# Patient Record
Sex: MALE | Race: WHITE | NOT HISPANIC OR LATINO | Employment: OTHER | URBAN - NONMETROPOLITAN AREA
[De-identification: names, ages, dates, MRNs, and addresses within clinical notes are randomized per-mention and may not be internally consistent; named-entity substitution may affect disease eponyms.]

---

## 2017-03-29 ENCOUNTER — CONSULT (OUTPATIENT)
Dept: ONCOLOGY | Facility: CLINIC | Age: 82
End: 2017-03-29

## 2017-03-29 VITALS
DIASTOLIC BLOOD PRESSURE: 54 MMHG | WEIGHT: 155 LBS | HEART RATE: 61 BPM | SYSTOLIC BLOOD PRESSURE: 144 MMHG | RESPIRATION RATE: 16 BRPM | HEIGHT: 72 IN | TEMPERATURE: 98.5 F | BODY MASS INDEX: 20.99 KG/M2

## 2017-03-29 DIAGNOSIS — D64.9 NORMOCYTIC ANEMIA: Primary | ICD-10-CM

## 2017-03-29 PROCEDURE — 99203 OFFICE O/P NEW LOW 30 MIN: CPT | Performed by: INTERNAL MEDICINE

## 2017-03-29 RX ORDER — AMLODIPINE BESYLATE 10 MG/1
10 TABLET ORAL
COMMUNITY
Start: 2014-10-27 | End: 2017-10-11

## 2017-03-29 RX ORDER — ASPIRIN 81 MG/1
TABLET, CHEWABLE ORAL
COMMUNITY
End: 2018-02-06 | Stop reason: SDUPTHER

## 2017-03-29 RX ORDER — SIMVASTATIN 40 MG
40 TABLET ORAL
COMMUNITY
Start: 2014-10-22 | End: 2020-06-29 | Stop reason: SDUPTHER

## 2017-03-29 RX ORDER — PANTOPRAZOLE SODIUM 20 MG/1
TABLET, DELAYED RELEASE ORAL
COMMUNITY
End: 2017-10-11

## 2017-03-29 RX ORDER — FLUTICASONE PROPIONATE 50 MCG
2 SPRAY, SUSPENSION (ML) NASAL
COMMUNITY
Start: 2016-10-13

## 2017-03-29 RX ORDER — MONTELUKAST SODIUM 10 MG/1
10 TABLET ORAL
COMMUNITY
Start: 2016-10-13 | End: 2022-05-10

## 2017-03-29 RX ORDER — NEBIVOLOL 10 MG/1
10 TABLET ORAL
COMMUNITY
Start: 2014-10-27 | End: 2017-10-11

## 2017-03-30 NOTE — PROGRESS NOTES
ID: 81 y.o. year old male from [place] [address]    PCP: LINDA Nuñez    REFERRING PHYSICIAN: Ms. Hanley    Reason for Consultation: Worsening Anemia    Dear Ms. Hanley    It is a pleasure to meet Mr. Trotter today.  As you know he is a very pleasant 81-year-old gentleman who I'm seeing in consultation for worsening normocytic anemia.  He reports that his hemoglobin was around 13 a few months ago and now it's down to 11.  In that.  He's had bleeding AVM and this colonoscopy.  He also has been having trouble swallowing for which she is undergoing EGD and possible dilation of the stricture.  He is also on chronic PPI use.  He does take an iron supplementation.  Clinically he does have some fatigue but overall he seems to be in reasonable health.            Past Medical History:   Diagnosis Date   • Basal cell carcinoma 2015   • COPD (chronic obstructive pulmonary disease)    • Diabetes mellitus    • Hypertension    • Melanoma 2012       History reviewed. No pertinent surgical history.    Social History     Social History   • Marital status:      Spouse name: N/A   • Number of children: N/A   • Years of education: N/A     Social History Main Topics   • Smoking status: Former Smoker     Quit date: 1961   • Smokeless tobacco: None   • Alcohol use None   • Drug use: None   • Sexual activity: Not Asked     Other Topics Concern   • None     Social History Narrative   • None       Family History   Problem Relation Age of Onset   • Diabetes Mother    • Stroke Mother    • Breast cancer Sister        Review of Systems:    16 point review of systems was performed and reviewed and scanned into the EMR      Current Outpatient Prescriptions:   •  albuterol (PROVENTIL) (5 MG/ML) 0.5% nebulizer solution, Inhale 2.5 mg Every 4 (Four) Hours., Disp: , Rfl:   •  amLODIPine (NORVASC) 10 MG tablet, Take 10 mg by mouth., Disp: , Rfl:   •  fluticasone (FLONASE) 50 MCG/ACT nasal spray, 2 sprays into each nostril., Disp:  , Rfl:   •  montelukast (SINGULAIR) 10 MG tablet, Take 10 mg by mouth., Disp: , Rfl:   •  nebivolol (BYSTOLIC) 10 MG tablet, Take 10 mg by mouth., Disp: , Rfl:   •  simvastatin (ZOCOR) 40 MG tablet, Take 40 mg by mouth., Disp: , Rfl:   •  sitaGLIPtin-metFORMIN (JANUMET)  MG per tablet, Take  by mouth., Disp: , Rfl:   •  aspirin 81 MG chewable tablet, Chew., Disp: , Rfl:   •  Multiple Vitamin (MULTIVITAMINS PO), Take  by mouth., Disp: , Rfl:   •  pantoprazole (PROTONIX) 20 MG EC tablet, Take  by mouth., Disp: , Rfl:     Allergies   Allergen Reactions   • Penicillins Swelling   • Sulfa Antibiotics Swelling       ECOG SCORE: 0    Objective     Vitals:    03/29/17 1403   BP: 144/54   Pulse: 61   Resp: 16   Temp: 98.5 °F (36.9 °C)       Physical Exam   Constitutional: He is oriented to person, place, and time. He appears well-developed and well-nourished.   HENT:   Head: Normocephalic.   Right Ear: External ear normal.   Left Ear: External ear normal.   Mouth/Throat: Oropharynx is clear and moist.   Eyes: EOM are normal. Pupils are equal, round, and reactive to light.   Neck: Normal range of motion.   Cardiovascular: Normal rate and regular rhythm.    Pulmonary/Chest: Effort normal.   Abdominal: Soft.   Musculoskeletal: Normal range of motion.   Neurological: He is alert and oriented to person, place, and time.   Skin: Skin is warm and dry.   Psychiatric: He has a normal mood and affect. His behavior is normal. Judgment and thought content normal.     Hemoglobin of 11.3 with an MCV of 93    Ferritin of 40    ASSESSMENT:  81-year-old gentleman with worsening anemia.  This appears to be an anemia of chronic disease.  I suspect there is a component related to his current difficulty eating.  I want to see how he does after his esophageal dilation.  At his age the possibility of myelodysplasia is also possible.  Anemia of age is also in the differential.  I think it's reasonable to watch and see if he improves in the  next month.  I've also recommended vitamin C with his iron to help with absorption.  Although I am less convinced that this is a deficiency. I will see him in 4 weeks after labs.      Thank you for allowing me to participate in the care of this patient.    Yours sincerely,    Delgado Larios MD  [facility]  Hematology and Oncology        Please note that portions of this note may have been completed with a voice recognition program. Efforts were made to edit the dictations, but occasionally words are mistranscribed.

## 2017-05-01 ENCOUNTER — RESULTS ENCOUNTER (OUTPATIENT)
Dept: ONCOLOGY | Facility: CLINIC | Age: 82
End: 2017-05-01

## 2017-05-01 DIAGNOSIS — D64.9 NORMOCYTIC ANEMIA: ICD-10-CM

## 2017-05-10 ENCOUNTER — OFFICE VISIT (OUTPATIENT)
Dept: ONCOLOGY | Facility: CLINIC | Age: 82
End: 2017-05-10

## 2017-05-10 VITALS
DIASTOLIC BLOOD PRESSURE: 51 MMHG | WEIGHT: 154 LBS | TEMPERATURE: 98 F | HEIGHT: 72 IN | SYSTOLIC BLOOD PRESSURE: 140 MMHG | BODY MASS INDEX: 20.86 KG/M2 | HEART RATE: 64 BPM | RESPIRATION RATE: 18 BRPM

## 2017-05-10 DIAGNOSIS — D64.9 NORMOCYTIC ANEMIA: Primary | ICD-10-CM

## 2017-05-10 PROCEDURE — 99213 OFFICE O/P EST LOW 20 MIN: CPT | Performed by: INTERNAL MEDICINE

## 2017-10-11 ENCOUNTER — RESULTS ENCOUNTER (OUTPATIENT)
Dept: ONCOLOGY | Facility: CLINIC | Age: 82
End: 2017-10-11

## 2017-10-11 ENCOUNTER — OFFICE VISIT (OUTPATIENT)
Dept: ONCOLOGY | Facility: CLINIC | Age: 82
End: 2017-10-11

## 2017-10-11 VITALS
WEIGHT: 152 LBS | RESPIRATION RATE: 16 BRPM | TEMPERATURE: 99.4 F | BODY MASS INDEX: 20.59 KG/M2 | DIASTOLIC BLOOD PRESSURE: 70 MMHG | HEART RATE: 70 BPM | HEIGHT: 72 IN | SYSTOLIC BLOOD PRESSURE: 142 MMHG

## 2017-10-11 DIAGNOSIS — D64.9 NORMOCYTIC ANEMIA: ICD-10-CM

## 2017-10-11 DIAGNOSIS — D64.9 NORMOCYTIC ANEMIA: Primary | ICD-10-CM

## 2017-10-11 PROCEDURE — 99213 OFFICE O/P EST LOW 20 MIN: CPT | Performed by: INTERNAL MEDICINE

## 2017-10-11 RX ORDER — TAMSULOSIN HYDROCHLORIDE 0.4 MG/1
1 CAPSULE ORAL DAILY
COMMUNITY
Start: 2017-08-02 | End: 2022-05-10

## 2017-10-11 RX ORDER — PANTOPRAZOLE SODIUM 40 MG/1
40 TABLET, DELAYED RELEASE ORAL DAILY
COMMUNITY
Start: 2017-07-16 | End: 2020-06-29

## 2017-10-11 RX ORDER — NEBIVOLOL HYDROCHLORIDE 20 MG/1
TABLET ORAL
COMMUNITY
Start: 2017-07-06 | End: 2018-02-06

## 2017-10-16 LAB
BASOPHILS # BLD AUTO: 0 X10E3/UL (ref 0–0.2)
BASOPHILS NFR BLD AUTO: 0 %
EOSINOPHIL # BLD AUTO: 0.2 X10E3/UL (ref 0–0.4)
EOSINOPHIL NFR BLD AUTO: 2 %
ERYTHROCYTE [DISTWIDTH] IN BLOOD BY AUTOMATED COUNT: 14.4 % (ref 12.3–15.4)
FERRITIN SERPL-MCNC: 87 NG/ML (ref 30–400)
HCT VFR BLD AUTO: 34 % (ref 37.5–51)
HGB BLD-MCNC: 11.4 G/DL (ref 12.6–17.7)
IMM GRANULOCYTES # BLD: 0 X10E3/UL (ref 0–0.1)
IMM GRANULOCYTES NFR BLD: 0 %
LYMPHOCYTES # BLD AUTO: 1.9 X10E3/UL (ref 0.7–3.1)
LYMPHOCYTES NFR BLD AUTO: 21 %
MCH RBC QN AUTO: 29.8 PG (ref 26.6–33)
MCHC RBC AUTO-ENTMCNC: 33.5 G/DL (ref 31.5–35.7)
MCV RBC AUTO: 89 FL (ref 79–97)
METHYLMALONATE SERPL-SCNC: 176 NMOL/L (ref 0–378)
MONOCYTES # BLD AUTO: 0.3 X10E3/UL (ref 0.1–0.9)
MONOCYTES NFR BLD AUTO: 4 %
NEUTROPHILS # BLD AUTO: 6.9 X10E3/UL (ref 1.4–7)
NEUTROPHILS NFR BLD AUTO: 73 %
PLATELET # BLD AUTO: 286 X10E3/UL (ref 150–379)
RBC # BLD AUTO: 3.83 X10E6/UL (ref 4.14–5.8)
TESTOST FREE SERPL-MCNC: 2.5 PG/ML (ref 6.6–18.1)
TESTOST SERPL-MCNC: 214 NG/DL (ref 264–916)
WBC # BLD AUTO: 9.5 X10E3/UL (ref 3.4–10.8)

## 2018-02-06 ENCOUNTER — OFFICE VISIT (OUTPATIENT)
Dept: PULMONOLOGY | Facility: CLINIC | Age: 83
End: 2018-02-06

## 2018-02-06 ENCOUNTER — DOCUMENTATION (OUTPATIENT)
Dept: OTHER | Facility: HOSPITAL | Age: 83
End: 2018-02-06

## 2018-02-06 VITALS
HEIGHT: 71 IN | HEART RATE: 76 BPM | DIASTOLIC BLOOD PRESSURE: 60 MMHG | WEIGHT: 152 LBS | TEMPERATURE: 98.1 F | OXYGEN SATURATION: 98 % | BODY MASS INDEX: 21.28 KG/M2 | RESPIRATION RATE: 18 BRPM | SYSTOLIC BLOOD PRESSURE: 130 MMHG

## 2018-02-06 DIAGNOSIS — R06.02 SHORTNESS OF BREATH: Primary | ICD-10-CM

## 2018-02-06 DIAGNOSIS — J44.9 CHRONIC OBSTRUCTIVE PULMONARY DISEASE, UNSPECIFIED COPD TYPE (HCC): ICD-10-CM

## 2018-02-06 DIAGNOSIS — J18.9 RECURRENT PNEUMONIA: ICD-10-CM

## 2018-02-06 DIAGNOSIS — J47.9 BRONCHIECTASIS WITHOUT COMPLICATION (HCC): ICD-10-CM

## 2018-02-06 DIAGNOSIS — R91.8 MASS OF LOWER LOBE OF LEFT LUNG: ICD-10-CM

## 2018-02-06 PROBLEM — K21.9 GERD (GASTROESOPHAGEAL REFLUX DISEASE): Status: ACTIVE | Noted: 2018-02-06

## 2018-02-06 PROCEDURE — 94726 PLETHYSMOGRAPHY LUNG VOLUMES: CPT | Performed by: INTERNAL MEDICINE

## 2018-02-06 PROCEDURE — 94060 EVALUATION OF WHEEZING: CPT | Performed by: INTERNAL MEDICINE

## 2018-02-06 PROCEDURE — 94729 DIFFUSING CAPACITY: CPT | Performed by: INTERNAL MEDICINE

## 2018-02-06 PROCEDURE — 99204 OFFICE O/P NEW MOD 45 MIN: CPT | Performed by: INTERNAL MEDICINE

## 2018-02-06 RX ORDER — SIMVASTATIN 40 MG
TABLET ORAL
COMMUNITY
Start: 2017-05-18 | End: 2022-05-02

## 2018-02-06 RX ORDER — FERROUS SULFATE 325(65) MG
325 TABLET ORAL
COMMUNITY

## 2018-02-06 RX ORDER — LEVOCETIRIZINE DIHYDROCHLORIDE 5 MG/1
5 TABLET, FILM COATED ORAL EVERY EVENING
COMMUNITY
Start: 2018-02-01 | End: 2020-06-12 | Stop reason: SDUPTHER

## 2018-02-06 RX ORDER — NEBIVOLOL 10 MG/1
TABLET ORAL
COMMUNITY
Start: 2016-11-15 | End: 2022-09-01 | Stop reason: SDUPTHER

## 2018-02-06 RX ORDER — FLUTICASONE PROPIONATE 50 MCG
2 SPRAY, SUSPENSION (ML) NASAL
COMMUNITY
Start: 2016-09-28 | End: 2018-04-18 | Stop reason: SDUPTHER

## 2018-02-06 RX ORDER — LEVALBUTEROL TARTRATE 45 UG/1
3 AEROSOL, METERED ORAL ONCE
Status: COMPLETED | OUTPATIENT
Start: 2018-02-06 | End: 2018-02-06

## 2018-02-06 RX ORDER — AMLODIPINE BESYLATE 10 MG/1
TABLET ORAL
COMMUNITY
Start: 2017-11-07 | End: 2022-05-02 | Stop reason: SDUPTHER

## 2018-02-06 RX ORDER — METOPROLOL SUCCINATE 50 MG/1
50 TABLET, EXTENDED RELEASE ORAL DAILY
COMMUNITY
Start: 2018-01-25 | End: 2020-06-29 | Stop reason: ALTCHOICE

## 2018-02-06 RX ADMIN — LEVALBUTEROL TARTRATE 3 PUFF: 45 AEROSOL, METERED ORAL at 12:25

## 2018-02-06 NOTE — PROGRESS NOTES
Complete PFT with Bronchodilator. Three puffs of Xopenex was given. Patient tolerated procedure well.

## 2018-02-06 NOTE — PROGRESS NOTES
Met patient, wife and daughter in lung nodule clinic with Dr. Herbert. Patient reports smoking cessation [id number] ago. He worked for [employer]. Indicates he has had pneumonia annually the last x3-4years. Last treatment for pneumonia was in November 2017. At that time he c/o fatigue and dry cough. Continues to have cough but denies production, SOA or pain. Scans and PFT's reviewed. Per Dr. Herbert repeat CT in April with f/u OV. Introduced lung navigator role and provided contact information. He v/u and agreeable to plan. He knows to call with questions or concerns.

## 2018-02-06 NOTE — PROGRESS NOTES
"Sorts Subjective:     Chief Complaint:   Chief Complaint   Patient presents with   • Lung Nodule       HPI:    Ricki Trotter is a 82 y.o. male seen in consultation at the request of Stacey MCKEON for evaluation of a lung nodule.    He has a history of recurrent \"pneumonia\".  He describes yearly episodes of increased cough and fatigue.  He typically does not have fever, sputum production, chest pain, or hemoptysis with these episodes.  He had an episode starting in November which has persisted longer than usual and has had more cough than usual.  He underwent a chest x-ray which revealed possible hilar prominence and he underwent a follow-up CAT scan of the chest last month which revealed a 2.6 x 3.6 cm masslike density in the left lower lobe associated with bronchiectasis and pleural scarring.  He is referred for further evaluation.  Also noted was granulomatous disease with partially calcified adenopathy in the hilar regions and some reticulonodular densities in the upper lobes most consistent with scarring    He has a history of COPD.  He does use albuterol on an as-needed basis.  He quit smoking in 1961.  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    He has a history of negative TB skin tests and has never been told he had histoplasmosis.    He has a history of a bronchoscopy in 1989 for unclear reasons.  He has not had any recent CAT scans though I think he had an evaluation several years ago in [place] for some type of lung issue.  He does follow with Dr. Gonzalez for normocytic anemia which has been attributed to age versus low-grade MDS and has esophageal stricture and recently underwent dilation.        Current medications are:   Current Outpatient Prescriptions:   •  amLODIPine (NORVASC) 10 MG tablet, TAKE 1 TABLET DAILY, Disp: , Rfl:   •  aspirin 81 MG tablet, Take  by mouth., Disp: , Rfl:   •  ferrous sulfate 325 (65 FE) MG " tablet, Take 325 mg by mouth Daily With Breakfast., Disp: , Rfl:   •  fluticasone (FLONASE) 50 MCG/ACT nasal spray, 2 sprays into each nostril., Disp: , Rfl:   •  fluticasone (FLONASE) 50 MCG/ACT nasal spray, 2 sprays into each nostril., Disp: , Rfl:   •  levocetirizine (XYZAL) 5 MG tablet, Take 5 mg by mouth Every Evening., Disp: , Rfl:   •  metoprolol succinate XL (TOPROL-XL) 50 MG 24 hr tablet, Take 50 mg by mouth Daily., Disp: , Rfl:   •  montelukast (SINGULAIR) 10 MG tablet, Take 10 mg by mouth., Disp: , Rfl:   •  Multiple Vitamin (MULTIVITAMINS PO), Take  by mouth., Disp: , Rfl:   •  Multiple Vitamins-Minerals (MULTIVITAMIN ADULT PO), Take 1 tablet by mouth., Disp: , Rfl:   •  nebivolol (BYSTOLIC) 10 MG tablet, TAKE 1 TABLET DAILY, Disp: , Rfl:   •  pantoprazole (PROTONIX) 40 MG EC tablet, Take 40 mg by mouth Daily., Disp: , Rfl:   •  simvastatin (ZOCOR) 40 MG tablet, Take 40 mg by mouth., Disp: , Rfl:   •  simvastatin (ZOCOR) 40 MG tablet, TAKE 1 TABLET NIGHTLY, Disp: , Rfl:   •  sitaGLIPtin-metFORMIN (JANUMET)  MG per tablet, Take  by mouth., Disp: , Rfl:   •  tamsulosin (FLOMAX) 0.4 MG capsule 24 hr capsule, 1 capsule Daily., Disp: , Rfl:   •  albuterol (PROVENTIL) (5 MG/ML) 0.5% nebulizer solution, Inhale 2.5 mg., Disp: , Rfl:   No current facility-administered medications for this visit. .      The patient's relevant past medical, surgical, family and social history were reviewed and updated in Epic as appropriate.     ROS:    Review of Systems  ROS documented in patient questionnaire ×12 systems.  Reviewed with patient.  Otherwise negative except as noted in HPI.    Objective:    Physical Exam   Constitutional: He is oriented to person, place, and time. He appears well-developed and well-nourished.   HENT:   Head: Normocephalic and atraumatic.   Nose: Nose normal.   Mouth/Throat: Oropharynx is clear and moist. No oropharyngeal exudate.   Eyes: Conjunctivae are normal. No scleral icterus.   Neck:  "No tracheal deviation present. No thyromegaly present.   Cardiovascular: Normal rate and regular rhythm.  Exam reveals no gallop and no friction rub.    No murmur heard.  Pulmonary/Chest: Effort normal. No respiratory distress. He has no wheezes. He has no rales.   LLL rub   Musculoskeletal: He exhibits no edema or deformity.   Neurological: He is alert and oriented to person, place, and time.   Skin: Skin is warm and dry. No rash noted.   Psychiatric: He has a normal mood and affect. His behavior is normal.   Nursing note and vitals reviewed.      Diagnostics:     Reviewed CT images and report from [facility].  They are as described below.    PFTs: Mild obstruction.  Mild restriction.  Normal diffusion.    Assessment:    Problem List Items Addressed This Visit        Pulmonary Problems    Mass of lower lobe of left lung    Overview     Probable inflammation associated with focal bronchiectasis         Relevant Orders    CT Chest Without Contrast    COPD (chronic obstructive pulmonary disease)    Relevant Medications    fluticasone (FLONASE) 50 MCG/ACT nasal spray    levocetirizine (XYZAL) 5 MG tablet      Other Visit Diagnoses     Shortness of breath    -  Primary    Relevant Medications    levalbuterol (XOPENEX HFA) inhaler 3 puff (Completed)    Other Relevant Orders    Pulmonary Function Test (Completed)          He has a density in the left lower lobe which measures 2.6 x 3.6 cm.  It is associated with bronchiectasis and pleural scarring as well as possibly a very small amount of pleural fluid.  He does have a rub on exam in the same area.  He has old granulomatous disease involving the roger and upper lobes.  The left lower lobe density is likely inflammatory and may be the source of his recurrent \"pneumonia\" however the symptoms are primarily those of a yearly episode of increased cough and fatigue.  He has never required hospitalization.    I would proceed in a conservative manner as I think malignancy is " unlikely.  Furthermore, he is symptomatically improving.  I would simply repeat the imaging in 3 months.  If there is improvement then would probably discontinue follow-up.  If there is stability then would continue follow-up for 2 years.  If there is worsening in the appearance of the imaging then would proceed with bronchoscopy and further workup.    Plan:     1. As above, follow-up CT scan in 3 months  2. Discussed all the above in detail with the patient and his family and all questions answered.  If the left lower lobe bronchiectasis is the source of the recurrent infection, given his age and the non-severity of the symptoms, I would not consider surgical resection for prevention of infection.  3. Gave him some recommendations on dust mitigation in the home as he clearly has indoor allergen type symptoms.  4. Follow-up with me after his follow-up CAT scan      Signed by  Gregorio Herbert MD

## 2018-04-03 ENCOUNTER — TRANSCRIBE ORDERS (OUTPATIENT)
Dept: PULMONOLOGY | Facility: CLINIC | Age: 83
End: 2018-04-03

## 2018-04-09 ENCOUNTER — RESULTS ENCOUNTER (OUTPATIENT)
Dept: ONCOLOGY | Facility: CLINIC | Age: 83
End: 2018-04-09

## 2018-04-09 DIAGNOSIS — D64.9 NORMOCYTIC ANEMIA: ICD-10-CM

## 2018-04-10 ENCOUNTER — TELEPHONE (OUTPATIENT)
Dept: OTHER | Facility: HOSPITAL | Age: 83
End: 2018-04-10

## 2018-04-10 DIAGNOSIS — R91.8 MASS OF LOWER LOBE OF LEFT LUNG: ICD-10-CM

## 2018-04-10 NOTE — TELEPHONE ENCOUNTER
Daughter returned VM left 04/09/2018. Lung navigator inquired if patient had CT chest done at outside facility prior to OV with Dr. Herbert 04/18/2018. Daughter confirmed he had CT chest at [facility] 04/09/2018. Requested they bring disc to appointment, she v/u and will obtain prior to OV. She knows to call with questions or concerns.

## 2018-04-18 ENCOUNTER — OFFICE VISIT (OUTPATIENT)
Dept: PULMONOLOGY | Facility: CLINIC | Age: 83
End: 2018-04-18

## 2018-04-18 VITALS
HEART RATE: 67 BPM | OXYGEN SATURATION: 96 % | DIASTOLIC BLOOD PRESSURE: 66 MMHG | RESPIRATION RATE: 16 BRPM | BODY MASS INDEX: 21.47 KG/M2 | TEMPERATURE: 98.6 F | SYSTOLIC BLOOD PRESSURE: 154 MMHG | WEIGHT: 153.38 LBS | HEIGHT: 71 IN

## 2018-04-18 DIAGNOSIS — J18.9 RECURRENT PNEUMONIA: ICD-10-CM

## 2018-04-18 DIAGNOSIS — R91.8 MASS OF LOWER LOBE OF LEFT LUNG: ICD-10-CM

## 2018-04-18 DIAGNOSIS — J44.9 CHRONIC OBSTRUCTIVE PULMONARY DISEASE, UNSPECIFIED COPD TYPE (HCC): ICD-10-CM

## 2018-04-18 DIAGNOSIS — J47.9 BRONCHIECTASIS WITHOUT COMPLICATION (HCC): Primary | ICD-10-CM

## 2018-04-18 PROCEDURE — 99214 OFFICE O/P EST MOD 30 MIN: CPT | Performed by: INTERNAL MEDICINE

## 2018-04-18 RX ORDER — CIPROFLOXACIN HYDROCHLORIDE 3.5 MG/ML
2 SOLUTION/ DROPS TOPICAL
COMMUNITY
Start: 2018-04-06 | End: 2018-12-12

## 2018-04-18 RX ORDER — PREDNISOLONE ACETATE 10 MG/ML
2 SUSPENSION/ DROPS OPHTHALMIC 4 TIMES DAILY
COMMUNITY
Start: 2018-04-13 | End: 2018-12-12

## 2018-04-18 NOTE — PROGRESS NOTES
"Subjective:     Chief Complaint:   Chief Complaint   Patient presents with   • Shortness of Breath     f/u       HPI:    Ricki Trotter is a 82 y.o. male here for follow-up of an abnormal CAT scan.    He has a history of recurrent \"pneumonia\".  He describes yearly episodes of increased cough and fatigue.  He typically does not have fever, sputum production, chest pain, or hemoptysis with these episodes.  He had an episode starting in November 2017 which persisted longer than usual and had more cough than usual.  He underwent a chest x-ray which revealed possible hilar prominence and he underwent a follow-up CAT scan of the chest which revealed a 2.6 x 3.6 cm masslike density in the left lower lobe associated with bronchiectasis and pleural scarring.  He was referred for further evaluation.  Also noted was granulomatous disease with partially calcified adenopathy in the hilar regions and some reticulonodular densities in the upper lobes most consistent with scarring     He has a history of COPD.  He quit smoking in 1961.  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     He has a history of negative TB skin tests and has never been told he had histoplasmosis.     He has a history of a bronchoscopy in 1989 for unclear reasons.  He had an evaluation several years ago in [place] for some type of lung issue.  He does follow with Dr. Gonzalez for normocytic anemia which has been attributed to age versus low-grade MDS and has esophageal stricture and recently underwent dilation.    Since I saw him in February he has improved symptomatically.  He denies any chest pain, cough, fever, or sputum production.  He is not that short of breath with exertion.  He had a follow-up CAT scan this month which revealed no change over February.  There is a 2.9 cm density in the left lower lobe associated with bronchiectasis and associated pleural thickening as well as a " small spiculated right upper lobe nodule which is stable.    Current medications are:   Current Outpatient Prescriptions:   •  albuterol (PROVENTIL) (5 MG/ML) 0.5% nebulizer solution, Inhale 2.5 mg., Disp: , Rfl:   •  amLODIPine (NORVASC) 10 MG tablet, TAKE 1 TABLET DAILY, Disp: , Rfl:   •  aspirin 81 MG tablet, Take  by mouth., Disp: , Rfl:   •  ciprofloxacin (CILOXAN) 0.3 % ophthalmic solution, Administer 2 drops to both eyes Every 2 (Two) Hours., Disp: , Rfl:   •  ferrous sulfate 325 (65 FE) MG tablet, Take 325 mg by mouth Daily With Breakfast., Disp: , Rfl:   •  fluticasone (FLONASE) 50 MCG/ACT nasal spray, 2 sprays into each nostril., Disp: , Rfl:   •  levocetirizine (XYZAL) 5 MG tablet, Take 5 mg by mouth Every Evening., Disp: , Rfl:   •  metoprolol succinate XL (TOPROL-XL) 50 MG 24 hr tablet, Take 50 mg by mouth Daily., Disp: , Rfl:   •  montelukast (SINGULAIR) 10 MG tablet, Take 10 mg by mouth., Disp: , Rfl:   •  Multiple Vitamin (MULTIVITAMINS PO), Take  by mouth., Disp: , Rfl:   •  Multiple Vitamins-Minerals (MULTIVITAMIN ADULT PO), Take 1 tablet by mouth., Disp: , Rfl:   •  nebivolol (BYSTOLIC) 10 MG tablet, TAKE 1 TABLET DAILY, Disp: , Rfl:   •  pantoprazole (PROTONIX) 40 MG EC tablet, Take 40 mg by mouth Daily., Disp: , Rfl:   •  prednisoLONE acetate (PRED FORTE) 1 % ophthalmic suspension, Administer 2 drops to both eyes 4 (Four) Times a Day., Disp: , Rfl:   •  simvastatin (ZOCOR) 40 MG tablet, Take 40 mg by mouth., Disp: , Rfl:   •  simvastatin (ZOCOR) 40 MG tablet, TAKE 1 TABLET NIGHTLY, Disp: , Rfl:   •  sitaGLIPtin-metFORMIN (JANUMET)  MG per tablet, Take  by mouth., Disp: , Rfl:   •  tamsulosin (FLOMAX) 0.4 MG capsule 24 hr capsule, 1 capsule Daily., Disp: , Rfl: .      The patient's relevant past medical, surgical, family and social history were reviewed and updated in Epic as appropriate.     ROS:    Review of Systems   Constitutional: Positive for fatigue. Negative for fever.    Respiratory: Negative for cough and shortness of breath.          Objective:    Physical Exam   Constitutional: He is oriented to person, place, and time. He appears well-developed and well-nourished.   HENT:   Head: Normocephalic and atraumatic.   Mouth/Throat: Oropharynx is clear and moist.   Neck: Neck supple. No thyromegaly present.   Cardiovascular: Normal rate and regular rhythm.  Exam reveals no gallop and no friction rub.    No murmur heard.  Pulmonary/Chest: Effort normal. No respiratory distress. He has no wheezes. He has no rales.   Left lower lobe pleural rub   Musculoskeletal: He exhibits no edema.   Neurological: He is alert and oriented to person, place, and time.   Skin: Skin is warm and dry.   Psychiatric: He has a normal mood and affect. His behavior is normal.   Vitals reviewed.      Diagnostics:     CT images were reviewed with the patient and his wife.  They're unchanged compared to February and are as described above.    Assessment:    Problem List Items Addressed This Visit        Pulmonary Problems    Mass of lower lobe of left lung    Overview     1. Probable inflammation associated with focal bronchiectasis  2. Stable CT scan         COPD (chronic obstructive pulmonary disease)    Recurrent pneumonia    Bronchiectasis without complication - Primary      Other Visit Diagnoses    None.         I continue to think the density in the left lower lobe is postinflammatory.  It has a very oblong shape and is associated with bronchiectasis and scarring which extends at the pleural surface.  I don't think there is any free flowing pleural fluid but the density in the pleural space appears to be some pleural thickening.  I think I may even detect some small calcifications in the pleural space.  I suspect this is a postinflammatory or postinfectious process.  He said he was told he had something in his left lung in the late 80s.  He also has a stable right upper lobe nodule which also appears to be  attached to linear densities consistent with scarring.  He does have a pleural rub which persists in the left lower lobe.  He does have a history of asbestos exposure.    Plan:     1. As he is asymptomatic I think we need to continue follow-up of the CT scan which is abnormal but stable.  I think would be prudent to perform a follow-up scan in the fall which will be 6 months from now.  He wanted to delay that further but at least early on I want to closely follow this area until I get better assurance that it is absolutely stable.  2. Continue Singulair and Flonase for his sinus congestion  3. Continue albuterol on an as-needed basis.  4. Follow-up with me after the above CAT scan or earlier for any new chest symptoms.    Discussed in detail with the patient.  He will call prior to his follow up visit for any new problems.    Signed by  Gregorio Herbert MD

## 2018-04-19 DIAGNOSIS — R91.1 NODULE OF LEFT LUNG: Primary | ICD-10-CM

## 2018-10-08 ENCOUNTER — TRANSCRIBE ORDERS (OUTPATIENT)
Dept: PULMONOLOGY | Facility: CLINIC | Age: 83
End: 2018-10-08

## 2018-10-15 DIAGNOSIS — R91.1 NODULE OF LEFT LUNG: ICD-10-CM

## 2018-12-12 ENCOUNTER — OFFICE VISIT (OUTPATIENT)
Dept: PULMONOLOGY | Facility: CLINIC | Age: 83
End: 2018-12-12

## 2018-12-12 VITALS
OXYGEN SATURATION: 98 % | SYSTOLIC BLOOD PRESSURE: 132 MMHG | WEIGHT: 158.13 LBS | BODY MASS INDEX: 21.42 KG/M2 | HEART RATE: 56 BPM | RESPIRATION RATE: 16 BRPM | TEMPERATURE: 97.8 F | DIASTOLIC BLOOD PRESSURE: 60 MMHG | HEIGHT: 72 IN

## 2018-12-12 DIAGNOSIS — J44.9 CHRONIC OBSTRUCTIVE PULMONARY DISEASE, UNSPECIFIED COPD TYPE (HCC): ICD-10-CM

## 2018-12-12 DIAGNOSIS — J18.9 RECURRENT PNEUMONIA: ICD-10-CM

## 2018-12-12 DIAGNOSIS — R91.8 MASS OF LOWER LOBE OF LEFT LUNG: Primary | ICD-10-CM

## 2018-12-12 PROCEDURE — 99214 OFFICE O/P EST MOD 30 MIN: CPT | Performed by: INTERNAL MEDICINE

## 2018-12-12 RX ORDER — SUCRALFATE 1 G/1
1 TABLET ORAL 2 TIMES DAILY
COMMUNITY
Start: 2018-10-29 | End: 2022-09-21

## 2018-12-12 RX ORDER — SITAGLIPTIN 100 MG/1
100 TABLET, FILM COATED ORAL DAILY
COMMUNITY
Start: 2018-11-01 | End: 2022-06-16

## 2018-12-12 NOTE — PROGRESS NOTES
"Subjective:     Chief Complaint:   Chief Complaint   Patient presents with   • Post CT     f/u       HPI:    Ricki Trotter is a 83 y.o. male here for follow-up of abnormal CAT scan    He has a history of recurrent \"pneumonia\".  He describes yearly episodes of increased cough and fatigue.  He typically does not have fever, sputum production, chest pain, or hemoptysis with these episodes.  He had an episode starting in November 2017 which persisted longer than usual and had more cough than usual.  He underwent a chest x-ray which revealed possible hilar prominence and he underwent a follow-up CAT scan of the chest which revealed a 2.6 x 3.6 cm masslike density in the left lower lobe associated with bronchiectasis and pleural scarring.  He was referred for further evaluation.  Also noted was granulomatous disease with partially calcified adenopathy in the hilar regions and some reticulonodular densities in the upper lobes most consistent with scarring     He has a history of COPD.  He quit smoking in 1961.  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     He has a history of negative TB skin tests and has never been told he had histoplasmosis.     He has a history of a bronchoscopy in 1989 for unclear reasons.  He had an evaluation several years ago in [place] for some type of lung issue.  He does follow with Dr. Gonzalez for normocytic anemia which has been attributed to age versus low-grade MDS and has esophageal stricture and recently underwent dilation.    He had a follow-up CAT scan in October.  This revealed complete stability of the masslike density in the left lower lobe associated with bronchiectasis as well as a smaller right upper lobe nodule associated with scarring.  Radiologist felt, as I did, that this is postinflammatory but recommended possible follow-up in a year    From the patient's standpoint he notes no acute pulmonary " symptoms.    Further details:    General symptoms:  - no fever  - weight stable  - no edema    Chest symptoms:  - no chest pain  - normal/appropriate cough    Sputum:  - normal  - denies hemoptysis    Upper airway:  - no sinus congestion or drainage    Reflux symptoms:  - reflux symptoms controlled with PPI    Exercise tolerance:  - dyspnea with moderate exertion    Recent imaging:  - CT of chest:  Stable findings compared to 6 months ago    Current medications are:   Current Outpatient Medications:   •  albuterol (PROVENTIL) (5 MG/ML) 0.5% nebulizer solution, Inhale 2.5 mg., Disp: , Rfl:   •  amLODIPine (NORVASC) 10 MG tablet, TAKE 1 TABLET DAILY, Disp: , Rfl:   •  aspirin 81 MG tablet, Take  by mouth., Disp: , Rfl:   •  ferrous sulfate 325 (65 FE) MG tablet, Take 325 mg by mouth Daily With Breakfast., Disp: , Rfl:   •  fluticasone (FLONASE) 50 MCG/ACT nasal spray, 2 sprays into each nostril., Disp: , Rfl:   •  levocetirizine (XYZAL) 5 MG tablet, Take 5 mg by mouth Every Evening., Disp: , Rfl:   •  metoprolol succinate XL (TOPROL-XL) 50 MG 24 hr tablet, Take 50 mg by mouth Daily., Disp: , Rfl:   •  montelukast (SINGULAIR) 10 MG tablet, Take 10 mg by mouth., Disp: , Rfl:   •  Multiple Vitamin (MULTIVITAMINS PO), Take  by mouth., Disp: , Rfl:   •  Multiple Vitamins-Minerals (MULTIVITAMIN ADULT PO), Take 1 tablet by mouth., Disp: , Rfl:   •  nebivolol (BYSTOLIC) 10 MG tablet, TAKE 1 TABLET DAILY, Disp: , Rfl:   •  pantoprazole (PROTONIX) 40 MG EC tablet, Take 40 mg by mouth Daily., Disp: , Rfl:   •  simvastatin (ZOCOR) 40 MG tablet, Take 40 mg by mouth., Disp: , Rfl:   •  simvastatin (ZOCOR) 40 MG tablet, TAKE 1 TABLET NIGHTLY, Disp: , Rfl:   •  tamsulosin (FLOMAX) 0.4 MG capsule 24 hr capsule, 1 capsule Daily., Disp: , Rfl:   •  JANUVIA 100 MG tablet, , Disp: , Rfl:   •  sucralfate (CARAFATE) 1 g tablet, , Disp: , Rfl: .      The patient's relevant past medical, surgical, family and social history were reviewed and  updated in Epic as appropriate.     ROS:    Review of Systems  ROS as documented in patient questionnaire unless as noted otherwise    Objective:    Physical Exam   Constitutional: He is oriented to person, place, and time. He appears well-developed and well-nourished.   HENT:   Head: Normocephalic and atraumatic.   Mouth/Throat: Oropharynx is clear and moist.   Neck: Neck supple. No thyromegaly present.   Cardiovascular: Normal rate and regular rhythm. Exam reveals no gallop and no friction rub.   No murmur heard.  Pulmonary/Chest: Effort normal. No respiratory distress. He has no wheezes. He has no rales.   Musculoskeletal: He exhibits no edema.   Neurological: He is alert and oriented to person, place, and time.   Skin: Skin is warm and dry.   Psychiatric: He has a normal mood and affect. His behavior is normal.   Vitals reviewed.      Diagnostics:     PFT:  - no additional    CXR:  - no additional    Assessment:    Problem List Items Addressed This Visit        Pulmonary Problems    Mass of lower lobe of left lung - Primary    Overview     1. Probable inflammation associated with focal bronchiectasis  2. Stable CT scan         COPD (chronic obstructive pulmonary disease) (CMS/HCC)    Recurrent pneumonia          Stable CT scan abnormalities.  Suspect these are postinflammatory associated with bronchiectasis.  Symptomatically he is stable.    Plan:     CT scan in one year  I will see him after the above unless he has new pulmonary symptoms before then.  Spirometry on return to clinic  Clinic follow-up  1 year    Discussed in detail with the patient.  He will call prior to his follow up visit for any new problems.    Signed by  Gregorio Herbert MD

## 2019-11-21 ENCOUNTER — TRANSCRIBE ORDERS (OUTPATIENT)
Dept: PULMONOLOGY | Facility: CLINIC | Age: 84
End: 2019-11-21

## 2019-11-27 DIAGNOSIS — R91.8 MASS OF LOWER LOBE OF LEFT LUNG: ICD-10-CM

## 2019-12-20 ENCOUNTER — OFFICE VISIT (OUTPATIENT)
Dept: PULMONOLOGY | Facility: CLINIC | Age: 84
End: 2019-12-20

## 2019-12-20 VITALS
SYSTOLIC BLOOD PRESSURE: 98 MMHG | TEMPERATURE: 98.7 F | OXYGEN SATURATION: 96 % | WEIGHT: 163 LBS | BODY MASS INDEX: 22.08 KG/M2 | HEART RATE: 72 BPM | HEIGHT: 72 IN | DIASTOLIC BLOOD PRESSURE: 50 MMHG

## 2019-12-20 DIAGNOSIS — J20.9 ACUTE BRONCHITIS, UNSPECIFIED ORGANISM: ICD-10-CM

## 2019-12-20 DIAGNOSIS — J47.9 BRONCHIECTASIS WITHOUT COMPLICATION (HCC): ICD-10-CM

## 2019-12-20 DIAGNOSIS — J44.9 CHRONIC OBSTRUCTIVE PULMONARY DISEASE, UNSPECIFIED COPD TYPE (HCC): ICD-10-CM

## 2019-12-20 DIAGNOSIS — R91.8 MASS OF LOWER LOBE OF LEFT LUNG: Primary | ICD-10-CM

## 2019-12-20 DIAGNOSIS — J18.9 RECURRENT PNEUMONIA: ICD-10-CM

## 2019-12-20 PROCEDURE — 94375 RESPIRATORY FLOW VOLUME LOOP: CPT | Performed by: INTERNAL MEDICINE

## 2019-12-20 PROCEDURE — 99214 OFFICE O/P EST MOD 30 MIN: CPT | Performed by: INTERNAL MEDICINE

## 2019-12-20 RX ORDER — PREDNISONE 10 MG/1
TABLET ORAL
Qty: 31 TABLET | Refills: 0 | Status: SHIPPED | OUTPATIENT
Start: 2019-12-20 | End: 2020-06-12

## 2019-12-20 RX ORDER — DOXYCYCLINE HYCLATE 100 MG/1
100 CAPSULE ORAL 2 TIMES DAILY
Qty: 20 CAPSULE | Refills: 0 | Status: SHIPPED | OUTPATIENT
Start: 2019-12-20 | End: 2020-06-12

## 2019-12-20 NOTE — PROGRESS NOTES
"Subjective:     Chief Complaint:   Chief Complaint   Patient presents with   • Shortness of Breath       HPI:    Ricki Trotter is a 84 y.o. male here for follow-up of abnormal CAT scan    He has a history of recurrent \"pneumonia\".  He describes yearly episodes of increased cough and fatigue.  He typically does not have fever, sputum production, chest pain, or hemoptysis with these episodes.  He had an episode starting in November 2017 which persisted longer than usual and had more cough than usual.  He underwent a chest x-ray which revealed possible hilar prominence and he underwent a follow-up CAT scan of the chest which revealed a 2.6 x 3.6 cm masslike density in the left lower lobe associated with bronchiectasis and pleural scarring.  He was referred for further evaluation.  Also noted was granulomatous disease with partially calcified adenopathy in the hilar regions and some reticulonodular densities in the upper lobes most consistent with scarring     He has a history of COPD.  He quit smoking in 1961.  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     He has a history of negative TB skin tests and has never been told he had histoplasmosis.     He has a history of a bronchoscopy in 1989 for unclear reasons.  He had an evaluation several years ago in [place] for some type of lung issue.  He does follow with Dr. Gonzalez for normocytic anemia which has been attributed to age versus low-grade MDS and has esophageal stricture and has a history of dilation.     He had a follow-up CAT scan in October 2018.  This revealed complete stability of the masslike density in the left lower lobe associated with bronchiectasis as well as a smaller right upper lobe nodule associated with scarring.  Radiologist felt, as I did, that this is postinflammatory but recommended possible follow-up in a year.    I last saw him in December of last year so this is a " one-year follow-up.  He has done symptomatically well until last week when he developed some increased dyspnea and wheezing.  This was really not associated with cough, production, or fever.  He denies any chest pain.    He had a follow-up CAT scan in [place] on 11/25.  There were multiple stable nodules but there was some increase in an area of left upper lobe atelectasis as well as some increase in the masslike density in the left lower lobe that was felt to represent rounded atelectasis.    Further details:    General symptoms:  - no fever  - weight stable  - no edema    Exercise tolerance:  - no unusual exercise limitation    Chest symptoms:  - mild cough    Sputum:  - normal  - denies hemoptysis    Upper airway:  - no sinus congestion or drainage    Recent imaging:  - CT of chest: Slight increase in some linear left upper lobe atelectasis and some change in morphology and possible slight increase in size of the left lower lobe density.  Images seen.    Current medications are:   Current Outpatient Medications:   •  albuterol (PROVENTIL) (5 MG/ML) 0.5% nebulizer solution, Inhale 2.5 mg., Disp: , Rfl:   •  amLODIPine (NORVASC) 10 MG tablet, TAKE 1 TABLET DAILY, Disp: , Rfl:   •  aspirin 81 MG tablet, Take  by mouth., Disp: , Rfl:   •  ferrous sulfate 325 (65 FE) MG tablet, Take 325 mg by mouth Daily With Breakfast., Disp: , Rfl:   •  fluticasone (FLONASE) 50 MCG/ACT nasal spray, 2 sprays into each nostril., Disp: , Rfl:   •  JANUVIA 100 MG tablet, , Disp: , Rfl:   •  levocetirizine (XYZAL) 5 MG tablet, Take 5 mg by mouth Every Evening., Disp: , Rfl:   •  metoprolol succinate XL (TOPROL-XL) 50 MG 24 hr tablet, Take 50 mg by mouth Daily., Disp: , Rfl:   •  montelukast (SINGULAIR) 10 MG tablet, Take 10 mg by mouth., Disp: , Rfl:   •  Multiple Vitamin (MULTIVITAMINS PO), Take  by mouth., Disp: , Rfl:   •  Multiple Vitamins-Minerals (MULTIVITAMIN ADULT PO), Take 1 tablet by mouth., Disp: , Rfl:   •  nebivolol  (BYSTOLIC) 10 MG tablet, TAKE 1 TABLET DAILY, Disp: , Rfl:   •  pantoprazole (PROTONIX) 40 MG EC tablet, Take 40 mg by mouth Daily., Disp: , Rfl:   •  simvastatin (ZOCOR) 40 MG tablet, Take 40 mg by mouth., Disp: , Rfl:   •  simvastatin (ZOCOR) 40 MG tablet, TAKE 1 TABLET NIGHTLY, Disp: , Rfl:   •  sucralfate (CARAFATE) 1 g tablet, , Disp: , Rfl:   •  tamsulosin (FLOMAX) 0.4 MG capsule 24 hr capsule, 1 capsule Daily., Disp: , Rfl: .      The patient's relevant past medical, surgical, family and social history were reviewed and updated in Epic as appropriate.     ROS:    Review of Systems  ROS as documented in patient questionnaire unless as noted otherwise    Objective:    Physical Exam   Constitutional: He is oriented to person, place, and time. He appears well-developed and well-nourished.   HENT:   Head: Normocephalic and atraumatic.   Mouth/Throat: Oropharynx is clear and moist.   Neck: Neck supple. No thyromegaly present.   Cardiovascular: Normal rate and regular rhythm. Exam reveals no gallop and no friction rub.   No murmur heard.  Pulmonary/Chest: Effort normal. No respiratory distress. He has no wheezes. He has no rales.   Musculoskeletal: He exhibits no edema.   Neurological: He is alert and oriented to person, place, and time.   Skin: Skin is warm and dry.   Psychiatric: He has a normal mood and affect. His behavior is normal.   Vitals reviewed.      Diagnostics:     PFT:  - moderate airway obstruction    CXR:  - no additional    Assessment/Plan:    Problem List Items Addressed This Visit        Pulmonary Problems    Mass of lower lobe of left lung - Primary    Overview     1. Probable inflammation associated with focal bronchiectasis  2. Stable CT scan         COPD (chronic obstructive pulmonary disease) (CMS/HCC)    Recurrent pneumonia    Bronchiectasis without complication (CMS/HCC)    Acute bronchitis            1. COPD: Some exacerbation due to what sounds like an acute bronchitis without systemic  symptoms.  Will give a 10-day course of doxycycline and a prednisone pulse and taper  2. Abnormal CAT scan: The longstanding density in the left lower lobe associate with bronchiectasis may be slightly bigger with slightly different morphology but I think this is an area of atelectasis/mucous plugging/chronic scarring and would expect some dynamic changes over time.  The changes are quite subtle and the size is within several millimeters of the size 2 years ago  3. Recurrent pneumonia: No further documented cases since his prior visit    Will plan on the antibiotics and steroids as above and will schedule a follow-up chest CT in 1 years time.  If he does not improve or has any new symptoms he will call prior to his follow-up.    Level of Risk Moderate due to: mild exacerbation of one chronic illness and prescription drug management    Discussed in detail with the patient.  He will call prior to his follow up visit for any new problems.    Signed by  Gregorio Herbert MD

## 2020-06-12 ENCOUNTER — OFFICE VISIT (OUTPATIENT)
Dept: PULMONOLOGY | Facility: CLINIC | Age: 85
End: 2020-06-12

## 2020-06-12 VITALS
OXYGEN SATURATION: 93 % | HEIGHT: 72 IN | DIASTOLIC BLOOD PRESSURE: 58 MMHG | SYSTOLIC BLOOD PRESSURE: 118 MMHG | BODY MASS INDEX: 22.19 KG/M2 | WEIGHT: 163.8 LBS | TEMPERATURE: 98.4 F | HEART RATE: 66 BPM

## 2020-06-12 DIAGNOSIS — K21.9 GASTROESOPHAGEAL REFLUX DISEASE, ESOPHAGITIS PRESENCE NOT SPECIFIED: ICD-10-CM

## 2020-06-12 DIAGNOSIS — J44.9 CHRONIC OBSTRUCTIVE PULMONARY DISEASE, UNSPECIFIED COPD TYPE (HCC): ICD-10-CM

## 2020-06-12 DIAGNOSIS — J47.9 BRONCHIECTASIS WITHOUT COMPLICATION (HCC): ICD-10-CM

## 2020-06-12 DIAGNOSIS — J18.9 RECURRENT PNEUMONIA: ICD-10-CM

## 2020-06-12 DIAGNOSIS — R09.82 POST-NASAL DRAINAGE: Primary | ICD-10-CM

## 2020-06-12 PROCEDURE — 99214 OFFICE O/P EST MOD 30 MIN: CPT | Performed by: NURSE PRACTITIONER

## 2020-06-12 RX ORDER — ESOMEPRAZOLE MAGNESIUM 40 MG/1
40 CAPSULE, DELAYED RELEASE ORAL
COMMUNITY
End: 2022-05-16

## 2020-06-12 RX ORDER — LEVOCETIRIZINE DIHYDROCHLORIDE 5 MG/1
5 TABLET, FILM COATED ORAL EVERY EVENING
Qty: 90 TABLET | Refills: 3 | Status: SHIPPED | OUTPATIENT
Start: 2020-06-12 | End: 2021-06-04

## 2020-06-12 NOTE — PROGRESS NOTES
[facility] Pulmonary Follow up    CHIEF COMPLAINT    Dyspnea/recurrent pneumonia    HISTORY OF PRESENT ILLNESS    Ricki Trotter is a 84 y.o.male here today for a follow-up.  He was last seen by Dr. Herbert in December.  He was scheduled for a follow-up in December 2020 with Dr. Herbert.  He is also scheduled for a repeat CT scan at that time as well.    He states he has had 2-3 episodes of pneumonia since his last appointment and has been treated with rounds of antibiotic and steroids.  He states his last antibiotic was 2 months ago.  He was treated with doxycycline and prednisone.  He states he had some uncontrolled reflux at the beginning of these episodes and is currently being followed by gastroenterologist.  He states that his GERD is now more controlled with medication.    He does state that he seems not back to baseline since his last treatment for pneumonia.  He continues to have some shortness of breath with activity but does not do much activity currently.  He states that he does have wheezing and a cough at nighttime.  He also feels that he has a lot of mucus at nighttime as well.    He denies fever, chills, sputum production, hemoptysis, night sweats, weight loss, chest pain or palpitations.  He states he has more of a dry cough.  He denies any lower extremity edema or calf tenderness.  He does have chronic postnasal drip and rhinorrhea.  He states he has been using Flonase on a twice a day basis.  He also is been taking Singulair at night.  He has noticed a slight improvement in his symptoms since taking both of these.  He continues to have reflux symptoms.  He does take Nexium on a daily basis.    He is up-to-date on his current vaccinations.  He is accompanied by his daughter today.    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   • COPD (chronic obstructive pulmonary disease) (CMS/HCC)   • Recurrent pneumonia   • Post-nasal drainage   • GERD  (gastroesophageal reflux disease)   • DM (diabetes mellitus) (CMS/HCC)   • HTN (hypertension)   • Bronchiectasis without complication (CMS/HCC)   • Acute bronchitis       Allergies   Allergen Reactions   • Penicillins Swelling   • Sulfa Antibiotics Swelling       Current Outpatient Medications:   •  albuterol (PROVENTIL) (5 MG/ML) 0.5% nebulizer solution, Inhale 2.5 mg., Disp: , Rfl:   •  amLODIPine (NORVASC) 10 MG tablet, TAKE 1 TABLET DAILY, Disp: , Rfl:   •  aspirin 81 MG tablet, Take  by mouth., Disp: , Rfl:   •  doxycycline (VIBRAMYCIN) 100 MG capsule, Take 1 capsule by mouth 2 (Two) Times a Day., Disp: 20 capsule, Rfl: 0  •  esomeprazole (nexIUM) 40 MG capsule, Take 40 mg by mouth Every Morning Before Breakfast., Disp: , Rfl:   •  ferrous sulfate 325 (65 FE) MG tablet, Take 325 mg by mouth Daily With Breakfast., Disp: , Rfl:   •  fluticasone (FLONASE) 50 MCG/ACT nasal spray, 2 sprays into each nostril., Disp: , Rfl:   •  JANUVIA 100 MG tablet, , Disp: , Rfl:   •  levocetirizine (XYZAL) 5 MG tablet, Take 1 tablet by mouth Every Evening., Disp: 90 tablet, Rfl: 3  •  metoprolol succinate XL (TOPROL-XL) 50 MG 24 hr tablet, Take 50 mg by mouth Daily., Disp: , Rfl:   •  montelukast (SINGULAIR) 10 MG tablet, Take 10 mg by mouth., Disp: , Rfl:   •  Multiple Vitamin (MULTIVITAMINS PO), Take  by mouth., Disp: , Rfl:   •  Multiple Vitamins-Minerals (MULTIVITAMIN ADULT PO), Take 1 tablet by mouth., Disp: , Rfl:   •  nebivolol (BYSTOLIC) 10 MG tablet, TAKE 1 TABLET DAILY, Disp: , Rfl:   •  predniSONE (DELTASONE) 10 MG tablet, Take 4 tabs daily x 3 days, then take 3 tabs daily x 3 days, then take 2 tabs daily x 3 days, then take 1 tab daily x 3 days, Disp: 31 tablet, Rfl: 0  •  simvastatin (ZOCOR) 40 MG tablet, Take 40 mg by mouth., Disp: , Rfl:   •  simvastatin (ZOCOR) 40 MG tablet, TAKE 1 TABLET NIGHTLY, Disp: , Rfl:   •  sucralfate (CARAFATE) 1 g tablet, , Disp: , Rfl:   •  tamsulosin (FLOMAX) 0.4 MG capsule 24 hr  "capsule, 1 capsule Daily., Disp: , Rfl:   •  pantoprazole (PROTONIX) 40 MG EC tablet, Take 40 mg by mouth Daily., Disp: , Rfl:   MEDICATION LIST AND ALLERGIES REVIEWED.    Social History     Tobacco Use   • Smoking status: Former Smoker     Last attempt to quit: 1961     Years since quittin.4   • Smokeless tobacco: Never Used   Substance Use Topics   • Alcohol use: Yes     Alcohol/week: 2.0 standard drinks     Types: 2 Cans of beer per week   • Drug use: No       FAMILY AND SOCIAL HISTORY REVIEWED.    Review of Systems   Constitutional: Positive for activity change. Negative for appetite change, fatigue, fever and unexpected weight change.   HENT: Positive for congestion, postnasal drip and rhinorrhea. Negative for sinus pressure, sore throat and voice change.    Eyes: Negative for visual disturbance.   Respiratory: Positive for cough and shortness of breath. Negative for chest tightness and wheezing.    Cardiovascular: Negative for chest pain, palpitations and leg swelling.   Gastrointestinal: Negative for abdominal distention, abdominal pain, nausea and vomiting.   Endocrine: Negative for cold intolerance and heat intolerance.   Genitourinary: Negative for difficulty urinating and urgency.   Musculoskeletal: Negative for arthralgias, back pain and neck pain.   Skin: Negative for color change and pallor.   Allergic/Immunologic: Negative for environmental allergies and food allergies.   Neurological: Negative for dizziness, syncope, weakness and light-headedness.   Hematological: Negative for adenopathy. Does not bruise/bleed easily.   Psychiatric/Behavioral: Negative for agitation and behavioral problems.   .    /58   Pulse 66   Temp 98.4 °F (36.9 °C)   Ht 181.6 cm (71.5\")   Wt 74.3 kg (163 lb 12.8 oz)   SpO2 93% Comment: resting at room air  BMI 22.53 kg/m²     Immunization History   Administered Date(s) Administered   • FLUAD TRI 65YR+ 10/18/2019   • Flu Vaccine Quad PF >18YRS 10/28/2014   • Flu " Vaccine Quad PF >36MO 10/18/2017, 10/22/2018   • Fluzone High Dose =>65 Years ([employer] ONLY) 10/29/2013   • Hepatitis A 04/30/2018, 11/01/2018   • Influenza TIV (IM) 10/15/2015, 11/16/2015   • Influenza, Unspecified 11/01/2014   • Tdap 06/13/2017       Physical Exam   Constitutional: He is oriented to person, place, and time. He appears well-developed and well-nourished.   HENT:   Head: Normocephalic and atraumatic.   Eyes: Pupils are equal, round, and reactive to light.   Neck: Normal range of motion. Neck supple. No thyromegaly present.   Cardiovascular: Normal rate, regular rhythm, normal heart sounds and intact distal pulses. Exam reveals no gallop and no friction rub.   No murmur heard.  Pulmonary/Chest: Effort normal and breath sounds normal. No respiratory distress. He has no wheezes. He has no rales. He exhibits no tenderness.   Abdominal: Soft. Bowel sounds are normal. There is no tenderness.   Musculoskeletal: Normal range of motion. He exhibits no edema.   Lymphadenopathy:     He has no cervical adenopathy.   Neurological: He is alert and oriented to person, place, and time.   Skin: Skin is warm and dry. Capillary refill takes less than 2 seconds. He is not diaphoretic.   Psychiatric: He has a normal mood and affect. His behavior is normal.   Nursing note and vitals reviewed.        RESULTS      PROBLEM LIST    Problem List Items Addressed This Visit        Respiratory    COPD (chronic obstructive pulmonary disease) (CMS/[facility])    Relevant Medications    levocetirizine (XYZAL) 5 MG tablet    Recurrent pneumonia    Relevant Medications    levocetirizine (XYZAL) 5 MG tablet    Bronchiectasis without complication (CMS/[facility])    Relevant Medications    levocetirizine (XYZAL) 5 MG tablet       Digestive    GERD (gastroesophageal reflux disease)    Relevant Medications    esomeprazole (nexIUM) 40 MG capsule       Other    Post-nasal drainage - Primary    Relevant Medications    levocetirizine (XYZAL) 5 MG tablet             DISCUSSION    Mr. Trotter was here for a follow-up of his recurrent pneumonias.  He has had 3 episodes of pneumonias since his last appointment in December.  I do suspect that reflux was playing a role in these recurrent pneumonias.  He does not appear to be infected in the office today.  I did advise him that if he were to have another episode of pneumonia in the near future he would probably need to have an EGD that was recommended by his gastroenterologist.    He will continue Flonase, Singulair and I am going to add Xyzal for his postnasal drainage.  I do suspect that postnasal drainage is contributing to his wheezing at nighttime.    He will continue Nexium daily for GERD.  We also discussed reflux precaution such as elevating the head of the bed at night with a wedge pillow, not eating 2 to 3 hours before bed and avoiding foods that trigger reflux symptoms.  He is going to buy a bed that will elevate the head of the bed.  He follows the other precautions closely.    I also encouraged him to use his albuterol nebulizers at least twice a day to see if this helps with opening his airways especially at nighttime.  I did give him a sample of Breo 100 to try for 2 weeks to see if this helped his shortness of breath during the day.  I did show him how to use the inhaler in the office today.  I did encourage him to rinse his mouth out thoroughly after each use.    He will follow-up in December with Dr. Herbert after his CT scan has been performed.  He would like a CT [facility] performed at [facility].  I will have this arranged in December.    I did advise him to call if he were to have worsening symptoms or need to be seen sooner.  I spent 25 minutes with the patient and family. I spent > 50% percent of this time counseling and discussing diagnosis, prognosis, diagnostic testing, evaluation, current status, treatment options and management.    Demetria Dasilva, APRN  06/12/202011:00  AM  Electronically signed     Please note that portions of this note were completed with a voice recognition program. Efforts were made to edit the dictations, but occasionally words are mistranscribed.      CC: Stacey Hanley APRN

## 2020-06-29 ENCOUNTER — OFFICE VISIT (OUTPATIENT)
Dept: CARDIOLOGY | Facility: HOSPITAL | Age: 85
End: 2020-06-29

## 2020-06-29 ENCOUNTER — HOSPITAL ENCOUNTER (OUTPATIENT)
Dept: CARDIOLOGY | Facility: HOSPITAL | Age: 85
Discharge: HOME | End: 2020-06-29
Admitting: NURSE PRACTITIONER

## 2020-06-29 ENCOUNTER — HOSPITAL ENCOUNTER (OUTPATIENT)
Dept: CARDIOLOGY | Facility: HOSPITAL | Age: 85
Discharge: HOME | End: 2020-06-29

## 2020-06-29 DIAGNOSIS — R00.2 PALPITATIONS: ICD-10-CM

## 2020-06-29 DIAGNOSIS — I49.3 PVC'S (PREMATURE VENTRICULAR CONTRACTIONS): ICD-10-CM

## 2020-06-29 DIAGNOSIS — R06.09 DOE (DYSPNEA ON EXERTION): ICD-10-CM

## 2020-06-29 DIAGNOSIS — R00.2 PALPITATIONS: Primary | ICD-10-CM

## 2020-06-29 PROCEDURE — 93010 ELECTROCARDIOGRAM REPORT: CPT | Performed by: INTERNAL MEDICINE

## 2020-06-29 PROCEDURE — 93005 ELECTROCARDIOGRAM TRACING: CPT | Performed by: NURSE PRACTITIONER

## 2020-06-29 PROCEDURE — 0296T HC EXT ECG > 48HR TO 21 DAY RCRD W/CONECT INTL RCRD: CPT

## 2020-06-29 PROCEDURE — 99214 OFFICE O/P EST MOD 30 MIN: CPT | Performed by: NURSE PRACTITIONER

## 2020-06-29 RX ORDER — BACLOFEN 5 MG/1
5 TABLET ORAL 3 TIMES DAILY
COMMUNITY
Start: 2020-06-19

## 2020-07-01 VITALS
RESPIRATION RATE: 18 BRPM | HEIGHT: 72 IN | SYSTOLIC BLOOD PRESSURE: 124 MMHG | BODY MASS INDEX: 22.22 KG/M2 | DIASTOLIC BLOOD PRESSURE: 64 MMHG | WEIGHT: 164.06 LBS | HEART RATE: 63 BPM | TEMPERATURE: 98 F | OXYGEN SATURATION: 96 %

## 2020-07-09 ENCOUNTER — TELEPHONE (OUTPATIENT)
Dept: CARDIOLOGY | Facility: HOSPITAL | Age: 85
End: 2020-07-09

## 2020-07-13 ENCOUNTER — HOSPITAL ENCOUNTER (OUTPATIENT)
Dept: CARDIOLOGY | Facility: HOSPITAL | Age: 85
End: 2020-07-13

## 2020-07-15 ENCOUNTER — TELEPHONE (OUTPATIENT)
Dept: CARDIOLOGY | Facility: HOSPITAL | Age: 85
End: 2020-07-15

## 2020-07-15 DIAGNOSIS — I49.3 PVC'S (PREMATURE VENTRICULAR CONTRACTIONS): ICD-10-CM

## 2020-07-15 DIAGNOSIS — I10 ESSENTIAL HYPERTENSION: Primary | ICD-10-CM

## 2020-07-15 DIAGNOSIS — I47.29 NSVT (NONSUSTAINED VENTRICULAR TACHYCARDIA) (HCC): ICD-10-CM

## 2020-07-15 DIAGNOSIS — R94.31 ABNORMAL ELECTROCARDIOGRAM (ECG) (EKG): ICD-10-CM

## 2020-07-15 NOTE — TELEPHONE ENCOUNTER
Spoke with patient's daughter and reviewed preliminary results of extended holter which showed nonsustained ventricular tachycardia, PVC burden of 8.2%, PAC burden of 4.6%.   Dr. Conde has reviewed the monitor and is requesting a stress test and an echo within the next week.  Patient's daughter verbalized understanding and will be called with that appointment.

## 2020-07-20 ENCOUNTER — HOSPITAL ENCOUNTER (OUTPATIENT)
Dept: NUCLEAR MEDICINE | Facility: HOSPITAL | Age: 85
Discharge: HOME | End: 2020-07-20

## 2020-07-20 ENCOUNTER — APPOINTMENT (OUTPATIENT)
Dept: NUCLEAR MEDICINE | Facility: HOSPITAL | Age: 85
End: 2020-07-20

## 2020-07-20 ENCOUNTER — HOSPITAL ENCOUNTER (OUTPATIENT)
Dept: CARDIOLOGY | Facility: HOSPITAL | Age: 85
Discharge: HOME | End: 2020-07-20
Admitting: NURSE PRACTITIONER

## 2020-07-20 ENCOUNTER — TELEPHONE (OUTPATIENT)
Dept: CARDIOLOGY | Facility: HOSPITAL | Age: 85
End: 2020-07-20

## 2020-07-20 ENCOUNTER — HOSPITAL ENCOUNTER (OUTPATIENT)
Dept: NUCLEAR MEDICINE | Facility: HOSPITAL | Age: 85
End: 2020-07-20

## 2020-07-20 DIAGNOSIS — R00.2 PALPITATIONS: ICD-10-CM

## 2020-07-20 DIAGNOSIS — R06.09 DOE (DYSPNEA ON EXERTION): ICD-10-CM

## 2020-07-20 LAB
MAXIMAL PREDICTED HEART RATE: 135 BPM
STRESS TARGET HR: 115 BPM

## 2020-07-20 PROCEDURE — 93306 TTE W/DOPPLER COMPLETE: CPT

## 2020-07-20 PROCEDURE — 93306 TTE W/DOPPLER COMPLETE: CPT | Performed by: INTERNAL MEDICINE

## 2020-07-22 ENCOUNTER — HOSPITAL ENCOUNTER (OUTPATIENT)
Dept: CARDIOLOGY | Facility: HOSPITAL | Age: 85
Discharge: HOME | End: 2020-07-22
Admitting: NURSE PRACTITIONER

## 2020-07-22 VITALS
BODY MASS INDEX: 22.96 KG/M2 | WEIGHT: 164 LBS | DIASTOLIC BLOOD PRESSURE: 78 MMHG | HEART RATE: 65 BPM | SYSTOLIC BLOOD PRESSURE: 138 MMHG | HEIGHT: 71 IN

## 2020-07-22 DIAGNOSIS — R94.31 ABNORMAL ELECTROCARDIOGRAM (ECG) (EKG): ICD-10-CM

## 2020-07-22 DIAGNOSIS — I49.3 PVC'S (PREMATURE VENTRICULAR CONTRACTIONS): ICD-10-CM

## 2020-07-22 DIAGNOSIS — I47.29 NSVT (NONSUSTAINED VENTRICULAR TACHYCARDIA) (HCC): ICD-10-CM

## 2020-07-22 DIAGNOSIS — I10 ESSENTIAL HYPERTENSION: ICD-10-CM

## 2020-07-22 LAB
BH CV STRESS BP STAGE 1: NORMAL
BH CV STRESS BP STAGE 3: NORMAL
BH CV STRESS COMMENTS STAGE 1: NORMAL
BH CV STRESS DOSE REGADENOSON STAGE 1: 0.4
BH CV STRESS DURATION MIN STAGE 1: 1
BH CV STRESS DURATION MIN STAGE 2: 1
BH CV STRESS DURATION MIN STAGE 3: 1
BH CV STRESS DURATION MIN STAGE 4: 1
BH CV STRESS DURATION SEC STAGE 2: 0
BH CV STRESS HR STAGE 1: 86
BH CV STRESS HR STAGE 2: 87
BH CV STRESS HR STAGE 3: 89
BH CV STRESS HR STAGE 4: 86
BH CV STRESS PROTOCOL 1: NORMAL
BH CV STRESS RECOVERY BP: NORMAL MMHG
BH CV STRESS RECOVERY HR: 86 BPM
BH CV STRESS STAGE 1: 1
BH CV STRESS STAGE 2: 2
BH CV STRESS STAGE 3: 3
BH CV STRESS STAGE 4: 4
LV EF NUC BP: 58 %
MAXIMAL PREDICTED HEART RATE: 135 BPM
PERCENT MAX PREDICTED HR: 65.93 %
STRESS BASELINE BP: NORMAL MMHG
STRESS BASELINE HR: 65 BPM
STRESS PERCENT HR: 78 %
STRESS POST PEAK BP: NORMAL MMHG
STRESS POST PEAK HR: 89 BPM
STRESS TARGET HR: 115 BPM

## 2020-07-22 PROCEDURE — 93018 CV STRESS TEST I&R ONLY: CPT | Performed by: INTERNAL MEDICINE

## 2020-07-22 PROCEDURE — 25010000002 REGADENOSON 0.4 MG/5ML SOLUTION: Performed by: NURSE PRACTITIONER

## 2020-07-22 PROCEDURE — 0 TECHNETIUM SESTAMIBI: Performed by: NURSE PRACTITIONER

## 2020-07-22 PROCEDURE — 93017 CV STRESS TEST TRACING ONLY: CPT

## 2020-07-22 PROCEDURE — A9500 TC99M SESTAMIBI: HCPCS | Performed by: NURSE PRACTITIONER

## 2020-07-22 PROCEDURE — 78452 HT MUSCLE IMAGE SPECT MULT: CPT | Performed by: INTERNAL MEDICINE

## 2020-07-22 PROCEDURE — 78452 HT MUSCLE IMAGE SPECT MULT: CPT

## 2020-07-22 RX ADMIN — TECHNETIUM TC 99M SESTAMIBI 1 DOSE: 1 INJECTION INTRAVENOUS at 07:35

## 2020-07-22 RX ADMIN — TECHNETIUM TC 99M SESTAMIBI 1 DOSE: 1 INJECTION INTRAVENOUS at 09:20

## 2020-07-22 RX ADMIN — REGADENOSON 0.4 MG: 0.08 INJECTION, SOLUTION INTRAVENOUS at 09:16

## 2020-07-23 ENCOUNTER — TELEPHONE (OUTPATIENT)
Dept: CARDIOLOGY | Facility: HOSPITAL | Age: 85
End: 2020-07-23

## 2020-07-23 NOTE — TELEPHONE ENCOUNTER
Reviewed stress and echo with patient's daughter. No ischemia noted. Patient to keep follow up appt next week in office to discuss medication changes. Patient's daughter verbalized understanding

## 2020-07-29 ENCOUNTER — APPOINTMENT (OUTPATIENT)
Dept: CARDIOLOGY | Facility: HOSPITAL | Age: 85
End: 2020-07-29

## 2020-07-30 ENCOUNTER — OFFICE VISIT (OUTPATIENT)
Dept: CARDIOLOGY | Facility: HOSPITAL | Age: 85
End: 2020-07-30

## 2020-07-30 VITALS
HEART RATE: 63 BPM | DIASTOLIC BLOOD PRESSURE: 59 MMHG | OXYGEN SATURATION: 96 % | TEMPERATURE: 98 F | HEIGHT: 72 IN | WEIGHT: 160 LBS | BODY MASS INDEX: 21.67 KG/M2 | SYSTOLIC BLOOD PRESSURE: 152 MMHG

## 2020-07-30 DIAGNOSIS — I10 ESSENTIAL HYPERTENSION: ICD-10-CM

## 2020-07-30 DIAGNOSIS — R06.09 DOE (DYSPNEA ON EXERTION): ICD-10-CM

## 2020-07-30 DIAGNOSIS — I49.3 PVC'S (PREMATURE VENTRICULAR CONTRACTIONS): Primary | ICD-10-CM

## 2020-07-30 PROCEDURE — 99214 OFFICE O/P EST MOD 30 MIN: CPT | Performed by: NURSE PRACTITIONER

## 2020-08-03 NOTE — PROGRESS NOTES
[facility]    [facility]  07/30/2020         Ricki Trotter  [address] [facility] [address] [facility] [place] [address]  [unfilled]    1935    Stacey Hanley APRN    Rikci Trotter is a 85 y.o. male.      Subjective:     Chief Complaint:  Palpitations and Follow-up           HPI 85 year old male who presents to the office today for ongoing evaluation of his pvcs. He notes that pvcs have improved and he notes that he has not experienced them in a few weeks. Notes dyspnea on exertion has resolved. Notes energy level has improved and notes that he is feeling better overall. Recently had stress and echo and both were acceptable studies.   Patient denies chest pain, chest pressure, palpitations, tachycardia, dyspnea, presyncope, syncope, orthopnea, PND, abdominal fullness, early satiety, claudication, cough or edema.      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   • COPD (chronic obstructive pulmonary disease) (CMS/HCC)   • Recurrent pneumonia   • Post-nasal drainage   • GERD (gastroesophageal reflux disease)   • DM (diabetes mellitus) (CMS/HCC)   • HTN (hypertension)   • Bronchiectasis without complication (CMS/HCC)   • Acute bronchitis       Past Medical History:   Diagnosis Date   • Basal cell carcinoma 2015   • COPD (chronic obstructive pulmonary disease) (CMS/HCC)    • Diabetes mellitus (CMS/HCC)    • Dyslipidemia    • Hypertension    • Melanoma (CMS/HCC) 2012       Past Surgical History:   Procedure Laterality Date   • COLONOSCOPY     • PROSTHODONTIC PROCEDURE         Family History   Problem Relation Age of Onset   • Diabetes Mother    • Stroke Mother    • Breast cancer Sister        Social History     Socioeconomic History   • Marital status:      Spouse name: Not on file   • Number of children: Not on file   • Years of education: Not on file   • Highest education level: Not on file   Tobacco Use   • Smoking status: Former  Smoker     Last attempt to quit:      Years since quittin.6   • Smokeless tobacco: Never Used   Substance and Sexual Activity   • Alcohol use: Yes     Alcohol/week: 2.0 standard drinks     Types: 2 Cans of beer per week   • Drug use: No   • Sexual activity: Defer   Social History Narrative    caffeine use: 2-3 serving of coffee daily       Allergies   Allergen Reactions   • Penicillins Swelling   • Sulfa Antibiotics Swelling         Current Outpatient Medications:   •  albuterol (PROVENTIL) (5 MG/ML) 0.5% nebulizer solution, Take 2.5 mg by nebulization Every 4 (Four) Hours As Needed., Disp: , Rfl:   •  amLODIPine (NORVASC) 10 MG tablet, TAKE 1 TABLET DAILY, Disp: , Rfl:   •  aspirin 81 MG tablet, Take 81 mg by mouth Daily., Disp: , Rfl:   •  Baclofen 5 MG tablet, Take 5 mg by mouth 3 (Three) Times a Day., Disp: , Rfl:   •  esomeprazole (nexIUM) 40 MG capsule, Take 40 mg by mouth Every Morning Before Breakfast., Disp: , Rfl:   •  ferrous sulfate 325 (65 FE) MG tablet, Take 325 mg by mouth Daily With Breakfast., Disp: , Rfl:   •  fluticasone (FLONASE) 50 MCG/ACT nasal spray, 2 sprays into each nostril., Disp: , Rfl:   •  JANUVIA 100 MG tablet, Take 100 mg by mouth Daily., Disp: , Rfl:   •  levocetirizine (XYZAL) 5 MG tablet, Take 1 tablet by mouth Every Evening., Disp: 90 tablet, Rfl: 3  •  montelukast (SINGULAIR) 10 MG tablet, Take 10 mg by mouth., Disp: , Rfl:   •  Multiple Vitamins-Minerals (MULTIVITAMIN ADULT PO), Take 1 tablet by mouth., Disp: , Rfl:   •  nebivolol (BYSTOLIC) 10 MG tablet, TAKE 1 TABLET DAILY, Disp: , Rfl:   •  simvastatin (ZOCOR) 40 MG tablet, TAKE 1 TABLET NIGHTLY, Disp: , Rfl:   •  sucralfate (CARAFATE) 1 g tablet, Take 1 g by mouth 2 (two) times a day., Disp: , Rfl:   •  tamsulosin (FLOMAX) 0.4 MG capsule 24 hr capsule, 1 capsule Daily., Disp: , Rfl:     The following portions of the patient's history were reviewed today and updated as appropriate: allergies, current medications,  "past family history, past medical history, past social history, past surgical history and problem list     Review of Systems   Constitution: Negative for chills, decreased appetite, diaphoresis, fever, malaise/fatigue, night sweats, weight gain and weight loss.   HENT: Negative for congestion, hearing loss, hoarse voice and nosebleeds.    Eyes: Negative for blurred vision, visual disturbance and visual halos.   Cardiovascular: Positive for palpitations. Negative for chest pain, claudication, cyanosis, dyspnea on exertion, irregular heartbeat, leg swelling, near-syncope, orthopnea, paroxysmal nocturnal dyspnea and syncope.   Respiratory: Negative for cough, hemoptysis, shortness of breath, sleep disturbances due to breathing, snoring, sputum production and wheezing.    Hematologic/Lymphatic: Negative for bleeding problem. Does not bruise/bleed easily.   Skin: Negative for dry skin, itching and rash.   Musculoskeletal: Negative for arthritis, falls, joint pain, joint swelling and myalgias.   Gastrointestinal: Negative for bloating, abdominal pain, constipation, diarrhea, flatus, heartburn, hematemesis, hematochezia, melena, nausea and vomiting.   Genitourinary: Negative for dysuria, frequency, hematuria, nocturia and urgency.   Neurological: Negative for excessive daytime sleepiness, dizziness, headaches, light-headedness, loss of balance and weakness.   Psychiatric/Behavioral: Negative for depression. The patient does not have insomnia and is not nervous/anxious.        Objective:     Vitals:    07/30/20 1534   BP: 152/59   BP Location: Left arm   Patient Position: Sitting   Cuff Size: Adult   Pulse: 63   Temp: 98 °F (36.7 °C)   TempSrc: Temporal   SpO2: 96%   Weight: 72.6 kg (160 lb)   Height: 181.6 cm (71.5\")       Body mass index is 22 kg/m².    Physical Exam   Constitutional: He is oriented to person, place, and time. He appears well-developed and well-nourished. He is active and cooperative. No distress.   HENT: "   Head: Normocephalic and atraumatic.   Mouth/Throat: Oropharynx is clear and moist.   Eyes: Pupils are equal, round, and reactive to light. Conjunctivae and EOM are normal.   Neck: Normal range of motion. Neck supple. No JVD present. No tracheal deviation present. No thyromegaly present.   Cardiovascular: Normal rate, regular rhythm, normal heart sounds and intact distal pulses.   Pulmonary/Chest: Effort normal and breath sounds normal.   Abdominal: Soft. Bowel sounds are normal. He exhibits no distension. There is no tenderness.   Musculoskeletal: Normal range of motion.   Neurological: He is alert and oriented to person, place, and time.   Skin: Skin is warm, dry and intact.   Psychiatric: He has a normal mood and affect. His behavior is normal.   Nursing note and vitals reviewed.      Lab and Diagnostic Review:  Results for orders placed or performed during the hospital encounter of 07/22/20   Stress Test With Myocardial Perfusion (1 Day)   Result Value Ref Range    BH CV STRESS PROTOCOL 1 Pharmacologic     Stage 1 1     Duration Min Stage 1 1     Stress Dose Regadenoson Stage 1 0.4     Stress Comments Stage 1 10 sec bolus injection     Stage 2 2     Duration Min Stage 2 1     Duration Sec Stage 2 0     Stage 3 3     Duration Min Stage 3 1     Stage 4 4     Duration Min Stage 4 1     Baseline HR 65 bpm    Baseline /78 mmHg    Target HR (85%) 115 bpm    Max. Pred. HR (100%) 135 bpm    HR Stage 1 86     BP Stage 1 140/44     HR Stage 2 87     HR Stage 3 89     BP Stage 3 138/58     HR Stage 4 86     Peak HR 89 bpm    Percent Max Pred HR 65.93 %    Percent Target HR 78 %    Peak /44 mmHg    Recovery HR 86 bpm    Recovery /68 mmHg    Nuc Stress EF 58 %     Extended holter showed pvc burden on 8.2%  EchoNormal left ventricular size with low normal LV systolic function, LVEF 50-55%.  2.  Mild concentric LVH.  3.  Grade 2 diastolic dysfunction.  4.  Normal right ventricular size and systolic  function.  5.  Severely increased left atrial volume index.  6.  Mild right atrial dilation.  7.  Aortic sclerosis without significant stenosis (max velocity 220 cm/s, max gradient 19 mmHg)  8.  Mild to moderate mitral regurgitation.  9.  Mild to moderate tricuspid regurgitation, RVSP 52 mmHg.    Assessment and Plan:   1. PVC's (premature ventricular contractions)  High burden on recent extended holter but has resolved. Continue BB    2. Essential hypertension  Well controlled  HTN Education provided today including signs and symptoms, medication management, daily blood pressure monitoring. Patient encouraged to call the Heart and Valve center with any abnormal readings.   3. RAI (dyspnea on exertion)  Resolved           It has been a pleasure to participate in the care of this patient.  Patient was instructed to call the [facility] with any questions, concerns, or worsening symptoms.    *Please note that portions of this note were completed with a voice recognition program. Efforts were made to edit the dictations, but occasionally words are mistranscribed.

## 2020-11-24 ENCOUNTER — TRANSCRIBE ORDERS (OUTPATIENT)
Dept: PULMONOLOGY | Facility: CLINIC | Age: 85
End: 2020-11-24

## 2020-12-14 ENCOUNTER — TRANSCRIBE ORDERS (OUTPATIENT)
Dept: PULMONOLOGY | Facility: CLINIC | Age: 85
End: 2020-12-14

## 2021-03-01 DIAGNOSIS — Z00.6 EXAMINATION FOR NORMAL COMPARISON FOR CLINICAL RESEARCH: Primary | ICD-10-CM

## 2021-03-10 ENCOUNTER — OFFICE VISIT (OUTPATIENT)
Dept: PULMONOLOGY | Facility: CLINIC | Age: 86
End: 2021-03-10

## 2021-03-10 VITALS
WEIGHT: 168.2 LBS | HEIGHT: 71 IN | OXYGEN SATURATION: 95 % | BODY MASS INDEX: 23.55 KG/M2 | RESPIRATION RATE: 18 BRPM | HEART RATE: 73 BPM | DIASTOLIC BLOOD PRESSURE: 70 MMHG | SYSTOLIC BLOOD PRESSURE: 140 MMHG | TEMPERATURE: 97.5 F

## 2021-03-10 DIAGNOSIS — J47.9 BRONCHIECTASIS WITHOUT COMPLICATION (HCC): ICD-10-CM

## 2021-03-10 DIAGNOSIS — J44.9 CHRONIC OBSTRUCTIVE PULMONARY DISEASE, UNSPECIFIED COPD TYPE (HCC): ICD-10-CM

## 2021-03-10 DIAGNOSIS — K21.9 GASTROESOPHAGEAL REFLUX DISEASE, UNSPECIFIED WHETHER ESOPHAGITIS PRESENT: Primary | ICD-10-CM

## 2021-03-10 PROCEDURE — 99214 OFFICE O/P EST MOD 30 MIN: CPT | Performed by: NURSE PRACTITIONER

## 2021-03-10 RX ORDER — INSULIN GLARGINE 300 U/ML
16 INJECTION, SOLUTION SUBCUTANEOUS DAILY
COMMUNITY
Start: 2021-02-01 | End: 2022-04-15

## 2021-03-11 NOTE — PROGRESS NOTES
[facility] Pulmonary Follow up    CHIEF COMPLAINT    Recurrent pneumonias    HISTORY OF PRESENT ILLNESS    Ricki Trotter is a 85 y.o.male here today for follow-up of his recurrent pneumonias.  He was last seen in the office by me in June.  He states that he did have one episode of pneumonia in December and was treated with antibiotics and steroids.    He has been followed in this office for an abnormal CAT scan since 2017.  He had an abnormal CT scan in 2017 that showed a 2.6 x 3.6 cm masslike density in the left lower lobe which was associated with bronchiectasis and pleural scarring and was referred to our office.  We have followed this area with serial CT scans since this time.  His last CT scan was in December 2020 and he is here to discuss the results.    He states that he is doing fairly well from a pulmonary standpoint.  He has minimal shortness of breath with exertion.  He does recover fairly quickly at rest.  He uses no maintenance inhalers.    He denies fever, chills, sputum production, hemoptysis, night sweats, weight loss, chest pain or palpitations.  He denies any lower extremity edema or calf tenderness.  He has occasional sinus and allergy symptoms.  He does take Flonase, Xyzal and Singulair on a regular basis.  He states that his reflux has been improved with the change of his medication.  He feels that reflux was causing majority of his difficulties with pneumonias.  He has noticed an improvement in his reflux symptoms since his medication changes.    He is up-to-date on his current vaccinations.    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   • COPD (chronic obstructive pulmonary disease) (CMS/HCC)   • Recurrent pneumonia   • Post-nasal drainage   • GERD (gastroesophageal reflux disease)   • DM (diabetes mellitus) (CMS/HCC)   • HTN (hypertension)   • Bronchiectasis without complication (CMS/HCC)   • Acute bronchitis       Allergies   Allergen  Reactions   • Penicillins Swelling   • Sulfa Antibiotics Swelling       Current Outpatient Medications:   •  albuterol (PROVENTIL) (5 MG/ML) 0.5% nebulizer solution, Take 2.5 mg by nebulization Every 4 (Four) Hours As Needed., Disp: , Rfl:   •  amLODIPine (NORVASC) 10 MG tablet, TAKE 1 TABLET DAILY, Disp: , Rfl:   •  aspirin 81 MG tablet, Take 81 mg by mouth Daily., Disp: , Rfl:   •  Baclofen 5 MG tablet, Take 5 mg by mouth 3 (Three) Times a Day., Disp: , Rfl:   •  esomeprazole (nexIUM) 40 MG capsule, Take 40 mg by mouth Every Morning Before Breakfast., Disp: , Rfl:   •  ferrous sulfate 325 (65 FE) MG tablet, Take 325 mg by mouth Daily With Breakfast., Disp: , Rfl:   •  fluticasone (FLONASE) 50 MCG/ACT nasal spray, 2 sprays into each nostril., Disp: , Rfl:   •  JANUVIA 100 MG tablet, Take 100 mg by mouth Daily., Disp: , Rfl:   •  levocetirizine (XYZAL) 5 MG tablet, Take 1 tablet by mouth Every Evening., Disp: 90 tablet, Rfl: 3  •  montelukast (SINGULAIR) 10 MG tablet, Take 10 mg by mouth., Disp: , Rfl:   •  Multiple Vitamins-Minerals (MULTIVITAMIN ADULT PO), Take 1 tablet by mouth., Disp: , Rfl:   •  nebivolol (BYSTOLIC) 10 MG tablet, TAKE 1 TABLET DAILY, Disp: , Rfl:   •  simvastatin (ZOCOR) 40 MG tablet, TAKE 1 TABLET NIGHTLY, Disp: , Rfl:   •  sucralfate (CARAFATE) 1 g tablet, Take 1 g by mouth 2 (two) times a day., Disp: , Rfl:   •  tamsulosin (FLOMAX) 0.4 MG capsule 24 hr capsule, 1 capsule Daily., Disp: , Rfl:   •  Toujeo Max SoloStar 300 UNIT/ML solution pen-injector injection, Inject 16 Units under the skin into the appropriate area as directed Daily., Disp: , Rfl:   MEDICATION LIST AND ALLERGIES REVIEWED.    Social History     Tobacco Use   • Smoking status: Former Smoker     Quit date:      Years since quittin.2   • Smokeless tobacco: Never Used   Substance Use Topics   • Alcohol use: Yes     Alcohol/week: 2.0 standard drinks     Types: 2 Cans of beer per week   • Drug use: No       FAMILY AND  "SOCIAL HISTORY REVIEWED.    Review of Systems   Constitutional: Positive for fatigue. Negative for activity change, appetite change, fever and unexpected weight change.   HENT: Negative for congestion, postnasal drip, rhinorrhea, sinus pressure, sore throat and voice change.    Eyes: Negative for visual disturbance.   Respiratory: Positive for shortness of breath. Negative for cough, chest tightness and wheezing.    Cardiovascular: Negative for chest pain, palpitations and leg swelling.   Gastrointestinal: Negative for abdominal distention, abdominal pain, nausea and vomiting.   Endocrine: Negative for cold intolerance and heat intolerance.   Genitourinary: Negative for difficulty urinating and urgency.   Musculoskeletal: Negative for arthralgias, back pain and neck pain.   Skin: Negative for color change and pallor.   Allergic/Immunologic: Negative for environmental allergies and food allergies.   Neurological: Negative for dizziness, syncope, weakness and light-headedness.   Hematological: Negative for adenopathy. Does not bruise/bleed easily.   Psychiatric/Behavioral: Negative for agitation and behavioral problems.   .    /70   Pulse 73   Temp 97.5 °F (36.4 °C)   Resp 18   Ht 181.6 cm (71.5\")   Wt 76.3 kg (168 lb 3.2 oz)   SpO2 95% Comment: RA  BMI 23.13 kg/m²     Immunization History   Administered Date(s) Administered   • FLUAD TRI 65YR+ 10/18/2019   • Flu Vaccine Quad PF >18YRS 10/28/2014   • Flu Vaccine Quad PF >36MO 10/18/2017, 10/22/2018   • Fluzone High Dose =>65 Years ([employer] ONLY) 10/29/2013   • Hepatitis A 04/30/2018, 11/01/2018   • Influenza TIV (IM) 10/15/2015, 11/16/2015   • Influenza, Unspecified 11/01/2014   • Tdap 06/13/2017       Physical Exam  Vitals and nursing note reviewed.   Constitutional:       Appearance: He is well-developed. He is not diaphoretic.   HENT:      Head: Normocephalic and atraumatic.   Eyes:      Pupils: Pupils are equal, round, and reactive to light.   Neck: "      Thyroid: No thyromegaly.   Cardiovascular:      Rate and Rhythm: Normal rate and regular rhythm.      Heart sounds: Murmur present. No friction rub. No gallop.    Pulmonary:      Effort: Pulmonary effort is normal. No respiratory distress.      Breath sounds: Normal breath sounds. No wheezing or rales.   Chest:      Chest wall: No tenderness.   Abdominal:      General: Bowel sounds are normal.      Palpations: Abdomen is soft.      Tenderness: There is no abdominal tenderness.   Musculoskeletal:         General: No swelling. Normal range of motion.      Cervical back: Normal range of motion and neck supple.   Lymphadenopathy:      Cervical: No cervical adenopathy.   Skin:     General: Skin is warm and dry.      Capillary Refill: Capillary refill takes less than 2 seconds.   Neurological:      Mental Status: He is alert and oriented to person, place, and time.   Psychiatric:         Behavior: Behavior normal.           RESULTS    CT chest without contrast on 12/14/2020: Small likely chronic and exudative left pleural effusion with rounded atelectasis at left lung base measuring only 3 x 3 cm.  In addition, biapical upper lobe scarring and clustered nodules with mediastinal granulomas, suspect chronic granulomatous process such as either sarcoidosis or silicosis.  Recommend short-term follow-up with CT chest in 6 months to confirm stability.    PROBLEM LIST    Problem List Items Addressed This Visit        Gastrointestinal Abdominal     GERD (gastroesophageal reflux disease) - Primary       Pulmonary and Pneumonias    COPD (chronic obstructive pulmonary disease) (CMS/HCC)    Bronchiectasis without complication (CMS/HCC)            DISCUSSION    Mr. Trotter was here for follow-up of a CT scan that had back in December.  I had Dr. Herbert review the CT scan and compared to his previous scans the left lung base area has been unchanged in 1 year when compared to his previous scans.  Dr. Herbert recommended no  further imaging at this time.    He will continue to take his medication for GERD.  We also discussed reflux precautions in the office today such as elevating the head of the bed at night, not eating to 3 hours before bed and avoiding foods that trigger reflux symptoms.    I did encourage him to use Mucinex twice a day if his secretions were to become thickened to help with his bronchiectasis.    He will follow-up in 1 year or sooner if his symptoms worsen.  I did advise him to call with any additional concerns or questions.    Level of service justified based on 30 minutes spent in patient care on this date of service including, but not limited to: preparing to see the patient, obtaining and/or reviewing history, performing medically appropriate examination, ordering tests/medicine/procedures, independently interpreting results, documenting clinical information in EHR, and counseling/education of patient/family/caregiver. (Level 4 30-39 minutes; Level 5 40-54 minutes)      LINDA Desouza  03/10/956628:30 EST  Electronically signed     Please note that portions of this note were completed with a voice recognition program. Efforts were made to edit the dictations, but occasionally words are mistranscribed.      CC: Stacey Hanley, APRN

## 2021-06-03 DIAGNOSIS — R09.82 POST-NASAL DRAINAGE: ICD-10-CM

## 2021-06-04 RX ORDER — LEVOCETIRIZINE DIHYDROCHLORIDE 5 MG/1
TABLET, FILM COATED ORAL
Qty: 90 TABLET | Refills: 3 | Status: SHIPPED | OUTPATIENT
Start: 2021-06-04 | End: 2023-04-06 | Stop reason: SDUPTHER

## 2022-04-15 RX ORDER — INSULIN GLARGINE 300 U/ML
INJECTION, SOLUTION SUBCUTANEOUS
Qty: 6 ML | Refills: 3 | Status: SHIPPED | OUTPATIENT
Start: 2022-04-15 | End: 2023-03-31 | Stop reason: SDUPTHER

## 2022-05-02 RX ORDER — SIMVASTATIN 40 MG
TABLET ORAL
Qty: 90 TABLET | Refills: 0 | Status: SHIPPED | OUTPATIENT
Start: 2022-05-02 | End: 2022-08-04

## 2022-05-10 RX ORDER — TAMSULOSIN HYDROCHLORIDE 0.4 MG/1
CAPSULE ORAL
Qty: 90 CAPSULE | Refills: 3 | Status: SHIPPED | OUTPATIENT
Start: 2022-05-10 | End: 2023-03-13

## 2022-05-10 RX ORDER — MONTELUKAST SODIUM 10 MG/1
TABLET ORAL
Qty: 90 TABLET | Refills: 3 | Status: SHIPPED | OUTPATIENT
Start: 2022-05-10 | End: 2023-03-13

## 2022-05-16 RX ORDER — ESOMEPRAZOLE MAGNESIUM 40 MG/1
CAPSULE, DELAYED RELEASE ORAL
Qty: 180 CAPSULE | Refills: 3 | Status: SHIPPED | OUTPATIENT
Start: 2022-05-16 | End: 2022-06-01 | Stop reason: SDUPTHER

## 2022-05-31 ENCOUNTER — TELEPHONE (OUTPATIENT)
Dept: FAMILY MEDICINE CLINIC | Facility: CLINIC | Age: 87
End: 2022-05-31

## 2022-06-01 RX ORDER — ESOMEPRAZOLE MAGNESIUM 40 MG/1
40 CAPSULE, DELAYED RELEASE ORAL 2 TIMES DAILY
Qty: 180 CAPSULE | Refills: 1 | Status: SHIPPED | OUTPATIENT
Start: 2022-06-01 | End: 2022-09-02 | Stop reason: SDUPTHER

## 2022-06-01 NOTE — TELEPHONE ENCOUNTER
I'll send it but it's a timely process for a PA. The pharm has to try to run it into insurance, insurance will kick it out, pharm will start PA. We receive PA then we start the process and it can take several days to hear back from the insurance. We are also very behind on PA's so unlikely to be done by Monday. He would probably be better off buying OTC.

## 2022-06-02 ENCOUNTER — TELEPHONE (OUTPATIENT)
Dept: FAMILY MEDICINE CLINIC | Facility: CLINIC | Age: 87
End: 2022-06-02

## 2022-06-15 ENCOUNTER — TELEPHONE (OUTPATIENT)
Dept: FAMILY MEDICINE CLINIC | Facility: CLINIC | Age: 87
End: 2022-06-15

## 2022-06-15 NOTE — TELEPHONE ENCOUNTER
DAUGHTER HAS CALLED TO REPORT THAT [employer] HAS CONTACTED THEM REGARDING PA NEXIUM. [employer] REPORTS THEY HAVE NOT REC'D THE INFO THAT THEY HAD SENT TO OUR OFFICE FOR FURTHER INFO REQUEST. DAUGHTER REPORTS PT IS ALMOST OUT OF THIS RX AT THIS TIME.

## 2022-06-16 RX ORDER — SITAGLIPTIN 100 MG/1
TABLET, FILM COATED ORAL
Qty: 90 TABLET | Refills: 0 | Status: SHIPPED | OUTPATIENT
Start: 2022-06-16 | End: 2022-09-14

## 2022-06-17 NOTE — TELEPHONE ENCOUNTER
We've submitted it but never heard anything back. I just renewed the request and submitted again. If he runs out they can get OTC.

## 2022-07-07 ENCOUNTER — TELEPHONE (OUTPATIENT)
Dept: FAMILY MEDICINE CLINIC | Facility: CLINIC | Age: 87
End: 2022-07-07

## 2022-07-07 RX ORDER — AMLODIPINE BESYLATE 10 MG/1
TABLET ORAL
Qty: 90 TABLET | Refills: 3 | OUTPATIENT
Start: 2022-07-07

## 2022-07-14 ENCOUNTER — LAB (OUTPATIENT)
Dept: FAMILY MEDICINE CLINIC | Facility: CLINIC | Age: 87
End: 2022-07-14

## 2022-07-14 DIAGNOSIS — E78.2 MIXED HYPERLIPIDEMIA: ICD-10-CM

## 2022-07-14 DIAGNOSIS — E55.9 VITAMIN D DEFICIENCY: ICD-10-CM

## 2022-07-14 DIAGNOSIS — E56.9 VITAMIN DEFICIENCY: ICD-10-CM

## 2022-07-14 DIAGNOSIS — R73.9 HYPERGLYCEMIA: ICD-10-CM

## 2022-07-14 DIAGNOSIS — E78.2 MIXED HYPERLIPIDEMIA: Primary | ICD-10-CM

## 2022-07-15 PROCEDURE — 36415 COLL VENOUS BLD VENIPUNCTURE: CPT | Performed by: NURSE PRACTITIONER

## 2022-07-16 LAB
25(OH)D3+25(OH)D2 SERPL-MCNC: 35.6 NG/ML (ref 30–100)
ALBUMIN SERPL-MCNC: 4.5 G/DL (ref 3.6–4.6)
ALBUMIN/GLOB SERPL: 2.1 {RATIO} (ref 1.2–2.2)
ALP SERPL-CCNC: 59 IU/L (ref 44–121)
ALT SERPL-CCNC: 13 IU/L (ref 0–44)
AST SERPL-CCNC: 23 IU/L (ref 0–40)
BASOPHILS # BLD AUTO: 0 X10E3/UL (ref 0–0.2)
BASOPHILS NFR BLD AUTO: 0 %
BILIRUB SERPL-MCNC: 0.2 MG/DL (ref 0–1.2)
BUN SERPL-MCNC: 18 MG/DL (ref 8–27)
BUN/CREAT SERPL: 12 (ref 10–24)
CALCIUM SERPL-MCNC: 9.1 MG/DL (ref 8.6–10.2)
CHLORIDE SERPL-SCNC: 100 MMOL/L (ref 96–106)
CO2 SERPL-SCNC: 22 MMOL/L (ref 20–29)
CREAT SERPL-MCNC: 1.51 MG/DL (ref 0.76–1.27)
EGFRCR SERPLBLD CKD-EPI 2021: 44 ML/MIN/1.73
EOSINOPHIL # BLD AUTO: 0.2 X10E3/UL (ref 0–0.4)
EOSINOPHIL NFR BLD AUTO: 2 %
ERYTHROCYTE [DISTWIDTH] IN BLOOD BY AUTOMATED COUNT: 14.2 % (ref 11.6–15.4)
FOLATE SERPL-MCNC: >20 NG/ML
GLOBULIN SER CALC-MCNC: 2.1 G/DL (ref 1.5–4.5)
GLUCOSE SERPL-MCNC: 82 MG/DL (ref 65–99)
HBA1C MFR BLD: 5.9 % (ref 4.8–5.6)
HCT VFR BLD AUTO: 38.4 % (ref 37.5–51)
HGB BLD-MCNC: 12.1 G/DL (ref 13–17.7)
IMM GRANULOCYTES # BLD AUTO: 0 X10E3/UL (ref 0–0.1)
IMM GRANULOCYTES NFR BLD AUTO: 0 %
LYMPHOCYTES # BLD AUTO: 3.7 X10E3/UL (ref 0.7–3.1)
LYMPHOCYTES NFR BLD AUTO: 33 %
MCH RBC QN AUTO: 29.1 PG (ref 26.6–33)
MCHC RBC AUTO-ENTMCNC: 31.5 G/DL (ref 31.5–35.7)
MCV RBC AUTO: 92 FL (ref 79–97)
MONOCYTES # BLD AUTO: 0.7 X10E3/UL (ref 0.1–0.9)
MONOCYTES NFR BLD AUTO: 6 %
NEUTROPHILS # BLD AUTO: 6.6 X10E3/UL (ref 1.4–7)
NEUTROPHILS NFR BLD AUTO: 59 %
PLATELET # BLD AUTO: 167 X10E3/UL (ref 150–450)
POTASSIUM SERPL-SCNC: 7 MMOL/L (ref 3.5–5.2)
PROT SERPL-MCNC: 6.6 G/DL (ref 6–8.5)
RBC # BLD AUTO: 4.16 X10E6/UL (ref 4.14–5.8)
SODIUM SERPL-SCNC: 139 MMOL/L (ref 134–144)
VIT B12 SERPL-MCNC: 834 PG/ML (ref 232–1245)
WBC # BLD AUTO: 11.2 X10E3/UL (ref 3.4–10.8)

## 2022-07-18 ENCOUNTER — TELEPHONE (OUTPATIENT)
Dept: FAMILY MEDICINE CLINIC | Facility: CLINIC | Age: 87
End: 2022-07-18

## 2022-07-18 DIAGNOSIS — E87.5 HYPERKALEMIA: Primary | ICD-10-CM

## 2022-07-18 NOTE — TELEPHONE ENCOUNTER
We let him know that his potassium was critically high on his lab work.  I would like him to go to the hospital today and have this redrawn.  Hopefully this was just an error in the processing of the blood.  We need to get him on the schedule for an appointment to discuss the rest of his labs and refill his medications as well.  Thanks!

## 2022-07-18 NOTE — TELEPHONE ENCOUNTER
Called other number in chart. Mobile is Daughter's and remember her telling me she is going on vacation. Ricki answered. Stated he went to [facility] on Saturday and potassium is much better. Pulling records from [facility].

## 2022-08-04 RX ORDER — SIMVASTATIN 40 MG
TABLET ORAL
Qty: 90 TABLET | Refills: 3 | Status: SHIPPED | OUTPATIENT
Start: 2022-08-04

## 2022-08-26 ENCOUNTER — OFFICE VISIT (OUTPATIENT)
Dept: PULMONOLOGY | Facility: CLINIC | Age: 87
End: 2022-08-26

## 2022-08-26 VITALS
OXYGEN SATURATION: 96 % | BODY MASS INDEX: 22.42 KG/M2 | TEMPERATURE: 97.3 F | WEIGHT: 163 LBS | DIASTOLIC BLOOD PRESSURE: 70 MMHG | SYSTOLIC BLOOD PRESSURE: 130 MMHG | HEART RATE: 76 BPM

## 2022-08-26 DIAGNOSIS — J47.9 BRONCHIECTASIS WITHOUT COMPLICATION: Primary | ICD-10-CM

## 2022-08-26 DIAGNOSIS — R91.8 MASS OF LOWER LOBE OF LEFT LUNG: ICD-10-CM

## 2022-08-26 PROCEDURE — 99213 OFFICE O/P EST LOW 20 MIN: CPT | Performed by: NURSE PRACTITIONER

## 2022-08-26 NOTE — PROGRESS NOTES
[facility] up    CHIEF COMPLAINT    No cpomplaints    HISTORY OF PRESENT ILLNESS    Ricki Trotter is a 87 y.o.male here today for follow-up.  He was last seen in March 2021 by me.    He has been followed in the past for an abnormal CAT scan since 2017.  It showed a 2.6 x 3.6 cm masslike density in the left lower lobe which was associated with bronchiectasis and pleural scarring was referred to our office.  He has had serial CT scans since that time and the decision was made to stop monitoring this as it has not changed in many years.    He denies any breathing difficulties.  He has mild dyspnea with exertion but does recover fairly quickly at rest.    He denies any sputum production, hemoptysis or chest pain or palpitations.  He denies any lower extremity edema or calf tenderness.    He is up-to-date on his current vaccinations.    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   • COPD (chronic obstructive pulmonary disease) ([facility])   • Recurrent pneumonia   • Post-nasal drainage   • GERD (gastroesophageal reflux disease)   • DM (diabetes mellitus) ([facility])   • HTN (hypertension)   • Bronchiectasis without complication ([facility])   • Acute bronchitis       Allergies   Allergen Reactions   • Penicillins Swelling   • Sulfa Antibiotics Swelling       Current Outpatient Medications:   •  albuterol (PROVENTIL) (5 MG/ML) 0.5% nebulizer solution, Take 2.5 mg by nebulization Every 4 (Four) Hours As Needed., Disp: , Rfl:   •  aspirin 81 MG tablet, Take 81 mg by mouth Daily., Disp: , Rfl:   •  Baclofen 5 MG tablet, Take 5 mg by mouth 3 (Three) Times a Day., Disp: , Rfl:   •  esomeprazole (nexIUM) 40 MG capsule, Take 1 capsule by mouth 2 (Two) Times a Day., Disp: 180 capsule, Rfl: 1  •  ferrous sulfate 325 (65 FE) MG tablet, Take 325 mg by mouth Daily With Breakfast., Disp: , Rfl:   •  fluticasone (FLONASE) 50 MCG/ACT nasal spray, 2 sprays into each nostril., Disp: ,  Rfl:   •  Insulin Pen Needle 32G X 4 MM misc, 90 each Daily., Disp: 90 each, Rfl: 1  •  Januvia 100 MG tablet, TAKE 1 TABLET DAILY, Disp: 90 tablet, Rfl: 0  •  levocetirizine (XYZAL) 5 MG tablet, TAKE 1 TABLET BY MOUTH ONCE DAILY IN THE EVENING, Disp: 90 tablet, Rfl: 3  •  montelukast (SINGULAIR) 10 MG tablet, TAKE 1 TABLET DAILY IN THE EVENING, Disp: 90 tablet, Rfl: 3  •  Multiple Vitamins-Minerals (MULTIVITAMIN ADULT PO), Take 1 tablet by mouth., Disp: , Rfl:   •  nebivolol (BYSTOLIC) 10 MG tablet, TAKE 1 TABLET DAILY, Disp: , Rfl:   •  simvastatin (ZOCOR) 40 MG tablet, TAKE 1 TABLET DAILY IN THE EVENING (DUE FOR 6 MONTH APPOINTMENT), Disp: 90 tablet, Rfl: 3  •  sucralfate (CARAFATE) 1 g tablet, Take 1 g by mouth 2 (two) times a day., Disp: , Rfl:   •  tamsulosin (FLOMAX) 0.4 MG capsule 24 hr capsule, TAKE 1 CAPSULE DAILY 1/2 HOUR FOLLOWING THE SAME MEAL EACH DAY, Disp: 90 capsule, Rfl: 3  •  Toujeo Max SoloStar 300 UNIT/ML solution pen-injector injection, INJECT 16 UNITS UNDER THE SKIN ONCE DAILY, Disp: 6 mL, Rfl: 3  MEDICATION LIST AND ALLERGIES REVIEWED.    Social History     Tobacco Use   • Smoking status: Former Smoker     Packs/day: 0.50     Years: 10.00     Pack years: 5.00     Quit date:      Years since quittin.6   • Smokeless tobacco: Never Used   Substance Use Topics   • Alcohol use: Yes     Alcohol/week: 2.0 standard drinks     Types: 2 Cans of beer per week   • Drug use: No       FAMILY AND SOCIAL HISTORY REVIEWED.    Review of Systems   Constitutional: Negative for activity change, appetite change, fatigue, fever and unexpected weight change.   HENT: Negative for congestion, postnasal drip, rhinorrhea, sinus pressure, sore throat and voice change.    Eyes: Negative for visual disturbance.   Respiratory: Positive for shortness of breath. Negative for cough, chest tightness and wheezing.    Cardiovascular: Negative for chest pain, palpitations and leg swelling.   Gastrointestinal: Negative for  abdominal distention, abdominal pain, nausea and vomiting.   Endocrine: Negative for cold intolerance and heat intolerance.   Genitourinary: Negative for difficulty urinating and urgency.   Musculoskeletal: Negative for arthralgias, back pain and neck pain.   Skin: Negative for color change and pallor.   Allergic/Immunologic: Negative for environmental allergies and food allergies.   Neurological: Negative for dizziness, syncope, weakness and light-headedness.   Hematological: Negative for adenopathy. Does not bruise/bleed easily.   Psychiatric/Behavioral: Negative for agitation and behavioral problems.   .    /70   Pulse 76   Temp 97.3 °F (36.3 °C)   Wt 73.9 kg (163 lb)   SpO2 96% Comment: resting ,room air  BMI 22.42 kg/m²     Immunization History   Administered Date(s) Administered   • COVID-19 (PFIZER) PURPLE CAP 03/22/2021, 04/19/2021, 11/03/2021   • FLUAD TRI 65YR+ 10/18/2019   • Flu Vaccine Quad PF >36MO 10/18/2017, 10/22/2018   • Fluzone High Dose =>65 Years ([facility] ONLY) 10/29/2013   • Fluzone High-Dose 65+yrs 11/03/2021   • Fluzone Quad >6mos (Multi-dose) 10/28/2014   • Hepatitis A 04/30/2018, 11/01/2018   • Influenza TIV (IM) 10/15/2015, 11/16/2015   • Influenza, Unspecified 11/01/2014, 10/15/2015, 11/16/2015   • Tdap 06/13/2017       Physical Exam  Vitals and nursing note reviewed.   Constitutional:       Appearance: He is well-developed. He is not diaphoretic.   HENT:      Head: Normocephalic and atraumatic.   Eyes:      Pupils: Pupils are equal, round, and reactive to light.   Neck:      Thyroid: No thyromegaly.   Cardiovascular:      Rate and Rhythm: Normal rate and regular rhythm.      Heart sounds: Normal heart sounds. No murmur heard.    No friction rub. No gallop.   Pulmonary:      Effort: Pulmonary effort is normal. No respiratory distress.      Breath sounds: Normal breath sounds. No wheezing or rales.   Chest:      Chest wall: No tenderness.   Abdominal:      General: Bowel sounds  are normal.      Palpations: Abdomen is soft.      Tenderness: There is no abdominal tenderness.   Musculoskeletal:         General: No swelling. Normal range of motion.      Cervical back: Normal range of motion and neck supple.   Lymphadenopathy:      Cervical: No cervical adenopathy.   Skin:     General: Skin is warm and dry.      Capillary Refill: Capillary refill takes less than 2 seconds.   Neurological:      Mental Status: He is alert and oriented to person, place, and time.   Psychiatric:         Mood and Affect: Mood normal.         Behavior: Behavior normal.           RESULTS    Chest PA/lateral: Official report pending    PROBLEM LIST    Problem List Items Addressed This Visit        Pulmonary and Pneumonias    Mass of lower lobe of left lung    Overview     1. Probable inflammation associated with focal bronchiectasis  2. Stable CT scan         Bronchiectasis without complication (HCC) - Primary    Relevant Orders    XR Chest PA & Lateral            DISCUSSION    Mr. Trotter was here for follow-up.  He seems to be doing well from a pulmonary standpoint.  He has no complaints today in the office.    We did review his chest x-ray in the office today and it does appear to be similar to his previous chest x-ray.  We will await the official interpretation before pursuing any further imaging.  I will call him if there are any abnormalities found.    He will follow-up in 1 year with a chest x-ray or sooner if his symptoms worsen.  Advised him to call with any additional concerns or questions.    Level of service justified based on 25 minutes spent in patient care on this date of service including, but not limited to: preparing to see the patient, obtaining and/or reviewing history, performing medically appropriate examination, ordering tests/medicine/procedures, independently interpreting results, documenting clinical information in EHR, and counseling/education of patient/family/caregiver. (Level 4 30-39  minutes; Level 5 40-54 minutes)      LINDA Desouza  08/26/202214:27 EDT  Electronically signed     Please note that portions of this note were completed with a voice recognition program.        CC: Stacey Hanley, APRN

## 2022-09-01 ENCOUNTER — TELEPHONE (OUTPATIENT)
Dept: FAMILY MEDICINE CLINIC | Facility: CLINIC | Age: 87
End: 2022-09-01

## 2022-09-01 RX ORDER — ESOMEPRAZOLE MAGNESIUM 40 MG/1
40 CAPSULE, DELAYED RELEASE ORAL 2 TIMES DAILY
Qty: 180 CAPSULE | Refills: 1 | Status: CANCELLED | OUTPATIENT
Start: 2022-09-01

## 2022-09-01 NOTE — TELEPHONE ENCOUNTER
Caller: Paty Tobar    Relationship: Emergency Contact    Best call back number: [phone]    Requested Prescriptions:   Requested Prescriptions     Pending Prescriptions Disp Refills   • esomeprazole (nexIUM) 40 MG capsule 180 capsule 1     Sig: Take 1 capsule by mouth 2 (Two) Times a Day.   • nebivolol (BYSTOLIC) 10 MG tablet       Sig: Take 1 tablet by mouth Daily.        Pharmacy where request should be sent: [facility] [address] [phone] [facility] [phone] [facility]     Additional details provided by patient: PATIENT HAS A FEW LEFT OF THESE MEDICATIONS    Does the patient have less than a 3 day supply:  [x] Yes  [] No    Alva [place] Rep   09/01/22 12:03 EDT

## 2022-09-02 RX ORDER — ESOMEPRAZOLE MAGNESIUM 40 MG/1
40 CAPSULE, DELAYED RELEASE ORAL 2 TIMES DAILY
Qty: 180 CAPSULE | Refills: 0 | Status: SHIPPED | OUTPATIENT
Start: 2022-09-02 | End: 2022-12-01

## 2022-09-02 RX ORDER — NEBIVOLOL 10 MG/1
10 TABLET ORAL DAILY
Qty: 90 TABLET | Refills: 0 | Status: SHIPPED | OUTPATIENT
Start: 2022-09-02 | End: 2023-03-31 | Stop reason: SDUPTHER

## 2022-09-14 RX ORDER — SITAGLIPTIN 100 MG/1
TABLET, FILM COATED ORAL
Qty: 90 TABLET | Refills: 0 | Status: SHIPPED | OUTPATIENT
Start: 2022-09-14 | End: 2022-12-13

## 2022-09-21 ENCOUNTER — OFFICE VISIT (OUTPATIENT)
Dept: FAMILY MEDICINE CLINIC | Facility: CLINIC | Age: 87
End: 2022-09-21

## 2022-09-21 VITALS
SYSTOLIC BLOOD PRESSURE: 136 MMHG | WEIGHT: 165 LBS | DIASTOLIC BLOOD PRESSURE: 80 MMHG | HEART RATE: 73 BPM | HEIGHT: 71 IN | OXYGEN SATURATION: 93 % | BODY MASS INDEX: 23.1 KG/M2

## 2022-09-21 DIAGNOSIS — I10 PRIMARY HYPERTENSION: ICD-10-CM

## 2022-09-21 DIAGNOSIS — Z79.4 TYPE 2 DIABETES MELLITUS WITHOUT COMPLICATION, WITH LONG-TERM CURRENT USE OF INSULIN: ICD-10-CM

## 2022-09-21 DIAGNOSIS — E11.9 TYPE 2 DIABETES MELLITUS WITHOUT COMPLICATION, WITH LONG-TERM CURRENT USE OF INSULIN: ICD-10-CM

## 2022-09-21 DIAGNOSIS — Z00.00 ENCOUNTER FOR ANNUAL WELLNESS EXAM IN MEDICARE PATIENT: Primary | ICD-10-CM

## 2022-09-21 DIAGNOSIS — N40.0 BENIGN PROSTATIC HYPERPLASIA WITHOUT LOWER URINARY TRACT SYMPTOMS: ICD-10-CM

## 2022-09-21 DIAGNOSIS — J44.9 CHRONIC OBSTRUCTIVE PULMONARY DISEASE, UNSPECIFIED COPD TYPE: ICD-10-CM

## 2022-09-21 DIAGNOSIS — K21.9 GASTROESOPHAGEAL REFLUX DISEASE WITHOUT ESOPHAGITIS: ICD-10-CM

## 2022-09-21 DIAGNOSIS — E78.2 MIXED HYPERLIPIDEMIA: ICD-10-CM

## 2022-09-21 LAB
POC CREATININE URINE: ABNORMAL
POC MICROALBUMIN URINE: 50

## 2022-09-21 PROCEDURE — 99397 PER PM REEVAL EST PAT 65+ YR: CPT | Performed by: NURSE PRACTITIONER

## 2022-09-21 PROCEDURE — G0009 ADMIN PNEUMOCOCCAL VACCINE: HCPCS | Performed by: NURSE PRACTITIONER

## 2022-09-21 PROCEDURE — 1170F FXNL STATUS ASSESSED: CPT | Performed by: NURSE PRACTITIONER

## 2022-09-21 PROCEDURE — 90677 PCV20 VACCINE IM: CPT | Performed by: NURSE PRACTITIONER

## 2022-09-21 PROCEDURE — 1159F MED LIST DOCD IN RCRD: CPT | Performed by: NURSE PRACTITIONER

## 2022-09-21 PROCEDURE — 82044 UR ALBUMIN SEMIQUANTITATIVE: CPT | Performed by: NURSE PRACTITIONER

## 2022-09-21 PROCEDURE — G0439 PPPS, SUBSEQ VISIT: HCPCS | Performed by: NURSE PRACTITIONER

## 2022-09-21 RX ORDER — VITAMIN E 268 MG
400 CAPSULE ORAL 2 TIMES DAILY
COMMUNITY

## 2022-09-21 RX ORDER — PEN NEEDLE, DIABETIC 32GX 5/32"
NEEDLE, DISPOSABLE MISCELLANEOUS
Qty: 90 EACH | Refills: 10 | Status: SHIPPED | OUTPATIENT
Start: 2022-09-21

## 2022-09-21 NOTE — PROGRESS NOTES
The ABCs of the Annual Wellness Visit  Subsequent Medicare Wellness Visit    Chief Complaint   Patient presents with   • check up      Subjective    History of Present Illness:  Ricki Trotter is a 87 y.o. male who presents for a Subsequent Medicare Wellness Visit.    The following portions of the patient's history were reviewed and   updated as appropriate: past family history, past social history and past surgical history.    Compared to one year ago, the patient feels his physical   health is better.    Compared to one year ago, the patient feels his mental   health is the same.    Recent Hospitalizations:  He was not admitted to the hospital during the last year.       Current Medical Providers:  Patient Care Team:  Stacey Hanley APRN as PCP - General (Nurse Practitioner)  Delgado Larios MD as Consulting Physician (Medical Oncology)  Demetria Dasilva APRN as Nurse Practitioner (Pulmonary Disease)    Outpatient Medications Prior to Visit   Medication Sig Dispense Refill   • albuterol (PROVENTIL) (5 MG/ML) 0.5% nebulizer solution Take 2.5 mg by nebulization Every 4 (Four) Hours As Needed.     • aspirin 81 MG tablet Take 81 mg by mouth Daily.     • Baclofen 5 MG tablet Take 5 mg by mouth 3 (Three) Times a Day.     • BD Pen Needle Rasheeda U/F 32G X 4 MM misc USE DAILY 90 each 10   • esomeprazole (nexIUM) 40 MG capsule Take 1 capsule by mouth 2 (Two) Times a Day. 180 capsule 0   • ferrous sulfate 325 (65 FE) MG tablet Take 325 mg by mouth Daily With Breakfast.     • fluticasone (FLONASE) 50 MCG/ACT nasal spray 2 sprays into each nostril.     • Januvia 100 MG tablet TAKE 1 TABLET DAILY 90 tablet 0   • levocetirizine (XYZAL) 5 MG tablet TAKE 1 TABLET BY MOUTH ONCE DAILY IN THE EVENING 90 tablet 3   • montelukast (SINGULAIR) 10 MG tablet TAKE 1 TABLET DAILY IN THE EVENING 90 tablet 3   • nebivolol (BYSTOLIC) 10 MG tablet Take 1 tablet by mouth Daily. 90 tablet 0   • simvastatin (ZOCOR) 40 MG tablet TAKE  1 TABLET DAILY IN THE EVENING (DUE FOR 6 MONTH APPOINTMENT) 90 tablet 3   • tamsulosin (FLOMAX) 0.4 MG capsule 24 hr capsule TAKE 1 CAPSULE DAILY 1/2 HOUR FOLLOWING THE SAME MEAL EACH DAY 90 capsule 3   • Toleann Max SoloStar 300 UNIT/ML solution pen-injector injection INJECT 16 UNITS UNDER THE SKIN ONCE DAILY 6 mL 3   • vitamin E 400 UNIT capsule Take 400 Units by mouth 2 (Two) Times a Day.     • Multiple Vitamins-Minerals (MULTIVITAMIN ADULT PO) Take 1 tablet by mouth.     • sucralfate (CARAFATE) 1 g tablet Take 1 g by mouth 2 (two) times a day.       No facility-administered medications prior to visit.       No opioid medication identified on active medication list. I have reviewed chart for other potential  high risk medication/s and harmful drug interactions in the elderly.          Aspirin is on active medication list. Aspirin use is indicated based on review of current medical condition/s. Pros and cons of this therapy have been discussed today. Benefits of this medication outweigh potential harm.  Patient has been encouraged to continue taking this medication.  .      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   • COPD (chronic obstructive pulmonary disease) (HCC)   • Recurrent pneumonia   • Post-nasal drainage   • GERD (gastroesophageal reflux disease)   • DM (diabetes mellitus) (HCC)   • HTN (hypertension)   • Bronchiectasis without complication (HCC)   • Acute bronchitis     Advance Care Planning  Advance Directive is not on file.  ACP discussion was held with the patient during this visit. Patient has an advance directive (not in EMR), copy requested.    Review of Systems   Constitutional: Negative for fatigue and fever.   Respiratory: Negative for shortness of breath.    Cardiovascular: Negative for chest pain, palpitations and leg swelling.   Neurological: Negative for syncope.   Psychiatric/Behavioral: The patient is not nervous/anxious.        "  Objective    Vitals:    22 1316   BP: 136/80   Pulse: 73   SpO2: 93%   Weight: 74.8 kg (165 lb)   Height: 180.3 cm (71\")     Estimated body mass index is 23.01 kg/m² as calculated from the following:    Height as of this encounter: 180.3 cm (71\").    Weight as of this encounter: 74.8 kg (165 lb).    BMI is within normal parameters. No other follow-up for BMI required.      Does the patient have evidence of cognitive impairment? No    Physical Exam  Constitutional:       Appearance: Normal appearance.   HENT:      Head: Normocephalic.   Eyes:      Conjunctiva/sclera: Conjunctivae normal.      Pupils: Pupils are equal, round, and reactive to light.   Cardiovascular:      Rate and Rhythm: Normal rate and regular rhythm.      Heart sounds: Normal heart sounds.   Pulmonary:      Effort: Pulmonary effort is normal.      Breath sounds: Normal breath sounds.   Abdominal:      Tenderness: There is no abdominal tenderness.   Musculoskeletal:         General: Normal range of motion.   Skin:     General: Skin is warm and dry.      Capillary Refill: Capillary refill takes less than 2 seconds.   Neurological:      General: No focal deficit present.      Mental Status: He is alert and oriented to person, place, and time.   Psychiatric:         Mood and Affect: Mood normal.         Behavior: Behavior normal.         Thought Content: Thought content normal.         Judgment: Judgment normal.       Lab Results   Component Value Date    HGBA1C 5.9 (H) 07/15/2022            HEALTH RISK ASSESSMENT    Smoking Status:  Social History     Tobacco Use   Smoking Status Former Smoker   • Packs/day: 0.50   • Years: 9.00   • Pack years: 4.50   • Start date:    • Quit date:    • Years since quittin.7   Smokeless Tobacco Never Used     Alcohol Consumption:  Social History     Substance and Sexual Activity   Alcohol Use Yes   • Alcohol/week: 2.0 standard drinks   • Types: 2 Cans of beer per week     Fall Risk Screen:    CAROLYN " Fall Risk Assessment has not been completed.    Depression Screening:  No flowsheet data found.    Health Habits and Functional and Cognitive Screening:  Functional & Cognitive Status 9/21/2022   Do you have difficulty preparing food and eating? No   Do you have difficulty bathing yourself, getting dressed or grooming yourself? No   Do you have difficulty using the toilet? No   Do you have difficulty moving around from place to place? No   Do you have trouble with steps or getting out of a bed or a chair? No   Current Diet Well Balanced Diet   Dental Exam Up to date   Eye Exam Up to date   Exercise (times per week) 0 times per week   Current Exercises Include No Regular Exercise   Do you need help using the phone?  No   Are you deaf or do you have serious difficulty hearing?  No   Do you need help with transportation? No   Do you need help shopping? No   Do you need help preparing meals?  No   Do you need help with housework?  No   Do you need help with laundry? No   Do you need help taking your medications? No   Do you need help managing money? No   Do you ever drive or ride in a car without wearing a seat belt? No   Have you felt unusual stress, anger or loneliness in the last month? No   Who do you live with? Spouse   If you need help, do you have trouble finding someone available to you? No   Have you been bothered in the last four weeks by sexual problems? No   Do you have difficulty concentrating, remembering or making decisions? No       Age-appropriate Screening Schedule:  Refer to the list below for future screening recommendations based on patient's age, sex and/or medical conditions. Orders for these recommended tests are listed in the plan section. The patient has been provided with a written plan.    Health Maintenance   Topic Date Due   • URINE MICROALBUMIN  Never done   • LIPID PANEL  Never done   • ZOSTER VACCINE (1 of 2) 09/21/2023 (Originally 6/13/1985)   • INFLUENZA VACCINE  10/01/2022   •  DIABETIC EYE EXAM  11/09/2022   • HEMOGLOBIN A1C  01/15/2023   • TDAP/TD VACCINES (2 - Td or Tdap) 06/13/2027              Assessment & Plan   CMS Preventative Services Quick Reference  Risk Factors Identified During Encounter  None Identified  The above risks/problems have been discussed with the patient.  Follow up actions/plans if indicated are seen below in the Assessment/Plan Section.  Pertinent information has been shared with the patient in the After Visit Summary.    Diagnoses and all orders for this visit:    1. Encounter for annual wellness exam in Medicare patient (Primary)  Comments:  We discussed diet, activity, safety, vaccines, and prev counseling.   Orders:  -     Pneumococcal Conjugate Vaccine 20-Valent (PCV20)    2. Type 2 diabetes mellitus without complication, with long-term current use of insulin (HCC)  Comments:  Continue current meds. We discussed diet and activity. I reviewed recent labs.   Orders:  -     POC Microalbumin    3. Chronic obstructive pulmonary disease, unspecified COPD type (HCC)  Comments:  Continue current meds.     4. Gastroesophageal reflux disease without esophagitis  Comments:  Continue current meds.     5. Primary hypertension  Comments:  Continue current meds. Monitor BP.     6. Mixed hyperlipidemia  Comments:  Continue current meds.     7. Benign prostatic hyperplasia without lower urinary tract symptoms  Comments:  Continue current meds.         Follow Up:   Return in about 6 months (around 3/21/2023) for Recheck.     An After Visit Summary and PPPS were made available to the patient.

## 2022-11-01 RX ORDER — AMLODIPINE BESYLATE 10 MG/1
10 TABLET ORAL DAILY
COMMUNITY
End: 2022-11-01 | Stop reason: SDUPTHER

## 2022-11-01 RX ORDER — AMLODIPINE BESYLATE 10 MG/1
10 TABLET ORAL DAILY
Qty: 90 TABLET | Refills: 3 | Status: SHIPPED | OUTPATIENT
Start: 2022-11-01

## 2022-11-01 RX ORDER — AMLODIPINE BESYLATE 10 MG/1
TABLET ORAL
Qty: 90 TABLET | Refills: 3 | OUTPATIENT
Start: 2022-11-01

## 2022-11-01 NOTE — TELEPHONE ENCOUNTER
I had to reject refill because [facility] So I refused this request and sent medication in another encounter. Thanks.

## 2022-11-10 ENCOUNTER — TELEPHONE (OUTPATIENT)
Dept: FAMILY MEDICINE CLINIC | Facility: CLINIC | Age: 87
End: 2022-11-10

## 2022-11-10 RX ORDER — TRAZODONE HYDROCHLORIDE 50 MG/1
TABLET ORAL
Qty: 60 TABLET | Refills: 1 | Status: SHIPPED | OUTPATIENT
Start: 2022-11-10 | End: 2023-01-24 | Stop reason: SDUPTHER

## 2022-11-16 ENCOUNTER — OFFICE VISIT (OUTPATIENT)
Dept: FAMILY MEDICINE CLINIC | Facility: CLINIC | Age: 87
End: 2022-11-16

## 2022-11-16 VITALS
DIASTOLIC BLOOD PRESSURE: 82 MMHG | HEIGHT: 71 IN | BODY MASS INDEX: 22.45 KG/M2 | HEART RATE: 71 BPM | OXYGEN SATURATION: 97 % | WEIGHT: 160.38 LBS | SYSTOLIC BLOOD PRESSURE: 124 MMHG

## 2022-11-16 DIAGNOSIS — G47.00 INSOMNIA, UNSPECIFIED TYPE: ICD-10-CM

## 2022-11-16 DIAGNOSIS — M79.644 PAIN OF RIGHT THUMB: ICD-10-CM

## 2022-11-16 DIAGNOSIS — J44.9 CHRONIC OBSTRUCTIVE PULMONARY DISEASE, UNSPECIFIED COPD TYPE: Primary | ICD-10-CM

## 2022-11-16 DIAGNOSIS — R05.1 ACUTE COUGH: ICD-10-CM

## 2022-11-16 PROBLEM — R05.9 COUGH: Status: ACTIVE | Noted: 2022-11-16

## 2022-11-16 PROCEDURE — 99214 OFFICE O/P EST MOD 30 MIN: CPT | Performed by: NURSE PRACTITIONER

## 2022-11-16 RX ORDER — UMECLIDINIUM BROMIDE AND VILANTEROL TRIFENATATE 62.5; 25 UG/1; UG/1
1 POWDER RESPIRATORY (INHALATION)
Qty: 1 EACH | Refills: 2 | Status: SHIPPED | OUTPATIENT
Start: 2022-11-16 | End: 2022-12-09

## 2022-11-16 RX ORDER — ALBUTEROL SULFATE 90 UG/1
2 AEROSOL, METERED RESPIRATORY (INHALATION) EVERY 4 HOURS PRN
Qty: 18 G | Refills: 2 | Status: SHIPPED | OUTPATIENT
Start: 2022-11-16

## 2022-11-16 RX ORDER — DOXYCYCLINE HYCLATE 100 MG/1
100 CAPSULE ORAL 2 TIMES DAILY
COMMUNITY
Start: 2022-11-14 | End: 2022-12-09

## 2022-11-16 NOTE — ASSESSMENT & PLAN NOTE
States he continues to follow-up with pulmonary in [place] so encouraged to continue to do so.  Due to cough we went ahead and completed a chest x-ray.  We will let know if radiologist sees anything.  Encouraged him to finish doxycycline as prescribed by urgent care.  Will prescribe him albuterol rescue inhaler to use as needed.  Due to his history of COPD and wheezing we will also start him on Anoro.  Education provided on how to use and he is aware that he is to rinse his mouth after each use.  Education provided.  Return in 2 days if no improvement, sooner if worsens.  Risk of meds discussed and understood.  Encouraged him to follow-up with his PCP in 1 month for reevaluation since starting the Anoro.  Return to clinic or ED with any issues or concerns.

## 2022-11-16 NOTE — ASSESSMENT & PLAN NOTE
He states he wants to continue with the trazodone.  Proper sleep hygiene discussed and encouraged.  Denies other medications to help with sleep.  Informed him that if it is not noticeably helping in a week or so to contact his PCP for further evaluation.  Risk discussed and understood.  Return to clinic or ED with any issues or concerns.

## 2022-11-16 NOTE — ASSESSMENT & PLAN NOTE
X-ray completed.  Will let know if radiologist sees anything.  Urged.  Informed him that since he got a steroid shot 2 days ago hopefully that we will work and help improve the pain.  Informed him that if the pain does not go away in a week or so to contact me and let me know and at that point we may need to refer him to orthopedist, sooner if worsens.  He understands.  Return to clinic or ED with any issues or concerns.

## 2022-11-16 NOTE — PROGRESS NOTES
Chief Complaint  Right Thumb Pain and Wheezing    Subjective          Ricki Jamie Trotter presents to [facility]  History of Present Illness     Here today with daughter.  States a month ago he got his right thumb stuck in some type of loop on a pain in and has had right thumb pain since then.  No redness no warmth no swelling.  Has full range of motion.  States he used to work at [employer] and did a lot of work with his hands so is wondering if it could just be some inflammation from arthritis.  Would like an x-ray today to make sure no fracture.    Also states for the past week he has had a cough and wheezing.  History of COPD.  No smoking.  No fever no chills no body aches.  Went to urgent care 2 days ago and states they gave him a steroid injection and started him on doxycycline of which she has taken 2 days and is going to continue taking it.  States cough has slightly improved.  Still has some wheezing.  Daughter states she can hear him wheezing in the evening time.  States he has been using his wife's rescue inhaler and it does help and is asking for his own prescription of this.     They state wheezing is almost a daily thing for him.  He is not on any long-term inhaled steroid so he may try adding 1 today to see if that helps.    No chest pain no chest pressure.    They also state his wife passed away ago.  States he is doing as expected and doing okay.  States he has been having some issues with sleeping and states his PCP Stacey Hanley called him and trazodone about a week ago.  States he was taking 50 mg daily and yesterday increased to 50 mg twice daily.  States it has minimally helped.  We discussed other options but they state since this is a new medication for him they will continue taking it and giving it a little more time.  No anxiety.  No thoughts of suicide or hurting himself or anyone else.  Daughter is staying with him at the time.    Objective   Vital  "Signs:   /82   Pulse 71   Ht 180.3 cm (71\")   Wt 72.7 kg (160 lb 6 oz)   SpO2 97%   BMI 22.37 kg/m²     Body mass index is 22.37 kg/m².    Review of Systems   Constitutional: Negative for chills, fatigue and fever.   HENT: Negative for congestion, sinus pressure and sneezing.    Eyes: Negative for visual disturbance.   Respiratory: Positive for cough and wheezing. Negative for shortness of breath.    Cardiovascular: Negative for chest pain, palpitations and leg swelling.   Gastrointestinal: Negative for abdominal pain.   Musculoskeletal: Positive for arthralgias.   Skin: Negative for rash and wound.   Neurological: Negative for dizziness, weakness, numbness and headache.   Psychiatric/Behavioral: Positive for sleep disturbance. Negative for agitation, behavioral problems, decreased concentration, dysphoric mood, hallucinations, self-injury, suicidal ideas, negative for hyperactivity, depressed mood and stress. The patient is not nervous/anxious.        Past History:  Medical History: has a past medical history of Allergic rhinitis, Allergies, Amnesia, Basal cell carcinoma (2015), Benign prostatic hyperplasia, Chronic kidney disease, stage 3b ([facility]), CKD (chronic kidney disease), COPD (chronic obstructive pulmonary disease) ([facility]), Diabetes mellitus (HCC), Drug dependency (HCC), Dyslipidemia, Essential hypertension, Gastroesophageal reflux, Hyperlipidemia, Hypertension, Insulin-treated type 2 diabetes mellitus (HCC), Iron deficiency, Long term (current) use of insulin ([facility]), Melanoma ([facility]) (2012), Mixed hyperlipidemia, and Nephrosclerosis.   Surgical History: has a past surgical history that includes prosthodontic procedure and Colonoscopy.   Family History: family history includes Breast cancer in his sister; Diabetes in his mother; Stroke in his mother.   Social History: reports that he quit smoking about 60 years ago. His smoking use included cigarettes. He started smoking about 69 years ago. He has a " 4.50 pack-year smoking history. He has never used smokeless tobacco. He reports current alcohol use of about 2.0 standard drinks per week. He reports that he does not use drugs.    PHQ-2 Depression Screening  Little interest or pleasure in doing things? 0-->not at all   Feeling down, depressed, or hopeless? 1-->several days   PHQ-2 Total Score 1        PHQ-9 Depression Screening  Little interest or pleasure in doing things? 0-->not at all   Feeling down, depressed, or hopeless? 1-->several days   Trouble falling or staying asleep, or sleeping too much?     Feeling tired or having little energy?     Poor appetite or overeating?     Feeling bad about yourself - or that you are a failure or have let yourself or your family down?     Trouble concentrating on things, such as reading the newspaper or watching television?     Moving or speaking so slowly that other people could have noticed? Or the opposite - being so fidgety or restless that you have been moving around a lot more than usual?     Thoughts that you would be better off dead, or of hurting yourself in some way?     PHQ-9 Total Score 1   If you checked off any problems, how difficult have these problems made it for you to do your work, take care of things at home, or get along with other people?       PHQ-9 Total Score: 1               Current Outpatient Medications:   •  albuterol (PROVENTIL) (5 MG/ML) 0.5% nebulizer solution, Take 2.5 mg by nebulization Every 4 (Four) Hours As Needed., Disp: , Rfl:   •  albuterol sulfate  (90 Base) MCG/ACT inhaler, Inhale 2 puffs Every 4 (Four) Hours As Needed for Wheezing., Disp: 18 g, Rfl: 2  •  amLODIPine (NORVASC) 10 MG tablet, Take 1 tablet by mouth Daily., Disp: 90 tablet, Rfl: 3  •  aspirin 81 MG tablet, Take 81 mg by mouth Daily., Disp: , Rfl:   •  Baclofen 5 MG tablet, Take 5 mg by mouth 3 (Three) Times a Day., Disp: , Rfl:   •  BD Pen Needle Rasheeda U/F 32G X 4 MM misc, USE DAILY, Disp: 90 each, Rfl: 10  •   doxycycline (VIBRAMYCIN) 100 MG capsule, Take 100 mg by mouth 2 (Two) Times a Day., Disp: , Rfl:   •  esomeprazole (nexIUM) 40 MG capsule, Take 1 capsule by mouth 2 (Two) Times a Day., Disp: 180 capsule, Rfl: 0  •  ferrous sulfate 325 (65 FE) MG tablet, Take 325 mg by mouth Daily With Breakfast., Disp: , Rfl:   •  fluticasone (FLONASE) 50 MCG/ACT nasal spray, 2 sprays into each nostril., Disp: , Rfl:   •  Januvia 100 MG tablet, TAKE 1 TABLET DAILY, Disp: 90 tablet, Rfl: 0  •  levocetirizine (XYZAL) 5 MG tablet, TAKE 1 TABLET BY MOUTH ONCE DAILY IN THE EVENING, Disp: 90 tablet, Rfl: 3  •  montelukast (SINGULAIR) 10 MG tablet, TAKE 1 TABLET DAILY IN THE EVENING, Disp: 90 tablet, Rfl: 3  •  nebivolol (BYSTOLIC) 10 MG tablet, Take 1 tablet by mouth Daily., Disp: 90 tablet, Rfl: 0  •  simvastatin (ZOCOR) 40 MG tablet, TAKE 1 TABLET DAILY IN THE EVENING (DUE FOR 6 MONTH APPOINTMENT), Disp: 90 tablet, Rfl: 3  •  tamsulosin (FLOMAX) 0.4 MG capsule 24 hr capsule, TAKE 1 CAPSULE DAILY 1/2 HOUR FOLLOWING THE SAME MEAL EACH DAY, Disp: 90 capsule, Rfl: 3  •  Toujeo Max SoloStar 300 UNIT/ML solution pen-injector injection, INJECT 16 UNITS UNDER THE SKIN ONCE DAILY, Disp: 6 mL, Rfl: 3  •  traZODone (DESYREL) 50 MG tablet, 1-2 tablets HS PRN insomnia, Disp: 60 tablet, Rfl: 1  •  Umeclidinium-Vilanterol (Anoro Ellipta) 62.5-25 MCG/ACT aerosol powder  inhaler, Inhale 1 puff Daily., Disp: 1 each, Rfl: 2  •  vitamin E 400 UNIT capsule, Take 400 Units by mouth 2 (Two) Times a Day., Disp: , Rfl:    (Not in a hospital admission)     Allergies: Penicillins and Sulfa antibiotics    Physical Exam  Constitutional:       Appearance: Normal appearance.   HENT:      Right Ear: Tympanic membrane, ear canal and external ear normal.      Left Ear: Tympanic membrane, ear canal and external ear normal.      Nose: Nose normal.      Mouth/Throat:      Mouth: Mucous membranes are moist.      Pharynx: Oropharynx is clear.   Eyes:      Conjunctiva/sclera:  Conjunctivae normal.      Pupils: Pupils are equal, round, and reactive to light.   Cardiovascular:      Rate and Rhythm: Normal rate and regular rhythm.      Heart sounds: Normal heart sounds.   Pulmonary:      Effort: Pulmonary effort is normal. No respiratory distress.      Breath sounds: Wheezing present. No rhonchi or rales.   Musculoskeletal:      Right hand: Tenderness present. No swelling, deformity or lacerations. Normal range of motion. Normal pulse.      Comments: Right DIP joint on thumb slightly tender to palpation. Full ROM of right thumb. No redness, no warmth, no swelling. No nail involvement.    Neurological:      General: No focal deficit present.      Mental Status: He is alert and oriented to person, place, and time. Mental status is at baseline.   Psychiatric:         Attention and Perception: Attention normal.         Mood and Affect: Mood and affect normal.         Speech: Speech normal.         Behavior: Behavior normal. Behavior is cooperative.         Thought Content: Thought content normal. Thought content does not include homicidal or suicidal ideation. Thought content does not include homicidal or suicidal plan.         Cognition and Memory: Cognition and memory normal.         Judgment: Judgment normal.          Result Review :                   Assessment and Plan    Diagnoses and all orders for this visit:    1. Chronic obstructive pulmonary disease, unspecified COPD type (HCC) (Primary)  Assessment & Plan:  States he continues to follow-up with pulmonary in [place] so encouraged to continue to do so.  Due to cough we went ahead and completed a chest x-ray.  We will let know if radiologist sees anything.  Encouraged him to finish doxycycline as prescribed by urgent care.  Will prescribe him albuterol rescue inhaler to use as needed.  Due to his history of COPD and wheezing we will also start him on Anoro.  Education provided on how to use and he is aware that he is to rinse his  mouth after each use.  Education provided.  Return in 2 days if no improvement, sooner if worsens.  Risk of meds discussed and understood.  Encouraged him to follow-up with his PCP in 1 month for reevaluation since starting the Anoro.  Return to clinic or ED with any issues or concerns.      Orders:  -     XR Chest PA & Lateral (In Office)  -     Umeclidinium-Vilanterol (Anoro Ellipta) 62.5-25 MCG/ACT aerosol powder  inhaler; Inhale 1 puff Daily.  Dispense: 1 each; Refill: 2  -     albuterol sulfate  (90 Base) MCG/ACT inhaler; Inhale 2 puffs Every 4 (Four) Hours As Needed for Wheezing.  Dispense: 18 g; Refill: 2    2. Pain of right thumb  Assessment & Plan:  X-ray completed.  Will let know if radiologist sees anything.  Urged.  Informed him that since he got a steroid shot 2 days ago hopefully that we will work and help improve the pain.  Informed him that if the pain does not go away in a week or so to contact me and let me know and at that point we may need to refer him to orthopedist, sooner if worsens.  He understands.  Return to clinic or ED with any issues or concerns.    Orders:  -     XR Hand 3+ View Right (In Office)    3. Acute cough  Assessment & Plan:  States he continues to follow-up with pulmonary in [place] so encouraged to continue to do so.  Due to cough we went ahead and completed a chest x-ray.  We will let know if radiologist sees anything.  Encouraged him to finish doxycycline as prescribed by urgent care.  Will prescribe him albuterol rescue inhaler to use as needed.  Due to his history of COPD and wheezing we will also start him on Anoro.  Education provided on how to use and he is aware that he is to rinse his mouth after each use.  Education provided.  Return in 2 days if no improvement, sooner if worsens.  Risk of meds discussed and understood.  Encouraged him to follow-up with his PCP in 1 month for reevaluation since starting the Anoro.  Return to clinic or ED with any issues or  concerns.      4. Insomnia, unspecified type  Assessment & Plan:  He states he wants to continue with the trazodone.  Proper sleep hygiene discussed and encouraged.  Denies other medications to help with sleep.  Informed him that if it is not noticeably helping in a week or so to contact his PCP for further evaluation.  Risk discussed and understood.  Return to clinic or ED with any issues or concerns.              BMI is within normal parameters. No other follow-up for BMI required.       Follow Up   Return if symptoms worsen or fail to improve.  Patient was given instructions and counseling regarding his condition or for health maintenance advice. Please see specific information pulled into the AVS if appropriate.     LINDA Machuca

## 2022-11-16 NOTE — ASSESSMENT & PLAN NOTE
States he continues to follow-up with pulmonary in [place] so encouraged to continue to do so.  Due to cough we went ahead and completed a chest x-ray.  We will let know if radiologist sees anything.  Encouraged him to finish doxycycline as prescribed by urgent care.  Will prescribe him albuterol rescue inhaler to use as needed.  Due to his history of COPD and wheezing we will also start him on Anoro.  Education provided on how to use and he is aware that he is to rinse his mouth after each use.  Education provided.  Return in 2 days if no improvement, sooner if worsens.  Risk of meds discussed and understood.  Encouraged him to follow-up with his PCP in 1 month for reevaluation since starting the Anoro.  Return to clinic or ED with any issues or concerns.

## 2022-11-17 ENCOUNTER — TELEPHONE (OUTPATIENT)
Dept: FAMILY MEDICINE CLINIC | Facility: CLINIC | Age: 87
End: 2022-11-17

## 2022-12-01 RX ORDER — ESOMEPRAZOLE MAGNESIUM 40 MG/1
CAPSULE, DELAYED RELEASE ORAL
Qty: 180 CAPSULE | Refills: 3 | Status: SHIPPED | OUTPATIENT
Start: 2022-12-01

## 2022-12-09 ENCOUNTER — OFFICE VISIT (OUTPATIENT)
Dept: PULMONOLOGY | Facility: CLINIC | Age: 87
End: 2022-12-09

## 2022-12-09 VITALS
OXYGEN SATURATION: 98 % | WEIGHT: 155 LBS | HEART RATE: 70 BPM | HEIGHT: 71 IN | BODY MASS INDEX: 21.7 KG/M2 | DIASTOLIC BLOOD PRESSURE: 46 MMHG | TEMPERATURE: 98.9 F | SYSTOLIC BLOOD PRESSURE: 132 MMHG

## 2022-12-09 DIAGNOSIS — K21.9 GASTROESOPHAGEAL REFLUX DISEASE WITHOUT ESOPHAGITIS: ICD-10-CM

## 2022-12-09 DIAGNOSIS — J47.9 BRONCHIECTASIS WITHOUT COMPLICATION: ICD-10-CM

## 2022-12-09 DIAGNOSIS — J18.9 RECURRENT PNEUMONIA: Primary | ICD-10-CM

## 2022-12-09 PROCEDURE — 99214 OFFICE O/P EST MOD 30 MIN: CPT | Performed by: NURSE PRACTITIONER

## 2022-12-09 NOTE — PROGRESS NOTES
[facility] Follow up    CHIEF COMPLAINT    dyspnea    HISTORY OF PRESENT ILLNESS    Ricki Trotter is a 87 y.o.male here today for follow-up.  He was last seen in the office by me in August.  He states that he lost his wife recently and was sick in October and was treated with antibiotics and a steroid taper.  He states after losing his wife he had 3 weeks where he was having difficulty sleeping.  He is now sleeping regularly and is feeling better.    He states his breathing has been better he did get a sample of Anoro but had difficulty urinating with it.  He is wondering if he needs an alternative.  He does have an albuterol rescue inhaler to use but has not used this recently.    He denies sputum production or hemoptysis.  He denies any chest pain or palpitations.  He did denies any lower extremity edema or calf tenderness.    He states that his appetite is better as well.    He denies any reflux symptoms or swallowing difficulties.  He did have some worsening reflux about 3 weeks ago but states it is a bit better for the last week.    We have followed him in the past since 2017 for a masslike density in the left lower lobe we have decided to stop monitoring as it has been stable for many years.    He is up-to-date on his current vaccinations.  He is completed by his daughter.    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   • COPD (chronic obstructive pulmonary disease) ([facility])   • Recurrent pneumonia   • Post-nasal drainage   • GERD (gastroesophageal reflux disease)   • DM (diabetes mellitus) ([facility])   • HTN (hypertension)   • Bronchiectasis without complication ([facility])   • Acute bronchitis   • Pain of right thumb   • Cough   • Insomnia       Allergies   Allergen Reactions   • Penicillins Swelling   • Sulfa Antibiotics Swelling       Current Outpatient Medications:   •  albuterol (PROVENTIL) (5 MG/ML) 0.5% nebulizer solution, Take 2.5 mg by nebulization  Every 4 (Four) Hours As Needed., Disp: , Rfl:   •  albuterol sulfate  (90 Base) MCG/ACT inhaler, Inhale 2 puffs Every 4 (Four) Hours As Needed for Wheezing., Disp: 18 g, Rfl: 2  •  amLODIPine (NORVASC) 10 MG tablet, Take 1 tablet by mouth Daily., Disp: 90 tablet, Rfl: 3  •  aspirin 81 MG tablet, Take 81 mg by mouth Daily., Disp: , Rfl:   •  Baclofen 5 MG tablet, Take 5 mg by mouth 3 (Three) Times a Day., Disp: , Rfl:   •  BD Pen Needle Rasheeda U/F 32G X 4 MM misc, USE DAILY, Disp: 90 each, Rfl: 10  •  esomeprazole (nexIUM) 40 MG capsule, TAKE 1 CAPSULE TWICE A DAY, Disp: 180 capsule, Rfl: 3  •  ferrous sulfate 325 (65 FE) MG tablet, Take 325 mg by mouth Daily With Breakfast., Disp: , Rfl:   •  fluticasone (FLONASE) 50 MCG/ACT nasal spray, 2 sprays into each nostril., Disp: , Rfl:   •  Januvia 100 MG tablet, TAKE 1 TABLET DAILY, Disp: 90 tablet, Rfl: 0  •  levocetirizine (XYZAL) 5 MG tablet, TAKE 1 TABLET BY MOUTH ONCE DAILY IN THE EVENING, Disp: 90 tablet, Rfl: 3  •  montelukast (SINGULAIR) 10 MG tablet, TAKE 1 TABLET DAILY IN THE EVENING, Disp: 90 tablet, Rfl: 3  •  nebivolol (BYSTOLIC) 10 MG tablet, Take 1 tablet by mouth Daily., Disp: 90 tablet, Rfl: 0  •  simvastatin (ZOCOR) 40 MG tablet, TAKE 1 TABLET DAILY IN THE EVENING (DUE FOR 6 MONTH APPOINTMENT), Disp: 90 tablet, Rfl: 3  •  tamsulosin (FLOMAX) 0.4 MG capsule 24 hr capsule, TAKE 1 CAPSULE DAILY 1/2 HOUR FOLLOWING THE SAME MEAL EACH DAY, Disp: 90 capsule, Rfl: 3  •  Toujeo Max SoloStar 300 UNIT/ML solution pen-injector injection, INJECT 16 UNITS UNDER THE SKIN ONCE DAILY, Disp: 6 mL, Rfl: 3  •  traZODone (DESYREL) 50 MG tablet, 1-2 tablets HS PRN insomnia, Disp: 60 tablet, Rfl: 1  •  vitamin E 400 UNIT capsule, Take 400 Units by mouth 2 (Two) Times a Day., Disp: , Rfl:   MEDICATION LIST AND ALLERGIES REVIEWED.    Social History     Tobacco Use   • Smoking status: Former     Packs/day: 0.50     Years: 9.00     Pack years: 4.50     Types: Cigarettes      "Start date:      Quit date:      Years since quittin.9   • Smokeless tobacco: Never   Vaping Use   • Vaping Use: Never used   Substance Use Topics   • Alcohol use: Yes     Alcohol/week: 2.0 standard drinks     Types: 2 Cans of beer per week   • Drug use: No       FAMILY AND SOCIAL HISTORY REVIEWED.    Review of Systems   Constitutional: Negative for activity change, appetite change, fatigue, fever and unexpected weight change.   HENT: Negative for congestion, postnasal drip, rhinorrhea, sinus pressure, sore throat and voice change.    Eyes: Negative for visual disturbance.   Respiratory: Positive for shortness of breath. Negative for cough, chest tightness and wheezing.    Cardiovascular: Negative for chest pain, palpitations and leg swelling.   Gastrointestinal: Negative for abdominal distention, abdominal pain, nausea and vomiting.   Endocrine: Negative for cold intolerance and heat intolerance.   Genitourinary: Negative for difficulty urinating and urgency.   Musculoskeletal: Negative for arthralgias, back pain and neck pain.   Skin: Negative for color change and pallor.   Allergic/Immunologic: Negative for environmental allergies and food allergies.   Neurological: Negative for dizziness, syncope, weakness and light-headedness.   Hematological: Negative for adenopathy. Does not bruise/bleed easily.   Psychiatric/Behavioral: Negative for agitation and behavioral problems.   .    /46 (BP Location: Right arm, Patient Position: Sitting, Cuff Size: Adult)   Pulse 70   Temp 98.9 °F (37.2 °C) (Infrared)   Ht 180.3 cm (71\")   Wt 70.3 kg (155 lb)   SpO2 98% Comment: resting, room air  BMI 21.62 kg/m²     Immunization History   Administered Date(s) Administered   • COVID-19 (MODERNA) BIVALENT BOOSTER 6+YRS 10/06/2022   • COVID-19 (PFIZER) PURPLE CAP 2021, 2021, 2021   • FLUAD TRI 65YR+ 10/18/2019   • Flu Vaccine Quad PF >36MO 10/18/2017, 10/22/2018   • Fluad Quad 65+ 10/06/2022 "   • Fluzone High Dose =>65 Years ([facility] ONLY) 10/29/2013   • Fluzone High-Dose 65+yrs 11/03/2021   • Fluzone Quad >6mos (Multi-dose) 10/28/2014   • Hepatitis A 04/30/2018, 11/01/2018   • Influenza TIV (IM) 10/15/2015, 11/16/2015   • Influenza, Unspecified 11/01/2014, 10/15/2015, 11/16/2015, 10/22/2018   • Pneumococcal Conjugate 20-Valent (PCV20) 09/21/2022   • Tdap 06/13/2017       Physical Exam  Vitals and nursing note reviewed.   Constitutional:       Appearance: He is well-developed. He is not diaphoretic.   HENT:      Head: Normocephalic and atraumatic.   Eyes:      Pupils: Pupils are equal, round, and reactive to light.   Neck:      Thyroid: No thyromegaly.   Cardiovascular:      Rate and Rhythm: Normal rate and regular rhythm.      Heart sounds: Normal heart sounds. No murmur heard.    No friction rub. No gallop.   Pulmonary:      Effort: Pulmonary effort is normal. No respiratory distress.      Breath sounds: Normal breath sounds. No wheezing or rales.   Chest:      Chest wall: No tenderness.   Abdominal:      General: Bowel sounds are normal.      Palpations: Abdomen is soft.      Tenderness: There is no abdominal tenderness.   Musculoskeletal:         General: No swelling. Normal range of motion.      Cervical back: Normal range of motion and neck supple.   Lymphadenopathy:      Cervical: No cervical adenopathy.   Skin:     General: Skin is warm and dry.      Capillary Refill: Capillary refill takes less than 2 seconds.   Neurological:      Mental Status: He is alert and oriented to person, place, and time.   Psychiatric:         Mood and Affect: Mood normal.         Behavior: Behavior normal.           RESULTS    XR Chest PA & Lateral    Result Date: 12/9/2022  Persistent small left pleural effusion, with otherwise improved aeration of the adjacent left lower lobe.  This report was finalized on 12/9/2022 11:44 AM by Maldonado Lanza.      PROBLEM LIST    Problem List Items Addressed This Visit         Gastrointestinal Abdominal     GERD (gastroesophageal reflux disease)       Pulmonary and Pneumonias    Recurrent pneumonia - Primary    Bronchiectasis without complication (HCC)         DISCUSSION    Mr. Trotter was here for follow-up.  He seems to have recovered from his respiratory infection from last month.  He does not appear to be infected in the office today.    We did review his chest x-ray that showed no acute pulmonary process.    He will continue to take Nexium twice a day for his GERD.  We also discussed reflux precautions in the office today.    I did give him a sample of Breo 100 to use for 2 weeks.  I did advise him that he may be able to stop the inhaler completely not needed at all.  If he feels that this is helping his breathing I will send this into his pharmacy at the beginning of the year.    He will follow-up in 3 to 4 months with full PFTs.  I advised him to call with any additional concerns or questions.    I personally spent a total of 31 minutes on patient visit today including chart review, face to face with the patient obtaining the history and physical exam, review of pertinent images and tests, counseling and discussion and/or coordination of care as described above, and documentation.  Total time excludes time spent on other separate services such as performing procedures or test interpretation, if applicable.        LINDA Desouza  12/09/202212:44 EST  Electronically signed     Please note that portions of this note were completed with a voice recognition program.        CC: Stacey Hanley, LINDA

## 2023-01-24 RX ORDER — TRAZODONE HYDROCHLORIDE 50 MG/1
TABLET ORAL
Qty: 180 TABLET | Refills: 1 | Status: SHIPPED | OUTPATIENT
Start: 2023-01-24 | End: 2023-03-31 | Stop reason: SDUPTHER

## 2023-01-24 NOTE — TELEPHONE ENCOUNTER
Caller: Paty Tobar    Relationship: Emergency Contact    Best call back number:    [phone]          Requested Prescriptions:   Requested Prescriptions     Pending Prescriptions Disp Refills   • traZODone (DESYREL) 50 MG tablet 60 tablet 1     Si-2 tablets HS PRN insomnia        Pharmacy where request should be sent: [facility] - [facility] [address] [phone] [facility] [phone] FX     Does the patient have less than a 3 day supply:  [x] Yes  [] No    Would you like a call back once the refill request has been completed: [x] Yes [] No    If the office needs to give you a call back, can they leave a voicemail: [x] Yes [] No    Alva Delatorre Rep   23 12:48 EST

## 2023-03-10 ENCOUNTER — OFFICE VISIT (OUTPATIENT)
Dept: PULMONOLOGY | Facility: CLINIC | Age: 88
End: 2023-03-10
Payer: MEDICARE

## 2023-03-10 VITALS
OXYGEN SATURATION: 94 % | SYSTOLIC BLOOD PRESSURE: 158 MMHG | TEMPERATURE: 94.4 F | BODY MASS INDEX: 22.06 KG/M2 | WEIGHT: 157.6 LBS | DIASTOLIC BLOOD PRESSURE: 60 MMHG | HEIGHT: 71 IN | HEART RATE: 67 BPM

## 2023-03-10 DIAGNOSIS — J47.9 BRONCHIECTASIS WITHOUT COMPLICATION: ICD-10-CM

## 2023-03-10 DIAGNOSIS — J44.9 CHRONIC OBSTRUCTIVE PULMONARY DISEASE, UNSPECIFIED COPD TYPE: Primary | ICD-10-CM

## 2023-03-10 DIAGNOSIS — J18.9 RECURRENT PNEUMONIA: ICD-10-CM

## 2023-03-10 DIAGNOSIS — R91.8 MASS OF LOWER LOBE OF LEFT LUNG: ICD-10-CM

## 2023-03-10 PROCEDURE — 94729 DIFFUSING CAPACITY: CPT | Performed by: INTERNAL MEDICINE

## 2023-03-10 PROCEDURE — 94375 RESPIRATORY FLOW VOLUME LOOP: CPT | Performed by: INTERNAL MEDICINE

## 2023-03-10 PROCEDURE — 1159F MED LIST DOCD IN RCRD: CPT | Performed by: INTERNAL MEDICINE

## 2023-03-10 PROCEDURE — 94726 PLETHYSMOGRAPHY LUNG VOLUMES: CPT | Performed by: INTERNAL MEDICINE

## 2023-03-10 PROCEDURE — 1160F RVW MEDS BY RX/DR IN RCRD: CPT | Performed by: INTERNAL MEDICINE

## 2023-03-10 PROCEDURE — 99214 OFFICE O/P EST MOD 30 MIN: CPT | Performed by: INTERNAL MEDICINE

## 2023-03-10 RX ORDER — UMECLIDINIUM BROMIDE AND VILANTEROL TRIFENATATE 62.5; 25 UG/1; UG/1
POWDER RESPIRATORY (INHALATION)
COMMUNITY
Start: 2022-12-11 | End: 2023-03-10

## 2023-03-10 RX ORDER — FLUOROURACIL 50 MG/G
CREAM TOPICAL
COMMUNITY
Start: 2023-02-24

## 2023-03-10 NOTE — PROGRESS NOTES
Subjective:     Chief Complaint:   Chief Complaint   Patient presents with   • mass of lower lobe of left lung      F/u        HPI:    Ricki Trotter is a 87 y.o. male here for follow-up of COPD and abnormal CAT scan    I last saw him in December 2019.  He has been seen by Demetria MCKEON several times since then.    He has a history of recurrent pneumonia.  He has a chronic masslike density in the left lower lobe associated with bronchiectasis and pleural thickening.  He also has a history of COPD.  He was smoking at 61.  He was exposed to asbestos and other nonspecific chemicals per his report when he worked for [employer].    I thought at the time the mass was related to rounded atelectasis versus postinflammatory scarring or some combination.  Radiographic follow-up was recommended.    He had a recent episode of pneumonia last fall.  He is currently at a good baseline.  He lost his wife of 65 years late in the year and has been grieving about that.    He is on no regular inhaled therapy.  He does not seem to need it.  He has no acute attacks of shortness of breath.    Current medications are:   Current Outpatient Medications:   •  albuterol sulfate  (90 Base) MCG/ACT inhaler, Inhale 2 puffs Every 4 (Four) Hours As Needed for Wheezing., Disp: 18 g, Rfl: 2  •  amLODIPine (NORVASC) 10 MG tablet, Take 1 tablet by mouth Daily., Disp: 90 tablet, Rfl: 3  •  aspirin 81 MG tablet, Take 1 tablet by mouth Daily., Disp: , Rfl:   •  Baclofen 5 MG tablet, Take 5 mg by mouth 3 (Three) Times a Day., Disp: , Rfl:   •  BD Pen Needle Rasheeda U/F 32G X 4 MM misc, USE DAILY, Disp: 90 each, Rfl: 10  •  esomeprazole (nexIUM) 40 MG capsule, TAKE 1 CAPSULE TWICE A DAY, Disp: 180 capsule, Rfl: 3  •  ferrous sulfate 325 (65 FE) MG tablet, Take 1 tablet by mouth Daily With Breakfast., Disp: , Rfl:   •  fluorouracil (EFUDEX) 5 % cream, APPLY TO PREVIOUS BIOPSY SITES ON THE LEFT FOREARM AND RIGHT WRIST TWICE DAILY FOR 6  WEEKS. AVOID EYE AREA, Disp: , Rfl:   •  fluticasone (FLONASE) 50 MCG/ACT nasal spray, 2 sprays into the nostril(s) as directed by provider., Disp: , Rfl:   •  levocetirizine (XYZAL) 5 MG tablet, TAKE 1 TABLET BY MOUTH ONCE DAILY IN THE EVENING, Disp: 90 tablet, Rfl: 3  •  montelukast (SINGULAIR) 10 MG tablet, TAKE 1 TABLET DAILY IN THE EVENING, Disp: 90 tablet, Rfl: 3  •  nebivolol (BYSTOLIC) 10 MG tablet, Take 1 tablet by mouth Daily., Disp: 90 tablet, Rfl: 0  •  simvastatin (ZOCOR) 40 MG tablet, TAKE 1 TABLET DAILY IN THE EVENING (DUE FOR 6 MONTH APPOINTMENT), Disp: 90 tablet, Rfl: 3  •  SITagliptin (Januvia) 100 MG tablet, Take 1 tablet by mouth Daily., Disp: 90 tablet, Rfl: 1  •  tamsulosin (FLOMAX) 0.4 MG capsule 24 hr capsule, TAKE 1 CAPSULE DAILY 1/2 HOUR FOLLOWING THE SAME MEAL EACH DAY, Disp: 90 capsule, Rfl: 3  •  Toujeo Max SoloStar 300 UNIT/ML solution pen-injector injection, INJECT 16 UNITS UNDER THE SKIN ONCE DAILY, Disp: 6 mL, Rfl: 3  •  traZODone (DESYREL) 50 MG tablet, 1-2 tablets HS PRN insomnia, Disp: 180 tablet, Rfl: 1  •  vitamin E 400 UNIT capsule, Take 1 capsule by mouth 2 (Two) Times a Day., Disp: , Rfl:   •  albuterol (PROVENTIL) (5 MG/ML) 0.5% nebulizer solution, Take 2.5 mg by nebulization Every 4 (Four) Hours As Needed., Disp: , Rfl: .      The patient's relevant past medical, surgical, family and social history were reviewed and updated in Epic as appropriate.     ROS:    Review of Systems  ROS as documented in patient questionnaire unless as noted otherwise    Objective:    Physical Exam  Vitals reviewed.   Constitutional:       Appearance: He is well-developed.   HENT:      Head: Normocephalic and atraumatic.      Mouth/Throat:      Mouth: Mucous membranes are moist.      Pharynx: Oropharynx is clear.   Neck:      Thyroid: No thyromegaly.   Cardiovascular:      Rate and Rhythm: Normal rate and regular rhythm.      Heart sounds: No murmur heard.    No friction rub. No gallop.    Pulmonary:      Effort: Pulmonary effort is normal. No respiratory distress.      Breath sounds: No wheezing or rales.      Comments: Decreased BS in left base  Musculoskeletal:      Cervical back: Neck supple.   Skin:     General: Skin is warm and dry.   Neurological:      Mental Status: He is alert and oriented to person, place, and time.   Psychiatric:         Behavior: Behavior normal.         Thought Content: Thought content normal.         Data:    CXR: No additional today.  Recent chest x-ray 12/9/2022 stable chronic left pleural thickening    PFT: Mild mixed respiratory defects.  Normal diffusion.  Better than 2019.    Assessment:    Problem List Items Addressed This Visit        Pulmonary Problems    Mass of lower lobe of left lung    Overview     1. Probable inflammation associated with focal bronchiectasis  2. Stable CT scan         COPD (chronic obstructive pulmonary disease) ([facility]) - Primary    Relevant Orders    Pulmonary Function Test (Completed)    Recurrent pneumonia    Bronchiectasis without complication ([facility])         1. COPD: Mild mixed defect by PFTs.  No regular inhaled therapy at this point and uses albuterol infrequently  2. Chronic rounded density of left lower lobe: Associated with pleural thickening and likely represents some entrapped lung with scarring and bronchiectasis.  This has been present since 2015.  He has not had a CAT scan since 2020 but chest x-ray has been stable.  3. Recurrent pneumonia: Likely related to his entrapped lung and decreased secretion clearance on the left side    Plan:    1. See no need to start scheduled inhaled therapy at this point.  He has albuterol for as needed use.  Refill as needed.  2. I think as long as we monitor his chest x-ray, a CAT scan will not add much since this process in his left lower lobe has been present in some form for at least 8 years and any treatment will be based upon his symptoms.  3. Continued routine follow-up    Level of Risk  Moderate due to: prescription drug management    Discussed in detail with the patient.  He will call prior to his follow up visit for any new problems.    Signed by  Gregorio Herbert MD

## 2023-03-13 RX ORDER — MONTELUKAST SODIUM 10 MG/1
TABLET ORAL
Qty: 30 TABLET | Refills: 0 | Status: SHIPPED | OUTPATIENT
Start: 2023-03-13 | End: 2023-03-31 | Stop reason: SDUPTHER

## 2023-03-13 RX ORDER — TAMSULOSIN HYDROCHLORIDE 0.4 MG/1
CAPSULE ORAL
Qty: 30 CAPSULE | Refills: 0 | Status: SHIPPED | OUTPATIENT
Start: 2023-03-13 | End: 2023-03-31 | Stop reason: SDUPTHER

## 2023-03-31 ENCOUNTER — OFFICE VISIT (OUTPATIENT)
Dept: FAMILY MEDICINE CLINIC | Facility: CLINIC | Age: 88
End: 2023-03-31
Payer: MEDICARE

## 2023-03-31 VITALS
SYSTOLIC BLOOD PRESSURE: 140 MMHG | OXYGEN SATURATION: 95 % | WEIGHT: 156 LBS | BODY MASS INDEX: 21.84 KG/M2 | HEIGHT: 71 IN | HEART RATE: 59 BPM | DIASTOLIC BLOOD PRESSURE: 80 MMHG

## 2023-03-31 DIAGNOSIS — E55.9 VITAMIN D DEFICIENCY: ICD-10-CM

## 2023-03-31 DIAGNOSIS — E11.9 TYPE 2 DIABETES MELLITUS WITHOUT COMPLICATION, WITH LONG-TERM CURRENT USE OF INSULIN: ICD-10-CM

## 2023-03-31 DIAGNOSIS — E78.2 MIXED HYPERLIPIDEMIA: Primary | ICD-10-CM

## 2023-03-31 DIAGNOSIS — R73.9 HYPERGLYCEMIA: ICD-10-CM

## 2023-03-31 DIAGNOSIS — E56.9 VITAMIN DEFICIENCY: ICD-10-CM

## 2023-03-31 DIAGNOSIS — J30.1 SEASONAL ALLERGIC RHINITIS DUE TO POLLEN: ICD-10-CM

## 2023-03-31 DIAGNOSIS — Z79.4 TYPE 2 DIABETES MELLITUS WITHOUT COMPLICATION, WITH LONG-TERM CURRENT USE OF INSULIN: ICD-10-CM

## 2023-03-31 DIAGNOSIS — G47.00 INSOMNIA, UNSPECIFIED TYPE: ICD-10-CM

## 2023-03-31 DIAGNOSIS — I10 PRIMARY HYPERTENSION: ICD-10-CM

## 2023-03-31 DIAGNOSIS — N40.0 BENIGN PROSTATIC HYPERPLASIA WITHOUT LOWER URINARY TRACT SYMPTOMS: ICD-10-CM

## 2023-03-31 DIAGNOSIS — K21.9 GASTROESOPHAGEAL REFLUX DISEASE WITHOUT ESOPHAGITIS: ICD-10-CM

## 2023-03-31 RX ORDER — MONTELUKAST SODIUM 10 MG/1
10 TABLET ORAL EVERY EVENING
Qty: 90 TABLET | Refills: 3 | Status: SHIPPED | OUTPATIENT
Start: 2023-03-31

## 2023-03-31 RX ORDER — NEBIVOLOL 10 MG/1
10 TABLET ORAL DAILY
Qty: 90 TABLET | Refills: 3 | Status: SHIPPED | OUTPATIENT
Start: 2023-03-31

## 2023-03-31 RX ORDER — INSULIN GLARGINE 300 U/ML
16 INJECTION, SOLUTION SUBCUTANEOUS DAILY
Qty: 6 ML | Refills: 3 | Status: SHIPPED | OUTPATIENT
Start: 2023-03-31

## 2023-03-31 RX ORDER — TRAZODONE HYDROCHLORIDE 50 MG/1
TABLET ORAL
Qty: 180 TABLET | Refills: 1 | Status: SHIPPED | OUTPATIENT
Start: 2023-03-31

## 2023-03-31 RX ORDER — TAMSULOSIN HYDROCHLORIDE 0.4 MG/1
1 CAPSULE ORAL DAILY
Qty: 90 CAPSULE | Refills: 3 | Status: SHIPPED | OUTPATIENT
Start: 2023-03-31

## 2023-03-31 RX ORDER — FLUTICASONE PROPIONATE 50 MCG
2 SPRAY, SUSPENSION (ML) NASAL DAILY
Qty: 16 G | Refills: 11 | Status: SHIPPED | OUTPATIENT
Start: 2023-03-31

## 2023-03-31 NOTE — PROGRESS NOTES
"Chief Complaint  medication refills    Subjective          Ricki Trotter presents to [facility]  History of Present Illness  Pt is here to follow up on HTN, allergies, BPH, insomnia, and GERD. He is doing well today with no complaints. He states the Trazodone has helped his sleeping difficulties. He continues to have sadness since his wife passed away but he states this is improving.       Objective   Vital Signs:   /80   Pulse 59   Ht 180.3 cm (71\")   Wt 70.8 kg (156 lb)   SpO2 95%   BMI 21.76 kg/m²     Body mass index is 21.76 kg/m².    Review of Systems   Constitutional: Negative for fatigue and fever.   Respiratory: Negative for shortness of breath.    Cardiovascular: Negative for chest pain, palpitations and leg swelling.   Neurological: Negative for syncope.   Psychiatric/Behavioral: The patient is not nervous/anxious.           Current Outpatient Medications:   •  albuterol (PROVENTIL) (5 MG/ML) 0.5% nebulizer solution, Take 2.5 mg by nebulization Every 4 (Four) Hours As Needed., Disp: , Rfl:   •  albuterol sulfate  (90 Base) MCG/ACT inhaler, Inhale 2 puffs Every 4 (Four) Hours As Needed for Wheezing., Disp: 18 g, Rfl: 2  •  aspirin 81 MG tablet, Take 1 tablet by mouth Daily., Disp: , Rfl:   •  Baclofen 5 MG tablet, Take 1 tablet by mouth 3 (Three) Times a Day., Disp: , Rfl:   •  BD Pen Needle Rasheeda U/F 32G X 4 MM misc, USE DAILY, Disp: 90 each, Rfl: 10  •  Cholecalciferol (Vitamin D3) 125 MCG (5000 UT) chewable tablet, Chew 125 mcg 2 (Two) Times a Day., Disp: , Rfl:   •  esomeprazole (nexIUM) 40 MG capsule, TAKE 1 CAPSULE TWICE A DAY, Disp: 180 capsule, Rfl: 3  •  ferrous sulfate 325 (65 FE) MG tablet, Take 1 tablet by mouth Daily With Breakfast., Disp: , Rfl:   •  fluorouracil (EFUDEX) 5 % cream, APPLY TO PREVIOUS BIOPSY SITES ON THE LEFT FOREARM AND RIGHT WRIST TWICE DAILY FOR 6 WEEKS. AVOID EYE AREA, Disp: , Rfl:   •  fluticasone (FLONASE) 50 MCG/ACT " nasal spray, 2 sprays into the nostril(s) as directed by provider Daily., Disp: 16 g, Rfl: 11  •  Insulin Glargine, 2 Unit Dial, (Toujeo Max SoloStar) 300 UNIT/ML solution pen-injector injection, Inject 16 Units under the skin into the appropriate area as directed Daily., Disp: 6 mL, Rfl: 3  •  montelukast (SINGULAIR) 10 MG tablet, Take 1 tablet by mouth Every Evening., Disp: 90 tablet, Rfl: 3  •  Multiple Vitamins-Minerals (VITEYES AREDS ADVANCED PO), Take  by mouth 2 (Two) Times a Day., Disp: , Rfl:   •  nebivolol (BYSTOLIC) 10 MG tablet, Take 1 tablet by mouth Daily., Disp: 90 tablet, Rfl: 3  •  simvastatin (ZOCOR) 40 MG tablet, TAKE 1 TABLET DAILY IN THE EVENING (DUE FOR 6 MONTH APPOINTMENT), Disp: 90 tablet, Rfl: 3  •  SITagliptin (Januvia) 100 MG tablet, Take 1 tablet by mouth Daily., Disp: 90 tablet, Rfl: 1  •  tamsulosin (FLOMAX) 0.4 MG capsule 24 hr capsule, Take 1 capsule by mouth Daily., Disp: 90 capsule, Rfl: 3  •  traZODone (DESYREL) 50 MG tablet, 1-2 tablets HS PRN insomnia, Disp: 180 tablet, Rfl: 1  •  amLODIPine (NORVASC) 10 MG tablet, Take 1 tablet by mouth Daily. (Patient not taking: Reported on 3/31/2023), Disp: 90 tablet, Rfl: 3  •  levocetirizine (XYZAL) 5 MG tablet, TAKE 1 TABLET BY MOUTH ONCE DAILY IN THE EVENING (Patient not taking: Reported on 3/31/2023), Disp: 90 tablet, Rfl: 3  •  vitamin E 400 UNIT capsule, Take 1 capsule by mouth 2 (Two) Times a Day. (Patient not taking: Reported on 3/31/2023), Disp: , Rfl:       Allergies: Penicillins and Sulfa antibiotics    Physical Exam  Constitutional:       Appearance: Normal appearance.   HENT:      Head: Normocephalic.   Eyes:      Conjunctiva/sclera: Conjunctivae normal.      Pupils: Pupils are equal, round, and reactive to light.   Cardiovascular:      Rate and Rhythm: Normal rate and regular rhythm.      Heart sounds: Normal heart sounds.   Pulmonary:      Effort: Pulmonary effort is normal.      Breath sounds: Normal breath sounds.    Abdominal:      Tenderness: There is no abdominal tenderness.   Musculoskeletal:         General: Normal range of motion.   Skin:     General: Skin is warm and dry.      Capillary Refill: Capillary refill takes less than 2 seconds.   Neurological:      General: No focal deficit present.      Mental Status: He is alert and oriented to person, place, and time.   Psychiatric:         Mood and Affect: Mood normal.         Behavior: Behavior normal.         Thought Content: Thought content normal.         Judgment: Judgment normal.          Result Review :                   Assessment and Plan    Diagnoses and all orders for this visit:    1. Mixed hyperlipidemia (Primary)  Comments:  Continue Zocor. We discussed diet and exercise. Labs drawn.   Orders:  -     CBC & Differential; Future  -     Comprehensive Metabolic Panel; Future  -     Lipid Panel; Future  -     CBC & Differential  -     Comprehensive Metabolic Panel  -     Lipid Panel    2. Hyperglycemia  -     Hemoglobin A1c; Future  -     Hemoglobin A1c    3. Vitamin deficiency  -     Vitamin B12; Future  -     Vitamin D,25-Hydroxy; Future  -     Folate; Future  -     Vitamin B12  -     Vitamin D,25-Hydroxy  -     Folate    4. Vitamin D deficiency  -     Vitamin D,25-Hydroxy; Future  -     Vitamin D,25-Hydroxy    5. Primary hypertension  Comments:  Continue Bystolic and Amlodipine. Monitor BP.  Orders:  -     nebivolol (BYSTOLIC) 10 MG tablet; Take 1 tablet by mouth Daily.  Dispense: 90 tablet; Refill: 3    6. Seasonal allergic rhinitis due to pollen  Comments:  Continue Singulair and restart Flonase.   Orders:  -     montelukast (SINGULAIR) 10 MG tablet; Take 1 tablet by mouth Every Evening.  Dispense: 90 tablet; Refill: 3  -     fluticasone (FLONASE) 50 MCG/ACT nasal spray; 2 sprays into the nostril(s) as directed by provider Daily.  Dispense: 16 g; Refill: 11    7. Gastroesophageal reflux disease without esophagitis  Comments:  Continue Nexium.    8. Benign  prostatic hyperplasia without lower urinary tract symptoms  Comments:  Continue Flomax.  Orders:  -     tamsulosin (FLOMAX) 0.4 MG capsule 24 hr capsule; Take 1 capsule by mouth Daily.  Dispense: 90 capsule; Refill: 3    9. Insomnia, unspecified type  Comments:  Continue Trazodone.   Orders:  -     traZODone (DESYREL) 50 MG tablet; 1-2 tablets HS PRN insomnia  Dispense: 180 tablet; Refill: 1    10. Type 2 diabetes mellitus without complication, with long-term current use of insulin ([facility])  Comments:  Continue Januvia and insulin. Labs drawn.   Orders:  -     SITagliptin (Januvia) 100 MG tablet; Take 1 tablet by mouth Daily.  Dispense: 90 tablet; Refill: 1  -     Insulin Glargine, 2 Unit Dial, (Toujeo Max SoloStar) 300 UNIT/ML solution pen-injector injection; Inject 16 Units under the skin into the appropriate area as directed Daily.  Dispense: 6 mL; Refill: 3                Follow Up   Return in about 6 months (around 9/30/2023) for Recheck.  Patient was given instructions and counseling regarding his condition or for health maintenance advice. Please see specific information pulled into the AVS if appropriate.     LINDA Nuñez

## 2023-04-01 LAB
25(OH)D3+25(OH)D2 SERPL-MCNC: 122 NG/ML (ref 30–100)
ALBUMIN SERPL-MCNC: 4.1 G/DL (ref 3.6–4.6)
ALBUMIN/GLOB SERPL: 1.9 {RATIO} (ref 1.2–2.2)
ALP SERPL-CCNC: 62 IU/L (ref 44–121)
ALT SERPL-CCNC: 7 IU/L (ref 0–44)
AST SERPL-CCNC: 13 IU/L (ref 0–40)
BASOPHILS # BLD AUTO: 0 X10E3/UL (ref 0–0.2)
BASOPHILS NFR BLD AUTO: 0 %
BILIRUB SERPL-MCNC: 0.3 MG/DL (ref 0–1.2)
BUN SERPL-MCNC: 17 MG/DL (ref 8–27)
BUN/CREAT SERPL: 15 (ref 10–24)
CALCIUM SERPL-MCNC: 9.2 MG/DL (ref 8.6–10.2)
CHLORIDE SERPL-SCNC: 102 MMOL/L (ref 96–106)
CHOLEST SERPL-MCNC: 137 MG/DL (ref 100–199)
CO2 SERPL-SCNC: 24 MMOL/L (ref 20–29)
CREAT SERPL-MCNC: 1.17 MG/DL (ref 0.76–1.27)
EGFRCR SERPLBLD CKD-EPI 2021: 60 ML/MIN/1.73
EOSINOPHIL # BLD AUTO: 0.2 X10E3/UL (ref 0–0.4)
EOSINOPHIL NFR BLD AUTO: 2 %
ERYTHROCYTE [DISTWIDTH] IN BLOOD BY AUTOMATED COUNT: 13.5 % (ref 11.6–15.4)
FOLATE SERPL-MCNC: 16.2 NG/ML
GLOBULIN SER CALC-MCNC: 2.2 G/DL (ref 1.5–4.5)
GLUCOSE SERPL-MCNC: 85 MG/DL (ref 70–99)
HBA1C MFR BLD: 6 % (ref 4.8–5.6)
HCT VFR BLD AUTO: 32.3 % (ref 37.5–51)
HDLC SERPL-MCNC: 43 MG/DL
HGB BLD-MCNC: 10.8 G/DL (ref 13–17.7)
IMM GRANULOCYTES # BLD AUTO: 0.1 X10E3/UL (ref 0–0.1)
IMM GRANULOCYTES NFR BLD AUTO: 1 %
LDLC SERPL CALC-MCNC: 79 MG/DL (ref 0–99)
LYMPHOCYTES # BLD AUTO: 3 X10E3/UL (ref 0.7–3.1)
LYMPHOCYTES NFR BLD AUTO: 32 %
MCH RBC QN AUTO: 29.3 PG (ref 26.6–33)
MCHC RBC AUTO-ENTMCNC: 33.4 G/DL (ref 31.5–35.7)
MCV RBC AUTO: 88 FL (ref 79–97)
MONOCYTES # BLD AUTO: 0.6 X10E3/UL (ref 0.1–0.9)
MONOCYTES NFR BLD AUTO: 6 %
NEUTROPHILS # BLD AUTO: 5.4 X10E3/UL (ref 1.4–7)
NEUTROPHILS NFR BLD AUTO: 59 %
PLATELET # BLD AUTO: 214 X10E3/UL (ref 150–450)
POTASSIUM SERPL-SCNC: 4.9 MMOL/L (ref 3.5–5.2)
PROT SERPL-MCNC: 6.3 G/DL (ref 6–8.5)
RBC # BLD AUTO: 3.69 X10E6/UL (ref 4.14–5.8)
SODIUM SERPL-SCNC: 141 MMOL/L (ref 134–144)
TRIGL SERPL-MCNC: 78 MG/DL (ref 0–149)
VIT B12 SERPL-MCNC: 797 PG/ML (ref 232–1245)
VLDLC SERPL CALC-MCNC: 15 MG/DL (ref 5–40)
WBC # BLD AUTO: 9.2 X10E3/UL (ref 3.4–10.8)

## 2023-04-06 ENCOUNTER — OFFICE VISIT (OUTPATIENT)
Dept: FAMILY MEDICINE CLINIC | Facility: CLINIC | Age: 88
End: 2023-04-06
Payer: MEDICARE

## 2023-04-06 VITALS
HEIGHT: 71 IN | HEART RATE: 70 BPM | SYSTOLIC BLOOD PRESSURE: 144 MMHG | BODY MASS INDEX: 21.74 KG/M2 | DIASTOLIC BLOOD PRESSURE: 78 MMHG | WEIGHT: 155.31 LBS | OXYGEN SATURATION: 95 % | RESPIRATION RATE: 17 BRPM

## 2023-04-06 DIAGNOSIS — R05.9 COUGH, UNSPECIFIED TYPE: ICD-10-CM

## 2023-04-06 DIAGNOSIS — J30.89 SEASONAL ALLERGIC RHINITIS DUE TO OTHER ALLERGIC TRIGGER: ICD-10-CM

## 2023-04-06 DIAGNOSIS — R09.82 POST-NASAL DRAINAGE: ICD-10-CM

## 2023-04-06 DIAGNOSIS — J44.1 COPD WITH EXACERBATION: Primary | ICD-10-CM

## 2023-04-06 LAB
EXPIRATION DATE: NORMAL
FLUAV AG UPPER RESP QL IA.RAPID: NOT DETECTED
FLUBV AG UPPER RESP QL IA.RAPID: NOT DETECTED
INTERNAL CONTROL: NORMAL
Lab: NORMAL
SARS-COV-2 AG UPPER RESP QL IA.RAPID: NOT DETECTED

## 2023-04-06 RX ORDER — LEVOCETIRIZINE DIHYDROCHLORIDE 5 MG/1
5 TABLET, FILM COATED ORAL EVERY EVENING
Qty: 90 TABLET | Refills: 3 | Status: SHIPPED | OUTPATIENT
Start: 2023-04-06

## 2023-04-06 RX ORDER — DEXTROMETHORPHAN HYDROBROMIDE AND PROMETHAZINE HYDROCHLORIDE 15; 6.25 MG/5ML; MG/5ML
5 SYRUP ORAL 4 TIMES DAILY PRN
Qty: 120 ML | Refills: 0 | Status: SHIPPED | OUTPATIENT
Start: 2023-04-06 | End: 2023-04-12

## 2023-04-06 RX ORDER — PREDNISONE 20 MG/1
TABLET ORAL
Qty: 9 TABLET | Refills: 0 | Status: SHIPPED | OUTPATIENT
Start: 2023-04-06

## 2023-04-06 RX ORDER — IPRATROPIUM BROMIDE 21 UG/1
2 SPRAY, METERED NASAL EVERY 12 HOURS
Qty: 30 ML | Refills: 2 | Status: SHIPPED | OUTPATIENT
Start: 2023-04-06

## 2023-04-06 RX ORDER — TRIAMCINOLONE ACETONIDE 40 MG/ML
40 INJECTION, SUSPENSION INTRA-ARTICULAR; INTRAMUSCULAR ONCE
Status: COMPLETED | OUTPATIENT
Start: 2023-04-06 | End: 2023-04-06

## 2023-04-06 RX ADMIN — TRIAMCINOLONE ACETONIDE 40 MG: 40 INJECTION, SUSPENSION INTRA-ARTICULAR; INTRAMUSCULAR at 09:32

## 2023-04-06 NOTE — PROGRESS NOTES
"Chief Complaint  Cough (Symptoms started over the weekend. His side is sore from coughing. States he couldn't sleep last night. ) and Fatigue    Subjective        Ricki Jamie Trotter presents to [facility]  History of Present Illness  Patient states that he noticed he had some allergy symptoms started about a week and a half ago.  He states that over the past few days especially this weekend he has had a lot of congestion and coughing.  He states that his stomach is sore from coughing.  He states that he has not had any fever or discolored drainage.  He states that his cough is keeping him awake.  He took albuterol for the first time last night.  He is taking Singulair and Xyzal and to start taking Flonase.  He states that he has had some postnasal drainage.  His daughter accompanies him to the visit and helps to fill in the history.      Objective   Vital Signs:  /78 (BP Location: Left arm, Patient Position: Sitting, Cuff Size: Adult)   Pulse 70   Resp 17   Ht 180.3 cm (71\")   Wt 70.4 kg (155 lb 5 oz)   SpO2 95%   BMI 21.66 kg/m²   Estimated body mass index is 21.66 kg/m² as calculated from the following:    Height as of this encounter: 180.3 cm (71\").    Weight as of this encounter: 70.4 kg (155 lb 5 oz).       BMI is within normal parameters. No other follow-up for BMI required.      Physical Exam  Vitals and nursing note reviewed.   Constitutional:       Appearance: Normal appearance. He is normal weight.   HENT:      Head: Normocephalic and atraumatic.      Right Ear: Ear canal and external ear normal.      Left Ear: Ear canal and external ear normal.      Ears:      Comments: Bilateral tympanic membranes with clear air-fluid levels.     Nose: Congestion present.      Mouth/Throat:      Mouth: Mucous membranes are moist.      Comments: Clear postnasal drainage  Eyes:      Pupils: Pupils are equal, round, and reactive to light.   Cardiovascular:      Rate and Rhythm: " Normal rate and regular rhythm.      Heart sounds: Normal heart sounds.   Pulmonary:      Effort: Pulmonary effort is normal.      Breath sounds: Normal breath sounds.   Neurological:      General: No focal deficit present.      Mental Status: He is alert.   Psychiatric:         Mood and Affect: Mood normal.        Result Review :     POCT SARS-CoV-2 Antigen DARCI + Flu (04/06/2023 09:00)                 Assessment and Plan   Diagnoses and all orders for this visit:    1. COPD with exacerbation (Primary)  -     triamcinolone acetonide (KENALOG-40) injection 40 mg  -     predniSONE (DELTASONE) 20 MG tablet; 2 tab po qd x 3 days then 1 tab po qd x 3 days  Dispense: 9 tablet; Refill: 0  Patient was given Kenalog shot in office and will start his prednisone tomorrow.  2. Cough, unspecified type  -     POCT SARS-CoV-2 Antigen DARCI + Flu  -     promethazine-dextromethorphan (PROMETHAZINE-DM) 6.25-15 MG/5ML syrup; Take 5 mL by mouth 4 (Four) Times a Day As Needed for Cough for up to 6 days.  Dispense: 120 mL; Refill: 0  Discussed with patient that his flu and COVID test today are negative and also given him some Promethazine DM to use as needed for cough  3. Seasonal allergic rhinitis due to other allergic trigger  -     ipratropium (ATROVENT) 0.03 % nasal spray; 2 sprays into the nostril(s) as directed by provider Every 12 (Twelve) Hours.  Dispense: 30 mL; Refill: 2  We will also add Atrovent nasal spray to help decrease his postnasal drainage cough.  We will have him continue his Xyzal daily.  4. Post-nasal drainage  -     levocetirizine (XYZAL) 5 MG tablet; Take 1 tablet by mouth Every Evening.  Dispense: 90 tablet; Refill: 3             Follow Up   No follow-ups on file.  Patient was given instructions and counseling regarding his condition or for health maintenance advice. Please see specific information pulled into the AVS if appropriate.

## 2023-04-12 ENCOUNTER — TELEPHONE (OUTPATIENT)
Dept: FAMILY MEDICINE CLINIC | Facility: CLINIC | Age: 88
End: 2023-04-12
Payer: MEDICARE

## 2023-04-12 NOTE — TELEPHONE ENCOUNTER
Caller: Sunil Kern    Relationship: Emergency Contact    Best call back number: [phone]    What medication are you requesting:     ANTIBIOTIC    What are your current symptoms:     COUGHING, NASAL DRAINAGE, NO ENERGY    How long have you been experiencing symptoms:     OVER 1 WEEK     Have you had these symptoms before:    [x] Yes  [] No    Have you been treated for these symptoms before:   [x] Yes  [] No    If a prescription is needed, what is your preferred pharmacy and phone number: [facility] [address] [place] [address] [phone] [facility] [phone] FX     Additional notes:    SUNIL KERN ADVISED PATIENT IS STILL NOT FEELING WELL AFTER SEEING MIRTA CASTRO A COUPLE OF WEEKS AGO    SUNIL IS ASKING IF THERE IS SOMETHING ELSE MIRTA CAN CALL IN TO HELP PATIENT WITH SYMPTOMS

## 2023-04-12 NOTE — TELEPHONE ENCOUNTER
Paty called back rush.    She states that he is having discolored drainage but unsure about fever.    She also wants to remind us that he is allergic to sulfa and penicillins.

## 2023-04-13 RX ORDER — DOXYCYCLINE HYCLATE 100 MG/1
100 CAPSULE ORAL 2 TIMES DAILY
Qty: 14 CAPSULE | Refills: 0 | Status: SHIPPED | OUTPATIENT
Start: 2023-04-13 | End: 2023-04-20

## 2023-05-04 ENCOUNTER — TELEPHONE (OUTPATIENT)
Dept: FAMILY MEDICINE CLINIC | Facility: CLINIC | Age: 88
End: 2023-05-04
Payer: MEDICARE

## 2023-05-04 DIAGNOSIS — I49.9 IRREGULAR HEART BEAT: Primary | ICD-10-CM

## 2023-05-04 NOTE — TELEPHONE ENCOUNTER
PHONE CALL FROM DAUGHTER.  PATIENT HAD UPPER GI BUT COULD NOT DO THE PROCEDURE DUE TO IRREGULAR HEARTBEATS.  THEY NEED A REFERRAL TO DR BREA FRANK, CARDIOLOGIST.  APPOINTMENT MAY 26TH.  PLEASE FAX REFERRAL [phone].  THEY WANT COPIES OF OFFICE NOTE, XRAYS AND BLOODWORK.      CALL IF NEEDED

## 2023-05-22 NOTE — PROGRESS NOTES
[facility] Cardiology  Consultation H&P  Ricki Trotter  1935  [address] [facility] [address]     VISIT DATE:  05/26/23    PCP: Stacey Hanley, APRN  [address] [facility] [address]    IDENTIFICATION: A 87 y.o. male former  resident of [place], [place]  Previously worked up by Tanja Felipe    PROBLEM LIST:  1. Abnormal EKG  2. PVCs  a. 9d Holter 7/2020: avg 66,  bpm, VE 8.3%, SVE 4.6%, 5 runs of NSVT, 20 runs of SVT, no A-fib  b. Nuclear stress 7/2020: Normal MPS with no evidence of ischemia, frequent PVCs noted at rest actually decreased with increased heart rate, EF 58%, lower study  c. PVCs adequately controlled following initiation of beta-blocker  3. Shortness of Breath (2020)  a. Echo 7/2020: EF 50-55%, mild concentric LVH, grade 2 diastolic dysfunction, severely increased LA volume, aortic sclerosis without significant stenosis, mild to moderate MR, mild to moderate TR, RVSP 52 mmHg  b. Normal stress 7/2020 as noted above  4. Hypertension  5. Dyslipidemia  a. 3/2023 , trig 78, HDL 43, LDL 79 on Zocor  6. Type 2 diabetes, controlled  a. 3/23 A1c 6.0  7. COPD with recurrent pneumonia  a. Former smoker, asbestos exposure and other chemicals at [employer]  b. Some entrapped lung d/t scarring and bronchiectasis  8. Normocytic Anemia  9. BPH      CC:  Chief Complaint   Patient presents with   • Establish Care   • Abnormal ECG       Allergies  Allergies   Allergen Reactions   • Penicillins Swelling   • Sulfa Antibiotics Swelling       Current Medications    Current Outpatient Medications:   •  albuterol (PROVENTIL) (5 MG/ML) 0.5% nebulizer solution, Take 2.5 mg by nebulization Every 4 (Four) Hours As Needed., Disp: , Rfl:   •  albuterol sulfate  (90 Base) MCG/ACT inhaler, Inhale 2 puffs Every 4 (Four) Hours As Needed for Wheezing., Disp: 18 g, Rfl: 2  •  amLODIPine (NORVASC) 10 MG tablet, Take 1 tablet by mouth Daily., Disp: 90 tablet, Rfl:  3  •  aspirin 81 MG tablet, Take 1 tablet by mouth Daily., Disp: , Rfl:   •  Baclofen 5 MG tablet, Take 1 tablet by mouth 3 (Three) Times a Day., Disp: , Rfl:   •  BD Pen Needle Rasheeda U/F 32G X 4 MM misc, USE DAILY, Disp: 90 each, Rfl: 10  •  Cholecalciferol (Vitamin D3) 125 MCG (5000 UT) chewable tablet, Chew 125 mcg 2 (Two) Times a Day., Disp: , Rfl:   •  esomeprazole (nexIUM) 40 MG capsule, TAKE 1 CAPSULE TWICE A DAY, Disp: 180 capsule, Rfl: 3  •  fluorouracil (EFUDEX) 5 % cream, APPLY TO PREVIOUS BIOPSY SITES ON THE LEFT FOREARM AND RIGHT WRIST TWICE DAILY FOR 6 WEEKS. AVOID EYE AREA, Disp: , Rfl:   •  fluticasone (FLONASE) 50 MCG/ACT nasal spray, 2 sprays into the nostril(s) as directed by provider Daily., Disp: 16 g, Rfl: 11  •  Insulin Glargine, 2 Unit Dial, (Toujeo Max SoloStar) 300 UNIT/ML solution pen-injector injection, Inject 16 Units under the skin into the appropriate area as directed Daily., Disp: 6 mL, Rfl: 3  •  ipratropium (ATROVENT) 0.03 % nasal spray, 2 sprays into the nostril(s) as directed by provider Every 12 (Twelve) Hours., Disp: 30 mL, Rfl: 2  •  levocetirizine (XYZAL) 5 MG tablet, Take 1 tablet by mouth Every Evening., Disp: 90 tablet, Rfl: 3  •  montelukast (SINGULAIR) 10 MG tablet, Take 1 tablet by mouth Every Evening., Disp: 90 tablet, Rfl: 3  •  Multiple Vitamins-Minerals (VITEYES AREDS ADVANCED PO), Take  by mouth 2 (Two) Times a Day., Disp: , Rfl:   •  nebivolol (BYSTOLIC) 10 MG tablet, Take 1 tablet by mouth Daily., Disp: 90 tablet, Rfl: 3  •  simvastatin (ZOCOR) 40 MG tablet, TAKE 1 TABLET DAILY IN THE EVENING (DUE FOR 6 MONTH APPOINTMENT), Disp: 90 tablet, Rfl: 3  •  SITagliptin (Januvia) 100 MG tablet, Take 1 tablet by mouth Daily., Disp: 90 tablet, Rfl: 1  •  tamsulosin (FLOMAX) 0.4 MG capsule 24 hr capsule, Take 1 capsule by mouth Daily., Disp: 90 capsule, Rfl: 3  •  traZODone (DESYREL) 50 MG tablet, 1-2 tablets HS PRN insomnia, Disp: 180 tablet, Rfl: 1  •  vitamin E 400 UNIT  capsule, Take 1 capsule by mouth 2 (Two) Times a Day., Disp: , Rfl:      History of Present Illness   HPI  Ricki Trotter is a 87 y.o. year old male with the above mentioned PMH who presents for consult from Self Referring for evaluation of abnormal EKG. he presented to have a EGD performed at [facility] [place]. Taveras and the procedure was aborted due to some abnormality on telemetry.  Daughter forgot to bring the telemetry strips.  He has a history of shortness of breath that was worked up in 2020 and a Holter that showed PVCs and several short runs of nonsustained V. Tach.  Patient lost his wife last October and his functional capacity has decreased somewhat but he still lives independently at home and performs all his daily activities by himself.  His activity is not limited by chest discomfort, palpitations or shortness of breath.  He does report 1 episode of near syncope within the last several weeks where he had showered walked downstairs got ready for the morning was about to drink his coffee and had a sudden sensation like he was going to pass out.  He became lightheaded, dizzy and had to crawl to the phone.  He had no associated chest pain, shortness of breath or palpitations during the episode.  He has had no recurrence of this episode.    Pt denies any chest pain, dyspnea at rest, dyspnea on exertion, orthopnea, PND, palpitations, lower extremity edema, or claudication. Pt denies history of CHF, DVT, PE, MI, CVA, TIA, or rheumatic fever.       ROS  Review of Systems   Constitutional: Positive for decreased appetite and malaise/fatigue.   Musculoskeletal: Positive for joint pain.   Gastrointestinal: Positive for heartburn.   Neurological: Positive for dizziness.   Psychiatric/Behavioral: Positive for depression and memory loss.       SOCIAL HX  Social History     Socioeconomic History   • Marital status:    Tobacco Use   • Smoking status: Former     Packs/day: 0.50     Years: 9.00     Pack years: 4.50  "    Types: Cigarettes     Start date:      Quit date:      Years since quittin.4   • Smokeless tobacco: Never   Vaping Use   • Vaping Use: Never used   Substance and Sexual Activity   • Alcohol use: Yes     Alcohol/week: 2.0 standard drinks     Types: 2 Cans of beer per week   • Drug use: No   • Sexual activity: Defer       FAMILY HX  Family History   Problem Relation Age of Onset   • Diabetes Mother    • Stroke Mother    • Breast cancer Sister        Vitals:    23 1440   BP: 140/40   BP Location: Right arm   Patient Position: Sitting   Pulse: 61   SpO2: 96%   Weight: 71.8 kg (158 lb 3.2 oz)   Height: 180.3 cm (71\")     Body mass index is 22.06 kg/m².     PHYSICAL EXAMINATION:  Constitutional:       Appearance: Healthy appearance. Not in distress.   Pulmonary:      Effort: Pulmonary effort is normal.      Breath sounds: Normal breath sounds. No wheezing. No rhonchi. No rales.   Cardiovascular:      PMI at left midclavicular line. Normal rate. Regular rhythm. Normal S1. Normal S2.      Murmurs: There is a grade 2/6 early systolic murmur at the URSB.      No gallop. No click. No rub.   Pulses:     Intact distal pulses.   Edema:     Peripheral edema absent.   Skin:     General: Skin is warm and dry.   Neurological:      General: No focal deficit present.      Mental Status: Alert and oriented to person, place and time.         Diagnostic Data:    ECG 12 Lead    Date/Time: 2023 3:20 PM  Performed by: Ike Potts PA-C  Authorized by: Ike Potts PA-C   Comparison: compared with previous ECG from 2020  Comparison to previous ECG: PVCs are no longer present  Rhythm: sinus rhythm  Rate: normal  BPM: 61  Conduction: conduction normal  ST Segments: ST segments normal  T Waves: T waves normal  QRS axis: normal  Other: no other findings    Clinical impression: normal ECG          Lab Results   Component Value Date    CHLPL 137 2023    TRIG 78 2023    HDL 43 2023     Lab " Results   Component Value Date    GLUCOSE 85 03/31/2023    BUN 17 03/31/2023    CREATININE 1.17 03/31/2023     03/31/2023    K 4.9 03/31/2023     03/31/2023    CO2 24 03/31/2023     Lab Results   Component Value Date    HGBA1C 6.0 (H) 03/31/2023     Lab Results   Component Value Date    WBC 9.2 03/31/2023    HGB 10.8 (L) 03/31/2023    HCT 32.3 (L) 03/31/2023     03/31/2023         Advance Care Planning   ACP discussion was declined by the patient. Patient has an advance directive (not in EMR), copy requested.         ASSESSMENT:   Diagnosis Plan   1. Near syncope  Holter Monitor - 72 Hour Up To 15 Days      2. Primary hypertension        3. PVC's (premature ventricular contractions)  Holter Monitor - 72 Hour Up To 15 Days          PLAN:  Near Syncope/History of PVCs/Abnormal telemetry ahead of Procedure-patient has had relatively healthy life and taking good care of himself.  He presented for an EGD and was noted to have some abnormality on telemetry was told that he needed to see a cardiologist by Dr. Taveras.  He has a history of PVCs on prior Holter monitor as well as several short, asymptomatic episodes of nonsustained ventricular tachycardia.  He also had 1 episode of near syncope past several weeks with no recurrence and no associated chest pain, shortness of breath or palpitations.  He was worked up for palpitations and shortness of breath in 2020.  Palpitations were controlled after initiation of beta-blocker.  -7-day Holter monitor    Hypertension-borderline in office today at 140/40.  We will allow permissive hypertension in setting of near syncope.  Blood pressure at time of near syncope was reportedly with systolic in the 130s.         Self Referring, thank you for referring Mr. Trotter for evaluation.  I have forwarded my electronically generated recommendations to you for review.  Please do not hesitate to call with any questions.    Scribed by Ike Potts PA-C for Lukasz Singh  MD, FACC

## 2023-05-26 ENCOUNTER — OFFICE VISIT (OUTPATIENT)
Dept: CARDIOLOGY | Facility: CLINIC | Age: 88
End: 2023-05-26
Payer: MEDICARE

## 2023-05-26 VITALS
OXYGEN SATURATION: 96 % | SYSTOLIC BLOOD PRESSURE: 140 MMHG | BODY MASS INDEX: 22.15 KG/M2 | HEART RATE: 61 BPM | HEIGHT: 71 IN | WEIGHT: 158.2 LBS | DIASTOLIC BLOOD PRESSURE: 40 MMHG

## 2023-05-26 DIAGNOSIS — I49.3 PVC'S (PREMATURE VENTRICULAR CONTRACTIONS): ICD-10-CM

## 2023-05-26 DIAGNOSIS — I10 PRIMARY HYPERTENSION: ICD-10-CM

## 2023-05-26 DIAGNOSIS — R55 NEAR SYNCOPE: Primary | ICD-10-CM

## 2023-06-05 ENCOUNTER — TELEPHONE (OUTPATIENT)
Dept: FAMILY MEDICINE CLINIC | Facility: CLINIC | Age: 88
End: 2023-06-05
Payer: MEDICARE

## 2023-06-05 DIAGNOSIS — I10 PRIMARY HYPERTENSION: ICD-10-CM

## 2023-06-05 RX ORDER — NEBIVOLOL 10 MG/1
10 TABLET ORAL DAILY
Qty: 90 TABLET | Refills: 3 | Status: SHIPPED | OUTPATIENT
Start: 2023-06-05

## 2023-06-05 NOTE — TELEPHONE ENCOUNTER
Caller: Paty Tobar    Relationship: Emergency Contact    Best call back number: [phone]    What medications are you currently taking:   Current Outpatient Medications on File Prior to Visit   Medication Sig Dispense Refill    albuterol (PROVENTIL) (5 MG/ML) 0.5% nebulizer solution Take 2.5 mg by nebulization Every 4 (Four) Hours As Needed.      albuterol sulfate  (90 Base) MCG/ACT inhaler Inhale 2 puffs Every 4 (Four) Hours As Needed for Wheezing. 18 g 2    amLODIPine (NORVASC) 10 MG tablet Take 1 tablet by mouth Daily. 90 tablet 3    aspirin 81 MG tablet Take 1 tablet by mouth Daily.      Baclofen 5 MG tablet Take 1 tablet by mouth 3 (Three) Times a Day.      BD Pen Needle Rasheeda U/F 32G X 4 MM misc USE DAILY 90 each 10    Cholecalciferol (Vitamin D3) 125 MCG (5000 UT) chewable tablet Chew 125 mcg 2 (Two) Times a Day.      esomeprazole (nexIUM) 40 MG capsule TAKE 1 CAPSULE TWICE A  capsule 3    fluorouracil (EFUDEX) 5 % cream APPLY TO PREVIOUS BIOPSY SITES ON THE LEFT FOREARM AND RIGHT WRIST TWICE DAILY FOR 6 WEEKS. AVOID EYE AREA      fluticasone (FLONASE) 50 MCG/ACT nasal spray 2 sprays into the nostril(s) as directed by provider Daily. 16 g 11    Insulin Glargine, 2 Unit Dial, (Toujeo Max SoloStar) 300 UNIT/ML solution pen-injector injection Inject 16 Units under the skin into the appropriate area as directed Daily. 6 mL 3    ipratropium (ATROVENT) 0.03 % nasal spray 2 sprays into the nostril(s) as directed by provider Every 12 (Twelve) Hours. 30 mL 2    levocetirizine (XYZAL) 5 MG tablet Take 1 tablet by mouth Every Evening. 90 tablet 3    montelukast (SINGULAIR) 10 MG tablet Take 1 tablet by mouth Every Evening. 90 tablet 3    Multiple Vitamins-Minerals (VITEYES AREDS ADVANCED PO) Take  by mouth 2 (Two) Times a Day.      nebivolol (BYSTOLIC) 10 MG tablet Take 1 tablet by mouth Daily. 90 tablet 3    simvastatin (ZOCOR) 40 MG tablet TAKE 1 TABLET DAILY IN THE EVENING (DUE FOR 6 MONTH  APPOINTMENT) 90 tablet 3    SITagliptin (Januvia) 100 MG tablet Take 1 tablet by mouth Daily. 90 tablet 1    tamsulosin (FLOMAX) 0.4 MG capsule 24 hr capsule Take 1 capsule by mouth Daily. 90 capsule 3    traZODone (DESYREL) 50 MG tablet 1-2 tablets HS PRN insomnia 180 tablet 1    vitamin E 400 UNIT capsule Take 1 capsule by mouth 2 (Two) Times a Day.       No current facility-administered medications on file prior to visit.        Which medication are you concerned about: nebivolol (BYSTOLIC) 10 MG tablet    What are your concerns:    GENERIC IS NOT WORKING    SEND BRAND NAME TO [facility]    HE IS DIZZY HEADED AND BLOOD PRESSURE HAS BEEN HIGHER WHILE ON THE GENERIC

## 2023-06-07 RX ORDER — SIMVASTATIN 40 MG
TABLET ORAL
Qty: 90 TABLET | Refills: 3 | Status: SHIPPED | OUTPATIENT
Start: 2023-06-07

## 2023-06-12 RX ORDER — SIMVASTATIN 40 MG
TABLET ORAL
Qty: 90 TABLET | Refills: 3 | OUTPATIENT
Start: 2023-06-12

## 2023-06-13 ENCOUNTER — TELEPHONE (OUTPATIENT)
Dept: CARDIOLOGY | Facility: CLINIC | Age: 88
End: 2023-06-13
Payer: MEDICARE

## 2023-06-13 DIAGNOSIS — I49.3 PVC'S (PREMATURE VENTRICULAR CONTRACTIONS): Primary | ICD-10-CM

## 2023-06-13 NOTE — TELEPHONE ENCOUNTER
Daughter called for monitor results. Reviewed results with Will MAURICIO Woodson. No arrhythmias that should be causing near syncope.   Daughter verified pt taking nebivolol but has noticed BP elevated since Bystolic was changed to generic. She has requested name brand from the pharmacy.     Per Will - check echo. Daughter aware and agreeable.

## 2023-07-31 ENCOUNTER — TELEPHONE (OUTPATIENT)
Dept: FAMILY MEDICINE CLINIC | Facility: CLINIC | Age: 88
End: 2023-07-31
Payer: MEDICARE

## 2023-08-03 DIAGNOSIS — G47.00 INSOMNIA, UNSPECIFIED TYPE: ICD-10-CM

## 2023-08-04 RX ORDER — TRAZODONE HYDROCHLORIDE 50 MG/1
TABLET ORAL
Qty: 180 TABLET | Refills: 3 | Status: SHIPPED | OUTPATIENT
Start: 2023-08-04

## 2023-08-29 ENCOUNTER — TELEPHONE (OUTPATIENT)
Dept: FAMILY MEDICINE CLINIC | Facility: CLINIC | Age: 88
End: 2023-08-29
Payer: MEDICARE

## 2023-08-29 NOTE — TELEPHONE ENCOUNTER
Caller: Paty Tobar    Relationship: Emergency Contact    Best call back number: [phone]     Requested Prescriptions:   Requested Prescriptions      No prescriptions requested or ordered in this encounter    ONE TOUCH ULTRA BLUE TEST STRIPS QUANTITY 100     Pharmacy where request should be sent: [address] [phone] [facility] [phone] [facility]     Last office visit with prescribing clinician: 3/31/2023   Last telemedicine visit with prescribing clinician: Visit date not found   Next office visit with prescribing clinician: Visit date not found     Additional details provided by patient: PATIENTS DAUGHTER STATES HE IS OUT OF HIS TEST STRIPS.     Alva Evnas Rep   08/29/23 14:22 EDT

## 2023-08-31 ENCOUNTER — TELEPHONE (OUTPATIENT)
Dept: FAMILY MEDICINE CLINIC | Facility: CLINIC | Age: 88
End: 2023-08-31
Payer: MEDICARE

## 2023-08-31 RX ORDER — BLOOD SUGAR DIAGNOSTIC
STRIP MISCELLANEOUS
Qty: 100 EACH | Refills: 0 | OUTPATIENT
Start: 2023-08-31

## 2023-08-31 NOTE — TELEPHONE ENCOUNTER
[facility] CALLED - THEY  NEED SCRIPT RESENT FOR GLUCOSE TEST STRIPS THAT INCLUDED DIAGNOSIS CODE PER MEDICARE REQUIREMENTS. PLEASE CORRECT AND RESEND

## 2023-09-21 RX ORDER — ESOMEPRAZOLE MAGNESIUM 40 MG/1
CAPSULE, DELAYED RELEASE ORAL
Qty: 180 CAPSULE | Refills: 3 | OUTPATIENT
Start: 2023-09-21

## 2023-10-08 DIAGNOSIS — Z79.4 TYPE 2 DIABETES MELLITUS WITHOUT COMPLICATION, WITH LONG-TERM CURRENT USE OF INSULIN: ICD-10-CM

## 2023-10-08 DIAGNOSIS — E11.9 TYPE 2 DIABETES MELLITUS WITHOUT COMPLICATION, WITH LONG-TERM CURRENT USE OF INSULIN: ICD-10-CM

## 2023-10-09 RX ORDER — SITAGLIPTIN 100 MG/1
100 TABLET, FILM COATED ORAL DAILY
Qty: 90 TABLET | Refills: 3 | Status: SHIPPED | OUTPATIENT
Start: 2023-10-09

## 2023-11-10 ENCOUNTER — OFFICE VISIT (OUTPATIENT)
Dept: FAMILY MEDICINE CLINIC | Facility: CLINIC | Age: 88
End: 2023-11-10
Payer: MEDICARE

## 2023-11-10 VITALS
SYSTOLIC BLOOD PRESSURE: 132 MMHG | DIASTOLIC BLOOD PRESSURE: 60 MMHG | WEIGHT: 159.31 LBS | OXYGEN SATURATION: 97 % | HEART RATE: 74 BPM | HEIGHT: 71 IN | BODY MASS INDEX: 22.3 KG/M2

## 2023-11-10 DIAGNOSIS — I10 PRIMARY HYPERTENSION: ICD-10-CM

## 2023-11-10 DIAGNOSIS — G47.00 INSOMNIA, UNSPECIFIED TYPE: ICD-10-CM

## 2023-11-10 DIAGNOSIS — J30.1 SEASONAL ALLERGIC RHINITIS DUE TO POLLEN: ICD-10-CM

## 2023-11-10 DIAGNOSIS — Z00.00 ENCOUNTER FOR ANNUAL WELLNESS EXAM IN MEDICARE PATIENT: Primary | ICD-10-CM

## 2023-11-10 DIAGNOSIS — Z79.4 TYPE 2 DIABETES MELLITUS WITHOUT COMPLICATION, WITH LONG-TERM CURRENT USE OF INSULIN: ICD-10-CM

## 2023-11-10 DIAGNOSIS — K21.9 GASTROESOPHAGEAL REFLUX DISEASE WITHOUT ESOPHAGITIS: ICD-10-CM

## 2023-11-10 DIAGNOSIS — R09.82 POST-NASAL DRAINAGE: ICD-10-CM

## 2023-11-10 DIAGNOSIS — E11.9 TYPE 2 DIABETES MELLITUS WITHOUT COMPLICATION, WITH LONG-TERM CURRENT USE OF INSULIN: ICD-10-CM

## 2023-11-10 DIAGNOSIS — E78.2 MIXED HYPERLIPIDEMIA: ICD-10-CM

## 2023-11-10 DIAGNOSIS — N40.0 BENIGN PROSTATIC HYPERPLASIA WITHOUT LOWER URINARY TRACT SYMPTOMS: ICD-10-CM

## 2023-11-10 DIAGNOSIS — Z23 IMMUNIZATION DUE: ICD-10-CM

## 2023-11-10 PROBLEM — J20.9 ACUTE BRONCHITIS: Status: RESOLVED | Noted: 2019-12-20 | Resolved: 2023-11-10

## 2023-11-10 PROBLEM — R05.9 COUGH: Status: RESOLVED | Noted: 2022-11-16 | Resolved: 2023-11-10

## 2023-11-10 PROBLEM — M79.644 PAIN OF RIGHT THUMB: Status: RESOLVED | Noted: 2022-11-16 | Resolved: 2023-11-10

## 2023-11-10 RX ORDER — ESOMEPRAZOLE MAGNESIUM 40 MG/1
40 CAPSULE, DELAYED RELEASE ORAL 2 TIMES DAILY
Qty: 180 CAPSULE | Refills: 3 | Status: SHIPPED | OUTPATIENT
Start: 2023-11-10

## 2023-11-10 RX ORDER — FLUTICASONE PROPIONATE 50 MCG
2 SPRAY, SUSPENSION (ML) NASAL DAILY
Qty: 16 G | Refills: 11 | Status: SHIPPED | OUTPATIENT
Start: 2023-11-10

## 2023-11-10 RX ORDER — MONTELUKAST SODIUM 10 MG/1
10 TABLET ORAL EVERY EVENING
Qty: 90 TABLET | Refills: 3 | Status: SHIPPED | OUTPATIENT
Start: 2023-11-10

## 2023-11-10 RX ORDER — TAMSULOSIN HYDROCHLORIDE 0.4 MG/1
1 CAPSULE ORAL DAILY
Qty: 90 CAPSULE | Refills: 3 | Status: SHIPPED | OUTPATIENT
Start: 2023-11-10

## 2023-11-10 RX ORDER — INSULIN GLARGINE 300 U/ML
16 INJECTION, SOLUTION SUBCUTANEOUS DAILY
Qty: 6 ML | Refills: 3 | Status: SHIPPED | OUTPATIENT
Start: 2023-11-10

## 2023-11-10 RX ORDER — NEBIVOLOL HYDROCHLORIDE 10 MG/1
10 TABLET ORAL DAILY
Qty: 90 TABLET | Refills: 3 | Status: SHIPPED | OUTPATIENT
Start: 2023-11-10

## 2023-11-10 RX ORDER — TRAZODONE HYDROCHLORIDE 50 MG/1
TABLET ORAL
Qty: 180 TABLET | Refills: 3 | Status: SHIPPED | OUTPATIENT
Start: 2023-11-10

## 2023-11-10 RX ORDER — POTASSIUM CHLORIDE 750 MG/1
10 TABLET, FILM COATED, EXTENDED RELEASE ORAL DAILY
Qty: 90 TABLET | Refills: 1 | Status: SHIPPED | OUTPATIENT
Start: 2023-11-10

## 2023-11-10 RX ORDER — LEVOCETIRIZINE DIHYDROCHLORIDE 5 MG/1
5 TABLET, FILM COATED ORAL EVERY EVENING
Qty: 90 TABLET | Refills: 3 | Status: SHIPPED | OUTPATIENT
Start: 2023-11-10

## 2023-11-10 RX ORDER — SIMVASTATIN 40 MG
40 TABLET ORAL EVERY EVENING
Qty: 90 TABLET | Refills: 3 | Status: SHIPPED | OUTPATIENT
Start: 2023-11-10

## 2023-11-10 RX ORDER — AMLODIPINE BESYLATE 10 MG/1
10 TABLET ORAL DAILY
Qty: 90 TABLET | Refills: 3 | Status: SHIPPED | OUTPATIENT
Start: 2023-11-10

## 2023-11-10 NOTE — PROGRESS NOTES
The ABCs of the Annual Wellness Visit  Subsequent Medicare Wellness Visit    Subjective      Ricki Trotter is a 88 y.o. male who presents for a Subsequent Medicare Wellness Visit.    The following portions of the patient's history were reviewed and   updated as appropriate: past family history, past social history, and past surgical history.    Compared to one year ago, the patient feels his physical   health is the same.    Compared to one year ago, the patient feels his mental   health is worse.    Recent Hospitalizations:  He was not admitted to the hospital during the last year.       Current Medical Providers:  Patient Care Team:  Stacey Hanley APRN as PCP - General (Nurse Practitioner)  Delgado Larios MD as Consulting Physician (Medical Oncology)  Demetria Dasilva APRN as Nurse Practitioner (Pulmonary Disease)  Gregorio Herbert MD as Consulting Physician (Pulmonary Disease)  Lukasz Singh MD as Consulting Physician (Cardiology)    Outpatient Medications Prior to Visit   Medication Sig Dispense Refill    albuterol (PROVENTIL) (5 MG/ML) 0.5% nebulizer solution Take 0.5 mL by nebulization Every 4 (Four) Hours As Needed.      albuterol sulfate  (90 Base) MCG/ACT inhaler Inhale 2 puffs Every 4 (Four) Hours As Needed for Wheezing. 18 g 2    aspirin 81 MG tablet Take 1 tablet by mouth Daily.      Baclofen 5 MG tablet Take 1 tablet by mouth 3 (Three) Times a Day.      BD Pen Needle Rasheeda U/F 32G X 4 MM misc USE DAILY 90 each 10    Cholecalciferol (Vitamin D3) 125 MCG (5000 UT) chewable tablet Chew 125 mcg 2 (Two) Times a Day.      glucose blood test strip Check blood sugar daily, E11.9 100 each 12    ipratropium (ATROVENT) 0.03 % nasal spray 2 sprays into the nostril(s) as directed by provider Every 12 (Twelve) Hours. 30 mL 2    Multiple Vitamins-Minerals (VITEYES AREDS ADVANCED PO) Take  by mouth 2 (Two) Times a Day.      torsemide (DEMADEX) 5 MG tablet Take 1 tablet by mouth  Daily. 90 tablet 1    vitamin E 400 UNIT capsule Take 1 capsule by mouth 2 (Two) Times a Day.      amLODIPine (NORVASC) 10 MG tablet Take 1 tablet by mouth Daily. 90 tablet 3    Bystolic 10 MG tablet Take 1 tablet by mouth Daily. 90 tablet 1    esomeprazole (nexIUM) 40 MG capsule TAKE 1 CAPSULE TWICE A  capsule 3    fluticasone (FLONASE) 50 MCG/ACT nasal spray 2 sprays into the nostril(s) as directed by provider Daily. 16 g 11    Insulin Glargine, 2 Unit Dial, (Toujeo Max SoloStar) 300 UNIT/ML solution pen-injector injection Inject 16 Units under the skin into the appropriate area as directed Daily. 6 mL 3    Januvia 100 MG tablet TAKE 1 TABLET BY MOUTH DAILY 90 tablet 3    levocetirizine (XYZAL) 5 MG tablet Take 1 tablet by mouth Every Evening. 90 tablet 3    montelukast (SINGULAIR) 10 MG tablet Take 1 tablet by mouth Every Evening. 90 tablet 3    potassium chloride 10 MEQ CR tablet Take 1 tablet by mouth Daily. 90 tablet 1    simvastatin (ZOCOR) 40 MG tablet TAKE 1 TABLET BY MOUTH DAILY IN  THE EVENING 90 tablet 3    tamsulosin (FLOMAX) 0.4 MG capsule 24 hr capsule Take 1 capsule by mouth Daily. 90 capsule 3    traZODone (DESYREL) 50 MG tablet TAKE 1 TO 2 TABLETS BY MOUTH AT  BEDTIME AS NEEDED FOR INSOMNIA 180 tablet 3    fluorouracil (EFUDEX) 5 % cream APPLY TO PREVIOUS BIOPSY SITES ON THE LEFT FOREARM AND RIGHT WRIST TWICE DAILY FOR 6 WEEKS. AVOID EYE AREA       No facility-administered medications prior to visit.       No opioid medication identified on active medication list. I have reviewed chart for other potential  high risk medication/s and harmful drug interactions in the elderly.        Aspirin is on active medication list. Aspirin use is indicated based on review of current medical condition/s. Pros and cons of this therapy have been discussed today. Benefits of this medication outweigh potential harm.  Patient has been encouraged to continue taking this medication.  .      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  ACP discussion was held with the patient during this visit. Patient has an advance directive (not in EMR), copy requested.     Objective    Vitals:    11/10/23 0933   BP: 132/60   Pulse: 74   SpO2: 97%   Weight: 72.3 kg (159 lb 5 oz)   Height: 180.3 cm (71\")     Estimated body mass index is 22.22 kg/m² as calculated from the following:    Height as of this encounter: 180.3 cm (71\").    Weight as of this encounter: 72.3 kg (159 lb 5 oz).    BMI is within normal parameters. No other follow-up for BMI required.      Does the patient have evidence of cognitive impairment?   No            Review of Systems   Constitutional:  Negative for appetite change, fatigue and unexpected weight loss.   Respiratory:  Negative for chest tightness and shortness of breath.    Cardiovascular:  Negative for chest pain, palpitations and leg swelling.   Neurological:  Negative for syncope, weakness and confusion.        Physical Exam  Constitutional:       Appearance: Normal appearance.   HENT:      Head: Normocephalic.   Eyes:      Conjunctiva/sclera: Conjunctivae normal.      Pupils: Pupils are equal, round, and reactive to light.   Cardiovascular:      Rate and Rhythm: Normal rate and regular rhythm.      Heart sounds: Normal heart sounds.   Pulmonary:      Effort: Pulmonary effort is normal.      Breath sounds: Normal breath sounds.   Abdominal:      Tenderness: There is no abdominal tenderness.   Musculoskeletal:         General: Normal range of motion.   Skin:     General: Skin is warm and dry.      Capillary " Refill: Capillary refill takes less than 2 seconds.   Neurological:      General: No focal deficit present.      Mental Status: He is alert and oriented to person, place, and time.   Psychiatric:         Mood and Affect: Mood normal.         Behavior: Behavior normal.         Thought Content: Thought content normal.         Judgment: Judgment normal.          HEALTH RISK ASSESSMENT    Smoking Status:  Social History     Tobacco Use   Smoking Status Former    Packs/day: 0.50    Years: 9.00    Additional pack years: 0.00    Total pack years: 4.50    Types: Cigarettes    Start date:     Quit date:     Years since quittin.8   Smokeless Tobacco Never     Alcohol Consumption:  Social History     Substance and Sexual Activity   Alcohol Use Yes    Alcohol/week: 2.0 standard drinks of alcohol    Types: 2 Cans of beer per week     Fall Risk Screen:    CAROLYN Fall Risk Assessment was completed, and patient is at LOW risk for falls.Assessment completed on:11/10/2023    Depression Screenin/10/2023     9:31 AM   PHQ-2/PHQ-9 Depression Screening   Little Interest or Pleasure in Doing Things 0-->not at all   Feeling Down, Depressed or Hopeless 0-->not at all   PHQ-9: Brief Depression Severity Measure Score 0       Health Habits and Functional and Cognitive Screenin/10/2023     9:00 AM   Functional & Cognitive Status   Do you have difficulty preparing food and eating? No   Do you have difficulty bathing yourself, getting dressed or grooming yourself? No   Do you have difficulty using the toilet? No   Do you have difficulty moving around from place to place? No   Do you have trouble with steps or getting out of a bed or a chair? No   Current Diet Well Balanced Diet   Dental Exam Up to date   Eye Exam Up to date   Exercise (times per week) 4 times per week   Current Exercises Include Other   Do you need help using the phone?  No   Are you deaf or do you have serious difficulty hearing?  No   Do you  need help to go to places out of walking distance? No   Do you need help shopping? No   Do you need help preparing meals?  No   Do you need help with housework?  No   Do you need help with laundry? No   Do you need help taking your medications? No   Do you need help managing money? No   Do you ever drive or ride in a car without wearing a seat belt? No   Have you felt unusual stress, anger or loneliness in the last month? Yes   Who do you live with? Alone   If you need help, do you have trouble finding someone available to you? No   Have you been bothered in the last four weeks by sexual problems? No   Do you have difficulty concentrating, remembering or making decisions? No       Age-appropriate Screening Schedule:  Refer to the list below for future screening recommendations based on patient's age, sex and/or medical conditions. Orders for these recommended tests are listed in the plan section. The patient has been provided with a written plan.    Health Maintenance   Topic Date Due    ZOSTER VACCINE (1 of 2) Never done    DIABETIC EYE EXAM  07/16/2023    COVID-19 Vaccine (5 - 2023-24 season) 09/01/2023    URINE MICROALBUMIN  09/21/2023    HEMOGLOBIN A1C  09/30/2023    LIPID PANEL  03/31/2024    ANNUAL WELLNESS VISIT  11/10/2024    TDAP/TD VACCINES (2 - Td or Tdap) 06/13/2027    INFLUENZA VACCINE  Completed    Pneumococcal Vaccine 65+  Completed                CMS Preventative Services Quick Reference  Risk Factors Identified During Encounter:    None Identified    The above risks/problems have been discussed with the patient.  Pertinent information has been shared with the patient in the After Visit Summary.    Diagnoses and all orders for this visit:    1. Encounter for annual wellness exam in Medicare patient (Primary)  Comments:  We discussed diet, exercise, vaccines, and prev counseling. Labs drawn.    2. Seasonal allergic rhinitis due to pollen  Comments:  Continue Singulair and Flonase.  Orders:  -      fluticasone (FLONASE) 50 MCG/ACT nasal spray; 2 sprays into the nostril(s) as directed by provider Daily.  Dispense: 16 g; Refill: 11  -     montelukast (SINGULAIR) 10 MG tablet; Take 1 tablet by mouth Every Evening.  Dispense: 90 tablet; Refill: 3    3. Type 2 diabetes mellitus without complication, with long-term current use of insulin  Comments:  Continue Januvia and insulin. Labs drawn.  We discussed diet.  We may decrease insulin dosage pending A1c  Orders:  -     Insulin Glargine, 2 Unit Dial, (Toujeo Max SoloStar) 300 UNIT/ML solution pen-injector injection; Inject 16 Units under the skin into the appropriate area as directed Daily.  Dispense: 6 mL; Refill: 3  -     SITagliptin (Januvia) 100 MG tablet; Take 1 tablet by mouth Daily.  Dispense: 90 tablet; Refill: 3  -     Comprehensive Metabolic Panel  -     Hemoglobin A1c    4. Post-nasal drainage  -     levocetirizine (XYZAL) 5 MG tablet; Take 1 tablet by mouth Every Evening.  Dispense: 90 tablet; Refill: 3    5. Benign prostatic hyperplasia without lower urinary tract symptoms  Comments:  Continue Flomax.  Orders:  -     tamsulosin (FLOMAX) 0.4 MG capsule 24 hr capsule; Take 1 capsule by mouth Daily.  Dispense: 90 capsule; Refill: 3    6. Insomnia, unspecified type  Comments:  Continue Trazodone.   Orders:  -     traZODone (DESYREL) 50 MG tablet; TAKE 1 TO 2 TABLETS BY MOUTH AT  BEDTIME AS NEEDED FOR INSOMNIA  Dispense: 180 tablet; Refill: 3    7. Mixed hyperlipidemia  Comments:  Continue current medications.  We discussed diet and exercise.  Labs drawn.  Orders:  -     simvastatin (ZOCOR) 40 MG tablet; Take 1 tablet by mouth Every Evening.  Dispense: 90 tablet; Refill: 3  -     CBC & Differential  -     Lipid Panel    8. Immunization due  -     Fluzone High-Dose 65+yrs    9. Gastroesophageal reflux disease without esophagitis  Comments:  Continue current medications.  Orders:  -     esomeprazole (nexIUM) 40 MG capsule; Take 1 capsule by mouth 2 (Two) Times  a Day.  Dispense: 180 capsule; Refill: 3    10. Primary hypertension  -     amLODIPine (NORVASC) 10 MG tablet; Take 1 tablet by mouth Daily.  Dispense: 90 tablet; Refill: 3  -     Bystolic 10 MG tablet; Take 1 tablet by mouth Daily.  Dispense: 90 tablet; Refill: 3  -     potassium chloride 10 MEQ CR tablet; Take 1 tablet by mouth Daily.  Dispense: 90 tablet; Refill: 1        Follow Up:   Next Medicare Wellness visit to be scheduled in 1 year.      An After Visit Summary and PPPS were made available to the patient.

## 2023-11-11 LAB
ALBUMIN SERPL-MCNC: 4.3 G/DL (ref 3.7–4.7)
ALBUMIN/GLOB SERPL: 2.3 {RATIO} (ref 1.2–2.2)
ALP SERPL-CCNC: 58 IU/L (ref 44–121)
ALT SERPL-CCNC: 14 IU/L (ref 0–44)
AST SERPL-CCNC: 17 IU/L (ref 0–40)
BASOPHILS # BLD AUTO: 0 X10E3/UL (ref 0–0.2)
BASOPHILS NFR BLD AUTO: 0 %
BILIRUB SERPL-MCNC: 0.5 MG/DL (ref 0–1.2)
BUN SERPL-MCNC: 16 MG/DL (ref 8–27)
BUN/CREAT SERPL: 11 (ref 10–24)
CALCIUM SERPL-MCNC: 9.2 MG/DL (ref 8.6–10.2)
CHLORIDE SERPL-SCNC: 101 MMOL/L (ref 96–106)
CHOLEST SERPL-MCNC: 145 MG/DL (ref 100–199)
CO2 SERPL-SCNC: 25 MMOL/L (ref 20–29)
CREAT SERPL-MCNC: 1.51 MG/DL (ref 0.76–1.27)
EGFRCR SERPLBLD CKD-EPI 2021: 44 ML/MIN/1.73
EOSINOPHIL # BLD AUTO: 0.2 X10E3/UL (ref 0–0.4)
EOSINOPHIL NFR BLD AUTO: 2 %
ERYTHROCYTE [DISTWIDTH] IN BLOOD BY AUTOMATED COUNT: 13.8 % (ref 11.6–15.4)
GLOBULIN SER CALC-MCNC: 1.9 G/DL (ref 1.5–4.5)
GLUCOSE SERPL-MCNC: 156 MG/DL (ref 70–99)
HBA1C MFR BLD: 6.5 % (ref 4.8–5.6)
HCT VFR BLD AUTO: 36.4 % (ref 37.5–51)
HDLC SERPL-MCNC: 43 MG/DL
HGB BLD-MCNC: 12 G/DL (ref 13–17.7)
IMM GRANULOCYTES # BLD AUTO: 0 X10E3/UL (ref 0–0.1)
IMM GRANULOCYTES NFR BLD AUTO: 0 %
LDLC SERPL CALC-MCNC: 79 MG/DL (ref 0–99)
LYMPHOCYTES # BLD AUTO: 3.1 X10E3/UL (ref 0.7–3.1)
LYMPHOCYTES NFR BLD AUTO: 31 %
MCH RBC QN AUTO: 29 PG (ref 26.6–33)
MCHC RBC AUTO-ENTMCNC: 33 G/DL (ref 31.5–35.7)
MCV RBC AUTO: 88 FL (ref 79–97)
MONOCYTES # BLD AUTO: 0.6 X10E3/UL (ref 0.1–0.9)
MONOCYTES NFR BLD AUTO: 6 %
NEUTROPHILS # BLD AUTO: 6 X10E3/UL (ref 1.4–7)
NEUTROPHILS NFR BLD AUTO: 61 %
PLATELET # BLD AUTO: 191 X10E3/UL (ref 150–450)
POTASSIUM SERPL-SCNC: 4.2 MMOL/L (ref 3.5–5.2)
PROT SERPL-MCNC: 6.2 G/DL (ref 6–8.5)
RBC # BLD AUTO: 4.14 X10E6/UL (ref 4.14–5.8)
SODIUM SERPL-SCNC: 140 MMOL/L (ref 134–144)
TRIGL SERPL-MCNC: 128 MG/DL (ref 0–149)
VLDLC SERPL CALC-MCNC: 23 MG/DL (ref 5–40)
WBC # BLD AUTO: 10 X10E3/UL (ref 3.4–10.8)

## 2023-12-04 RX ORDER — TORSEMIDE 5 MG/1
5 TABLET ORAL DAILY
Qty: 90 TABLET | Refills: 3 | Status: SHIPPED | OUTPATIENT
Start: 2023-12-04

## 2023-12-20 DIAGNOSIS — I10 PRIMARY HYPERTENSION: ICD-10-CM

## 2023-12-20 NOTE — TELEPHONE ENCOUNTER
Caller: Paty Tobar    Relationship: Emergency Contact    Best call back number: “patient was notified that after Sept 21 they will be out of network    Requested Prescriptions:   Requested Prescriptions     Pending Prescriptions Disp Refills    amLODIPine (NORVASC) 10 MG tablet 90 tablet 3     Sig: Take 1 tablet by mouth Daily.        Pharmacy where request should be sent: [facility] [address] [place] [address] [phone] [facility] [phone] FX     Last office visit with prescribing clinician: 11/10/2023   Last telemedicine visit with prescribing clinician: Visit date not found   Next office visit with prescribing clinician: Visit date not found     Additional details provided by patient:     Does the patient have less than a 3 day supply:  [] Yes  [x] No    Would you like a call back once the refill request has been completed: [] Yes [x] No    If the office needs to give you a call back, can they leave a voicemail: [] Yes [] No    Alva Delatorre Rep   12/20/23 15:09 EST

## 2023-12-21 RX ORDER — AMLODIPINE BESYLATE 10 MG/1
10 TABLET ORAL DAILY
Qty: 90 TABLET | Refills: 3 | OUTPATIENT
Start: 2023-12-21

## 2024-01-02 ENCOUNTER — NURSE TRIAGE (OUTPATIENT)
Dept: CALL CENTER | Facility: HOSPITAL | Age: 89
End: 2024-01-02
Payer: MEDICARE

## 2024-01-02 NOTE — TELEPHONE ENCOUNTER
Patient having some wheezing x week and a half. Using his inhaler. Has had a dry cough following recent strep. No fever.     Reason for Disposition   Wheezing is present    Additional Information   Negative: SEVERE difficulty breathing (e.g., struggling for each breath, speaks in single words)   Negative: Bluish (or gray) lips or face now   Negative: [1] Rapid onset of cough AND [2] has hives   Negative: Coughing started suddenly after medicine, an allergic food or bee sting   Negative: [1] Difficulty breathing AND [2] exposure to flames, smoke, or fumes   Negative: [1] Stridor AND [2] difficulty breathing   Negative: Sounds like a life-threatening emergency to the triager   Negative: Choked on object of food that could be caught in the throat   Negative: Chest pain is main symptom   Negative: [1] Previous asthma attacks AND [2] this feels like asthma attack   Negative: Cough lasts > 3 weeks   Negative: Wet cough (productive; white-yellow, yellow, green, or nohemy colored sputum)   Negative: [1] Dry cough (non-productive;  no sputum or minimal clear sputum) AND [2] within 14 days of COVID-19 Exposure   Negative: [1] MODERATE difficulty breathing (e.g., speaks in phrases, SOB even at rest, pulse 100-120) AND [2] still present when not coughing   Negative: Chest pain  (Exception: MILD central chest pain, present only when coughing.)   Negative: Patient sounds very sick or weak to the triager   Negative: [1] MILD difficulty breathing (e.g., minimal/no SOB at rest, SOB with walking, pulse <100) AND [2] still present when not coughing   Negative: [1] Coughed up blood AND [2] > 1 tablespoon (15 ml)   (Exception: Blood-tinged sputum.)   Negative: Fever > 103 F (39.4 C)   Negative: [1] Fever > 101 F (38.3 C) AND [2] age > 60 years   Negative: [1] Fever > 100.0 F (37.8 C) AND [2] bedridden (e.g., CVA, chronic illness, recovering from surgery)   Negative: [1] Fever > 100.0 F (37.8 C) AND [2] diabetes mellitus or weak immune  "system (e.g., HIV positive, cancer chemo, splenectomy, organ transplant, chronic steroids)    Answer Assessment - Initial Assessment Questions  1. ONSET: \"When did the cough begin?\"       Patient having some wheezing x week and a half. Using his inhaler. Has had a dry cough following recent strep.  2. SEVERITY: \"How bad is the cough today?\"       moderate   3. SPUTUM: \"Describe the color of your sputum\" (none, dry cough; clear, white, yellow, green)      no  4. HEMOPTYSIS: \"Are you coughing up any blood?\" If so ask: \"How much?\" (flecks, streaks, tablespoons, etc.)      no  5. DIFFICULTY BREATHING: \"Are you having difficulty breathing?\" If Yes, ask: \"How bad is it?\" (e.g., mild, moderate, severe)     - MILD: No SOB at rest, mild SOB with walking, speaks normally in sentences, can lie down, no retractions, pulse < 100.     - MODERATE: SOB at rest, SOB with minimal exertion and prefers to sit, cannot lie down flat, speaks in phrases, mild retractions, audible wheezing, pulse 100-120.     - SEVERE: Very SOB at rest, speaks in single words, struggling to breathe, sitting hunched forward, retractions, pulse > 120       Moderate   6. FEVER: \"Do you have a fever?\" If Yes, ask: \"What is your temperature, how was it measured, and when did it start?\"      no  7. CARDIAC HISTORY: \"Do you have any history of heart disease?\" (e.g., heart attack, congestive heart failure)       No.   8. LUNG HISTORY: \"Do you have any history of lung disease?\"  (e.g., pulmonary embolus, asthma, emphysema)      COPD. Not on o2  9. PE RISK FACTORS: \"Do you have a history of blood clots?\" (or: recent major surgery, recent prolonged travel, bedridden)      no  10. OTHER SYMPTOMS: \"Do you have any other symptoms?\" (e.g., runny nose, wheezing, chest pain)        no  11. PREGNANCY: \"Is there any chance you are pregnant?\" \"When was your last menstrual period?\"        no  12. TRAVEL: \"Have you traveled out of the country in the last month?\" (e.g., travel " history, exposures)        no    Protocols used: Cough - Acute Non-Productive-ADULT-AH

## 2024-01-03 ENCOUNTER — TELEPHONE (OUTPATIENT)
Dept: FAMILY MEDICINE CLINIC | Facility: CLINIC | Age: 89
End: 2024-01-03

## 2024-01-03 ENCOUNTER — OFFICE VISIT (OUTPATIENT)
Dept: FAMILY MEDICINE CLINIC | Facility: CLINIC | Age: 89
End: 2024-01-03
Payer: MEDICARE

## 2024-01-03 VITALS
TEMPERATURE: 97.6 F | HEART RATE: 70 BPM | SYSTOLIC BLOOD PRESSURE: 124 MMHG | OXYGEN SATURATION: 97 % | HEIGHT: 71 IN | DIASTOLIC BLOOD PRESSURE: 62 MMHG | BODY MASS INDEX: 23.25 KG/M2 | WEIGHT: 166.06 LBS

## 2024-01-03 DIAGNOSIS — J44.9 CHRONIC OBSTRUCTIVE PULMONARY DISEASE, UNSPECIFIED COPD TYPE: ICD-10-CM

## 2024-01-03 DIAGNOSIS — R05.1 ACUTE COUGH: ICD-10-CM

## 2024-01-03 DIAGNOSIS — R01.1 MURMUR: Primary | ICD-10-CM

## 2024-01-03 PROCEDURE — 99214 OFFICE O/P EST MOD 30 MIN: CPT | Performed by: NURSE PRACTITIONER

## 2024-01-03 RX ORDER — AZITHROMYCIN 250 MG/1
TABLET, FILM COATED ORAL
Qty: 6 TABLET | Refills: 0 | Status: SHIPPED | OUTPATIENT
Start: 2024-01-03 | End: 2024-01-08

## 2024-01-03 RX ORDER — ALBUTEROL SULFATE 90 UG/1
2 AEROSOL, METERED RESPIRATORY (INHALATION) EVERY 4 HOURS PRN
Qty: 18 G | Refills: 2 | Status: SHIPPED | OUTPATIENT
Start: 2024-01-03

## 2024-01-03 NOTE — PROGRESS NOTES
"Chief Complaint  Cough    Subjective          Ricki Jamie Trotter presents to [facility]  History of Present Illness  Pt has had a cough and wheezing x 2 weeks. He denies fever, chills, or myalgias. He has shortness of breath at times.   Cough  Pertinent negatives include no chest pain, fever or shortness of breath.       Objective   Vital Signs:   /62   Pulse 70   Temp 97.6 °F (36.4 °C)   Ht 180.3 cm (71\")   Wt 75.3 kg (166 lb 1 oz)   SpO2 97%   BMI 23.16 kg/m²     Body mass index is 23.16 kg/m².    Review of Systems   Constitutional:  Negative for fatigue and fever.   Respiratory:  Positive for cough. Negative for shortness of breath.    Cardiovascular:  Negative for chest pain, palpitations and leg swelling.   Neurological:  Negative for syncope.   Psychiatric/Behavioral:  The patient is not nervous/anxious.           Current Outpatient Medications:     albuterol (PROVENTIL) (5 MG/ML) 0.5% nebulizer solution, Take 0.5 mL by nebulization Every 4 (Four) Hours As Needed., Disp: , Rfl:     albuterol sulfate  (90 Base) MCG/ACT inhaler, Inhale 2 puffs Every 4 (Four) Hours As Needed for Wheezing., Disp: 18 g, Rfl: 2    amLODIPine (NORVASC) 10 MG tablet, Take 1 tablet by mouth Daily., Disp: 90 tablet, Rfl: 3    aspirin 81 MG tablet, Take 1 tablet by mouth Daily., Disp: , Rfl:     BD Pen Needle Rasheeda U/F 32G X 4 MM misc, USE DAILY, Disp: 90 each, Rfl: 10    Bystolic 10 MG tablet, Take 1 tablet by mouth Daily., Disp: 90 tablet, Rfl: 3    Cholecalciferol (Vitamin D3) 125 MCG (5000 UT) chewable tablet, Chew 125 mcg 2 (Two) Times a Day., Disp: , Rfl:     esomeprazole (nexIUM) 40 MG capsule, Take 1 capsule by mouth 2 (Two) Times a Day., Disp: 180 capsule, Rfl: 3    fluticasone (FLONASE) 50 MCG/ACT nasal spray, 2 sprays into the nostril(s) as directed by provider Daily., Disp: 16 g, Rfl: 11    glucose blood test strip, Check blood sugar daily, E11.9, Disp: 100 each, Rfl: 12    " Insulin Glargine, 2 Unit Dial, (Toujeo Max SoloStar) 300 UNIT/ML solution pen-injector injection, Inject 16 Units under the skin into the appropriate area as directed Daily., Disp: 6 mL, Rfl: 3    ipratropium (ATROVENT) 0.03 % nasal spray, 2 sprays into the nostril(s) as directed by provider Every 12 (Twelve) Hours., Disp: 30 mL, Rfl: 2    levocetirizine (XYZAL) 5 MG tablet, Take 1 tablet by mouth Every Evening., Disp: 90 tablet, Rfl: 3    montelukast (SINGULAIR) 10 MG tablet, Take 1 tablet by mouth Every Evening., Disp: 90 tablet, Rfl: 3    Multiple Vitamins-Minerals (VITEYES AREDS ADVANCED PO), Take  by mouth 2 (Two) Times a Day., Disp: , Rfl:     potassium chloride 10 MEQ CR tablet, Take 1 tablet by mouth Daily., Disp: 90 tablet, Rfl: 1    simvastatin (ZOCOR) 40 MG tablet, Take 1 tablet by mouth Every Evening., Disp: 90 tablet, Rfl: 3    SITagliptin (Januvia) 100 MG tablet, Take 1 tablet by mouth Daily., Disp: 90 tablet, Rfl: 3    tamsulosin (FLOMAX) 0.4 MG capsule 24 hr capsule, Take 1 capsule by mouth Daily., Disp: 90 capsule, Rfl: 3    torsemide (DEMADEX) 5 MG tablet, TAKE 1 TABLET BY MOUTH DAILY, Disp: 90 tablet, Rfl: 3    traZODone (DESYREL) 50 MG tablet, TAKE 1 TO 2 TABLETS BY MOUTH AT  BEDTIME AS NEEDED FOR INSOMNIA, Disp: 180 tablet, Rfl: 3      Allergies: Penicillins and Sulfa antibiotics    Physical Exam  Constitutional:       Appearance: Normal appearance.   HENT:      Head: Normocephalic.   Eyes:      Conjunctiva/sclera: Conjunctivae normal.      Pupils: Pupils are equal, round, and reactive to light.   Cardiovascular:      Rate and Rhythm: Normal rate and regular rhythm.      Heart sounds: Normal heart sounds.   Pulmonary:      Effort: Pulmonary effort is normal.      Breath sounds: Normal breath sounds.   Abdominal:      Tenderness: There is no abdominal tenderness.   Musculoskeletal:         General: Normal range of motion.   Skin:     General: Skin is warm and dry.      Capillary Refill: Capillary  refill takes less than 2 seconds.   Neurological:      General: No focal deficit present.      Mental Status: He is alert and oriented to person, place, and time.   Psychiatric:         Mood and Affect: Mood normal.         Behavior: Behavior normal.         Thought Content: Thought content normal.         Judgment: Judgment normal.          Result Review :                   Assessment and Plan    Diagnoses and all orders for this visit:    1. Murmur (Primary)  Comments:  F/U with cardio. Last echo in June showed disease in aortic, tricuspid, and mitral valves.  Orders:  -     Ambulatory Referral to Cardiology    2. Acute cough  Comments:  CXR done. Finish antibitiotics. Mucinex and Albuterol as needed. RTC if not improving in 1 week.  Orders:  -     XR Chest PA & Lateral; Future    3. Chronic obstructive pulmonary disease, unspecified COPD type  Comments:  Albuterol as needed.  Orders:  -     albuterol sulfate  (90 Base) MCG/ACT inhaler; Inhale 2 puffs Every 4 (Four) Hours As Needed for Wheezing.  Dispense: 18 g; Refill: 2                Follow Up   No follow-ups on file.  Patient was given instructions and counseling regarding his condition or for health maintenance advice. Please see specific information pulled into the AVS if appropriate.     LINDA Nuñez

## 2024-01-03 NOTE — TELEPHONE ENCOUNTER
Will you let know that his CXR showed some fluid and abnormalities in his left lung that was unchanged since his previous chest Xrs. I sent an antibiotic to take. Let me know if his pain and shortness of breath is not improving in 1 week. Thanks!

## 2024-01-04 ENCOUNTER — OFFICE VISIT (OUTPATIENT)
Dept: CARDIOLOGY | Facility: CLINIC | Age: 89
End: 2024-01-04
Payer: MEDICARE

## 2024-01-04 VITALS
SYSTOLIC BLOOD PRESSURE: 104 MMHG | WEIGHT: 163.4 LBS | HEART RATE: 82 BPM | HEIGHT: 71 IN | OXYGEN SATURATION: 97 % | BODY MASS INDEX: 22.88 KG/M2 | DIASTOLIC BLOOD PRESSURE: 60 MMHG

## 2024-01-04 DIAGNOSIS — I34.0 NONRHEUMATIC MITRAL VALVE REGURGITATION: ICD-10-CM

## 2024-01-04 DIAGNOSIS — I50.33 ACUTE ON CHRONIC DIASTOLIC CONGESTIVE HEART FAILURE: Primary | ICD-10-CM

## 2024-01-04 DIAGNOSIS — I35.0 NONRHEUMATIC AORTIC VALVE STENOSIS: ICD-10-CM

## 2024-01-04 DIAGNOSIS — I10 PRIMARY HYPERTENSION: ICD-10-CM

## 2024-01-04 RX ORDER — POTASSIUM CHLORIDE 750 MG/1
10 TABLET, EXTENDED RELEASE ORAL DAILY
COMMUNITY
Start: 2023-12-04

## 2024-01-04 NOTE — PROGRESS NOTES
[facility] Cardiology  Office Progress Note  Ricki Trotter  1935  [address]  [facility] [address]       Visit Date: 01/04/24    PCP: Stacey Hanley, LINDA  [address]  [facility] [address]    IDENTIFICATION: A 88 y.o. male  resident of [place], [place]     PROBLEM LIST:   Abnormal EKG  Valvular heart disease  Echo, 6/28/2023: EF 51-55%, mild AS, max PG 28, mean PG 14, moderate MR, moderate-severe TR, RVSP 60 mmHg  PVCs  9d Holter 7/2020: avg 66,  bpm, VE 8.3%, SVE 4.6%, 5 runs of NSVT, 20 runs of SVT, no A-fib  Nuclear stress 7/2020: Normal MPS with no evidence of ischemia, frequent PVCs noted at rest actually decreased with increased heart rate, EF 58%, lower study  PVCs adequately controlled following initiation of beta-blocker  Shortness of Breath (2020)  Echo 7/2020: EF 50-55%, mild concentric LVH, grade 2 diastolic dysfunction, severely increased LA volume, aortic sclerosis without significant stenosis, mild to moderate MR, mild to moderate TR, RVSP 52 mmHg  Normal stress 7/2020 as noted above  Hypertension  Dyslipidemia  3/2023 , trig 78, HDL 43, LDL 79 on Zocor  Type 2 diabetes, controlled  3/23 A1c 6.0  COPD with recurrent pneumonia  Former smoker, asbestos exposure and other chemicals at [employer]  Some entrapped lung d/t scarring and bronchiectasis  Normocytic Anemia  BPH        CC:   Chief Complaint   Patient presents with    Syncope   Referred back by PCP for murmur    Allergies  Allergies   Allergen Reactions    Penicillins Swelling    Sulfa Antibiotics Swelling       Current Medications  Current Outpatient Medications   Medication Instructions    albuterol (PROVENTIL) 2.5 mg, Nebulization, Every 4 Hours PRN    albuterol sulfate  (90 Base) MCG/ACT inhaler 2 puffs, Inhalation, Every 4 Hours PRN    amLODIPine (NORVASC) 10 mg, Oral, Daily    aspirin 81 mg, Oral, Daily    azithromycin (Zithromax Z-Heath) 250 MG tablet Take 2  "tablets by mouth Daily for 1 day, THEN 1 tablet Daily for 4 days.    BD Pen Needle Rasheeda U/F 32G X 4 MM misc USE DAILY    Bystolic 10 mg, Oral, Daily    esomeprazole (NEXIUM) 40 mg, Oral, 2 Times Daily    fluticasone (FLONASE) 50 MCG/ACT nasal spray 2 sprays, Nasal, Daily    glucose blood test strip Check blood sugar daily, E11.9    levocetirizine (XYZAL) 5 mg, Oral, Every Evening    montelukast (SINGULAIR) 10 mg, Oral, Every Evening    potassium chloride (K-DUR,KLOR-CON) 10 MEQ CR tablet 10 mEq, Oral, Daily    simvastatin (ZOCOR) 40 mg, Oral, Every Evening    SITagliptin (JANUVIA) 100 mg, Oral, Daily    tamsulosin (FLOMAX) 0.4 mg, Oral, Daily    torsemide (DEMADEX) 5 mg, Oral, Daily    Toujeo Max SoloStar 16 Units, Subcutaneous, Daily    traZODone (DESYREL) 50 MG tablet TAKE 1 TO 2 TABLETS BY MOUTH AT  BEDTIME AS NEEDED FOR INSOMNIA        History of Present Illness   Ricki Trotter is a 88 y.o. year old male here for follow up.    Patient was noted by PCP to have strep infection prior to Leila.  He has been on antibiotics.  He is noted a dry cough and some pleuritic chest pain since that time.  He has not noted any fevers or productive cough.  Mild edema more in his hands than anywhere.  Baseline shortness of breath is increased somewhat.  Anecdotally patient states he has been in the hospital 1 day in his life in 1959    OBJECTIVE:  Vitals:    01/04/24 0911   BP: 104/60   BP Location: Left arm   Patient Position: Sitting   Cuff Size: Adult   Pulse: 82   SpO2: 97%   Weight: 74.1 kg (163 lb 6.4 oz)   Height: 180.3 cm (71\")     Body mass index is 22.79 kg/m².    Constitutional:       Appearance: Healthy appearance. Not in distress.   Neck:      Vascular: No JVR. JVD normal.   Pulmonary:      Effort: Pulmonary effort is normal.      Breath sounds: Normal breath sounds. No wheezing. No rhonchi. No rales.   Chest:      Chest wall: Not tender to palpatation.   Cardiovascular:      PMI at left midclavicular line. " Normal rate. Regular rhythm. Normal S1. Normal S2.       Murmurs: There is a systolic murmur.      No gallop.  No click. No rub.   Pulses:     Intact distal pulses.   Edema:     Peripheral edema absent.   Abdominal:      General: Bowel sounds are normal.      Palpations: Abdomen is soft.      Tenderness: There is no abdominal tenderness.   Musculoskeletal: Normal range of motion.         General: No tenderness. Skin:     General: Skin is warm and dry.   Neurological:      General: No focal deficit present.      Mental Status: Alert and oriented to person, place and time.         Diagnostic Data:  Procedures        ASSESSMENT:   Diagnosis Plan   1. Acute on chronic diastolic congestive heart failure        2. Primary hypertension        3. Nonrheumatic aortic valve stenosis        4. Nonrheumatic mitral valve regurgitation            PLAN:  Valvular heart disease with preserved LVEF.  Pulmonary hypertension.  We will escalate diuretic as needed with 2 pound weight gain in 24 hours.    Hypertension currently controlled on Bystolic amlodipine    Dyslipidemia controlled on statin therapy    Insert current diabetes on sliding scale insulin dosing.          Lukasz Singh MD, FACC

## 2024-01-09 NOTE — TELEPHONE ENCOUNTER
Pt daughter said you were supposed to send Referral for Colonoscopy. Can you please order that.       Contacted about xray, did get abx and finished those and is feeling better. Saw heart dr as well and he increased his fluid pill to twice daily and believes that is helping as well.

## 2024-01-10 NOTE — TELEPHONE ENCOUNTER
Is the colonoscopy follow up on a previous colonoscopy? We don't typically do colonoscopy after 80.

## 2024-01-11 ENCOUNTER — TELEPHONE (OUTPATIENT)
Dept: FAMILY MEDICINE CLINIC | Facility: CLINIC | Age: 89
End: 2024-01-11
Payer: MEDICARE

## 2024-01-11 DIAGNOSIS — I10 PRIMARY HYPERTENSION: ICD-10-CM

## 2024-01-11 NOTE — TELEPHONE ENCOUNTER
Caller: Paty Tobar    Relationship: Emergency Contact    Best call back number: [phone]    What was the call regarding: DAUGHTER STATES THAT [employer] (BR[employer]) REQUIRES A PRIOR AUTHORIZATION. PLEASE SUBMIT    REFERENCE NUMBER- [id number]    Additional Notes: DAUGHTER STATES THAT PATIENT HAS A NEW PHARMACY.    [facility] [employer] - [address] - [phone] PH - [phone] FX  P: [phone]  F: [phone]

## 2024-01-15 NOTE — TELEPHONE ENCOUNTER
HUB RELAY    VM box is full, unable to leave vm.   Is the colonoscopy follow up on a previous colonoscopy? We don't typically do colonoscopy after 80.

## 2024-01-18 NOTE — TELEPHONE ENCOUNTER
Pt daughter called and is asking about the status of the PA for his medicine. She states that he received a letter in the mail stating that it was approved. Please advise.

## 2024-01-19 RX ORDER — NEBIVOLOL HYDROCHLORIDE 10 MG/1
10 TABLET ORAL DAILY
Qty: 30 TABLET | Refills: 0 | Status: SHIPPED | OUTPATIENT
Start: 2024-01-19 | End: 2024-01-19 | Stop reason: SDUPTHER

## 2024-01-19 RX ORDER — NEBIVOLOL HYDROCHLORIDE 10 MG/1
10 TABLET ORAL DAILY
Qty: 90 TABLET | Refills: 2 | Status: SHIPPED | OUTPATIENT
Start: 2024-01-19

## 2024-01-19 NOTE — TELEPHONE ENCOUNTER
Spoke to daughter, everything is straightened out. Pt just needed refill sent to [facility] and [place]

## 2024-01-19 NOTE — TELEPHONE ENCOUNTER
Pt daughter contacted, question was for her , not dad. Got in husbands chart and gave her that information

## 2024-01-23 DIAGNOSIS — G47.00 INSOMNIA, UNSPECIFIED TYPE: ICD-10-CM

## 2024-01-23 DIAGNOSIS — E11.9 TYPE 2 DIABETES MELLITUS WITHOUT COMPLICATION, WITH LONG-TERM CURRENT USE OF INSULIN: ICD-10-CM

## 2024-01-23 DIAGNOSIS — R09.82 POST-NASAL DRAINAGE: ICD-10-CM

## 2024-01-23 DIAGNOSIS — J44.9 CHRONIC OBSTRUCTIVE PULMONARY DISEASE, UNSPECIFIED COPD TYPE: ICD-10-CM

## 2024-01-23 DIAGNOSIS — I10 PRIMARY HYPERTENSION: ICD-10-CM

## 2024-01-23 DIAGNOSIS — E78.2 MIXED HYPERLIPIDEMIA: ICD-10-CM

## 2024-01-23 DIAGNOSIS — N40.0 BENIGN PROSTATIC HYPERPLASIA WITHOUT LOWER URINARY TRACT SYMPTOMS: ICD-10-CM

## 2024-01-23 DIAGNOSIS — Z79.4 TYPE 2 DIABETES MELLITUS WITHOUT COMPLICATION, WITH LONG-TERM CURRENT USE OF INSULIN: ICD-10-CM

## 2024-01-23 DIAGNOSIS — K21.9 GASTROESOPHAGEAL REFLUX DISEASE WITHOUT ESOPHAGITIS: ICD-10-CM

## 2024-01-23 DIAGNOSIS — J30.1 SEASONAL ALLERGIC RHINITIS DUE TO POLLEN: ICD-10-CM

## 2024-01-23 NOTE — TELEPHONE ENCOUNTER
Caller: Paty Tobar    Relationship: Emergency Contact    Best call back number: [phone]     Requested Prescriptions:   Requested Prescriptions     Pending Prescriptions Disp Refills    albuterol (PROVENTIL) (5 MG/ML) 0.5% nebulizer solution       Sig: Take 0.5 mL by nebulization Every 4 (Four) Hours As Needed.    albuterol sulfate  (90 Base) MCG/ACT inhaler 18 g 2     Sig: Inhale 2 puffs Every 4 (Four) Hours As Needed for Wheezing.    amLODIPine (NORVASC) 10 MG tablet 90 tablet 3     Sig: Take 1 tablet by mouth Daily.    Bystolic 10 MG tablet 90 tablet 2     Sig: Take 1 tablet by mouth Daily.    esomeprazole (nexIUM) 40 MG capsule 180 capsule 3     Sig: Take 1 capsule by mouth 2 (Two) Times a Day.    fluticasone (FLONASE) 50 MCG/ACT nasal spray 16 g 11     Si sprays into the nostril(s) as directed by provider Daily.    glucose blood test strip 100 each 12     Sig: Check blood sugar daily, E11.9    Insulin Glargine, 2 Unit Dial, (Toujeo Max SoloStar) 300 UNIT/ML solution pen-injector injection 6 mL 3     Sig: Inject 16 Units under the skin into the appropriate area as directed Daily.    levocetirizine (XYZAL) 5 MG tablet 90 tablet 3     Sig: Take 1 tablet by mouth Every Evening.    montelukast (SINGULAIR) 10 MG tablet 90 tablet 3     Sig: Take 1 tablet by mouth Every Evening.    potassium chloride (K-DUR,KLOR-CON) 10 MEQ CR tablet       Sig: Take 1 tablet by mouth Daily.    simvastatin (ZOCOR) 40 MG tablet 90 tablet 3     Sig: Take 1 tablet by mouth Every Evening.    SITagliptin (Januvia) 100 MG tablet 90 tablet 3     Sig: Take 1 tablet by mouth Daily.    tamsulosin (FLOMAX) 0.4 MG capsule 24 hr capsule 90 capsule 3     Sig: Take 1 capsule by mouth Daily.    torsemide (DEMADEX) 5 MG tablet 90 tablet 3     Sig: Take 1 tablet by mouth Daily.    traZODone (DESYREL) 50 MG tablet 180 tablet 3     Sig: TAKE 1 TO 2 TABLETS BY MOUTH AT  BEDTIME AS NEEDED FOR INSOMNIA        Pharmacy where request should  be sent: [facility], [facility]. [facility] [address] [place] [address] [phone]  - [phone]  [phone] FAX     Last office visit with prescribing clinician: 1/3/2024   Last telemedicine visit with prescribing clinician: Visit date not found   Next office visit with prescribing clinician: Visit date not found     Additional details provided by patient: PLEASE SEND TO THE PHARMACY THEN CALL SUNIL TO VERIFY SENT. THANK YOU.    Does the patient have less than a 3 day supply:  [] Yes  [x] No    Would you like a call back once the refill request has been completed: [x] Yes [] No    If the office needs to give you a call back, can they leave a voicemail: [x] Yes [] No    Alva Armenta Rep   01/23/24 15:53 EST

## 2024-01-23 NOTE — TELEPHONE ENCOUNTER
Caller: Sunil Tobar    Relationship: Emergency Contact    Best call back number:  [phone]     Requested Prescriptions:   Requested Prescriptions     Pending Prescriptions Disp Refills    Insulin Pen Needle (BD Pen Needle Rasheeda U/F) 32G X 4 MM misc 90 each 10     Sig: Daily.        Pharmacy where request should be sent: [facility] [address] [place] [address] [phone] [facility] [phone] FX     Last office visit with prescribing clinician: 1/3/2024   Last telemedicine visit with prescribing clinician: Visit date not found   Next office visit with prescribing clinician: Visit date not found     Additional details provided by patient: PATIENT IS OUT. PLEASE SEND IN ASAP THEN CALL SUNIL TO ADVISE BEEN SENT.    Does the patient have less than a 3 day supply:  [x] Yes  [] No    Would you like a call back once the refill request has been completed: [] Yes [] No    If the office needs to give you a call back, can they leave a voicemail: [x] Yes [] No    Alva Armenta Rep   01/23/24 15:57 EST

## 2024-01-24 RX ORDER — PEN NEEDLE, DIABETIC 32GX 5/32"
1 NEEDLE, DISPOSABLE MISCELLANEOUS DAILY
Qty: 90 EACH | Refills: 10 | Status: SHIPPED | OUTPATIENT
Start: 2024-01-24

## 2024-01-24 RX ORDER — AMLODIPINE BESYLATE 10 MG/1
10 TABLET ORAL DAILY
Qty: 90 TABLET | Refills: 1 | Status: SHIPPED | OUTPATIENT
Start: 2024-01-24

## 2024-01-24 RX ORDER — TRAZODONE HYDROCHLORIDE 50 MG/1
TABLET ORAL
Qty: 180 TABLET | Refills: 1 | Status: SHIPPED | OUTPATIENT
Start: 2024-01-24

## 2024-01-24 RX ORDER — ALBUTEROL SULFATE 90 UG/1
2 AEROSOL, METERED RESPIRATORY (INHALATION) EVERY 4 HOURS PRN
Qty: 18 G | Refills: 2 | Status: SHIPPED | OUTPATIENT
Start: 2024-01-24

## 2024-01-24 RX ORDER — POTASSIUM CHLORIDE 750 MG/1
10 TABLET, EXTENDED RELEASE ORAL DAILY
Qty: 90 TABLET | Refills: 1 | Status: SHIPPED | OUTPATIENT
Start: 2024-01-24

## 2024-01-24 RX ORDER — LEVOCETIRIZINE DIHYDROCHLORIDE 5 MG/1
5 TABLET, FILM COATED ORAL EVERY EVENING
Qty: 90 TABLET | Refills: 1 | Status: SHIPPED | OUTPATIENT
Start: 2024-01-24

## 2024-01-24 RX ORDER — MONTELUKAST SODIUM 10 MG/1
10 TABLET ORAL EVERY EVENING
Qty: 90 TABLET | Refills: 1 | Status: SHIPPED | OUTPATIENT
Start: 2024-01-24

## 2024-01-24 RX ORDER — INSULIN GLARGINE 300 U/ML
16 INJECTION, SOLUTION SUBCUTANEOUS DAILY
Qty: 6 ML | Refills: 1 | Status: SHIPPED | OUTPATIENT
Start: 2024-01-24

## 2024-01-24 RX ORDER — SIMVASTATIN 40 MG
40 TABLET ORAL EVERY EVENING
Qty: 90 TABLET | Refills: 1 | Status: SHIPPED | OUTPATIENT
Start: 2024-01-24

## 2024-01-24 RX ORDER — TORSEMIDE 5 MG/1
5 TABLET ORAL DAILY
Qty: 90 TABLET | Refills: 1 | Status: SHIPPED | OUTPATIENT
Start: 2024-01-24

## 2024-01-24 RX ORDER — ESOMEPRAZOLE MAGNESIUM 40 MG/1
40 CAPSULE, DELAYED RELEASE ORAL 2 TIMES DAILY
Qty: 180 CAPSULE | Refills: 1 | Status: SHIPPED | OUTPATIENT
Start: 2024-01-24

## 2024-01-24 RX ORDER — NEBIVOLOL HYDROCHLORIDE 10 MG/1
10 TABLET ORAL DAILY
Qty: 90 TABLET | Refills: 1 | Status: SHIPPED | OUTPATIENT
Start: 2024-01-24

## 2024-01-24 RX ORDER — FLUTICASONE PROPIONATE 50 MCG
2 SPRAY, SUSPENSION (ML) NASAL DAILY
Qty: 16 G | Refills: 11 | Status: SHIPPED | OUTPATIENT
Start: 2024-01-24

## 2024-01-24 RX ORDER — TAMSULOSIN HYDROCHLORIDE 0.4 MG/1
1 CAPSULE ORAL DAILY
Qty: 90 CAPSULE | Refills: 1 | Status: SHIPPED | OUTPATIENT
Start: 2024-01-24

## 2024-03-07 ENCOUNTER — APPOINTMENT (OUTPATIENT)
Dept: CT IMAGING | Facility: HOSPITAL | Age: 89
DRG: 291 | End: 2024-03-07
Payer: MEDICARE

## 2024-03-07 ENCOUNTER — HOSPITAL ENCOUNTER (INPATIENT)
Facility: HOSPITAL | Age: 89
LOS: 3 days | Discharge: HOME | DRG: 291 | End: 2024-03-11
Attending: EMERGENCY MEDICINE | Admitting: INTERNAL MEDICINE
Payer: MEDICARE

## 2024-03-07 DIAGNOSIS — E87.1 HYPONATREMIA: ICD-10-CM

## 2024-03-07 DIAGNOSIS — R06.02 SHORTNESS OF BREATH: Primary | ICD-10-CM

## 2024-03-07 DIAGNOSIS — J90 PLEURAL EFFUSION, BILATERAL: ICD-10-CM

## 2024-03-07 DIAGNOSIS — I50.33 ACUTE ON CHRONIC DIASTOLIC HEART FAILURE: ICD-10-CM

## 2024-03-07 PROBLEM — I50.9 CHF (CONGESTIVE HEART FAILURE): Status: ACTIVE | Noted: 2024-03-07

## 2024-03-07 PROBLEM — N18.30 CKD (CHRONIC KIDNEY DISEASE) STAGE 3, GFR 30-59 ML/MIN: Status: ACTIVE | Noted: 2024-03-07

## 2024-03-07 PROBLEM — R91.1 LESION OF LUNG: Status: ACTIVE | Noted: 2024-03-07

## 2024-03-07 PROBLEM — D72.829 LEUKOCYTOSIS: Status: ACTIVE | Noted: 2024-03-07

## 2024-03-07 PROBLEM — E78.5 HLD (HYPERLIPIDEMIA): Status: ACTIVE | Noted: 2024-03-07

## 2024-03-07 PROBLEM — I48.91 ATRIAL FIBRILLATION: Status: ACTIVE | Noted: 2024-03-07

## 2024-03-07 LAB
ALBUMIN SERPL-MCNC: 3.6 G/DL (ref 3.5–5.2)
ALBUMIN/GLOB SERPL: 1.3 G/DL
ALP SERPL-CCNC: 55 U/L (ref 39–117)
ALT SERPL W P-5'-P-CCNC: 8 U/L (ref 1–41)
ANION GAP SERPL CALCULATED.3IONS-SCNC: 13 MMOL/L (ref 5–15)
AST SERPL-CCNC: 14 U/L (ref 1–40)
B PARAPERT DNA SPEC QL NAA+PROBE: NOT DETECTED
B PERT DNA SPEC QL NAA+PROBE: NOT DETECTED
BASOPHILS # BLD AUTO: 0.02 10*3/MM3 (ref 0–0.2)
BASOPHILS NFR BLD AUTO: 0.1 % (ref 0–1.5)
BILIRUB SERPL-MCNC: 0.7 MG/DL (ref 0–1.2)
BUN SERPL-MCNC: 25 MG/DL (ref 8–23)
BUN/CREAT SERPL: 19.4 (ref 7–25)
C PNEUM DNA NPH QL NAA+NON-PROBE: NOT DETECTED
CALCIUM SPEC-SCNC: 9 MG/DL (ref 8.6–10.5)
CHLORIDE SERPL-SCNC: 86 MMOL/L (ref 98–107)
CO2 SERPL-SCNC: 26 MMOL/L (ref 22–29)
CREAT SERPL-MCNC: 1.29 MG/DL (ref 0.76–1.27)
D-LACTATE SERPL-SCNC: 1.7 MMOL/L (ref 0.5–2)
DEPRECATED RDW RBC AUTO: 42.3 FL (ref 37–54)
EGFRCR SERPLBLD CKD-EPI 2021: 53.3 ML/MIN/1.73
EOSINOPHIL # BLD AUTO: 0.13 10*3/MM3 (ref 0–0.4)
EOSINOPHIL NFR BLD AUTO: 0.9 % (ref 0.3–6.2)
ERYTHROCYTE [DISTWIDTH] IN BLOOD BY AUTOMATED COUNT: 13.6 % (ref 12.3–15.4)
FLUAV SUBTYP SPEC NAA+PROBE: NOT DETECTED
FLUBV RNA ISLT QL NAA+PROBE: NOT DETECTED
GLOBULIN UR ELPH-MCNC: 2.8 GM/DL
GLUCOSE SERPL-MCNC: 162 MG/DL (ref 65–99)
HADV DNA SPEC NAA+PROBE: NOT DETECTED
HCOV 229E RNA SPEC QL NAA+PROBE: NOT DETECTED
HCOV HKU1 RNA SPEC QL NAA+PROBE: NOT DETECTED
HCOV NL63 RNA SPEC QL NAA+PROBE: NOT DETECTED
HCOV OC43 RNA SPEC QL NAA+PROBE: NOT DETECTED
HCT VFR BLD AUTO: 32.1 % (ref 37.5–51)
HGB BLD-MCNC: 11 G/DL (ref 13–17.7)
HMPV RNA NPH QL NAA+NON-PROBE: NOT DETECTED
HOLD SPECIMEN: NORMAL
HOLD SPECIMEN: NORMAL
HPIV1 RNA ISLT QL NAA+PROBE: NOT DETECTED
HPIV2 RNA SPEC QL NAA+PROBE: NOT DETECTED
HPIV3 RNA NPH QL NAA+PROBE: NOT DETECTED
HPIV4 P GENE NPH QL NAA+PROBE: NOT DETECTED
IMM GRANULOCYTES # BLD AUTO: 0.17 10*3/MM3 (ref 0–0.05)
IMM GRANULOCYTES NFR BLD AUTO: 1.2 % (ref 0–0.5)
LYMPHOCYTES # BLD AUTO: 1.84 10*3/MM3 (ref 0.7–3.1)
LYMPHOCYTES NFR BLD AUTO: 13.1 % (ref 19.6–45.3)
M PNEUMO IGG SER IA-ACNC: NOT DETECTED
MCH RBC QN AUTO: 29 PG (ref 26.6–33)
MCHC RBC AUTO-ENTMCNC: 34.3 G/DL (ref 31.5–35.7)
MCV RBC AUTO: 84.7 FL (ref 79–97)
MONOCYTES # BLD AUTO: 1.21 10*3/MM3 (ref 0.1–0.9)
MONOCYTES NFR BLD AUTO: 8.6 % (ref 5–12)
NEUTROPHILS NFR BLD AUTO: 10.64 10*3/MM3 (ref 1.7–7)
NEUTROPHILS NFR BLD AUTO: 76.1 % (ref 42.7–76)
NRBC BLD AUTO-RTO: 0 /100 WBC (ref 0–0.2)
NT-PROBNP SERPL-MCNC: 6418 PG/ML (ref 0–1800)
PLATELET # BLD AUTO: 241 10*3/MM3 (ref 140–450)
PMV BLD AUTO: 10.1 FL (ref 6–12)
POTASSIUM SERPL-SCNC: 3.5 MMOL/L (ref 3.5–5.2)
PROCALCITONIN SERPL-MCNC: 0.24 NG/ML (ref 0–0.25)
PROT SERPL-MCNC: 6.4 G/DL (ref 6–8.5)
RBC # BLD AUTO: 3.79 10*6/MM3 (ref 4.14–5.8)
RHINOVIRUS RNA SPEC NAA+PROBE: NOT DETECTED
RSV RNA NPH QL NAA+NON-PROBE: NOT DETECTED
SARS-COV-2 RNA NPH QL NAA+NON-PROBE: NOT DETECTED
SODIUM SERPL-SCNC: 125 MMOL/L (ref 136–145)
TROPONIN T SERPL HS-MCNC: 30 NG/L
WBC NRBC COR # BLD AUTO: 14.01 10*3/MM3 (ref 3.4–10.8)
WHOLE BLOOD HOLD COAG: NORMAL
WHOLE BLOOD HOLD SPECIMEN: NORMAL

## 2024-03-07 PROCEDURE — 84484 ASSAY OF TROPONIN QUANT: CPT | Performed by: EMERGENCY MEDICINE

## 2024-03-07 PROCEDURE — 80053 COMPREHEN METABOLIC PANEL: CPT | Performed by: EMERGENCY MEDICINE

## 2024-03-07 PROCEDURE — G0378 HOSPITAL OBSERVATION PER HR: HCPCS

## 2024-03-07 PROCEDURE — 93005 ELECTROCARDIOGRAM TRACING: CPT | Performed by: EMERGENCY MEDICINE

## 2024-03-07 PROCEDURE — 99285 EMERGENCY DEPT VISIT HI MDM: CPT

## 2024-03-07 PROCEDURE — 25010000002 FUROSEMIDE PER 20 MG: Performed by: PHYSICIAN ASSISTANT

## 2024-03-07 PROCEDURE — 0202U NFCT DS 22 TRGT SARS-COV-2: CPT | Performed by: PHYSICIAN ASSISTANT

## 2024-03-07 PROCEDURE — 84145 PROCALCITONIN (PCT): CPT | Performed by: PHYSICIAN ASSISTANT

## 2024-03-07 PROCEDURE — 93005 ELECTROCARDIOGRAM TRACING: CPT

## 2024-03-07 PROCEDURE — 85025 COMPLETE CBC W/AUTO DIFF WBC: CPT | Performed by: EMERGENCY MEDICINE

## 2024-03-07 PROCEDURE — 71275 CT ANGIOGRAPHY CHEST: CPT

## 2024-03-07 PROCEDURE — 83880 ASSAY OF NATRIURETIC PEPTIDE: CPT | Performed by: EMERGENCY MEDICINE

## 2024-03-07 PROCEDURE — 36415 COLL VENOUS BLD VENIPUNCTURE: CPT

## 2024-03-07 PROCEDURE — 99222 1ST HOSP IP/OBS MODERATE 55: CPT | Performed by: STUDENT IN AN ORGANIZED HEALTH CARE EDUCATION/TRAINING PROGRAM

## 2024-03-07 PROCEDURE — 25510000001 IOPAMIDOL PER 1 ML: Performed by: EMERGENCY MEDICINE

## 2024-03-07 PROCEDURE — 83605 ASSAY OF LACTIC ACID: CPT | Performed by: PHYSICIAN ASSISTANT

## 2024-03-07 RX ORDER — HEPARIN SODIUM 5000 [USP'U]/ML
5000 INJECTION, SOLUTION INTRAVENOUS; SUBCUTANEOUS EVERY 8 HOURS SCHEDULED
Status: DISCONTINUED | OUTPATIENT
Start: 2024-03-08 | End: 2024-03-08

## 2024-03-07 RX ORDER — SODIUM CHLORIDE 9 MG/ML
40 INJECTION, SOLUTION INTRAVENOUS AS NEEDED
Status: DISCONTINUED | OUTPATIENT
Start: 2024-03-07 | End: 2024-03-11 | Stop reason: HOSPADM

## 2024-03-07 RX ORDER — AMOXICILLIN 250 MG
2 CAPSULE ORAL 2 TIMES DAILY PRN
Status: DISCONTINUED | OUTPATIENT
Start: 2024-03-07 | End: 2024-03-11 | Stop reason: HOSPADM

## 2024-03-07 RX ORDER — NICOTINE POLACRILEX 4 MG
15 LOZENGE BUCCAL
Status: DISCONTINUED | OUTPATIENT
Start: 2024-03-07 | End: 2024-03-11 | Stop reason: HOSPADM

## 2024-03-07 RX ORDER — DEXTROSE MONOHYDRATE 25 G/50ML
25 INJECTION, SOLUTION INTRAVENOUS
Status: DISCONTINUED | OUTPATIENT
Start: 2024-03-07 | End: 2024-03-11 | Stop reason: HOSPADM

## 2024-03-07 RX ORDER — SODIUM CHLORIDE 0.9 % (FLUSH) 0.9 %
10 SYRINGE (ML) INJECTION AS NEEDED
Status: DISCONTINUED | OUTPATIENT
Start: 2024-03-07 | End: 2024-03-11 | Stop reason: HOSPADM

## 2024-03-07 RX ORDER — IPRATROPIUM BROMIDE AND ALBUTEROL SULFATE 2.5; .5 MG/3ML; MG/3ML
3 SOLUTION RESPIRATORY (INHALATION)
Status: DISCONTINUED | OUTPATIENT
Start: 2024-03-08 | End: 2024-03-11 | Stop reason: HOSPADM

## 2024-03-07 RX ORDER — CHOLECALCIFEROL (VITAMIN D3) 125 MCG
5 CAPSULE ORAL NIGHTLY PRN
Status: DISCONTINUED | OUTPATIENT
Start: 2024-03-07 | End: 2024-03-11 | Stop reason: HOSPADM

## 2024-03-07 RX ORDER — SODIUM CHLORIDE 0.9 % (FLUSH) 0.9 %
10 SYRINGE (ML) INJECTION EVERY 12 HOURS SCHEDULED
Status: DISCONTINUED | OUTPATIENT
Start: 2024-03-08 | End: 2024-03-11 | Stop reason: HOSPADM

## 2024-03-07 RX ORDER — BISACODYL 10 MG
10 SUPPOSITORY, RECTAL RECTAL DAILY PRN
Status: DISCONTINUED | OUTPATIENT
Start: 2024-03-07 | End: 2024-03-11 | Stop reason: HOSPADM

## 2024-03-07 RX ORDER — ACETAMINOPHEN 650 MG/1
650 SUPPOSITORY RECTAL EVERY 4 HOURS PRN
Status: DISCONTINUED | OUTPATIENT
Start: 2024-03-07 | End: 2024-03-11 | Stop reason: HOSPADM

## 2024-03-07 RX ORDER — ASPIRIN 81 MG/1
81 TABLET ORAL DAILY
Status: DISCONTINUED | OUTPATIENT
Start: 2024-03-08 | End: 2024-03-08

## 2024-03-07 RX ORDER — ATORVASTATIN CALCIUM 20 MG/1
20 TABLET, FILM COATED ORAL NIGHTLY
Status: DISCONTINUED | OUTPATIENT
Start: 2024-03-08 | End: 2024-03-11 | Stop reason: HOSPADM

## 2024-03-07 RX ORDER — IBUPROFEN 600 MG/1
1 TABLET ORAL
Status: DISCONTINUED | OUTPATIENT
Start: 2024-03-07 | End: 2024-03-11 | Stop reason: HOSPADM

## 2024-03-07 RX ORDER — NEBIVOLOL 10 MG/1
10 TABLET ORAL DAILY
Status: DISCONTINUED | OUTPATIENT
Start: 2024-03-08 | End: 2024-03-09

## 2024-03-07 RX ORDER — ACETAMINOPHEN 325 MG/1
650 TABLET ORAL EVERY 4 HOURS PRN
Status: DISCONTINUED | OUTPATIENT
Start: 2024-03-07 | End: 2024-03-11 | Stop reason: HOSPADM

## 2024-03-07 RX ORDER — FUROSEMIDE 10 MG/ML
80 INJECTION INTRAMUSCULAR; INTRAVENOUS ONCE
Status: COMPLETED | OUTPATIENT
Start: 2024-03-07 | End: 2024-03-07

## 2024-03-07 RX ORDER — BISACODYL 5 MG/1
5 TABLET, DELAYED RELEASE ORAL DAILY PRN
Status: DISCONTINUED | OUTPATIENT
Start: 2024-03-07 | End: 2024-03-11 | Stop reason: HOSPADM

## 2024-03-07 RX ORDER — ACETAMINOPHEN 160 MG/5ML
650 SOLUTION ORAL EVERY 4 HOURS PRN
Status: DISCONTINUED | OUTPATIENT
Start: 2024-03-07 | End: 2024-03-11 | Stop reason: HOSPADM

## 2024-03-07 RX ORDER — INSULIN LISPRO 100 [IU]/ML
2-7 INJECTION, SOLUTION INTRAVENOUS; SUBCUTANEOUS
Status: DISCONTINUED | OUTPATIENT
Start: 2024-03-08 | End: 2024-03-11 | Stop reason: HOSPADM

## 2024-03-07 RX ORDER — POLYETHYLENE GLYCOL 3350 17 G/17G
17 POWDER, FOR SOLUTION ORAL DAILY PRN
Status: DISCONTINUED | OUTPATIENT
Start: 2024-03-07 | End: 2024-03-11 | Stop reason: HOSPADM

## 2024-03-07 RX ORDER — IPRATROPIUM BROMIDE AND ALBUTEROL SULFATE 2.5; .5 MG/3ML; MG/3ML
3 SOLUTION RESPIRATORY (INHALATION) EVERY 4 HOURS PRN
Status: DISCONTINUED | OUTPATIENT
Start: 2024-03-07 | End: 2024-03-11 | Stop reason: HOSPADM

## 2024-03-07 RX ADMIN — IOPAMIDOL 75 ML: 755 INJECTION, SOLUTION INTRAVENOUS at 21:30

## 2024-03-07 RX ADMIN — ATORVASTATIN CALCIUM 20 MG: 20 TABLET, FILM COATED ORAL at 23:59

## 2024-03-07 RX ADMIN — FUROSEMIDE 80 MG: 10 INJECTION, SOLUTION INTRAMUSCULAR; INTRAVENOUS at 22:48

## 2024-03-07 NOTE — Clinical Note
Level of Care: Telemetry [5]   Diagnosis: CHF (congestive heart failure) [197293]   Admitting Physician: PING SANTOYO [[id number]]   Attending Physician: PING SANTOYO [[id number]]

## 2024-03-08 ENCOUNTER — APPOINTMENT (OUTPATIENT)
Dept: CARDIOLOGY | Facility: HOSPITAL | Age: 89
DRG: 291 | End: 2024-03-08
Payer: MEDICARE

## 2024-03-08 PROBLEM — I50.9 CHF (CONGESTIVE HEART FAILURE): Status: ACTIVE | Noted: 2024-03-08

## 2024-03-08 PROBLEM — I50.33 ACUTE ON CHRONIC DIASTOLIC CONGESTIVE HEART FAILURE: Status: ACTIVE | Noted: 2024-03-07

## 2024-03-08 PROBLEM — R55 NEAR SYNCOPE: Status: RESOLVED | Noted: 2023-05-26 | Resolved: 2024-03-08

## 2024-03-08 PROBLEM — G47.00 INSOMNIA: Status: RESOLVED | Noted: 2022-11-16 | Resolved: 2024-03-08

## 2024-03-08 PROBLEM — I38 VALVULAR HEART DISEASE: Status: ACTIVE | Noted: 2024-03-08

## 2024-03-08 PROBLEM — J47.9 BRONCHIECTASIS WITHOUT COMPLICATION: Status: RESOLVED | Noted: 2018-02-06 | Resolved: 2024-03-08

## 2024-03-08 PROBLEM — I27.20 PULMONARY HYPERTENSION: Status: ACTIVE | Noted: 2024-03-08

## 2024-03-08 PROBLEM — D64.9 NORMOCYTIC ANEMIA: Status: RESOLVED | Noted: 2017-03-29 | Resolved: 2024-03-08

## 2024-03-08 LAB
ANION GAP SERPL CALCULATED.3IONS-SCNC: 12 MMOL/L (ref 5–15)
BASOPHILS # BLD AUTO: 0.02 10*3/MM3 (ref 0–0.2)
BASOPHILS NFR BLD AUTO: 0.2 % (ref 0–1.5)
BILIRUB UR QL STRIP: NEGATIVE
BUN SERPL-MCNC: 25 MG/DL (ref 8–23)
BUN/CREAT SERPL: 20.5 (ref 7–25)
CALCIUM SPEC-SCNC: 8.6 MG/DL (ref 8.6–10.5)
CHLORIDE SERPL-SCNC: 85 MMOL/L (ref 98–107)
CLARITY UR: CLEAR
CO2 SERPL-SCNC: 28 MMOL/L (ref 22–29)
COLOR UR: YELLOW
CREAT SERPL-MCNC: 1.22 MG/DL (ref 0.76–1.27)
CREAT UR-MCNC: 15.9 MG/DL
DEPRECATED RDW RBC AUTO: 41.3 FL (ref 37–54)
EGFRCR SERPLBLD CKD-EPI 2021: 57 ML/MIN/1.73
EOSINOPHIL # BLD AUTO: 0.13 10*3/MM3 (ref 0–0.4)
EOSINOPHIL NFR BLD AUTO: 1.1 % (ref 0.3–6.2)
ERYTHROCYTE [DISTWIDTH] IN BLOOD BY AUTOMATED COUNT: 13.6 % (ref 12.3–15.4)
GEN 5 2HR TROPONIN T REFLEX: 27 NG/L
GLUCOSE BLDC GLUCOMTR-MCNC: 127 MG/DL (ref 70–130)
GLUCOSE BLDC GLUCOMTR-MCNC: 149 MG/DL (ref 70–130)
GLUCOSE BLDC GLUCOMTR-MCNC: 194 MG/DL (ref 70–130)
GLUCOSE BLDC GLUCOMTR-MCNC: 232 MG/DL (ref 70–130)
GLUCOSE SERPL-MCNC: 139 MG/DL (ref 65–99)
GLUCOSE UR STRIP-MCNC: ABNORMAL MG/DL
HBA1C MFR BLD: 6.4 % (ref 4.8–5.6)
HCT VFR BLD AUTO: 30.6 % (ref 37.5–51)
HGB BLD-MCNC: 10.4 G/DL (ref 13–17.7)
HGB UR QL STRIP.AUTO: NEGATIVE
HOLD SPECIMEN: NORMAL
IMM GRANULOCYTES # BLD AUTO: 0.13 10*3/MM3 (ref 0–0.05)
IMM GRANULOCYTES NFR BLD AUTO: 1.1 % (ref 0–0.5)
KETONES UR QL STRIP: NEGATIVE
LEUKOCYTE ESTERASE UR QL STRIP.AUTO: NEGATIVE
LYMPHOCYTES # BLD AUTO: 1.73 10*3/MM3 (ref 0.7–3.1)
LYMPHOCYTES NFR BLD AUTO: 15.1 % (ref 19.6–45.3)
MAGNESIUM SERPL-MCNC: 2.2 MG/DL (ref 1.6–2.4)
MCH RBC QN AUTO: 28.3 PG (ref 26.6–33)
MCHC RBC AUTO-ENTMCNC: 34 G/DL (ref 31.5–35.7)
MCV RBC AUTO: 83.4 FL (ref 79–97)
MONOCYTES # BLD AUTO: 1.04 10*3/MM3 (ref 0.1–0.9)
MONOCYTES NFR BLD AUTO: 9.1 % (ref 5–12)
NEUTROPHILS NFR BLD AUTO: 73.4 % (ref 42.7–76)
NEUTROPHILS NFR BLD AUTO: 8.42 10*3/MM3 (ref 1.7–7)
NITRITE UR QL STRIP: NEGATIVE
NRBC BLD AUTO-RTO: 0 /100 WBC (ref 0–0.2)
OSMOLALITY UR: 226 MOSM/KG
OSMOLALITY UR: 231 MOSM/KG
PH UR STRIP.AUTO: 6 [PH] (ref 5–8)
PLATELET # BLD AUTO: 234 10*3/MM3 (ref 140–450)
PMV BLD AUTO: 10.1 FL (ref 6–12)
POTASSIUM SERPL-SCNC: 3.2 MMOL/L (ref 3.5–5.2)
PROT UR QL STRIP: NEGATIVE
QT INTERVAL: 390 MS
QT INTERVAL: 412 MS
QTC INTERVAL: 451 MS
QTC INTERVAL: 466 MS
RBC # BLD AUTO: 3.67 10*6/MM3 (ref 4.14–5.8)
SODIUM SERPL-SCNC: 124 MMOL/L (ref 136–145)
SODIUM SERPL-SCNC: 125 MMOL/L (ref 136–145)
SODIUM SERPL-SCNC: 126 MMOL/L (ref 136–145)
SODIUM UR-SCNC: 66 MMOL/L
SP GR UR STRIP: 1.01 (ref 1–1.03)
TROPONIN T DELTA: 0 NG/L
TROPONIN T SERPL HS-MCNC: 27 NG/L
UROBILINOGEN UR QL STRIP: ABNORMAL
WBC NRBC COR # BLD AUTO: 11.47 10*3/MM3 (ref 3.4–10.8)

## 2024-03-08 PROCEDURE — 80048 BASIC METABOLIC PNL TOTAL CA: CPT | Performed by: NURSE PRACTITIONER

## 2024-03-08 PROCEDURE — 25010000002 FUROSEMIDE PER 20 MG: Performed by: FAMILY MEDICINE

## 2024-03-08 PROCEDURE — 82948 REAGENT STRIP/BLOOD GLUCOSE: CPT

## 2024-03-08 PROCEDURE — 94640 AIRWAY INHALATION TREATMENT: CPT

## 2024-03-08 PROCEDURE — 81003 URINALYSIS AUTO W/O SCOPE: CPT | Performed by: NURSE PRACTITIONER

## 2024-03-08 PROCEDURE — 99222 1ST HOSP IP/OBS MODERATE 55: CPT | Performed by: NURSE PRACTITIONER

## 2024-03-08 PROCEDURE — 83935 ASSAY OF URINE OSMOLALITY: CPT | Performed by: NURSE PRACTITIONER

## 2024-03-08 PROCEDURE — 94799 UNLISTED PULMONARY SVC/PX: CPT

## 2024-03-08 PROCEDURE — 83935 ASSAY OF URINE OSMOLALITY: CPT | Performed by: STUDENT IN AN ORGANIZED HEALTH CARE EDUCATION/TRAINING PROGRAM

## 2024-03-08 PROCEDURE — 93005 ELECTROCARDIOGRAM TRACING: CPT | Performed by: NURSE PRACTITIONER

## 2024-03-08 PROCEDURE — 63710000001 INSULIN LISPRO (HUMAN) PER 5 UNITS: Performed by: NURSE PRACTITIONER

## 2024-03-08 PROCEDURE — 83036 HEMOGLOBIN GLYCOSYLATED A1C: CPT | Performed by: NURSE PRACTITIONER

## 2024-03-08 PROCEDURE — 97165 OT EVAL LOW COMPLEX 30 MIN: CPT

## 2024-03-08 PROCEDURE — 84484 ASSAY OF TROPONIN QUANT: CPT | Performed by: NURSE PRACTITIONER

## 2024-03-08 PROCEDURE — 84295 ASSAY OF SERUM SODIUM: CPT | Performed by: NURSE PRACTITIONER

## 2024-03-08 PROCEDURE — 82570 ASSAY OF URINE CREATININE: CPT | Performed by: NURSE PRACTITIONER

## 2024-03-08 PROCEDURE — 99233 SBSQ HOSP IP/OBS HIGH 50: CPT | Performed by: FAMILY MEDICINE

## 2024-03-08 PROCEDURE — 85025 COMPLETE CBC W/AUTO DIFF WBC: CPT | Performed by: NURSE PRACTITIONER

## 2024-03-08 PROCEDURE — 83735 ASSAY OF MAGNESIUM: CPT | Performed by: NURSE PRACTITIONER

## 2024-03-08 PROCEDURE — 84295 ASSAY OF SERUM SODIUM: CPT | Performed by: FAMILY MEDICINE

## 2024-03-08 PROCEDURE — 84300 ASSAY OF URINE SODIUM: CPT | Performed by: NURSE PRACTITIONER

## 2024-03-08 PROCEDURE — 93306 TTE W/DOPPLER COMPLETE: CPT

## 2024-03-08 PROCEDURE — 93010 ELECTROCARDIOGRAM REPORT: CPT | Performed by: INTERNAL MEDICINE

## 2024-03-08 PROCEDURE — 25010000002 HEPARIN (PORCINE) PER 1000 UNITS: Performed by: NURSE PRACTITIONER

## 2024-03-08 PROCEDURE — 97161 PT EVAL LOW COMPLEX 20 MIN: CPT

## 2024-03-08 PROCEDURE — 93306 TTE W/DOPPLER COMPLETE: CPT | Performed by: INTERNAL MEDICINE

## 2024-03-08 RX ORDER — METOPROLOL SUCCINATE 25 MG/1
25 TABLET, EXTENDED RELEASE ORAL DAILY
COMMUNITY
End: 2024-03-11 | Stop reason: HOSPADM

## 2024-03-08 RX ORDER — PANTOPRAZOLE SODIUM 40 MG/1
40 TABLET, DELAYED RELEASE ORAL
Status: DISCONTINUED | OUTPATIENT
Start: 2024-03-08 | End: 2024-03-09

## 2024-03-08 RX ORDER — FUROSEMIDE 10 MG/ML
80 INJECTION INTRAMUSCULAR; INTRAVENOUS EVERY 12 HOURS
Status: DISCONTINUED | OUTPATIENT
Start: 2024-03-08 | End: 2024-03-08

## 2024-03-08 RX ORDER — BUDESONIDE 0.5 MG/2ML
0.5 INHALANT ORAL
Status: DISCONTINUED | OUTPATIENT
Start: 2024-03-08 | End: 2024-03-11 | Stop reason: HOSPADM

## 2024-03-08 RX ORDER — FUROSEMIDE 10 MG/ML
40 INJECTION INTRAMUSCULAR; INTRAVENOUS EVERY 12 HOURS
Status: DISCONTINUED | OUTPATIENT
Start: 2024-03-08 | End: 2024-03-09

## 2024-03-08 RX ADMIN — Medication 10 ML: at 08:57

## 2024-03-08 RX ADMIN — IPRATROPIUM BROMIDE AND ALBUTEROL SULFATE 3 ML: 2.5; .5 SOLUTION RESPIRATORY (INHALATION) at 07:44

## 2024-03-08 RX ADMIN — HEPARIN SODIUM 5000 UNITS: 5000 INJECTION INTRAVENOUS; SUBCUTANEOUS at 00:00

## 2024-03-08 RX ADMIN — IPRATROPIUM BROMIDE AND ALBUTEROL SULFATE 3 ML: 2.5; .5 SOLUTION RESPIRATORY (INHALATION) at 11:00

## 2024-03-08 RX ADMIN — APIXABAN 5 MG: 5 TABLET, FILM COATED ORAL at 12:04

## 2024-03-08 RX ADMIN — HEPARIN SODIUM 5000 UNITS: 5000 INJECTION INTRAVENOUS; SUBCUTANEOUS at 10:36

## 2024-03-08 RX ADMIN — Medication 10 ML: at 20:17

## 2024-03-08 RX ADMIN — PANTOPRAZOLE SODIUM 40 MG: 40 TABLET, DELAYED RELEASE ORAL at 17:34

## 2024-03-08 RX ADMIN — FUROSEMIDE 40 MG: 10 INJECTION, SOLUTION INTRAMUSCULAR; INTRAVENOUS at 08:57

## 2024-03-08 RX ADMIN — FUROSEMIDE 40 MG: 10 INJECTION, SOLUTION INTRAMUSCULAR; INTRAVENOUS at 20:15

## 2024-03-08 RX ADMIN — Medication 10 ML: at 00:00

## 2024-03-08 RX ADMIN — INSULIN LISPRO 3 UNITS: 100 INJECTION, SOLUTION INTRAVENOUS; SUBCUTANEOUS at 20:15

## 2024-03-08 RX ADMIN — IPRATROPIUM BROMIDE AND ALBUTEROL SULFATE 3 ML: 2.5; .5 SOLUTION RESPIRATORY (INHALATION) at 19:28

## 2024-03-08 RX ADMIN — PANTOPRAZOLE SODIUM 40 MG: 40 TABLET, DELAYED RELEASE ORAL at 12:04

## 2024-03-08 RX ADMIN — INSULIN LISPRO 2 UNITS: 100 INJECTION, SOLUTION INTRAVENOUS; SUBCUTANEOUS at 12:02

## 2024-03-08 RX ADMIN — ATORVASTATIN CALCIUM 20 MG: 20 TABLET, FILM COATED ORAL at 20:17

## 2024-03-08 RX ADMIN — BUDESONIDE 0.5 MG: 0.5 SUSPENSION RESPIRATORY (INHALATION) at 13:19

## 2024-03-08 RX ADMIN — APIXABAN 5 MG: 5 TABLET, FILM COATED ORAL at 20:15

## 2024-03-08 RX ADMIN — NEBIVOLOL 10 MG: 10 TABLET ORAL at 08:54

## 2024-03-08 RX ADMIN — Medication 5 MG: at 20:15

## 2024-03-08 RX ADMIN — IPRATROPIUM BROMIDE AND ALBUTEROL SULFATE 3 ML: 2.5; .5 SOLUTION RESPIRATORY (INHALATION) at 15:37

## 2024-03-08 RX ADMIN — ASPIRIN 81 MG: 81 TABLET, COATED ORAL at 08:54

## 2024-03-08 RX ADMIN — BUDESONIDE 0.5 MG: 0.5 SUSPENSION RESPIRATORY (INHALATION) at 19:28

## 2024-03-08 NOTE — PLAN OF CARE
Goal Outcome Evaluation:  Plan of Care Reviewed With: patient, daughter           Outcome Evaluation: Pt presents near functional baseline for all ADL tasks. No further IP OT services warranted. Recommend d/c home when appropriate.      Anticipated Discharge Disposition (OT): home

## 2024-03-08 NOTE — ED PROVIDER NOTES
EMERGENCY DEPARTMENT ENCOUNTER    Pt Name: Ricki Trotter  MRN: [id number]  Pt :   1935  Room Number:  10/10  Date of encounter:  3/7/2024  PCP: Stacey Hanley APRN  ED Provider: MAURICIO Russell    Historian: Patient    HPI:  Chief Complaint: Shortness of breath    Context: Ricki Trotter is a 88 y.o. male who presents to the ED c/o shortness of breath. Patient with recent admission to [facility] where he presented for shortness of breath. Family reports that he has history of aspiration and they thought he may have had pneumonia and was treated with antibiotics. He was discharged yesterday and continued to be short of breath. Seen in outpatient clinic today with Low O2, reported 86% on room air and sent here. Patient reports that he feels worse than when he was in the hospital. He reports no fever. He has been experiencing a cough and shortness of breath. He notes that his symptoms seem to be worse when he eats. Family reports a known hiatal hernia and history of GERD.   HPI     REVIEW OF SYSTEMS  A chief complaint appropriate review of systems was completed and is negative except as noted in the HPI.     PAST MEDICAL HISTORY  Past Medical History:   Diagnosis Date    Allergic rhinitis     Allergies     Amnesia     Basal cell carcinoma     Benign prostatic hyperplasia     Chronic kidney disease, stage 3b     CKD (chronic kidney disease)     COPD (chronic obstructive pulmonary disease)     Diabetes mellitus     Drug dependency     Dyslipidemia     Essential hypertension     Gastroesophageal reflux     Hyperlipidemia     Hypertension     Hyperthyroidism     Insulin-treated type 2 diabetes mellitus     Iron deficiency     Long term (current) use of insulin     Melanoma     Mixed hyperlipidemia     Nephrosclerosis        PAST SURGICAL HISTORY  Past Surgical History:   Procedure Laterality Date    CATARACT EXTRACTION Bilateral     COLONOSCOPY      PROSTHODONTIC  PROCEDURE         FAMILY HISTORY  Family History   Problem Relation Age of Onset    Diabetes Mother     Stroke Mother     Breast cancer Sister        SOCIAL HISTORY  Social History     Socioeconomic History    Marital status:    Tobacco Use    Smoking status: Former     Current packs/day: 0.00     Average packs/day: 0.5 packs/day for 9.0 years (4.5 ttl pk-yrs)     Types: Cigarettes     Start date:      Quit date:      Years since quittin.2     Passive exposure: Past    Smokeless tobacco: Never   Vaping Use    Vaping status: Never Used   Substance and Sexual Activity    Alcohol use: Yes     Alcohol/week: 2.0 standard drinks of alcohol     Types: 2 Cans of beer per week    Drug use: No    Sexual activity: Defer       ALLERGIES  Penicillins and Sulfa antibiotics    PHYSICAL EXAM  Physical Exam  Vitals and nursing note reviewed.   Constitutional:       General: He is not in acute distress.     Appearance: Normal appearance. He is not ill-appearing or toxic-appearing.   HENT:      Head: Normocephalic and atraumatic.      Nose: Nose normal.      Mouth/Throat:      Mouth: Mucous membranes are moist.   Eyes:      Extraocular Movements: Extraocular movements intact.   Cardiovascular:      Rate and Rhythm: Normal rate. Rhythm irregular.   Pulmonary:      Effort: Pulmonary effort is normal.      Breath sounds: Normal breath sounds.   Chest:      Chest wall: No tenderness.   Abdominal:      General: There is no distension.      Palpations: Abdomen is soft.   Musculoskeletal:         General: Normal range of motion.      Cervical back: Normal range of motion and neck supple.      Right lower leg: No edema.      Left lower leg: No edema.   Skin:     General: Skin is warm and dry.   Neurological:      General: No focal deficit present.      Mental Status: He is alert.   Psychiatric:         Mood and Affect: Mood normal.         Behavior: Behavior normal.       LAB RESULTS  Results for orders placed or performed  during the hospital encounter of 03/07/24   Respiratory Panel PCR w/COVID-19(SARS-CoV-2) SEBAS/KOKI/PRISCILLA/PAD/COR/AMY In-House, NP Swab in UTM/VTM, 2 HR TAT - Swab, Nasopharynx    Specimen: Nasopharynx; Swab   Result Value Ref Range    ADENOVIRUS, PCR Not Detected Not Detected    Coronavirus 229E Not Detected Not Detected    Coronavirus HKU1 Not Detected Not Detected    Coronavirus NL63 Not Detected Not Detected    Coronavirus OC43 Not Detected Not Detected    COVID19 Not Detected Not Detected - Ref. Range    Human Metapneumovirus Not Detected Not Detected    Human Rhinovirus/Enterovirus Not Detected Not Detected    Influenza A PCR Not Detected Not Detected    Influenza B PCR Not Detected Not Detected    Parainfluenza Virus 1 Not Detected Not Detected    Parainfluenza Virus 2 Not Detected Not Detected    Parainfluenza Virus 3 Not Detected Not Detected    Parainfluenza Virus 4 Not Detected Not Detected    RSV, PCR Not Detected Not Detected    Bordetella pertussis pcr Not Detected Not Detected    Bordetella parapertussis PCR Not Detected Not Detected    Chlamydophila pneumoniae PCR Not Detected Not Detected    Mycoplasma pneumo by PCR Not Detected Not Detected   Comprehensive Metabolic Panel    Specimen: Blood   Result Value Ref Range    Glucose 162 (H) 65 - 99 mg/dL    BUN 25 (H) 8 - 23 mg/dL    Creatinine 1.29 (H) 0.76 - 1.27 mg/dL    Sodium 125 (L) 136 - 145 mmol/L    Potassium 3.5 3.5 - 5.2 mmol/L    Chloride 86 (L) 98 - 107 mmol/L    CO2 26.0 22.0 - 29.0 mmol/L    Calcium 9.0 8.6 - 10.5 mg/dL    Total Protein 6.4 6.0 - 8.5 g/dL    Albumin 3.6 3.5 - 5.2 g/dL    ALT (SGPT) 8 1 - 41 U/L    AST (SGOT) 14 1 - 40 U/L    Alkaline Phosphatase 55 39 - 117 U/L    Total Bilirubin 0.7 0.0 - 1.2 mg/dL    Globulin 2.8 gm/dL    A/G Ratio 1.3 g/dL    BUN/Creatinine Ratio 19.4 7.0 - 25.0    Anion Gap 13.0 5.0 - 15.0 mmol/L    eGFR 53.3 (L) >60.0 mL/min/1.73   BNP    Specimen: Blood   Result Value Ref Range    proBNP 6,418.0 (H) 0.0 -  1,800.0 pg/mL   Single High Sensitivity Troponin T    Specimen: Blood   Result Value Ref Range    HS Troponin T 30 (H) <22 ng/L   CBC Auto Differential    Specimen: Blood   Result Value Ref Range    WBC 14.01 (H) 3.40 - 10.80 10*3/mm3    RBC 3.79 (L) 4.14 - 5.80 10*6/mm3    Hemoglobin 11.0 (L) 13.0 - 17.7 g/dL    Hematocrit 32.1 (L) 37.5 - 51.0 %    MCV 84.7 79.0 - 97.0 fL    MCH 29.0 26.6 - 33.0 pg    MCHC 34.3 31.5 - 35.7 g/dL    RDW 13.6 12.3 - 15.4 %    RDW-SD 42.3 37.0 - 54.0 fl    MPV 10.1 6.0 - 12.0 fL    Platelets 241 140 - 450 10*3/mm3    Neutrophil % 76.1 (H) 42.7 - 76.0 %    Lymphocyte % 13.1 (L) 19.6 - 45.3 %    Monocyte % 8.6 5.0 - 12.0 %    Eosinophil % 0.9 0.3 - 6.2 %    Basophil % 0.1 0.0 - 1.5 %    Immature Grans % 1.2 (H) 0.0 - 0.5 %    Neutrophils, Absolute 10.64 (H) 1.70 - 7.00 10*3/mm3    Lymphocytes, Absolute 1.84 0.70 - 3.10 10*3/mm3    Monocytes, Absolute 1.21 (H) 0.10 - 0.90 10*3/mm3    Eosinophils, Absolute 0.13 0.00 - 0.40 10*3/mm3    Basophils, Absolute 0.02 0.00 - 0.20 10*3/mm3    Immature Grans, Absolute 0.17 (H) 0.00 - 0.05 10*3/mm3    nRBC 0.0 0.0 - 0.2 /100 WBC   Lactic Acid, Plasma    Specimen: Blood   Result Value Ref Range    Lactate 1.7 0.5 - 2.0 mmol/L   Procalcitonin    Specimen: Blood   Result Value Ref Range    Procalcitonin 0.24 0.00 - 0.25 ng/mL   ECG 12 Lead ED Triage Standing Order; SOA   Result Value Ref Range    QT Interval 390 ms    QTC Interval 466 ms   Green Top (Gel)   Result Value Ref Range    Extra Tube Hold for add-ons.    Lavender Top   Result Value Ref Range    Extra Tube hold for add-on    Gold Top - SST   Result Value Ref Range    Extra Tube Hold for add-ons.    Light Blue Top   Result Value Ref Range    Extra Tube Hold for add-ons.        If labs were ordered, I independently reviewed the results and considered them in treating the patient.    RADIOLOGY  CT Angiogram Chest   Final Result   No pulmonary embolus. Moderate right and small left pleural  effusions. 3.8 cm x 3 cm rounded lesion overlying the left pleural effusion favored to represent an area of rounded atelectasis. Consider PET/CT and/or biopsy.      Electronically Signed: Lukasz Ramirez MD     3/7/2024 9:36 PM EST     Workstation ID: [id number]        [x] Radiologist's Report Reviewed:  I ordered and independently interpreted the above noted radiographic studies.  See radiologist's dictation for official interpretation.      PROCEDURES    Procedures    ECG 12 Lead ED Triage Standing Order; SOA   Preliminary Result   Test Reason : ED Triage Standing Order~   Blood Pressure :   */*   mmHG   Vent. Rate :  86 BPM     Atrial Rate :   * BPM      P-R Int :   * ms          QRS Dur : 106 ms       QT Int : 390 ms       P-R-T Axes :   *   8  54 degrees      QTc Int : 466 ms      Atrial fibrillation with premature ventricular or aberrantly conducted    complexes   Minimal voltage criteria for LVH, may be normal variant ( [place] product    )   Abnormal ECG   When compared with ECG of 29-JUN-2020 15:39,   Previous ECG has undetermined rhythm, needs review      Referred By:            Confirmed By:           MEDICATIONS GIVEN IN ER    Medications   sodium chloride 0.9 % flush 10 mL (has no administration in time range)   iopamidol (ISOVUE-370) 76 % injection 100 mL (75 mL Intravenous Given 3/7/24 2130)   furosemide (LASIX) injection 80 mg (80 mg Intravenous Given 3/7/24 2248)       MEDICAL DECISION MAKING, PROGRESS, and CONSULTS   Medical Decision Making  Problems Addressed:  Shortness of breath: complicated acute illness or injury    Amount and/or Complexity of Data Reviewed  Labs: ordered.  Radiology: ordered.  ECG/medicine tests: ordered.    Risk  Prescription drug management.        All labs have been independently reviewed by me.  All radiology studies have been interpreted by me and the radiologist dictating the report.  All EKG's have been independently interpreted by me as well as and overseeing attending  physician.    [] Discussed with radiology regarding test interpretation:    Discussion below represents my analysis of pertinent findings related to patient's condition, differential diagnosis, treatment plan and final disposition.    Differential diagnosis:  The differential diagnosis associated with the patient's presentation includes: ACS, CHF, PE, pneumothorax , COPD exacerbation, infectious etiologies such as PNA.    Additional sources  Discussed/ obtained information from independent historians:   [] Spouse  [] Parent  [x] Family member  [] Friend  [] EMS   [] Other:  External (non-ED) record review:   [] Inpatient record:   [x] Office record: Cardiology record reviewed from January 4, 2024: Patient with acute on chronic diastolic heart failure; Echo, 6/28/2023: EF 51-55%, mild AS, max PG 28, mean PG 14, moderate MR, moderate-severe TR, RVSP 60 mmHg    [] Outpatient record:   [] Prior Outpatient labs:   [] Prior Outpatient radiology:   [] Primary Care record:   [] Outside ED record:   [] Other:   Patient's care impacted by:   [x] Diabetes  [x] Hypertension  [x] Hyperlipidemia  [] Hypothyroidism   [] Coronary Artery Disease   [] COPD   [] Cancer   [] Obesity  [x] GERD   [] Tobacco Abuse   [] Substance Abuse    [] Anxiety   [] Depression   [x] Other: History of anemia, 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 Based on findings, recommendation made for  admission. Patient is agreeable to plan of care and hospital admission.      Orders placed during this visit:  Orders Placed This Encounter   Procedures    COVID PRE-OP / PRE-PROCEDURE SCREENING ORDER (NO ISOLATION) - Swab, Nasopharynx    Respiratory Panel PCR w/COVID-19(SARS-CoV-2) SEBAS/KOKI/PRISCILLA/PAD/COR/AMY In-House, NP Swab in UTM/VTM, 2 HR TAT - Swab, Nasopharynx    CT Angiogram Chest    [place] Draw    Comprehensive Metabolic Panel    BNP    Single High Sensitivity Troponin T    CBC Auto Differential    Lactic Acid, Plasma    Procalcitonin    NPO Diet NPO Type: Strict NPO    Undress & Gown    Continuous Pulse Oximetry    Vital Signs    Code Status and Medical Interventions:    Oxygen Therapy- Nasal Cannula; Titrate 1-6 LPM Per SpO2; 90 - 95%    ECG 12 Lead ED Triage Standing Order; SOA    Insert Peripheral IV    Initiate Observation Status    ED Bed Request    CBC & Differential    Green Top (Gel)    Lavender Top    Gold Top - SST    Gray Top    Light Blue Top     ED Course:    ED Course as of 03/07/24 2326   u Mar 07, 2024   1947 Initial vitals and Telemetry tracing was reviewed and directly interpreted by myself demonstrating blood pressure 144/70, afebrile, heart rate 84, respirations 16 breaths/min maintaining oxygen saturation 96% on room air.  [JG]   2159 Labs studies were reviewed and directly interpreted by myself and demonstrated leukocytosis WBC 14.01, high-sensitivity troponin 30, proBNP elevated at 6418, sodium 125;  remainder of labs consistent with prior results [JG]   2208 CTA chest personally interpreted by myself and with official read provided by radiology demonstrates No pulmonary embolus. Moderate right and small left pleural effusions. 3.8 cm x 3 cm rounded lesion overlying the left pleural effusion favored to represent an area of rounded atelectasis. [JG]   2221 Hospitalist messaged for admission [JG]   2240 Hospitalist agreeable to accept patient for admission [JG]   2323 In summary  patient presents to ED with shortness of breath with recent admission to [facility] where he presented for shortness of breath. Family reports that he has history of aspiration and they thought he may have had pneumonia and was treated with antibiotics. He was discharged yesterday and continued to be short of breath. Seen in outpatient clinic today with Low O2, reported 86% on room air and sent here. He has not been hypoxic in ED with O2 sats >90% on room air. Patient received steroid in one of his visits likely responsible for his leukocytosis. Sodium 125. Also evidence of heart failure with elevated BNP and evidence on CTA of chest of bilateral pleural effusions. He follows with Cardiology here with last echo 6/28/2023: EF 51-55%. One dose of diuretics in ED. Admitted to hospital medicine for further evaluation and treatment of  acute on chronic diastolic heart failure, bilateral pleural effusions and hyponatremia. [JG]      ED Course User Index  [JG] Tuan Anguiano PA            DIAGNOSIS  Final diagnoses:   Shortness of breath   Acute on chronic diastolic heart failure   Hyponatremia   Pleural effusion, bilateral       DISPOSITION    ED Disposition       ED Disposition   Decision to Admit    Condition   --    Comment   Level of Care: Telemetry [5]   Diagnosis: CHF (congestive heart failure) [912292]   Admitting Physician: PING SANTOYO [[id number]]   Attending Physician: PING SANTOYO [[id number]]                 Please note that portions of this document were completed with voice recognition software.        Tuan Anguiano PA  03/07/24 [id number]

## 2024-03-08 NOTE — ED NOTES
Ricki Trotter    Nursing Report ED to Floor:  Mental status: oriented x 4  Ambulatory status: standby assist  Oxygen Therapy:  room air  Cardiac Rhythm: a fib with frequent pvs  Admitted from: home  Safety Concerns:  fall risk  Social Issues: none  ED Room #:  10    ED Nurse Phone Extension - [phone] or may call [phone].      HPI:   Chief Complaint   Patient presents with    Shortness of Breath       Past Medical History:  Past Medical History:   Diagnosis Date    Allergic rhinitis     Allergies     Amnesia     Basal cell carcinoma 2015    Benign prostatic hyperplasia     Chronic kidney disease, stage 3b     CKD (chronic kidney disease)     COPD (chronic obstructive pulmonary disease)     Diabetes mellitus     Drug dependency     Dyslipidemia     Essential hypertension     Gastroesophageal reflux     Hyperlipidemia     Hypertension     Hyperthyroidism     Insulin-treated type 2 diabetes mellitus     Iron deficiency     Long term (current) use of insulin     Melanoma 2012    Mixed hyperlipidemia     Nephrosclerosis         Past Surgical History:  Past Surgical History:   Procedure Laterality Date    CATARACT EXTRACTION Bilateral     COLONOSCOPY      PROSTHODONTIC PROCEDURE          Admitting Doctor:   Paresh Varma MD    Consulting Provider(s):  Consults       No orders found from 2/7/2024 to 3/8/2024.             Admitting Diagnosis:   The primary encounter diagnosis was Shortness of breath. Diagnoses of Acute on chronic diastolic heart failure, Hyponatremia, and Pleural effusion, bilateral were also pertinent to this visit.    Most Recent Vitals:   Vitals:    03/07/24 2102 03/07/24 2213 03/07/24 2230 03/07/24 2231   BP: 150/66  135/68    BP Location:       Patient Position:       Pulse: 84 78  73   Resp:       Temp:       TempSrc:       SpO2: 92% 93%     Weight:       Height:           Active LDAs/IV Access:   Lines, Drains & Airways       Active LDAs       Name Placement date Placement time Site Days     Peripheral IV 03/07/24 2059 Right Antecubital 03/07/24 2059  Antecubital  less than 1                    Labs (abnormal labs have a star):   Labs Reviewed   COMPREHENSIVE METABOLIC PANEL - Abnormal; Notable for the following components:       Result Value    Glucose 162 (*)     BUN 25 (*)     Creatinine 1.29 (*)     Sodium 125 (*)     Chloride 86 (*)     eGFR 53.3 (*)     All other components within normal limits    Narrative:     GFR Normal >60  Chronic Kidney Disease <60  Kidney Failure <15    The GFR formula is only valid for adults with stable renal function between ages 18 and 70.   BNP (IN-HOUSE) - Abnormal; Notable for the following components:    proBNP 6,418.0 (*)     All other components within normal limits    Narrative:     This assay is used as an aid in the diagnosis of individuals suspected of having heart failure. It can be used as an aid in the diagnosis of acute decompensated heart failure (ADHF) in patients presenting with signs and symptoms of ADHF to the emergency department (ED). In addition, NT-proBNP of <300 pg/mL indicates ADHF is not likely.    Age Range Result Interpretation  NT-proBNP Concentration (pg/mL:      <50             Positive            >450                   Gray                 300-450                    Negative             <300    50-75           Positive            >900                  Gray                300-900                  Negative            <300      >75             Positive            >1800                  Gray                300-1800                  Negative            <300   SINGLE HSTROPONIN T - Abnormal; Notable for the following components:    HS Troponin T 30 (*)     All other components within normal limits    Narrative:     High Sensitive Troponin T Reference Range:  <14.0 ng/L- Negative Female for AMI  <22.0 ng/L- Negative Male for AMI  >=14 - Abnormal Female indicating possible myocardial injury.  >=22 - Abnormal Male indicating possible myocardial  "injury.   Clinicians would have to utilize clinical acumen, EKG, Troponin, and serial changes to determine if it is an Acute Myocardial Infarction or myocardial injury due to an underlying chronic condition.        CBC WITH AUTO DIFFERENTIAL - Abnormal; Notable for the following components:    WBC 14.01 (*)     RBC 3.79 (*)     Hemoglobin 11.0 (*)     Hematocrit 32.1 (*)     Neutrophil % 76.1 (*)     Lymphocyte % 13.1 (*)     Immature Grans % 1.2 (*)     Neutrophils, Absolute 10.64 (*)     Monocytes, Absolute 1.21 (*)     Immature Grans, Absolute 0.17 (*)     All other components within normal limits   RESPIRATORY PANEL PCR W/ COVID-19 (SARS-COV-2), NP SWAB IN UTM/VTP, 2 HR TAT - Normal    Narrative:     In the setting of a positive respiratory panel with a viral infection PLUS a negative procalcitonin without other underlying concern for bacterial infection, consider observing off antibiotics or discontinuation of antibiotics and continue supportive care. If the respiratory panel is positive for atypical bacterial infection (Bordetella pertussis, Chlamydophila pneumoniae, or Mycoplasma pneumoniae), consider antibiotic de-escalation to target atypical bacterial infection.   LACTIC ACID, PLASMA - Normal   PROCALCITONIN - Normal    Narrative:     As a Marker for Sepsis (Non-Neonates):    1. <0.5 ng/mL represents a low risk of severe sepsis and/or septic shock.  2. >2 ng/mL represents a high risk of severe sepsis and/or septic shock.    As a Marker for Lower Respiratory Tract Infections that require antibiotic therapy:    PCT on Admission    Antibiotic Therapy       6-12 Hrs later    >0.5                Strongly Recommended  >0.25 - <0.5        Recommended   0.1 - 0.25          Discouraged              Remeasure/reassess PCT  <0.1                Strongly Discouraged     Remeasure/reassess PCT    As 28 day mortality risk marker: \"Change in Procalcitonin Result\" (>80% or <=80%) if Day 0 (or Day 1) and Day 4 values are " available. Refer to http://www.[facility]-pct-calculator.com    Change in PCT <=80%  A decrease of PCT levels below or equal to 80% defines a positive change in PCT test result representing a higher risk for 28-day all-cause mortality of patients diagnosed with severe sepsis for septic shock.    Change in PCT >80%  A decrease of PCT levels of more than 80% defines a negative change in PCT result representing a lower risk for 28-day all-cause mortality of patients diagnosed with severe sepsis or septic shock.      COVID PRE-OP / PRE-PROCEDURE SCREENING ORDER (NO ISOLATION)    Narrative:     The following orders were created for panel order COVID PRE-OP / PRE-PROCEDURE SCREENING ORDER (NO ISOLATION) - Swab, Nasopharynx.  Procedure                               Abnormality         Status                     ---------                               -----------         ------                     Respiratory Panel PCR w/...[[id number]]  Normal              Final result                 Please view results for these tests on the individual orders.   RAINBOW DRAW    Narrative:     The following orders were created for panel order [place] Draw.  Procedure                               Abnormality         Status                     ---------                               -----------         ------                     Green Top (Gel)[[id number]]                                  Final result               Lavender Top[[id number]]                                     Final result               Gold Top - SST[[id number]]                                   Final result               Gray Top[[id number]]                                         In process                 Light Blue Top[[id number]]                                   Final result                 Please view results for these tests on the individual orders.   CBC AND DIFFERENTIAL    Narrative:     The following orders were created for panel order CBC & Differential.  Procedure                                Abnormality         Status                     ---------                               -----------         ------                     CBC Auto Differential[[id number]]        Abnormal            Final result                 Please view results for these tests on the individual orders.   GREEN TOP   LAVENDER TOP   GOLD TOP - SST   LIGHT BLUE TOP   GRAY TOP       Meds Given in ED:   Medications   sodium chloride 0.9 % flush 10 mL (has no administration in time range)   iopamidol (ISOVUE-370) 76 % injection 100 mL (75 mL Intravenous Given 3/7/24 2130)   furosemide (LASIX) injection 80 mg (80 mg Intravenous Given 3/7/24 2248)

## 2024-03-08 NOTE — PROGRESS NOTES
[facility] Medicine Services  PROGRESS NOTE    Patient Name: Ricki Trotter  : 1935  MRN: [id number]    Date of Admission: 3/7/2024  Primary Care Physician: Stacey Hanley APRN    Subjective   Subjective     CC:  F/U CHF    HPI:  Patient seen and examined. Feels a little better today. Not as SOA. Remains in A fib, rate controlled. Has some wheezing.     Objective   Objective     Vital Signs:   Temp:  [97.4 °F (36.3 °C)-98.3 °F (36.8 °C)] 97.4 °F (36.3 °C)  Heart Rate:  [73-95] 95  Resp:  [16-18] 18  BP: (119-150)/(56-75) 136/75     Physical Exam:  Constitutional: No acute distress, awake, alert, elderly male, pleasant   HENT: NCAT, mucous membranes moist  Respiratory: +exp wheezes B/L LL, respiratory effort normal RA  Cardiovascular: Irregular rhythm, rate controlled, no murmurs, rubs, or gallops  Gastrointestinal: Positive bowel sounds, soft, nontender, nondistended  Musculoskeletal: Trace bilateral ankle edema  Psychiatric: Appropriate affect, cooperative  Neurologic: Oriented x 3, [place], speech clear  Skin: No rashes     Results Reviewed:  LAB RESULTS:      Lab 24   WBC 11.47* 14.01*   HEMOGLOBIN 10.4* 11.0*   HEMATOCRIT 30.6* 32.1*   PLATELETS 234 241   NEUTROS ABS 8.42* 10.64*   IMMATURE GRANS (ABS) 0.13* 0.17*   LYMPHS ABS 1.73 1.84   MONOS ABS 1.04* 1.21*   EOS ABS 0.13 0.13   MCV 83.4 84.7   PROCALCITONIN  --  0.24   LACTATE  --  1.7         Lab 24  02124  0009 24   SODIUM 125* 124* 125*   POTASSIUM 3.2*  --  3.5   CHLORIDE 85*  --  86*   CO2 28.0  --  26.0   ANION GAP 12.0  --  13.0   BUN 25*  --  25*   CREATININE 1.22  --  1.29*   EGFR 57.0*  --  53.3*   GLUCOSE 139*  --  162*   CALCIUM 8.6  --  9.0   MAGNESIUM 2.2  --   --    HEMOGLOBIN A1C 6.40*  --   --          Lab 24   TOTAL PROTEIN 6.4   ALBUMIN 3.6   GLOBULIN 2.8   ALT (SGPT) 8   AST (SGOT) 14   BILIRUBIN 0.7   ALK PHOS 55         Lab  03/08/24  0219 03/08/24  0009 03/07/24 2015   PROBNP  --   --  6,418.0*   HSTROP T 27* 27* 30*                 Brief Urine Lab Results  (Last result in the past 365 days)        Color   Clarity   Blood   Leuk Est   Nitrite   Protein   CREAT   Urine HCG        03/08/24 0024             15.9         03/08/24 0024 Yellow   Clear   Negative   Negative   Negative   Negative                   Microbiology Results Abnormal       Procedure Component Value - Date/Time    COVID PRE-OP / PRE-PROCEDURE SCREENING ORDER (NO ISOLATION) - Swab, Nasopharynx [[id number]]  (Normal) Collected: 03/07/24 2018    Lab Status: Final result Specimen: Swab from Nasopharynx Updated: 03/07/24 2127    Narrative:      The following orders were created for panel order COVID PRE-OP / PRE-PROCEDURE SCREENING ORDER (NO ISOLATION) - Swab, Nasopharynx.  Procedure                               Abnormality         Status                     ---------                               -----------         ------                     Respiratory Panel PCR w/...[[id number]]  Normal              Final result                 Please view results for these tests on the individual orders.    Respiratory Panel PCR w/COVID-19(SARS-CoV-2) SE[facility]/KOKI/PRISCILLA/PAD/COR/AMY In-House, NP Swab in UTM/VTM, 2 HR TAT - Swab, Nasopharynx [[id number]]  (Normal) Collected: 03/07/24 2018    Lab Status: Final result Specimen: Swab from Nasopharynx Updated: 03/07/24 2127     ADENOVIRUS, PCR Not Detected     Coronavirus 229E Not Detected     Coronavirus HKU1 Not Detected     Coronavirus NL63 Not Detected     Coronavirus OC43 Not Detected     COVID19 Not Detected     Human Metapneumovirus Not Detected     Human Rhinovirus/Enterovirus Not Detected     Influenza A PCR Not Detected     Influenza B PCR Not Detected     Parainfluenza Virus 1 Not Detected     Parainfluenza Virus 2 Not Detected     Parainfluenza Virus 3 Not Detected     Parainfluenza Virus 4 Not Detected     RSV, PCR Not Detected      Bordetella pertussis pcr Not Detected     Bordetella parapertussis PCR Not Detected     Chlamydophila pneumoniae PCR Not Detected     Mycoplasma pneumo by PCR Not Detected    Narrative:      In the setting of a positive respiratory panel with a viral infection PLUS a negative procalcitonin without other underlying concern for bacterial infection, consider observing off antibiotics or discontinuation of antibiotics and continue supportive care. If the respiratory panel is positive for atypical bacterial infection (Bordetella pertussis, Chlamydophila pneumoniae, or Mycoplasma pneumoniae), consider antibiotic de-escalation to target atypical bacterial infection.            CT Angiogram Chest    Result Date: 3/7/2024  CT ANGIOGRAM CHEST Date of Exam: 3/7/2024 9:22 PM EST Indication: Shortness of breath. Comparison: None available. Technique: CTA of the chest was performed after the uneventful intravenous administration of 75 cc Isovue-370 . Reconstructed coronal and sagittal images were also obtained. In addition, a 3-D volume rendered image was created for interpretation. Automated exposure control and iterative reconstruction methods were used. Findings: The thyroid gland is normal. The subglottic airway is clear. No evidence of aortic dissection. Severe atheromatous disease of the coronary vessels. No pulmonary embolus. Moderate right and small left pleural effusions. Small hiatal hernia. Extensive calcified mediastinal and hilar lymph nodes consistent with prior granulomatous disease. Infiltrates. Rounded density overlying the left pleural effusion measuring 3.8 cm x 3 cm favored to represent rounded atelectasis though an underlying mass  cannot be excluded. Limited evaluation of the upper abdomen is normal. Thoracic vertebral body height and alignment are normal. No lytic or blastic disease.     Impression: No pulmonary embolus. Moderate right and small left pleural effusions. 3.8 cm x 3 cm rounded lesion overlying  the left pleural effusion favored to represent an area of rounded atelectasis. Consider PET/CT and/or biopsy. Electronically Signed: Lukasz Ramirez MD  3/7/2024 9:36 PM EST  Workstation ID: [id number]     Results for orders placed during the hospital encounter of 06/28/23    Adult Transthoracic Echo Complete W/ Cont if Necessary Per Protocol    Interpretation Summary    Left ventricular systolic function is normal. Left ventricular ejection fraction appears to be 51 - 55%.    Left ventricular diastolic function was indeterminate.    The left atrial cavity is mildly dilated.    Mild aortic valve stenosis is present.    Aortic valve maximum pressure gradient is 28 mmHg. Aortic valve mean pressure gradient is 14 mmHg.    Moderate mitral valve regurgitation is present.    Moderate to severe tricuspid valve regurgitation is present.    Estimated right ventricular systolic pressure from tricuspid regurgitation is markedly elevated (>55 mmHg). Calculated right ventricular systolic pressure from tricuspid regurgitation is 60 mmHg.    There is a trivial pericardial effusion.      Current medications:  Scheduled Meds:apixaban, 5 mg, Oral, Q12H  atorvastatin, 20 mg, Oral, Nightly  furosemide, 40 mg, Intravenous, Q12H  insulin lispro, 2-7 Units, Subcutaneous, 4x Daily AC & at Bedtime  ipratropium-albuterol, 3 mL, Nebulization, 4x Daily - RT  nebivolol, 10 mg, Oral, Daily  pantoprazole, 40 mg, Oral, BID AC  pharmacy consult - MTM, , Does not apply, Daily  sodium chloride, 10 mL, Intravenous, Q12H      Continuous Infusions:   PRN Meds:.  acetaminophen **OR** acetaminophen **OR** acetaminophen    senna-docusate sodium **AND** polyethylene glycol **AND** bisacodyl **AND** bisacodyl    dextrose    dextrose    glucagon (human recombinant)    ipratropium-albuterol    melatonin    Potassium Replacement - Follow Nurse / BPA Driven Protocol    sodium chloride    sodium chloride    sodium chloride    Assessment & Plan   Assessment & Plan      Active Hospital Problems    Diagnosis  POA    **Acute on chronic diastolic congestive heart failure [I50.33]  Yes    Pulmonary hypertension [I27.20]  Yes    Valvular heart disease [I38]  Yes    HLD (hyperlipidemia) [E78.5]  Yes    Leukocytosis [D72.829]  Yes    Pleural effusion, bilateral [J90]  Yes    CKD (chronic kidney disease) stage 3, GFR 30-59 ml/min [N18.30]  Yes    Hyponatremia [E87.1]  Yes    Lesion of lung [R91.1]  Yes    Atrial fibrillation [I48.91]  Yes    PVC's (premature ventricular contractions) [I49.3]  Yes    COPD (chronic obstructive pulmonary disease) [J44.9]  Yes    GERD (gastroesophageal reflux disease) [K21.9]  Yes    Type 2 diabetes mellitus [E11.9]  Yes    Primary hypertension [I10]  Yes      Resolved Hospital Problems   No resolved problems to display.        Brief Hospital Course to date:  Ricki Trotter is a 88 y.o. male w/ a hx of COPD, bronchiectasis, HTN, HLD, CHF, CKD Stage III, GERD, T2DM, BPH, hx of dysphagia (s/p esophageal dilatation), hx of recurrent pneumonia who presented to the ED w/ c/o shortness of breath.     This patient's problems and plans were partially entered by my partner and updated as appropriate by me 03/08/24.   All problems are new to me today     Acute CHF exacerbation   Atrial fibrillation (new Dx)  Bilateral pleural effusions (R>L)  HTN, HLD  -Cardiology following. Appreciate assistance. Follows with Dr Singh outpatient.   -Echo pending  -Received 80mg IV Lasix in ED. Continue 40mg IV q12  -Strict I's/O's, daily weights  -Eliquis added 5mg BID for stroke ppx   -Continue ASA, Bystolic, Statin   -Amlodipine discontinued per Cards      Hyponatremia   -likely 2/2 volume overload   -Continue diuresis  -Add fluid restriction, 1500cc daily  -Check sodium this afternoon   -BMP in AM      Leukocytosis -> trending down   -afebrile   -procal and lactic acid both WNL   -treated for PNA at [place] [facility] during recent admit (d/c 3/6); records requested; reportedly  received dose of steroids recently  -UA negative  -CTA Chest with no evidence of PNA  -monitor      CKD Stage III  -Stable  -Monitor with diuresis      COPD   Hx of bronchiectasis   Lung lesion (chronic)  Hx recurrent PNA  -Follows outpatient with Dr Adams   -Only uses Albuterol PRN   -Add Pulmicort Nebs      T2DM  -hem A1c 6.4  -hold routine meds   -FSBG q ac/hs w/ LDSS   -If Cr remains stable, consider adding Jardiance     Hypokalemia  -Replace per protocol   -Mg normal     Expected Discharge Location and Transportation: Home  Expected Discharge   Expected Discharge Date: 3/11/2024; Expected Discharge Time:      DVT prophylaxis:  Medical DVT prophylaxis orders are present.         AM-PAC 6 Clicks Score (PT): 23 (03/08/24 0800)    CODE STATUS:   Code Status and Medical Interventions:   Ordered at: 03/07/24 9545     Medical Intervention Limits:    NO intubation (DNI)     Level Of Support Discussed With:    Patient     Code Status (Patient has no pulse and is not breathing):    CPR (Attempt to Resuscitate)     Medical Interventions (Patient has pulse or is breathing):    Limited Support       Olga Saucedo,   03/08/24

## 2024-03-08 NOTE — CASE MANAGEMENT/SOCIAL WORK
Discharge Planning Assessment  [facility]     Patient Name: Ricki Trotter  MRN: [id number]  Today's Date: 3/8/2024    Admit Date: 3/7/2024    Plan: IDP   Discharge Needs Assessment       Row Name 03/08/24 1154       Living Environment    People in Home alone    Current Living Arrangements home    Potentially Unsafe Housing Conditions none    In the past 12 months has the electric, gas, oil, or water company threatened to shut off services in your home? No    Primary Care Provided by self    Provides Primary Care For no one    Family Caregiver if Needed child(bianca), adult    Family Caregiver Names Paty    Quality of Family Relationships supportive;helpful;involved    Able to Return to Prior Arrangements yes       Resource/Environmental Concerns    Resource/Environmental Concerns none    Transportation Concerns none       Transportation Needs    In the past 12 months, has lack of transportation kept you from medical appointments or from getting medications? no    In the past 12 months, has lack of transportation kept you from meetings, work, or from getting things needed for daily living? No       Food Insecurity    Within the past 12 months, you worried that your food would run out before you got the money to buy more. Never true    Within the past 12 months, the food you bought just didn't last and you didn't have money to get more. Never true       Transition Planning    Patient/Family Anticipates Transition to home    Patient/Family Anticipated Services at Transition none    Transportation Anticipated family or friend will provide       Discharge Needs Assessment    Readmission Within the Last 30 Days no previous admission in last 30 days    Current Outpatient/Agency/Support Group adult day care    Equipment Currently Used at Home walker, rolling;walker, standard;wheelchair;shower chair    Concerns to be Addressed no discharge needs identified    Anticipated Changes Related to Illness none    Equipment  Needed After Discharge none                   Discharge Plan       Row Name 03/08/24 1155       Plan    Plan IDP    Patient/Family in Agreement with Plan yes    Plan Comments Spoke with daughter at bedside while pt recieved neb tx. Pt lives in [facility] alone. Ind. No HH. RW, WC, standard walker, shower chair. PCP Stacey Hanley. [place] [employer] with scripts filled at [facility]. Plan is home with daughter to transport. Daughter able to stay with patient when he goes home if needed for short period of time. CM will cont to follow.    Final Discharge Disposition Code 01 - home or self-care                  Continued Care and Services - Admitted Since 3/7/2024    No active coordination exists for this encounter.       Expected Discharge Date and Time       Expected Discharge Date Expected Discharge Time    Mar 11, 2024            Demographic Summary       Row Name 03/08/24 1153       General Information    Admission Type observation    Arrived From emergency department    Referral Source admission list    Reason for Consult discharge planning                   Functional Status       Row Name 03/08/24 1154       Functional Status    Usual Activity Tolerance good    Current Activity Tolerance good       Physical Activity    On average, how many days per week do you engage in moderate to strenuous exercise (like a brisk walk)? 0 days    On average, how many minutes do you engage in exercise at this level? 0 min    Number of minutes of exercise per week 0       Functional Status, IADL    Medications independent    Meal Preparation independent    Housekeeping independent    Laundry independent    Shopping independent                   Psychosocial    No documentation.                  Abuse/Neglect    No documentation.                  Legal    No documentation.                  Substance Abuse    No documentation.                  Patient Forms    No documentation.                     Tiffany Colindres RN

## 2024-03-08 NOTE — CONSULTS
Inpatient Cardiology Consult  Consult performed by: Priyanka Jenkins APRN  Consult ordered by: Cheryl Humphries APRN          [facility] at [facility]  Cardiovascular Consultation Note    Reason for consult: CHF/atrial fibrillation     Patient is an 88-year-old gentleman with a history of type 2 diabetes mellitus, bronchiectasis/COPD, recurrent aspiration pneumonia, PVCs, diastolic heart failure, pulmonary hypertension, valvular heart disease, stage III chronic kidney disease, hypertension and hyperlipidemia who we are being asked to consult for acute heart failure and atrial fibrillation.  The patient had been hospitalized at [facility] for 6 days for pneumonia and was treated with IV antibiotics.  He was discharged home on Wednesday and feels he was in worse shape than when he went to their hospital.  His daughter took him to urgent treatment yesterday because he was wheezing and his breathing was worse.  His oxygen saturations were found to be 86% on room air and he was instructed to go to the emergency room.   Chest x-ray showed moderate right and small left pleural effusions and proBNP was elevated at 6400.  He was treated with IV Lasix with improvement in his breathing.  EKG concerning for atrial fibrillation.  High-sensitivity troponin was minimally elevated and flat.  CT angiogram of the chest was negative for pulmonary embolism but did show a 3.8 x 3 cm lesion on the left lung.    Cardiac risk factors: Advanced age, hypertension, hyperlipidemia    Past medical and surgical history, social and family history reviewed in EMR.    REVIEW OF SYSTEMS:   H&P ROS reviewed and pertinent CV ROS as noted in HPI.         Vital Sign Min/Max for last 24 hours  Temp  Min: 97.4 °F (36.3 °C)  Max: 98.3 °F (36.8 °C)   BP  Min: 119/56  Max: 150/66   Pulse  Min: 73  Max: 92   Resp  Min: 16  Max: 18   SpO2  Min: 91 %  Max: 97 %   No data recorded      Intake/Output Summary (Last  24 hours) at 3/8/2024 1006  Last data filed at 3/8/2024 0500  Gross per 24 hour   Intake 1000 ml   Output 1275 ml   Net -275 ml             EKG: 3/8/2024 atrial fibrillation at 72 bpm    Lab Review:   Labs reviewed in the electronic medical record.  Pertinent findings include:  Lab Results   Component Value Date    GLUCOSE 139 (H) 03/08/2024    BUN 25 (H) 03/08/2024    CREATININE 1.22 03/08/2024    EGFRRESULT 44 (L) 11/10/2023    EGFR 57.0 (L) 03/08/2024    BCR 20.5 03/08/2024    K 3.2 (L) 03/08/2024    CO2 28.0 03/08/2024    CALCIUM 8.6 03/08/2024    PROTENTOTREF 6.2 11/10/2023    ALBUMIN 3.6 03/07/2024    BILITOT 0.7 03/07/2024    AST 14 03/07/2024    ALT 8 03/07/2024     Lab Results   Component Value Date    WBC 11.47 (H) 03/08/2024    HGB 10.4 (L) 03/08/2024    HCT 30.6 (L) 03/08/2024    MCV 83.4 03/08/2024     03/08/2024     Lab Results   Component Value Date    CHLPL 145 11/10/2023    TRIG 128 11/10/2023    HDL 43 11/10/2023    LDL 79 11/10/2023     Results from last 7 days   Lab Units 03/08/24  0219   HEMOGLOBIN A1C % 6.40*            Active Hospital Problems    Diagnosis     **Acute on chronic diastolic congestive heart failure      Echo (7/2020): EF 50-55%, grade 2 diastolic dysfunction.  Mild to moderate MR.  Mild to moderate TR with RVSP 52 mmHg   Echo (6/29/2023): LVEF 51 - 55%.  Mild aortic stenosis with mean gradient 14 mmHg.  Moderate MR.  Moderate to severe TR with RVSP 60 mmHg      Pulmonary hypertension      Echo (7/2020): EF 50-55%, grade 2 diastolic dysfunction.  Mild to moderate MR.  Mild to moderate TR with RVSP 52 mmHg   Echo (6/29/2023): LVEF 51 - 55%.  Mild aortic stenosis with mean gradient 14 mmHg.  Moderate MR.  Moderate to severe TR with RVSP 60 mmHg      Leukocytosis     Pleural effusion, bilateral     CKD (chronic kidney disease) stage 3, GFR 30-59 ml/min     Atrial fibrillation      Echo (6/28/2023): EF 51-55%.  Mild aortic stenosis with mean gradient 14 mmHg.  Moderate MR.   Moderate to severe TR with RVSP 60 mmHg      Type 2 diabetes mellitus     Valvular heart disease      Echo (6/29/2023): LVEF 51 - 55%.  Mild aortic stenosis mean gradient 14 mmHg.  Moderate MR.  Moderate to severe TR with RVSP 60 mmHg      Hyponatremia     Lesion of lung     PVC's (premature ventricular contractions)      9day Holter (7/2020): Average heart rate 66.  4% burden PVCs.  5 runs of NSVT, 20 runs of SVT.  No atrial fibrillation   Nuclear stress (7/2020):No evidence of ischemia, frequent PVCs noted at rest actually decreased with increased heart rate.  Normal LVEF  60 monitor (6/2023): No atrial fibrillation.  PVC burden 7.2%  Echo (6/29/2023): LVEF 51 - 55%.  Mild aortic stenosis mean gradient 14 mmHg.  Moderate MR.  Moderate to severe TR with RVSP 60 mmHg      COPD (chronic obstructive pulmonary disease)     Primary hypertension     HLD (hyperlipidemia)     GERD (gastroesophageal reflux disease)              Acute on chronic diastolic heart failure  Continue to diurese with IV Lasix 40 mg twice daily  Monitor creatinine closely  Strict I's and O's and daily weights  Would leave off home dose amlodipine permanently as can worsen CHF      Atrial fibrillation  New onset this admission  Currently rate controlled.  Would not recommend ECV at this time and continue to rate control with his Bystolic  Start Eliquis 5 mg twice daily for stroke prophylaxis  Repeat echocardiogram    Valvular heart disease  Echo last summer showed mild AS, moderate MR and moderate to severe TR with elevated RVSP  Repeat echocardiogram    Pulmonary hypertension  Patient has had elevated RVSP on past 2 echoes  Repeat echocardiogram  After IV diuretics completed May benefit from addition of spironolactone    Hypertension  Current /75  Continue Bystolic 10 mg daily  Would permanently leave off amlodipine which he takes at home      Hyperlipidemia  Continue Lipitor 20 mg daily  Lipid panel within normal  limits    COPD/bronchiectasis/lung lesion  Management per hospitalist      Type 2 diabetes mellitus  Consider addition of SGL 2 inhibitor if renal function stays stable        Priyanka Jenkins, APRN

## 2024-03-08 NOTE — H&P
[facility] Medicine Services  HISTORY AND PHYSICAL    Patient Name: Ricki Trotter  : 1935  MRN: [id number]  Primary Care Physician: Stacey Hanley, LINDA  Date of admission: 3/7/2024    Subjective   Subjective     Chief Complaint:  Shortness of breath     HPI:  Ricki Trotter is a 88 y.o. male w/ a hx of COPD, bronchiectasis, HTN, HLD, CHF, CKD Stage III, GERD, T2DM, BPH, hx of dysphagia (s/p esophageal dilatation), hx of recurrent pneumonia who presented to the ED w/ c/o shortness of breath.   Pt recently admitted to [facility] for 6 days. Pt was d/c home yesterday. Pt admitted for Pneumonia and treated w/ IV antibiotics.   Pt was seen in clinic today for f/u and noted to have room air saturations of 86%. Pt sent to the ED for further evaluation.   Pt c/o ongoing dyspnea, orthopnea, non-productive cough, BLE edema and wheezing. Pt denies fever, chest pain.   Pt evaluated in the ED. CTA Chest w/ moderate right and small left pleural effusion. ProBNP ~6400. EKG concerning for atrial fibrillation. Pt admitted to the hospital medicine service for further evaluation.     Review of Systems   Constitutional: Negative.  Negative for chills, diaphoresis, fatigue, fever and unexpected weight change.   HENT: Negative.  Negative for congestion, postnasal drip, rhinorrhea, sinus pressure, sinus pain, sneezing, sore throat and trouble swallowing.    Eyes: Negative.  Negative for visual disturbance.   Respiratory:  Positive for cough, shortness of breath and wheezing. Negative for chest tightness.    Cardiovascular:  Positive for leg swelling. Negative for chest pain and palpitations.   Gastrointestinal: Negative.  Negative for abdominal distention, abdominal pain, blood in stool, constipation, diarrhea, nausea and vomiting.   Endocrine: Negative.    Genitourinary: Negative.  Negative for decreased urine volume, difficulty urinating, dysuria, flank pain, hematuria  and urgency.   Musculoskeletal: Negative.  Negative for arthralgias, back pain, myalgias, neck pain and neck stiffness.   Skin: Negative.  Negative for wound.   Allergic/Immunologic: Negative.  Negative for immunocompromised state.   Neurological: Negative.  Negative for dizziness, tremors, seizures, syncope, facial asymmetry, speech difficulty, weakness, light-headedness, numbness and headaches.   Hematological: Negative.  Does not bruise/bleed easily.   Psychiatric/Behavioral: Negative.  Negative for confusion.    All other systems reviewed and are negative.     Personal History     Past Medical History:   Diagnosis Date    Allergic rhinitis     Allergies     Amnesia     Basal cell carcinoma 2015    Benign prostatic hyperplasia     Chronic kidney disease, stage 3b     CKD (chronic kidney disease)     COPD (chronic obstructive pulmonary disease)     Diabetes mellitus     Drug dependency     Dyslipidemia     Essential hypertension     Gastroesophageal reflux     Hyperlipidemia     Hypertension     Hyperthyroidism     Insulin-treated type 2 diabetes mellitus     Iron deficiency     Long term (current) use of insulin     Melanoma 2012    Mixed hyperlipidemia     Nephrosclerosis      Past Surgical History:   Procedure Laterality Date    CATARACT EXTRACTION Bilateral     COLONOSCOPY      PROSTHODONTIC PROCEDURE       Family History:  family history includes Breast cancer in his sister; Diabetes in his mother; Stroke in his mother.     Social History:  reports that he quit smoking about 63 years ago. His smoking use included cigarettes. He started smoking about 71 years ago. He has a 4.5 pack-year smoking history. He has been exposed to tobacco smoke. He has never used smokeless tobacco. He reports current alcohol use of about 2.0 standard drinks of alcohol per week. He reports that he does not use drugs.  Social History     Social History Narrative    caffeine use: 2-3 serving of coffee daily     Medications:  Insulin  Glargine (2 Unit Dial), Insulin Pen Needle, SITagliptin, albuterol, albuterol sulfate HFA, amLODIPine, aspirin, esomeprazole, fluticasone, glucose blood, levocetirizine, montelukast, nebivolol, potassium chloride, simvastatin, tamsulosin, torsemide, and traZODone    Allergies   Allergen Reactions    Penicillins Swelling    Sulfa Antibiotics Swelling     Objective   Objective     Vital Signs:   Temp:  [98.3 °F (36.8 °C)] 98.3 °F (36.8 °C)  Heart Rate:  [73-84] 73  Resp:  [16] 16  BP: (127-150)/(59-70) 135/68    Physical Exam    Constitutional: Awake, alert; non-toxic appearing   Eyes: PERRLA, sclerae anicteric, no conjunctival injection  HENT: NCAT, mucous membranes moist  Neck: Supple, no thyromegaly, no lymphadenopathy, trachea midline  Respiratory: Course w/ exp wheezing; nonlabored respirations   Cardiovascular: Irregular rhythm, rate controlled, no murmurs, rubs, or gallops, trace edema BLE   Gastrointestinal: Positive bowel sounds, soft, nontender, nondistended  Musculoskeletal: Normal ROM bilaterally   Psychiatric: Appropriate affect, cooperative  Neurologic: Oriented x 3, strength symmetric in all extremities, Cranial Nerves grossly intact to confrontation, speech clear  Skin: No rashes, lesions or wounds     Result Review:  I have personally reviewed the results from the time of this admission to 3/7/2024 23:40 EST and agree with these findings:  [x]  Laboratory list / accordion  []  Microbiology  [x]  Radiology  [x]  EKG/Telemetry   []  Cardiology/Vascular   []  Pathology  [x]  Old records    LAB RESULTS:      Lab 03/07/24 2015   WBC 14.01*   HEMOGLOBIN 11.0*   HEMATOCRIT 32.1*   PLATELETS 241   NEUTROS ABS 10.64*   IMMATURE GRANS (ABS) 0.17*   LYMPHS ABS 1.84   MONOS ABS 1.21*   EOS ABS 0.13   MCV 84.7   PROCALCITONIN 0.24   LACTATE 1.7         Lab 03/07/24 2015   SODIUM 125*   POTASSIUM 3.5   CHLORIDE 86*   CO2 26.0   ANION GAP 13.0   BUN 25*   CREATININE 1.29*   EGFR 53.3*   GLUCOSE 162*   CALCIUM 9.0          Lab 03/07/24 2015   TOTAL PROTEIN 6.4   ALBUMIN 3.6   GLOBULIN 2.8   ALT (SGPT) 8   AST (SGOT) 14   BILIRUBIN 0.7   ALK PHOS 55         Lab 03/07/24 2015   PROBNP 6,418.0*   HSTROP T 30*                 Brief Urine Lab Results       None          Microbiology Results (last 10 days)       Procedure Component Value - Date/Time    COVID PRE-OP / PRE-PROCEDURE SCREENING ORDER (NO ISOLATION) - Swab, Nasopharynx [[id number]]  (Normal) Collected: 03/07/24 2018    Lab Status: Final result Specimen: Swab from Nasopharynx Updated: 03/07/24 2127    Narrative:      The following orders were created for panel order COVID PRE-OP / PRE-PROCEDURE SCREENING ORDER (NO ISOLATION) - Swab, Nasopharynx.  Procedure                               Abnormality         Status                     ---------                               -----------         ------                     Respiratory Panel PCR w/...[[id number]]  Normal              Final result                 Please view results for these tests on the individual orders.    Respiratory Panel PCR w/COVID-19(SARS-CoV-2) [facility]OKI/PRISCILLA/PAD/COR/AMY In-House, NP Swab in UTM/VTM, 2 HR TAT - Swab, Nasopharynx [[id number]]  (Normal) Collected: 03/07/24 2018    Lab Status: Final result Specimen: Swab from Nasopharynx Updated: 03/07/24 2127     ADENOVIRUS, PCR Not Detected     Coronavirus 229E Not Detected     Coronavirus HKU1 Not Detected     Coronavirus NL63 Not Detected     Coronavirus OC43 Not Detected     COVID19 Not Detected     Human Metapneumovirus Not Detected     Human Rhinovirus/Enterovirus Not Detected     Influenza A PCR Not Detected     Influenza B PCR Not Detected     Parainfluenza Virus 1 Not Detected     Parainfluenza Virus 2 Not Detected     Parainfluenza Virus 3 Not Detected     Parainfluenza Virus 4 Not Detected     RSV, PCR Not Detected     Bordetella pertussis pcr Not Detected     Bordetella parapertussis PCR Not Detected     Chlamydophila pneumoniae PCR Not Detected      Mycoplasma pneumo by PCR Not Detected    Narrative:      In the setting of a positive respiratory panel with a viral infection PLUS a negative procalcitonin without other underlying concern for bacterial infection, consider observing off antibiotics or discontinuation of antibiotics and continue supportive care. If the respiratory panel is positive for atypical bacterial infection (Bordetella pertussis, Chlamydophila pneumoniae, or Mycoplasma pneumoniae), consider antibiotic de-escalation to target atypical bacterial infection.          CT Angiogram Chest    Result Date: 3/7/2024  CT ANGIOGRAM CHEST Date of Exam: 3/7/2024 9:22 PM EST Indication: Shortness of breath. Comparison: None available. Technique: CTA of the chest was performed after the uneventful intravenous administration of 75 cc Isovue-370 . Reconstructed coronal and sagittal images were also obtained. In addition, a 3-D volume rendered image was created for interpretation. Automated exposure control and iterative reconstruction methods were used. Findings: The thyroid gland is normal. The subglottic airway is clear. No evidence of aortic dissection. Severe atheromatous disease of the coronary vessels. No pulmonary embolus. Moderate right and small left pleural effusions. Small hiatal hernia. Extensive calcified mediastinal and hilar lymph nodes consistent with prior granulomatous disease. Infiltrates. Rounded density overlying the left pleural effusion measuring 3.8 cm x 3 cm favored to represent rounded atelectasis though an underlying mass  cannot be excluded. Limited evaluation of the upper abdomen is normal. Thoracic vertebral body height and alignment are normal. No lytic or blastic disease.     Impression: No pulmonary embolus. Moderate right and small left pleural effusions. 3.8 cm x 3 cm rounded lesion overlying the left pleural effusion favored to represent an area of rounded atelectasis. Consider PET/CT and/or biopsy. Electronically  Signed: Lukasz Ramirez MD  3/7/2024 9:36 PM EST  Workstation ID: [id number]     Results for orders placed during the hospital encounter of 06/28/23    Adult Transthoracic Echo Complete W/ Cont if Necessary Per Protocol    Interpretation Summary    Left ventricular systolic function is normal. Left ventricular ejection fraction appears to be 51 - 55%.    Left ventricular diastolic function was indeterminate.    The left atrial cavity is mildly dilated.    Mild aortic valve stenosis is present.    Aortic valve maximum pressure gradient is 28 mmHg. Aortic valve mean pressure gradient is 14 mmHg.    Moderate mitral valve regurgitation is present.    Moderate to severe tricuspid valve regurgitation is present.    Estimated right ventricular systolic pressure from tricuspid regurgitation is markedly elevated (>55 mmHg). Calculated right ventricular systolic pressure from tricuspid regurgitation is 60 mmHg.    There is a trivial pericardial effusion.    Assessment & Plan   Assessment & Plan       CHF (congestive heart failure)    COPD (chronic obstructive pulmonary disease)    GERD (gastroesophageal reflux disease)    DM (diabetes mellitus)    HTN (hypertension)    HLD (hyperlipidemia)    Leukocytosis    Pleural effusion, bilateral    CKD (chronic kidney disease) stage 3, GFR 30-59 ml/min    Hyponatremia    Lesion of lung    Atrial fibrillation    Ricki Trotter is a 88 y.o. male w/ a hx of COPD, bronchiectasis, HTN, HLD, CHF, CKD Stage III, GERD, T2DM, BPH, hx of dysphagia (s/p esophageal dilatation), hx of recurrent pneumonia who presented to the ED w/ c/o shortness of breath.     **Acute CHF exacerbation   **Atrial fibrillation   **Bilateral pleural effusions (R>L)  **HTN, HLD  -per family, pt 86% on room air prior to arrival today  -currently remains ~93% on room air   -initial EKG suggestive of A.Fib (pt denies hx)  -serial EKGS  -troponin 30, trend   -proBNP 6,418  -CTA Chest obtained; results above  -last ECHO  6/2023: EF 51-55%, normal LVSF, indeterminate LVDF, mild aortic stenosis, moderate mitral valve regurg, mod-severe tricuspid regurg, RVSP 60mmHg  -pt had ECHO at [facility] during admission this week; records requested  -s/p Lasix 80mg IV given in ED; continue IV diuresis and monitor carefully  -awaiting med rec verification; unclear if pt taking Demadex routinely still   -continue ASA, Bystolic, statin   -daily weights; strict I/Os  -pt,ot and case mgt consult   -Cardiology consult in am     **Hyponatremia   -likely 2/2 volume overload  -Na 125 (previously 140 in 11/2023)  -trend, q 6 hours   -IV diuresis   -urine sodium pending     **Leukocytosis   -WBC 14.01  -afebrile   -procal and lactic acid both WNL   -treated for PNA at [facility] during recent admit (d/c 3/6); records requested; reportedly received dose of steroids recently  -UA Pending   -CTA Chest with no evidence of PNA  -monitor     **CKD Stage III  -previous Creatinine 1.51 in 11/2023, 1.17 in 3/2023   -current CR 1.29 w/ eGFR 53.3  -UA Pending   -monitor closely w/ IV diuresis     **COPD   **Hx of bronchiectasis   **Lung lesion   -pt currently on room air; c/o wheezing (s/p recent treatment for PNA, records requested from [facility])  -CTA Chest shows 3.8 cm x 3 cm rounded lesion overlying the left pleural effusion favored to represent an area of rounded atelectasis. Consider PET/CT and/or biopsy.   -believe pt following w/ Dr. Herbert w/ Pulmonology   -prn and scheduled nebs   -symptom mgt    **T2DM  -hem A1c pending  -hold routine meds   -FSBG q ac/hs w/ LDSS     DVT prophylaxis:  Heparin     CODE STATUS:    Medical Intervention Limits: NO intubation (DNI)  Level Of Support Discussed With: Patient  Code Status (Patient has no pulse and is not breathing): CPR (Attempt to Resuscitate)  Medical Interventions (Patient has pulse or is breathing): Limited Support    Expected Discharge pending clinical course(click hyperlink to enter date then  refresh the note)  Expected discharge date/ time has not been documented.    This note has been completed as part of a split-shared workflow.     Signature: Electronically signed by LINDA Cohen, 03/07/24, 11:40 PM EST.    Time spent: 55 minutes         Attending   Admission Attestation       I have performed an independent face-to-face diagnostic evaluation including performing an independent physical examination.  I approve of the documented plan of care above that was reviewed and developed with the advanced practice clinician (APC) and take responsibility for that plan along with its associated risks.  I have updated the HPI as appropriate.    Brief HPI    88-year-old male with past medical history of heart failure, COPD, known lung nodule, following with pulmonary, CKD who presents with shortness of breath, found to have bilateral pleural effusions, diagnosis of heart failure exacerbation.  He is currently doing well on room air.  Additionally he does appear to be in A-fib, however upon speaking with the patient he states that he has been told that he had A-fib before including prior hospital stay at [facility] where he was admitted for pneumonia and heart failure exacerbation.  He states he was never on a blood thinner.    Attending Physical Exam:  Temp:  [97.7 °F (36.5 °C)-98.3 °F (36.8 °C)] 97.7 °F (36.5 °C)  Heart Rate:  [73-84] 74  Resp:  [16] 16  BP: (127-150)/(59-70) 134/59    Awake alert and oriented x 3  Resting comfortably in bed  JVP 10 cm  Diminished sounds bilaterally with rales one third way up lung fields bilaterally, end expiratory wheezing diffusely bilaterally  Heart rate irregular and rhythm irregular  Abdomen soft and nontender  1+ pretibial bilateral lower extremity edema  Extremities are warm dry and well-perfused    Result Review:  I have personally reviewed the results from the time of this admission to 3/8/2024 03:27 EST and agree with these findings:  [x]  Laboratory list /  accordion  []  Microbiology  [x]  Radiology  [x]  EKG/Telemetry   [x]  Cardiology/Vascular   []  Pathology  [x]  Old records  []  Other:  Most notable findings include: See assessment and plan    Assessment and Plan:    Acute decompensated heart failure.  As evidenced by increasing peripheral edema, orthopnea, pleural effusions and interstitial edema.  Continue on IV diuresis, we do not have a recent echo in our system however he did just have one at [facility] and will need to request these records.  With the hyponatremia will need to monitor sodium very carefully while on diuretics although highly likely that sodium will improve as he is hypervolemic.    For A-fib I do not see a diagnosis listed in chart but according to family this is a known diagnosis rhythm.  They do not mention any strong contraindications to anticoagulation.  They did say that they discussed this at [facility] as well.  Will need to see what the records show as to why he is not on anticoagulation however without contraindication would recommend starting Eliquis.    Cardiology consult in the morning, appreciate recommendations    See assessment and plan documented by APC above and updated/edited by me as appropriate.    Paresh Varma MD  03/08/24

## 2024-03-08 NOTE — THERAPY DISCHARGE NOTE
Acute Care - Occupational Therapy Discharge  [facility]    Patient Name: Ricki Trotter  : 1935    MRN: [id number]                              Today's Date: 3/8/2024       Admit Date: 3/7/2024    Visit Dx:     ICD-10-CM ICD-9-CM   1. Shortness of breath  R06.02 786.05   2. Acute on chronic diastolic heart failure  I50.33 428.33   3. Hyponatremia  E87.1 276.1   4. Pleural effusion, bilateral  J90 511.9     Patient Active Problem List   Diagnosis    Mass of lower lobe of left lung    COPD (chronic obstructive pulmonary disease)    Recurrent pneumonia    GERD (gastroesophageal reflux disease)    Type 2 diabetes mellitus    Primary hypertension    PVC's (premature ventricular contractions)    Acute on chronic diastolic congestive heart failure    HLD (hyperlipidemia)    Leukocytosis    Pleural effusion, bilateral    CKD (chronic kidney disease) stage 3, GFR 30-59 ml/min    Hyponatremia    Lesion of lung    Atrial fibrillation    Pulmonary hypertension    Valvular heart disease     Past Medical History:   Diagnosis Date    Allergic rhinitis     Allergies     Amnesia     Basal cell carcinoma     Benign prostatic hyperplasia     Chronic kidney disease, stage 3b     CKD (chronic kidney disease)     COPD (chronic obstructive pulmonary disease)     Diabetes mellitus     Drug dependency     Dyslipidemia     Essential hypertension     Gastroesophageal reflux     Hyperlipidemia     Hypertension     Hyperthyroidism     Insulin-treated type 2 diabetes mellitus     Iron deficiency     Long term (current) use of insulin     Melanoma     Mixed hyperlipidemia     Nephrosclerosis      Past Surgical History:   Procedure Laterality Date    CATARACT EXTRACTION Bilateral     COLONOSCOPY      PROSTHODONTIC PROCEDURE        General Information       Row Name 24 1010          OT Time and Intention    Document Type discharge evaluation/summary  -SR     Mode of Treatment occupational therapy  -SR       Row Name  03/08/24 1010          General Information    Patient Profile Reviewed yes  -SR     Prior Level of Function independent:;all household mobility;community mobility;ADL's  -SR     Barriers to Rehab none identified  -SR       Row Name 03/08/24 1010          Living Environment    People in Home alone  -SR       Row Name 03/08/24 1010          Home Main Entrance    Number of Stairs, Main Entrance two  -SR     Stair Railings, Main Entrance none  -SR       Row Name 03/08/24 1010          Stairs Within Home, Primary    Number of Stairs, Within Home, Primary none  -SR       Row Name 03/08/24 1010          Cognition    Orientation Status (Cognition) oriented x 3  -SR       Row Name 03/08/24 1010          Safety Issues, Functional Mobility    Impairments Affecting Function (Mobility) endurance/activity tolerance  -SR               User Key  (r) = Recorded By, (t) = Taken By, (c) = Cosigned By      Initials Name Provider Type    SR Ibeth Jenkins OT Occupational Therapist                   Mobility/ADL's       Row Name 03/08/24 1011          Bed Mobility    Bed Mobility supine-sit  -SR     Supine-Sit [place] (Bed Mobility) standby assist  -SR     Assistive Device (Bed Mobility) head of bed elevated  -SR       Row Name 03/08/24 1011          Transfers    Transfers sit-stand transfer;stand-sit transfer  -SR       Row Name 03/08/24 1011          Sit-Stand Transfer    Sit-Stand [place] (Transfers) supervision  -SR       Row Name 03/08/24 1011          Stand-Sit Transfer    Stand-Sit [place] (Transfers) supervision  -SR       Row Name 03/08/24 1011          Functional Mobility    Functional Mobility- Ind. Level supervision required  -SR     Functional Mobility-Distance (Feet) --  household distances  -SR       Row Name 03/08/24 1011          Activities of Daily Living    BADL Assessment/Intervention lower body dressing;toileting;grooming  -SR       Row Name 03/08/24 1011          Lower Body Dressing  Assessment/Training    [place] Level (Lower Body Dressing) don;doff;socks;standby assist  -SR     Position (Lower Body Dressing) edge of bed sitting  -SR       Row Name 03/08/24 1011          Toileting Assessment/Training    [place] Level (Toileting) supervision;adjust/manage clothing;perform perineal hygiene  -SR     Assistive Devices (Toileting) commode  -SR     Position (Toileting) --  standing at commode to urinate  -SR       Row Name 03/08/24 1011          Grooming Assessment/Training    [place] Level (Grooming) oral care regimen;wash face, hands;supervision  -SR     Position (Grooming) sink side  -SR               User Key  (r) = Recorded By, (t) = Taken By, (c) = Cosigned By      Initials Name Provider Type    SR Ibeth Jenkins OT Occupational Therapist                   Obj/Interventions       Row Name 03/08/24 1013          Sensory Assessment (Somatosensory)    Sensory Assessment (Somatosensory) sensation intact  -SR       Row Name 03/08/24 1013          Vision Assessment/Intervention    Visual Impairment/Limitations WFL  -SR       Row Name 03/08/24 1013          Range of Motion Comprehensive    General Range of Motion no range of motion deficits identified  -SR       Row Name 03/08/24 1013          Strength Comprehensive (MMT)    General Manual Muscle Testing (MMT) Assessment no strength deficits identified  -SR     Comment, General Manual Muscle Testing (MMT) Assessment BUE grossly 4+/5  -SR       Row Name 03/08/24 1013          Balance    Balance Assessment sitting static balance;sitting dynamic balance;sit to stand dynamic balance;standing static balance;standing dynamic balance  -SR     Static Sitting Balance independent  -SR     Dynamic Sitting Balance independent  -SR     Position, Sitting Balance sitting edge of bed  -SR     Sit to Stand Dynamic Balance standby assist  -SR     Static Standing Balance standby assist  -SR     Dynamic Standing Balance supervision  -SR     Balance  Interventions sitting;standing;sit to stand;static;dynamic;minimal challenge;occupation based/functional task  -SR               User Key  (r) = Recorded By, (t) = Taken By, (c) = Cosigned By      Initials Name Provider Type    SR Ibeth Jenkins OT Occupational Therapist                   Goals/Plan    No documentation.                  Clinical Impression       Row Name 03/08/24 1014          Pain Assessment    Pretreatment Pain Rating 0/10 - no pain  -SR     Posttreatment Pain Rating 0/10 - no pain  -SR     Pre/Posttreatment Pain Comment asymptomatic  -SR       Row Name 03/08/24 1014          Plan of Care Review    Plan of Care Reviewed With patient;daughter  -SR     Outcome Evaluation Pt presents near functional baseline for all ADL tasks. No further IP OT services warranted. Recommend d/c home when appropriate.  -SR       Row Name 03/08/24 1014          Therapy Plan Review/Discharge Plan (OT)    Anticipated Discharge Disposition (OT) home  -SR       Row Name 03/08/24 1014          Vital Signs    Pre Systolic BP Rehab 136  -SR     Pre Treatment Diastolic BP 75  -SR     Post Systolic BP Rehab 136  -SR     Post Treatment Diastolic BP 75  -SR     Pretreatment Heart Rate (beats/min) 85  -SR     Posttreatment Heart Rate (beats/min) 83  -SR     Pre SpO2 (%) 92  -SR     O2 Delivery Pre Treatment room air  -SR     O2 Delivery Intra Treatment room air  -SR     Post SpO2 (%) 94  -SR     O2 Delivery Post Treatment room air  -SR     Pre Patient Position Supine  -SR     Intra Patient Position Standing  -SR     Post Patient Position Sitting  -SR       Row Name 03/08/24 1014          Positioning and Restraints    Pre-Treatment Position in bed  -SR     Post Treatment Position chair  -SR     In Chair reclined;notified nsg;call light within reach;with family/caregiver;legs elevated  -SR               User Key  (r) = Recorded By, (t) = Taken By, (c) = Cosigned By      Initials Name Provider Type    SR Ibeth Jenkins, OT  Occupational Therapist                   Outcome Measures       Row Name 03/08/24 1017          How much help from another is currently needed...    Putting on and taking off regular lower body clothing? 4  -SR     Bathing (including washing, rinsing, and drying) 4  -SR     Toileting (which includes using toilet bed pan or urinal) 4  -SR     Putting on and taking off regular upper body clothing 4  -SR     Taking care of personal grooming (such as brushing teeth) 4  -SR     Eating meals 4  -SR     AM-PAC 6 Clicks Score (OT) 24  -SR       Row Name 03/08/24 0800 03/08/24 0050       How much help from another person do you currently need...    Turning from your back to your side while in flat bed without using bedrails? 4  -CD 4  -LB    Moving from lying on back to sitting on the side of a flat bed without bedrails? 4  -CD 4  -LB    Moving to and from a bed to a chair (including a wheelchair)? 4  -CD 4  -LB    Standing up from a chair using your arms (e.g., wheelchair, bedside chair)? 4  -CD 4  -LB    Climbing 3-5 steps with a railing? 3  -CD 3  -LB    To walk in hospital room? 4  -CD 4  -LB    AM-PAC 6 Clicks Score (PT) 23  -CD 23  -LB    Highest Level of Mobility Goal 7 --> Walk 25 feet or more  -CD 7 --> Walk 25 feet or more  -LB      Row Name 03/08/24 1017          Functional Assessment    Outcome Measure Options AM-PAC 6 Clicks Daily Activity (OT)  -SR               User Key  (r) = Recorded By, (t) = Taken By, (c) = Cosigned By      Initials Name Provider Type    CD Sabi Gold RN Registered Nurse    Lila Craft RN Registered Nurse    SR Ibeth Jenkins OT Occupational Therapist                  Occupational Therapy Education       Title: PT OT SLP Therapies (In Progress)       Topic: Occupational Therapy (In Progress)       Point: ADL training (Done)       Description:   Instruct learner(s) on proper safety adaptation and remediation techniques during self care or transfers.   Instruct in proper  use of assistive devices.                  Learning Progress Summary             Patient Acceptance, E, VU by SR at 3/8/2024 1018   Family Acceptance, E, VU by SR at 3/8/2024 1018                         Point: Home exercise program (Not Started)       Description:   Instruct learner(s) on appropriate technique for monitoring, assisting and/or progressing therapeutic exercises/activities.                  Learner Progress:  Not documented in this visit.              Point: Precautions (Done)       Description:   Instruct learner(s) on prescribed precautions during self-care and functional transfers.                  Learning Progress Summary             Patient Acceptance, E, VU by SR at 3/8/2024 1018   Family Acceptance, E, VU by SR at 3/8/2024 1018                         Point: Body mechanics (Done)       Description:   Instruct learner(s) on proper positioning and spine alignment during self-care, functional mobility activities and/or exercises.                  Learning Progress Summary             Patient Acceptance, E, VU by SR at 3/8/2024 1018   Family Acceptance, E, VU by SR at 3/8/2024 1018                                         User Key       Initials Effective Dates Name Provider Type Discipline     02/22/24 -  Ibeth Jenkins OT Occupational Therapist OT                  OT Recommendation and Plan     Plan of Care Review  Plan of Care Reviewed With: patient, daughter  Outcome Evaluation: Pt presents near functional baseline for all ADL tasks. No further IP OT services warranted. Recommend d/c home when appropriate.  Plan of Care Reviewed With: patient, daughter  Outcome Evaluation: Pt presents near functional baseline for all ADL tasks. No further IP OT services warranted. Recommend d/c home when appropriate.     Time Calculation:   Evaluation Complexity (OT)  Review Occupational Profile/Medical/Therapy History Complexity: brief/low complexity  Assessment, Occupational Performance/Identification of  Deficit Complexity: 1-3 performance deficits  Clinical Decision Making Complexity (OT): problem focused assessment/low complexity  Overall Complexity of Evaluation (OT): low complexity     Time Calculation- OT       Row Name 03/08/24 1020             Time Calculation- OT    OT Start Time 0900  -SR      OT Received On 03/08/24  -SR         Untimed Charges    OT Eval/Re-eval Minutes 46  -SR         Total Minutes    Untimed Charges Total Minutes 46  -SR       Total Minutes 46  -SR                User Key  (r) = Recorded By, (t) = Taken By, (c) = Cosigned By      Initials Name Provider Type    SR Ibeth Jenkins OT Occupational Therapist                  Therapy Charges for Today       Code Description Service Date Service Provider Modifiers Qty    [id number] HC OT EVAL LOW COMPLEXITY 4 3/8/2024 Ibeth Jenkins OT GO 1               OT Discharge Summary  Anticipated Discharge Disposition (OT): home  Reason for Discharge: At baseline function  Discharge Destination: Home    Ibeth Jenkins OT  3/8/2024

## 2024-03-08 NOTE — PLAN OF CARE
Goal Outcome Evaluation:           Progress: no change  Outcome Evaluation: Pt alert and oriented x4. VSS on room air. Pt up standby. Family at bedside. No complaints of pain. Call light and personal items within reach.

## 2024-03-08 NOTE — PLAN OF CARE
Goal Outcome Evaluation:  Plan of Care Reviewed With: patient, daughter        Progress: no change  Outcome Evaluation: PT eval completed. Patient presents w/ mild generalized weakness, very mild balance deficits, mildly unsteady gait, and is slightly below his functional baseline. He ambulated in hallway w/ 1 minor LOB requiring assist to recover. Skilled IPPT warranted to promote return to PLOF. Recommend home w/ assist when medically appropriate.      Anticipated Discharge Disposition (PT): home with assist

## 2024-03-08 NOTE — PAYOR COMM NOTE
"Ricki Vázquez (88 y.o. Male)       Date of Birth   1935    Social Security Number       Address   [address] [place] [address] [address] [place] [address]    Home Phone   [phone]    MRN   [id number]       Catholic   None    Marital Status                               Admission Date   3/7/24    Admission Type   Emergency    Admitting Provider   Paresh Varma MD    Attending Provider   Olga Saucedo DO    Department, Room/Bed   [facility] 2G, S225/1       Discharge Date       Discharge Disposition       Discharge Destination                                 Attending Provider: Olga Saucedo DO    Allergies: Penicillins, Sulfa Antibiotics    Isolation: None   Infection: None   Code Status: CPR    Ht: 180.3 cm (71\")   Wt: 72.1 kg (159 lb)    Admission Cmt: None   Principal Problem: Acute on chronic diastolic congestive heart failure [I50.33]                   Active Insurance as of 3/7/2024       Primary Coverage       Payor Plan Insurance Group Employer/Plan Group    ANTHEM MEDICARE REPLACEMENT ANTHEM MEDICARE ADVANTAGE [id number]       Payor Plan Address Payor Plan Phone Number Payor Plan Fax Number Effective Dates    [address] [phone]  2024 - None Entered    [facility] [id number]         Subscriber Name Subscriber Birth Date Member ID       RICKI VÁZQUEZ 1935 [id number]                     Emergency Contacts        (Rel.) Home Phone Work Phone Mobile Phone    Paty Tobar (Daughter) [phone] -- [phone]    Liane Shaw (Grandchild) [phone] -- [phone]    Diogo Vázquez (Son) -- -- [phone]                 History & Physical        Paresh Varma MD at 24 2240              [facility] Medicine Services  HISTORY AND PHYSICAL    Patient Name: Ricki Vázquez  : 1935  MRN: [id number]  Primary Care Physician: Stacey Hanley, APRN  Date of admission: " 3/7/2024    Subjective  Subjective     Chief Complaint:  Shortness of breath     HPI:  Ricki Trotter is a 88 y.o. male w/ a hx of COPD, bronchiectasis, HTN, HLD, CHF, CKD Stage III, GERD, T2DM, BPH, hx of dysphagia (s/p esophageal dilatation), hx of recurrent pneumonia who presented to the ED w/ c/o shortness of breath.   Pt recently admitted to [facility] for 6 days. Pt was d/c home yesterday. Pt admitted for Pneumonia and treated w/ IV antibiotics.   Pt was seen in clinic today for f/u and noted to have room air saturations of 86%. Pt sent to the ED for further evaluation.   Pt c/o ongoing dyspnea, orthopnea, non-productive cough, BLE edema and wheezing. Pt denies fever, chest pain.   Pt evaluated in the ED. CTA Chest w/ moderate right and small left pleural effusion. ProBNP ~6400. EKG concerning for atrial fibrillation. Pt admitted to the hospital medicine service for further evaluation.     Review of Systems   Constitutional: Negative.  Negative for chills, diaphoresis, fatigue, fever and unexpected weight change.   HENT: Negative.  Negative for congestion, postnasal drip, rhinorrhea, sinus pressure, sinus pain, sneezing, sore throat and trouble swallowing.    Eyes: Negative.  Negative for visual disturbance.   Respiratory:  Positive for cough, shortness of breath and wheezing. Negative for chest tightness.    Cardiovascular:  Positive for leg swelling. Negative for chest pain and palpitations.   Gastrointestinal: Negative.  Negative for abdominal distention, abdominal pain, blood in stool, constipation, diarrhea, nausea and vomiting.   Endocrine: Negative.    Genitourinary: Negative.  Negative for decreased urine volume, difficulty urinating, dysuria, flank pain, hematuria and urgency.   Musculoskeletal: Negative.  Negative for arthralgias, back pain, myalgias, neck pain and neck stiffness.   Skin: Negative.  Negative for wound.   Allergic/Immunologic: Negative.  Negative for  immunocompromised state.   Neurological: Negative.  Negative for dizziness, tremors, seizures, syncope, facial asymmetry, speech difficulty, weakness, light-headedness, numbness and headaches.   Hematological: Negative.  Does not bruise/bleed easily.   Psychiatric/Behavioral: Negative.  Negative for confusion.    All other systems reviewed and are negative.     Personal History     Past Medical History:   Diagnosis Date    Allergic rhinitis     Allergies     Amnesia     Basal cell carcinoma 2015    Benign prostatic hyperplasia     Chronic kidney disease, stage 3b     CKD (chronic kidney disease)     COPD (chronic obstructive pulmonary disease)     Diabetes mellitus     Drug dependency     Dyslipidemia     Essential hypertension     Gastroesophageal reflux     Hyperlipidemia     Hypertension     Hyperthyroidism     Insulin-treated type 2 diabetes mellitus     Iron deficiency     Long term (current) use of insulin     Melanoma 2012    Mixed hyperlipidemia     Nephrosclerosis      Past Surgical History:   Procedure Laterality Date    CATARACT EXTRACTION Bilateral     COLONOSCOPY      PROSTHODONTIC PROCEDURE       Family History:  family history includes Breast cancer in his sister; Diabetes in his mother; Stroke in his mother.     Social History:  reports that he quit smoking about 63 years ago. His smoking use included cigarettes. He started smoking about 71 years ago. He has a 4.5 pack-year smoking history. He has been exposed to tobacco smoke. He has never used smokeless tobacco. He reports current alcohol use of about 2.0 standard drinks of alcohol per week. He reports that he does not use drugs.  Social History     Social History Narrative    caffeine use: 2-3 serving of coffee daily     Medications:  Insulin Glargine (2 Unit Dial), Insulin Pen Needle, SITagliptin, albuterol, albuterol sulfate HFA, amLODIPine, aspirin, esomeprazole, fluticasone, glucose blood, levocetirizine, montelukast, nebivolol, potassium  chloride, simvastatin, tamsulosin, torsemide, and traZODone    Allergies   Allergen Reactions    Penicillins Swelling    Sulfa Antibiotics Swelling     Objective  Objective     Vital Signs:   Temp:  [98.3 °F (36.8 °C)] 98.3 °F (36.8 °C)  Heart Rate:  [73-84] 73  Resp:  [16] 16  BP: (127-150)/(59-70) 135/68    Physical Exam    Constitutional: Awake, alert; non-toxic appearing   Eyes: PERRLA, sclerae anicteric, no conjunctival injection  HENT: NCAT, mucous membranes moist  Neck: Supple, no thyromegaly, no lymphadenopathy, trachea midline  Respiratory: Course w/ exp wheezing; nonlabored respirations   Cardiovascular: Irregular rhythm, rate controlled, no murmurs, rubs, or gallops, trace edema BLE   Gastrointestinal: Positive bowel sounds, soft, nontender, nondistended  Musculoskeletal: Normal ROM bilaterally   Psychiatric: Appropriate affect, cooperative  Neurologic: Oriented x 3, strength symmetric in all extremities, Cranial Nerves grossly intact to confrontation, speech clear  Skin: No rashes, lesions or wounds     Result Review:  I have personally reviewed the results from the time of this admission to 3/7/2024 23:40 EST and agree with these findings:  [x]  Laboratory list / accordion  []  Microbiology  [x]  Radiology  [x]  EKG/Telemetry   []  Cardiology/Vascular   []  Pathology  [x]  Old records    LAB RESULTS:      Lab 03/07/24 2015   WBC 14.01*   HEMOGLOBIN 11.0*   HEMATOCRIT 32.1*   PLATELETS 241   NEUTROS ABS 10.64*   IMMATURE GRANS (ABS) 0.17*   LYMPHS ABS 1.84   MONOS ABS 1.21*   EOS ABS 0.13   MCV 84.7   PROCALCITONIN 0.24   LACTATE 1.7         Lab 03/07/24 2015   SODIUM 125*   POTASSIUM 3.5   CHLORIDE 86*   CO2 26.0   ANION GAP 13.0   BUN 25*   CREATININE 1.29*   EGFR 53.3*   GLUCOSE 162*   CALCIUM 9.0         Lab 03/07/24 2015   TOTAL PROTEIN 6.4   ALBUMIN 3.6   GLOBULIN 2.8   ALT (SGPT) 8   AST (SGOT) 14   BILIRUBIN 0.7   ALK PHOS 55         Lab 03/07/24 2015   PROBNP 6,418.0*   HSTROP T 30*                  Brief Urine Lab Results       None          Microbiology Results (last 10 days)       Procedure Component Value - Date/Time    COVID PRE-OP / PRE-PROCEDURE SCREENING ORDER (NO ISOLATION) - Swab, Nasopharynx [[id number]]  (Normal) Collected: 03/07/24 2018    Lab Status: Final result Specimen: Swab from Nasopharynx Updated: 03/07/24 2127    Narrative:      The following orders were created for panel order COVID PRE-OP / PRE-PROCEDURE SCREENING ORDER (NO ISOLATION) - Swab, Nasopharynx.  Procedure                               Abnormality         Status                     ---------                               -----------         ------                     Respiratory Panel PCR w/...[[id number]]  Normal              Final result                 Please view results for these tests on the individual orders.    Respiratory Panel PCR w/COVID-19(SARS-CoV-2) [facility]/KOKI/PRISCILLA/PAD/COR/AMY In-House, NP Swab in UTM/VTM, 2 HR TAT - Swab, Nasopharynx [[id number]]  (Normal) Collected: 03/07/24 2018    Lab Status: Final result Specimen: Swab from Nasopharynx Updated: 03/07/24 2127     ADENOVIRUS, PCR Not Detected     Coronavirus 229E Not Detected     Coronavirus HKU1 Not Detected     Coronavirus NL63 Not Detected     Coronavirus OC43 Not Detected     COVID19 Not Detected     Human Metapneumovirus Not Detected     Human Rhinovirus/Enterovirus Not Detected     Influenza A PCR Not Detected     Influenza B PCR Not Detected     Parainfluenza Virus 1 Not Detected     Parainfluenza Virus 2 Not Detected     Parainfluenza Virus 3 Not Detected     Parainfluenza Virus 4 Not Detected     RSV, PCR Not Detected     Bordetella pertussis pcr Not Detected     Bordetella parapertussis PCR Not Detected     Chlamydophila pneumoniae PCR Not Detected     Mycoplasma pneumo by PCR Not Detected    Narrative:      In the setting of a positive respiratory panel with a viral infection PLUS a negative procalcitonin without other underlying concern for bacterial  infection, consider observing off antibiotics or discontinuation of antibiotics and continue supportive care. If the respiratory panel is positive for atypical bacterial infection (Bordetella pertussis, Chlamydophila pneumoniae, or Mycoplasma pneumoniae), consider antibiotic de-escalation to target atypical bacterial infection.          CT Angiogram Chest    Result Date: 3/7/2024  CT ANGIOGRAM CHEST Date of Exam: 3/7/2024 9:22 PM EST Indication: Shortness of breath. Comparison: None available. Technique: CTA of the chest was performed after the uneventful intravenous administration of 75 cc Isovue-370 . Reconstructed coronal and sagittal images were also obtained. In addition, a 3-D volume rendered image was created for interpretation. Automated exposure control and iterative reconstruction methods were used. Findings: The thyroid gland is normal. The subglottic airway is clear. No evidence of aortic dissection. Severe atheromatous disease of the coronary vessels. No pulmonary embolus. Moderate right and small left pleural effusions. Small hiatal hernia. Extensive calcified mediastinal and hilar lymph nodes consistent with prior granulomatous disease. Infiltrates. Rounded density overlying the left pleural effusion measuring 3.8 cm x 3 cm favored to represent rounded atelectasis though an underlying mass  cannot be excluded. Limited evaluation of the upper abdomen is normal. Thoracic vertebral body height and alignment are normal. No lytic or blastic disease.     Impression: No pulmonary embolus. Moderate right and small left pleural effusions. 3.8 cm x 3 cm rounded lesion overlying the left pleural effusion favored to represent an area of rounded atelectasis. Consider PET/CT and/or biopsy. Electronically Signed: Lukasz Ramirez MD  3/7/2024 9:36 PM EST  Workstation ID: [id number]     Results for orders placed during the hospital encounter of 06/28/23    Adult Transthoracic Echo Complete W/ Cont if Necessary Per  Protocol    Interpretation Summary    Left ventricular systolic function is normal. Left ventricular ejection fraction appears to be 51 - 55%.    Left ventricular diastolic function was indeterminate.    The left atrial cavity is mildly dilated.    Mild aortic valve stenosis is present.    Aortic valve maximum pressure gradient is 28 mmHg. Aortic valve mean pressure gradient is 14 mmHg.    Moderate mitral valve regurgitation is present.    Moderate to severe tricuspid valve regurgitation is present.    Estimated right ventricular systolic pressure from tricuspid regurgitation is markedly elevated (>55 mmHg). Calculated right ventricular systolic pressure from tricuspid regurgitation is 60 mmHg.    There is a trivial pericardial effusion.    Assessment & Plan  Assessment & Plan       CHF (congestive heart failure)    COPD (chronic obstructive pulmonary disease)    GERD (gastroesophageal reflux disease)    DM (diabetes mellitus)    HTN (hypertension)    HLD (hyperlipidemia)    Leukocytosis    Pleural effusion, bilateral    CKD (chronic kidney disease) stage 3, GFR 30-59 ml/min    Hyponatremia    Lesion of lung    Atrial fibrillation    Ricki Trotter is a 88 y.o. male w/ a hx of COPD, bronchiectasis, HTN, HLD, CHF, CKD Stage III, GERD, T2DM, BPH, hx of dysphagia (s/p esophageal dilatation), hx of recurrent pneumonia who presented to the ED w/ c/o shortness of breath.     **Acute CHF exacerbation   **Atrial fibrillation   **Bilateral pleural effusions (R>L)  **HTN, HLD  -per family, pt 86% on room air prior to arrival today  -currently remains ~93% on room air   -initial EKG suggestive of A.Fib (pt denies hx)  -serial EKGS  -troponin 30, trend   -proBNP 6,418  -CTA Chest obtained; results above  -last ECHO 6/2023: EF 51-55%, normal LVSF, indeterminate LVDF, mild aortic stenosis, moderate mitral valve regurg, mod-severe tricuspid regurg, RVSP 60mmHg  -pt had ECHO at [facility] during admission this week;  records requested  -s/p Lasix 80mg IV given in ED; continue IV diuresis and monitor carefully  -awaiting med rec verification; unclear if pt taking Demadex routinely still   -continue ASA, Bystolic, statin   -daily weights; strict I/Os  -pt,ot and case mgt consult   -Cardiology consult in am     **Hyponatremia   -likely 2/2 volume overload  -Na 125 (previously 140 in 11/2023)  -trend, q 6 hours   -IV diuresis   -urine sodium pending     **Leukocytosis   -WBC 14.01  -afebrile   -procal and lactic acid both WNL   -treated for PNA at [facility] during recent admit (d/c 3/6); records requested; reportedly received dose of steroids recently  -UA Pending   -CTA Chest with no evidence of PNA  -monitor     **CKD Stage III  -previous Creatinine 1.51 in 11/2023, 1.17 in 3/2023   -current CR 1.29 w/ eGFR 53.3  -UA Pending   -monitor closely w/ IV diuresis     **COPD   **Hx of bronchiectasis   **Lung lesion   -pt currently on room air; c/o wheezing (s/p recent treatment for PNA, records requested from [facility])  -CTA Chest shows 3.8 cm x 3 cm rounded lesion overlying the left pleural effusion favored to represent an area of rounded atelectasis. Consider PET/CT and/or biopsy.   -believe pt following w/ Dr. Herbert w/ Pulmonology   -prn and scheduled nebs   -symptom mgt    **T2DM  -hem A1c pending  -hold routine meds   -FSBG q ac/hs w/ LDSS     DVT prophylaxis:  Heparin     CODE STATUS:    Medical Intervention Limits: NO intubation (DNI)  Level Of Support Discussed With: Patient  Code Status (Patient has no pulse and is not breathing): CPR (Attempt to Resuscitate)  Medical Interventions (Patient has pulse or is breathing): Limited Support    Expected Discharge pending clinical course(click hyperlink to enter date then refresh the note)  Expected discharge date/ time has not been documented.    This note has been completed as part of a split-shared workflow.     Signature: Electronically signed by LINDA Cohen,  03/07/24, 11:40 PM EST.    Time spent: 55 minutes         Attending   Admission Attestation       I have performed an independent face-to-face diagnostic evaluation including performing an independent physical examination.  I approve of the documented plan of care above that was reviewed and developed with the advanced practice clinician (APC) and take responsibility for that plan along with its associated risks.  I have updated the HPI as appropriate.    Brief HPI    88-year-old male with past medical history of heart failure, COPD, known lung nodule, following with pulmonary, CKD who presents with shortness of breath, found to have bilateral pleural effusions, diagnosis of heart failure exacerbation.  He is currently doing well on room air.  Additionally he does appear to be in A-fib, however upon speaking with the patient he states that he has been told that he had A-fib before including prior hospital stay at [facility] where he was admitted for pneumonia and heart failure exacerbation.  He states he was never on a blood thinner.    Attending Physical Exam:  Temp:  [97.7 °F (36.5 °C)-98.3 °F (36.8 °C)] 97.7 °F (36.5 °C)  Heart Rate:  [73-84] 74  Resp:  [16] 16  BP: (127-150)/(59-70) 134/59    Awake alert and oriented x 3  Resting comfortably in bed  JVP 10 cm  Diminished sounds bilaterally with rales one third way up lung fields bilaterally, end expiratory wheezing diffusely bilaterally  Heart rate irregular and rhythm irregular  Abdomen soft and nontender  1+ pretibial bilateral lower extremity edema  Extremities are warm dry and well-perfused    Result Review:  I have personally reviewed the results from the time of this admission to 3/8/2024 03:27 EST and agree with these findings:  [x]  Laboratory list / accordion  []  Microbiology  [x]  Radiology  [x]  EKG/Telemetry   [x]  Cardiology/Vascular   []  Pathology  [x]  Old records  []  Other:  Most notable findings include: See assessment and plan    Assessment  and Plan:    Acute decompensated heart failure.  As evidenced by increasing peripheral edema, orthopnea, pleural effusions and interstitial edema.  Continue on IV diuresis, we do not have a recent echo in our system however he did just have one at [place] and will need to request these records.  With the hyponatremia will need to monitor sodium very carefully while on diuretics although highly likely that sodium will improve as he is hypervolemic.    For A-fib I do not see a diagnosis listed in chart but according to family this is a known diagnosis rhythm.  They do not mention any strong contraindications to anticoagulation.  They did say that they discussed this at [place] as well.  Will need to see what the records show as to why he is not on anticoagulation however without contraindication would recommend starting Eliquis.    Cardiology consult in the morning, appreciate recommendations    See assessment and plan documented by APC above and updated/edited by me as appropriate.    Paresh Varma MD  24                      Electronically signed by Paresh Varma MD at 24 0347          Physician Progress Notes (all)        Olga Saucedo DO at 24 1133              [facility] Medicine Services  PROGRESS NOTE    Patient Name: Ricki Trotter  : 1935  MRN: [id number]    Date of Admission: 3/7/2024  Primary Care Physician: Stacey Hanley, LINDA    Subjective   Subjective     CC:  F/U CHF    HPI:  Patient seen and examined. Feels a little better today. Not as SOA. Remains in A fib, rate controlled. Has some wheezing.     Objective   Objective     Vital Signs:   Temp:  [97.4 °F (36.3 °C)-98.3 °F (36.8 °C)] 97.4 °F (36.3 °C)  Heart Rate:  [73-95] 95  Resp:  [16-18] 18  BP: (119-150)/(56-75) 136/75     Physical Exam:  Constitutional: No acute distress, awake, alert, elderly male, pleasant   HENT: NCAT, mucous membranes moist  Respiratory: +exp wheezes  B/L LL, respiratory effort normal RA  Cardiovascular: Irregular rhythm, rate controlled, no murmurs, rubs, or gallops  Gastrointestinal: Positive bowel sounds, soft, nontender, nondistended  Musculoskeletal: Trace bilateral ankle edema  Psychiatric: Appropriate affect, cooperative  Neurologic: Oriented x 3, [place], speech clear  Skin: No rashes     Results Reviewed:  LAB RESULTS:      Lab 03/08/24 0219 03/07/24 2015   WBC 11.47* 14.01*   HEMOGLOBIN 10.4* 11.0*   HEMATOCRIT 30.6* 32.1*   PLATELETS 234 241   NEUTROS ABS 8.42* 10.64*   IMMATURE GRANS (ABS) 0.13* 0.17*   LYMPHS ABS 1.73 1.84   MONOS ABS 1.04* 1.21*   EOS ABS 0.13 0.13   MCV 83.4 84.7   PROCALCITONIN  --  0.24   LACTATE  --  1.7         Lab 03/08/24 0219 03/08/24  0009 03/07/24 2015   SODIUM 125* 124* 125*   POTASSIUM 3.2*  --  3.5   CHLORIDE 85*  --  86*   CO2 28.0  --  26.0   ANION GAP 12.0  --  13.0   BUN 25*  --  25*   CREATININE 1.22  --  1.29*   EGFR 57.0*  --  53.3*   GLUCOSE 139*  --  162*   CALCIUM 8.6  --  9.0   MAGNESIUM 2.2  --   --    HEMOGLOBIN A1C 6.40*  --   --          Lab 03/07/24 2015   TOTAL PROTEIN 6.4   ALBUMIN 3.6   GLOBULIN 2.8   ALT (SGPT) 8   AST (SGOT) 14   BILIRUBIN 0.7   ALK PHOS 55         Lab 03/08/24 0219 03/08/24  0009 03/07/24 2015   PROBNP  --   --  6,418.0*   HSTROP T 27* 27* 30*                 Brief Urine Lab Results  (Last result in the past 365 days)        Color   Clarity   Blood   Leuk Est   Nitrite   Protein   CREAT   Urine HCG        03/08/24 0024             15.9         03/08/24 0024 Yellow   Clear   Negative   Negative   Negative   Negative                   Microbiology Results Abnormal       Procedure Component Value - Date/Time    COVID PRE-OP / PRE-PROCEDURE SCREENING ORDER (NO ISOLATION) - Swab, Nasopharynx [[id number]]  (Normal) Collected: 03/07/24 2018    Lab Status: Final result Specimen: Swab from Nasopharynx Updated: 03/07/24 2127    Narrative:      The following orders were created for panel  order COVID PRE-OP / PRE-PROCEDURE SCREENING ORDER (NO ISOLATION) - Swab, Nasopharynx.  Procedure                               Abnormality         Status                     ---------                               -----------         ------                     Respiratory Panel PCR w/...[[id number]]  Normal              Final result                 Please view results for these tests on the individual orders.    Respiratory Panel PCR w/COVID-19(SARS-CoV-2) [facility]/PAD/COR/AMY In-House, NP Swab in UTM/VTM, 2 HR TAT - Swab, Nasopharynx [[id number]]  (Normal) Collected: 03/07/24 2018    Lab Status: Final result Specimen: Swab from Nasopharynx Updated: 03/07/24 2127     ADENOVIRUS, PCR Not Detected     Coronavirus 229E Not Detected     Coronavirus HKU1 Not Detected     Coronavirus NL63 Not Detected     Coronavirus OC43 Not Detected     COVID19 Not Detected     Human Metapneumovirus Not Detected     Human Rhinovirus/Enterovirus Not Detected     Influenza A PCR Not Detected     Influenza B PCR Not Detected     Parainfluenza Virus 1 Not Detected     Parainfluenza Virus 2 Not Detected     Parainfluenza Virus 3 Not Detected     Parainfluenza Virus 4 Not Detected     RSV, PCR Not Detected     Bordetella pertussis pcr Not Detected     Bordetella parapertussis PCR Not Detected     Chlamydophila pneumoniae PCR Not Detected     Mycoplasma pneumo by PCR Not Detected    Narrative:      In the setting of a positive respiratory panel with a viral infection PLUS a negative procalcitonin without other underlying concern for bacterial infection, consider observing off antibiotics or discontinuation of antibiotics and continue supportive care. If the respiratory panel is positive for atypical bacterial infection (Bordetella pertussis, Chlamydophila pneumoniae, or Mycoplasma pneumoniae), consider antibiotic de-escalation to target atypical bacterial infection.            CT Angiogram Chest    Result Date: 3/7/2024  CT ANGIOGRAM CHEST  Date of Exam: 3/7/2024 9:22 PM EST Indication: Shortness of breath. Comparison: None available. Technique: CTA of the chest was performed after the uneventful intravenous administration of 75 cc Isovue-370 . Reconstructed coronal and sagittal images were also obtained. In addition, a 3-D volume rendered image was created for interpretation. Automated exposure control and iterative reconstruction methods were used. Findings: The thyroid gland is normal. The subglottic airway is clear. No evidence of aortic dissection. Severe atheromatous disease of the coronary vessels. No pulmonary embolus. Moderate right and small left pleural effusions. Small hiatal hernia. Extensive calcified mediastinal and hilar lymph nodes consistent with prior granulomatous disease. Infiltrates. Rounded density overlying the left pleural effusion measuring 3.8 cm x 3 cm favored to represent rounded atelectasis though an underlying mass  cannot be excluded. Limited evaluation of the upper abdomen is normal. Thoracic vertebral body height and alignment are normal. No lytic or blastic disease.     Impression: No pulmonary embolus. Moderate right and small left pleural effusions. 3.8 cm x 3 cm rounded lesion overlying the left pleural effusion favored to represent an area of rounded atelectasis. Consider PET/CT and/or biopsy. Electronically Signed: Lukasz Ramirez MD  3/7/2024 9:36 PM EST  Workstation ID: [id number]     Results for orders placed during the hospital encounter of 06/28/23    Adult Transthoracic Echo Complete W/ Cont if Necessary Per Protocol    Interpretation Summary    Left ventricular systolic function is normal. Left ventricular ejection fraction appears to be 51 - 55%.    Left ventricular diastolic function was indeterminate.    The left atrial cavity is mildly dilated.    Mild aortic valve stenosis is present.    Aortic valve maximum pressure gradient is 28 mmHg. Aortic valve mean pressure gradient is 14 mmHg.    Moderate mitral  valve regurgitation is present.    Moderate to severe tricuspid valve regurgitation is present.    Estimated right ventricular systolic pressure from tricuspid regurgitation is markedly elevated (>55 mmHg). Calculated right ventricular systolic pressure from tricuspid regurgitation is 60 mmHg.    There is a trivial pericardial effusion.      Current medications:  Scheduled Meds:apixaban, 5 mg, Oral, Q12H  atorvastatin, 20 mg, Oral, Nightly  furosemide, 40 mg, Intravenous, Q12H  insulin lispro, 2-7 Units, Subcutaneous, 4x Daily AC & at Bedtime  ipratropium-albuterol, 3 mL, Nebulization, 4x Daily - RT  nebivolol, 10 mg, Oral, Daily  pantoprazole, 40 mg, Oral, BID AC  pharmacy consult - MTM, , Does not apply, Daily  sodium chloride, 10 mL, Intravenous, Q12H      Continuous Infusions:   PRN Meds:.  acetaminophen **OR** acetaminophen **OR** acetaminophen    senna-docusate sodium **AND** polyethylene glycol **AND** bisacodyl **AND** bisacodyl    dextrose    dextrose    glucagon (human recombinant)    ipratropium-albuterol    melatonin    Potassium Replacement - Follow Nurse / BPA Driven Protocol    sodium chloride    sodium chloride    sodium chloride    Assessment & Plan   Assessment & Plan     Active Hospital Problems    Diagnosis  POA    **Acute on chronic diastolic congestive heart failure [I50.33]  Yes    Pulmonary hypertension [I27.20]  Yes    Valvular heart disease [I38]  Yes    HLD (hyperlipidemia) [E78.5]  Yes    Leukocytosis [D72.829]  Yes    Pleural effusion, bilateral [J90]  Yes    CKD (chronic kidney disease) stage 3, GFR 30-59 ml/min [N18.30]  Yes    Hyponatremia [E87.1]  Yes    Lesion of lung [R91.1]  Yes    Atrial fibrillation [I48.91]  Yes    PVC's (premature ventricular contractions) [I49.3]  Yes    COPD (chronic obstructive pulmonary disease) [J44.9]  Yes    GERD (gastroesophageal reflux disease) [K21.9]  Yes    Type 2 diabetes mellitus [E11.9]  Yes    Primary hypertension [I10]  Yes      Resolved  Hospital Problems   No resolved problems to display.        Brief Hospital Course to date:  Ricki Trotter is a 88 y.o. male w/ a hx of COPD, bronchiectasis, HTN, HLD, CHF, CKD Stage III, GERD, T2DM, BPH, hx of dysphagia (s/p esophageal dilatation), hx of recurrent pneumonia who presented to the ED w/ c/o shortness of breath.     This patient's problems and plans were partially entered by my partner and updated as appropriate by me 03/08/24.   All problems are new to me today     Acute CHF exacerbation   Atrial fibrillation (new Dx)  Bilateral pleural effusions (R>L)  HTN, HLD  -Cardiology following. Appreciate assistance. Follows with Dr Singh outpatient.   -Echo pending  -Received 80mg IV Lasix in ED. Continue 40mg IV q12  -Strict I's/O's, daily weights  -Eliquis added 5mg BID for stroke ppx   -Continue ASA, Bystolic, Statin   -Amlodipine discontinued per Cards      Hyponatremia   -likely 2/2 volume overload   -Continue diuresis  -Add fluid restriction, 1500cc daily  -Check sodium this afternoon   -BMP in AM      Leukocytosis -> trending down   -afebrile   -procal and lactic acid both WNL   -treated for PNA at [facility] during recent admit (d/c 3/6); records requested; reportedly received dose of steroids recently  -UA negative  -CTA Chest with no evidence of PNA  -monitor      CKD Stage III  -Stable  -Monitor with diuresis      COPD   Hx of bronchiectasis   Lung lesion (chronic)  Hx recurrent PNA  -Follows outpatient with Dr Adams   -Only uses Albuterol PRN   -Add Pulmicort Nebs      T2DM  -hem A1c 6.4  -hold routine meds   -FSBG q ac/hs w/ LDSS   -If Cr remains stable, consider adding Jardiance     Hypokalemia  -Replace per protocol   -Mg normal     Expected Discharge Location and Transportation: Home  Expected Discharge   Expected Discharge Date: 3/11/2024; Expected Discharge Time:      DVT prophylaxis:  Medical DVT prophylaxis orders are present.         AM-PAC 6 Clicks Score (PT): 23 (03/08/24  0800)    CODE STATUS:   Code Status and Medical Interventions:   Ordered at: 03/07/24 2317     Medical Intervention Limits:    NO intubation (DNI)     Level Of Support Discussed With:    Patient     Code Status (Patient has no pulse and is not breathing):    CPR (Attempt to Resuscitate)     Medical Interventions (Patient has pulse or is breathing):    Limited Support       Olga Saucedo DO  03/08/24        Electronically signed by Olga Saucedo DO at 03/08/24 1142          Consult Notes (all)        Priyanka Jenkins APRN at 03/08/24 1006        Consult Orders    1. Inpatient Cardiology Consult [[id number]] ordered by Cheryl Humphries APRN                 Inpatient Cardiology Consult  Consult performed by: Priyanka Jenkins APRN  Consult ordered by: Cheryl Humphries APRN          [facility]  Cardiovascular Consultation Note    Reason for consult: CHF/atrial fibrillation  Subjective   Patient is an 88-year-old gentleman with a history of type 2 diabetes mellitus, bronchiectasis/COPD, recurrent aspiration pneumonia, PVCs, diastolic heart failure, pulmonary hypertension, valvular heart disease, stage III chronic kidney disease, hypertension and hyperlipidemia who we are being asked to consult for acute heart failure and atrial fibrillation.  The patient had been hospitalized at [facility] for 6 days for pneumonia and was treated with IV antibiotics.  He was discharged home on Wednesday and feels he was in worse shape than when he went to their hospital.  His daughter took him to urgent treatment yesterday because he was wheezing and his breathing was worse.  His oxygen saturations were found to be 86% on room air and he was instructed to go to the emergency room.   Chest x-ray showed moderate right and small left pleural effusions and proBNP was elevated at 6400.  He was treated with IV Lasix with improvement in his breathing.  EKG concerning for  atrial fibrillation.  High-sensitivity troponin was minimally elevated and flat.  CT angiogram of the chest was negative for pulmonary embolism but did show a 3.8 x 3 cm lesion on the left lung.    Cardiac risk factors: Advanced age, hypertension, hyperlipidemia    Past medical and surgical history, social and family history reviewed in EMR.    REVIEW OF SYSTEMS:   H&P ROS reviewed and pertinent CV ROS as noted in HPI.    Objective     Vital Sign Min/Max for last 24 hours  Temp  Min: 97.4 °F (36.3 °C)  Max: 98.3 °F (36.8 °C)   BP  Min: 119/56  Max: 150/66   Pulse  Min: 73  Max: 92   Resp  Min: 16  Max: 18   SpO2  Min: 91 %  Max: 97 %   No data recorded      Intake/Output Summary (Last 24 hours) at 3/8/2024 1006  Last data filed at 3/8/2024 0500  Gross per 24 hour   Intake 1000 ml   Output 1275 ml   Net -275 ml             EKG: 3/8/2024 atrial fibrillation at 72 bpm    Lab Review:   Labs reviewed in the electronic medical record.  Pertinent findings include:  Lab Results   Component Value Date    GLUCOSE 139 (H) 03/08/2024    BUN 25 (H) 03/08/2024    CREATININE 1.22 03/08/2024    EGFRRESULT 44 (L) 11/10/2023    EGFR 57.0 (L) 03/08/2024    BCR 20.5 03/08/2024    K 3.2 (L) 03/08/2024    CO2 28.0 03/08/2024    CALCIUM 8.6 03/08/2024    PROTENTOTREF 6.2 11/10/2023    ALBUMIN 3.6 03/07/2024    BILITOT 0.7 03/07/2024    AST 14 03/07/2024    ALT 8 03/07/2024     Lab Results   Component Value Date    WBC 11.47 (H) 03/08/2024    HGB 10.4 (L) 03/08/2024    HCT 30.6 (L) 03/08/2024    MCV 83.4 03/08/2024     03/08/2024     Lab Results   Component Value Date    CHLPL 145 11/10/2023    TRIG 128 11/10/2023    HDL 43 11/10/2023    LDL 79 11/10/2023     Results from last 7 days   Lab Units 03/08/24  0219   HEMOGLOBIN A1C % 6.40*         Assessment   Active Hospital Problems    Diagnosis     **Acute on chronic diastolic congestive heart failure      Echo (7/2020): EF 50-55%, grade 2 diastolic dysfunction.  Mild to moderate MR.   Mild to moderate TR with RVSP 52 mmHg   Echo (6/29/2023): LVEF 51 - 55%.  Mild aortic stenosis with mean gradient 14 mmHg.  Moderate MR.  Moderate to severe TR with RVSP 60 mmHg      Pulmonary hypertension      Echo (7/2020): EF 50-55%, grade 2 diastolic dysfunction.  Mild to moderate MR.  Mild to moderate TR with RVSP 52 mmHg   Echo (6/29/2023): LVEF 51 - 55%.  Mild aortic stenosis with mean gradient 14 mmHg.  Moderate MR.  Moderate to severe TR with RVSP 60 mmHg      Leukocytosis     Pleural effusion, bilateral     CKD (chronic kidney disease) stage 3, GFR 30-59 ml/min     Atrial fibrillation      Echo (6/28/2023): EF 51-55%.  Mild aortic stenosis with mean gradient 14 mmHg.  Moderate MR.  Moderate to severe TR with RVSP 60 mmHg      Type 2 diabetes mellitus     Valvular heart disease      Echo (6/29/2023): LVEF 51 - 55%.  Mild aortic stenosis mean gradient 14 mmHg.  Moderate MR.  Moderate to severe TR with RVSP 60 mmHg      Hyponatremia     Lesion of lung     PVC's (premature ventricular contractions)      9day Holter (7/2020): Average heart rate 66.  4% burden PVCs.  5 runs of NSVT, 20 runs of SVT.  No atrial fibrillation   Nuclear stress (7/2020):No evidence of ischemia, frequent PVCs noted at rest actually decreased with increased heart rate.  Normal LVEF  60 monitor (6/2023): No atrial fibrillation.  PVC burden 7.2%  Echo (6/29/2023): LVEF 51 - 55%.  Mild aortic stenosis mean gradient 14 mmHg.  Moderate MR.  Moderate to severe TR with RVSP 60 mmHg      COPD (chronic obstructive pulmonary disease)     Primary hypertension     HLD (hyperlipidemia)     GERD (gastroesophageal reflux disease)           Plan   Acute on chronic diastolic heart failure  Continue to diurese with IV Lasix 40 mg twice daily  Monitor creatinine closely  Strict I's and O's and daily weights  Would leave off home dose amlodipine permanently as can worsen CHF      Atrial fibrillation  New onset this admission  Currently rate controlled.   Would not recommend ECV at this time and continue to rate control with his Bystolic  Start Eliquis 5 mg twice daily for stroke prophylaxis  Repeat echocardiogram    Valvular heart disease  Echo last summer showed mild AS, moderate MR and moderate to severe TR with elevated RVSP  Repeat echocardiogram    Pulmonary hypertension  Patient has had elevated RVSP on past 2 echoes  Repeat echocardiogram  After IV diuretics completed May benefit from addition of spironolactone    Hypertension  Current /75  Continue Bystolic 10 mg daily  Would permanently leave off amlodipine which he takes at home      Hyperlipidemia  Continue Lipitor 20 mg daily  Lipid panel within normal limits    COPD/bronchiectasis/lung lesion  Management per hospitalist      Type 2 diabetes mellitus  Consider addition of SGL 2 inhibitor if renal function stays stable        LINDA Day          Electronically signed by Priyanka Jenkins APRN at 03/08/24 3495

## 2024-03-08 NOTE — THERAPY EVALUATION
Patient Name: Ricki Trotter  : 1935    MRN: [id number]                              Today's Date: 3/8/2024       Admit Date: 3/7/2024    Visit Dx:     ICD-10-CM ICD-9-CM   1. Shortness of breath  R06.02 786.05   2. Acute on chronic diastolic heart failure  I50.33 428.33   3. Hyponatremia  E87.1 276.1   4. Pleural effusion, bilateral  J90 511.9     Patient Active Problem List   Diagnosis    Mass of lower lobe of left lung    COPD (chronic obstructive pulmonary disease)    Recurrent pneumonia    GERD (gastroesophageal reflux disease)    Type 2 diabetes mellitus    Primary hypertension    PVC's (premature ventricular contractions)    Acute on chronic diastolic congestive heart failure    HLD (hyperlipidemia)    Leukocytosis    Pleural effusion, bilateral    CKD (chronic kidney disease) stage 3, GFR 30-59 ml/min    Hyponatremia    Lesion of lung    Atrial fibrillation    Pulmonary hypertension    Valvular heart disease    CHF (congestive heart failure)     Past Medical History:   Diagnosis Date    Allergic rhinitis     Allergies     Amnesia     Basal cell carcinoma     Benign prostatic hyperplasia     Chronic kidney disease, stage 3b     CKD (chronic kidney disease)     COPD (chronic obstructive pulmonary disease)     Diabetes mellitus     Drug dependency     Dyslipidemia     Essential hypertension     Gastroesophageal reflux     Hyperlipidemia     Hypertension     Hyperthyroidism     Insulin-treated type 2 diabetes mellitus     Iron deficiency     Long term (current) use of insulin     Melanoma     Mixed hyperlipidemia     Nephrosclerosis      Past Surgical History:   Procedure Laterality Date    CATARACT EXTRACTION Bilateral     COLONOSCOPY      PROSTHODONTIC PROCEDURE        General Information       Row Name 24 1039          Physical Therapy Time and Intention    Document Type evaluation  -MB     Mode of Treatment physical therapy  -MB       Row Name 24 1039          General  Information    Patient Profile Reviewed yes  -MB     Prior Level of Function independent:;bed mobility;ADL's;transfer;gait;all household mobility;community mobility  -MB     Existing Precautions/Restrictions fall  -MB     Barriers to Rehab none identified  -MB       Row Name 03/08/24 1039          Living Environment    People in Home alone  -MB       Row Name 03/08/24 1039          Home Main Entrance    Number of Stairs, Main Entrance two  -MB     Stair Railings, Main Entrance none  -MB       Row Name 03/08/24 1039          Stairs Within Home, Primary    Number of Stairs, Within Home, Primary none  -MB       Row Name 03/08/24 1039          Cognition    Orientation Status (Cognition) oriented x 3  -MB       Row Name 03/08/24 1039          Safety Issues, Functional Mobility    Impairments Affecting Function (Mobility) endurance/activity tolerance;strength  -MB               User Key  (r) = Recorded By, (t) = Taken By, (c) = Cosigned By      Initials Name Provider Type    Val Holm, PT Physical Therapist                   Mobility       Row Name 03/08/24 1400          Bed Mobility    Comment, (Bed Mobility) [facility]IC  -MB       Row Name 03/08/24 1400          Transfers    Comment, (Transfers) Pt. demo safe hand placement and no LOB upon standing.  -MB       Row Name 03/08/24 1400          Sit-Stand Transfer    Sit-Stand [place] (Transfers) supervision  -MB       Row Name 03/08/24 1400          Gait/Stairs (Locomotion)    [place] Level (Gait) contact guard;verbal cues  -MB     Distance in Feet (Gait) 120  -MB     Deviations/Abnormal Patterns (Gait) malini decreased;gait speed decreased;stride length decreased  -MB     [place] Level (Stairs) not tested  -MB     Comment, (Gait/Stairs) Pt. ambulated w/ step through gait pattern w/ slower pace and 1 episode of mild LOB requiring assist to correct. VCs for forward gaze.  -MB               User Key  (r) = Recorded By, (t) = Taken By, (c) = Cosigned By       Initials Name Provider Type    Val Holm, PT Physical Therapist                   Obj/Interventions       Row Name 03/08/24 1401          Range of Motion Comprehensive    General Range of Motion bilateral lower extremity ROM WFL  -MB       Row Name 03/08/24 1401          Strength Comprehensive (MMT)    General Manual Muscle Testing (MMT) Assessment lower extremity strength deficits identified  -MB     Comment, General Manual Muscle Testing (MMT) Assessment BLEs grossly 4/5  -MB       Row Name 03/08/24 1401          Balance    Balance Assessment sitting static balance;standing static balance;standing dynamic balance  -MB     Static Sitting Balance independent  -MB     Dynamic Sitting Balance independent  -MB     Position, Sitting Balance unsupported;sitting in chair  -MB     Static Standing Balance supervision  -MB     Dynamic Standing Balance contact guard  -MB     Position/Device Used, Standing Balance unsupported  -MB     Balance Interventions standing;sit to stand;weight shifting activity;dynamic reaching  -MB       Row Name 03/08/24 1401          Sensory Assessment (Somatosensory)    Sensory Assessment (Somatosensory) LE sensation intact  -MB               User Key  (r) = Recorded By, (t) = Taken By, (c) = Cosigned By      Initials Name Provider Type    Val Holm, PT Physical Therapist                   Goals/Plan       Row Name 03/08/24 1404          Bed Mobility Goal 1 (PT)    Activity/Assistive Device (Bed Mobility Goal 1, PT) sit to supine/supine to sit  -MB     [place] Level/Cues Needed (Bed Mobility Goal 1, PT) independent  -MB     Time Frame (Bed Mobility Goal 1, PT) 1 week  -MB       Row Name 03/08/24 1404          Transfer Goal 1 (PT)    Activity/Assistive Device (Transfer Goal 1, PT) sit-to-stand/stand-to-sit;bed-to-chair/chair-to-bed  -MB     [place] Level/Cues Needed (Transfer Goal 1, PT) independent  -MB     Time Frame (Transfer Goal 1, PT) 1 week  -MB        Row Name 03/08/24 1404          Gait Training Goal 1 (PT)    Activity/Assistive Device (Gait Training Goal 1, PT) gait (walking locomotion)  -MB     [place] Level (Gait Training Goal 1, PT) independent  -MB     Distance (Gait Training Goal 1, PT) 150  -MB     Time Frame (Gait Training Goal 1, PT) 10 days  -MB       Row Name 03/08/24 1404          Stairs Goal 1 (PT)    Activity/Assistive Device (Stairs Goal 1, PT) ascending stairs;descending stairs  -MB     [place] Level/Cues Needed (Stairs Goal 1, PT) supervision required  -MB     Number of Stairs (Stairs Goal 1, PT) 2  -MB     Time Frame (Stairs Goal 1, PT) by discharge  -MB       Row Name 03/08/24 1404          Patient Education Goal (PT)    Activity (Patient Education Goal, PT) HEP  -MB     [place]/Cues/Accuracy (Memory Goal 2, PT) demonstrates adequately  -MB     Time Frame (Patient Education Goal, PT) 1 week  -MB       Row Name 03/08/24 1404          Therapy Assessment/Plan (PT)    Planned Therapy Interventions (PT) balance training;bed mobility training;home exercise program;gait training;patient/family education;stair training;strengthening;transfer training  -MB               User Key  (r) = Recorded By, (t) = Taken By, (c) = Cosigned By      Initials Name Provider Type    Val Holm, PT Physical Therapist                   Clinical Impression       Row Name 03/08/24 1402          Pain    Pretreatment Pain Rating 0/10 - no pain  -MB     Posttreatment Pain Rating 0/10 - no pain  -MB       Row Name 03/08/24 1402          Plan of Care Review    Plan of Care Reviewed With patient;daughter  -MB     Progress no change  -MB     Outcome Evaluation PT eval completed. Patient presents w/ mild generalized weakness, very mild balance deficits, mildly unsteady gait, and is slightly below his functional baseline. He ambulated in hallway w/ 1 minor LOB requiring assist to recover. Skilled IPPT warranted to promote return to PLOF. Recommend home  w/ assist when medically appropriate.  -MB       Row Name 03/08/24 1402          Therapy Assessment/Plan (PT)    Patient/Family Therapy Goals Statement (PT) Return to home and PLOF.  -MB     Rehab Potential (PT) good, to achieve stated therapy goals  -MB     Criteria for Skilled Interventions Met (PT) yes;meets criteria;skilled treatment is necessary  -MB     Therapy Frequency (PT) daily  -MB       Row Name 03/08/24 1402          Vital Signs    Pre Systolic BP Rehab 136  -MB     Pre Treatment Diastolic BP 75  -MB     Post Systolic BP Rehab 134  -MB     Post Treatment Diastolic BP 58  -MB     Pre Patient Position Sitting  -MB     Intra Patient Position Standing  -MB     Post Patient Position Sitting  -MB       Row Name 03/08/24 1402          Positioning and Restraints    Pre-Treatment Position sitting in chair/recliner  -MB     Post Treatment Position chair  -MB     In Chair notified nsg;reclined;call light within reach;encouraged to call for assist;with family/caregiver;legs elevated;with nsg  -MB               User Key  (r) = Recorded By, (t) = Taken By, (c) = Cosigned By      Initials Name Provider Type    Val Holm, PT Physical Therapist                   Outcome Measures       Row Name 03/08/24 1405 03/08/24 0800       How much help from another person do you currently need...    Turning from your back to your side while in flat bed without using bedrails? 4  -MB 4  -CD    Moving from lying on back to sitting on the side of a flat bed without bedrails? 3  -MB 4  -CD    Moving to and from a bed to a chair (including a wheelchair)? 3  -MB 4  -CD    Standing up from a chair using your arms (e.g., wheelchair, bedside chair)? 3  -MB 4  -CD    Climbing 3-5 steps with a railing? 3  -MB 3  -CD    To walk in hospital room? 3  -MB 4  -CD    AM-PAC 6 Clicks Score (PT) 19  -MB 23  -CD    Highest Level of Mobility Goal 6 --> Walk 10 steps or more  -MB 7 --> Walk 25 feet or more  -CD      Row Name 03/08/24 1407  03/08/24 1017       Functional Assessment    Outcome Measure Options AM-PAC 6 Clicks Basic Mobility (PT)  -MB AM-PAC 6 Clicks Daily Activity (OT)  -SR              User Key  (r) = Recorded By, (t) = Taken By, (c) = Cosigned By      Initials Name Provider Type    Val Holm, PT Physical Therapist    Sabi Jimenez, RN Registered Nurse    SR Ibeth Jenkins, OT Occupational Therapist                                 Physical Therapy Education       Title: PT OT SLP Therapies (In Progress)       Topic: Physical Therapy (Done)       Point: Mobility training (Done)       Learning Progress Summary             Patient Acceptance, E,D, VU by MB at 3/8/2024 1405                         Point: Home exercise program (Done)       Learning Progress Summary             Patient Acceptance, E,D, VU by MB at 3/8/2024 1405                         Point: Body mechanics (Done)       Learning Progress Summary             Patient Acceptance, E,D, VU by MB at 3/8/2024 1405                         Point: Precautions (Done)       Learning Progress Summary             Patient Acceptance, E,D, VU by MB at 3/8/2024 1405                                         User Key       Initials Effective Dates Name Provider Type Discipline    MB 06/16/21 -  Val Talbot, PT Physical Therapist PT                  PT Recommendation and Plan  Planned Therapy Interventions (PT): balance training, bed mobility training, home exercise program, gait training, patient/family education, stair training, strengthening, transfer training  Plan of Care Reviewed With: patient, daughter  Progress: no change  Outcome Evaluation: PT eval completed. Patient presents w/ mild generalized weakness, very mild balance deficits, mildly unsteady gait, and is slightly below his functional baseline. He ambulated in hallway w/ 1 minor LOB requiring assist to recover. Skilled IPPT warranted to promote return to PLOF. Recommend home w/ assist when medically  appropriate.     Time Calculation:   PT Evaluation Complexity  History, PT Evaluation Complexity: 1-2 personal factors and/or comorbidities  Examination of Body Systems (PT Eval Complexity): total of 3 or more elements  Clinical Presentation (PT Evaluation Complexity): evolving  Clinical Decision Making (PT Evaluation Complexity): low complexity  Overall Complexity (PT Evaluation Complexity): low complexity     PT Charges       Row Name 03/08/24 1407             Time Calculation    Start Time 1039  -MB      PT Received On 03/08/24  -MB      PT Goal Re-Cert Due Date 03/18/24  -MB         Untimed Charges    PT Eval/Re-eval Minutes 46  -MB         Total Minutes    Untimed Charges Total Minutes 46  -MB       Total Minutes 46  -MB                User Key  (r) = Recorded By, (t) = Taken By, (c) = Cosigned By      Initials Name Provider Type    Val Holm, PT Physical Therapist                  Therapy Charges for Today       Code Description Service Date Service Provider Modifiers Qty    43510900689 HC PT EVAL LOW COMPLEXITY 4 3/8/2024 Val Talbot, PT GP 1            PT G-Codes  Outcome Measure Options: AM-PAC 6 Clicks Basic Mobility (PT)  AM-PAC 6 Clicks Score (PT): 19  AM-PAC 6 Clicks Score (OT): 24  PT Discharge Summary  Anticipated Discharge Disposition (PT): home with assist    Val Talbot, PT  3/8/2024

## 2024-03-09 LAB
ANION GAP SERPL CALCULATED.3IONS-SCNC: 13 MMOL/L (ref 5–15)
BH CV ECHO MEAS - AO MAX PG: 22.2 MMHG
BH CV ECHO MEAS - AO MEAN PG: 13.2 MMHG
BH CV ECHO MEAS - AO ROOT DIAM: 3.4 CM
BH CV ECHO MEAS - AO V2 MAX: 235.4 CM/SEC
BH CV ECHO MEAS - AO V2 VTI: 49 CM
BH CV ECHO MEAS - AVA(I,D): 1.28 CM2
BH CV ECHO MEAS - EDV(CUBED): 85.2 ML
BH CV ECHO MEAS - EDV(MOD-SP2): 58.1 ML
BH CV ECHO MEAS - EDV(MOD-SP4): 90.4 ML
BH CV ECHO MEAS - EF(MOD-BP): 69.1 %
BH CV ECHO MEAS - EF(MOD-SP2): 75.7 %
BH CV ECHO MEAS - EF(MOD-SP4): 64.2 %
BH CV ECHO MEAS - ESV(CUBED): 32.8 ML
BH CV ECHO MEAS - ESV(MOD-SP2): 14.1 ML
BH CV ECHO MEAS - ESV(MOD-SP4): 32.4 ML
BH CV ECHO MEAS - FS: 27.3 %
BH CV ECHO MEAS - IVS/LVPW: 1.09 CM
BH CV ECHO MEAS - IVSD: 1.2 CM
BH CV ECHO MEAS - LA DIMENSION: 4 CM
BH CV ECHO MEAS - LAT PEAK E' VEL: 9.9 CM/SEC
BH CV ECHO MEAS - LV MASS(C)D: 180 GRAMS
BH CV ECHO MEAS - LV MAX PG: 2.5 MMHG
BH CV ECHO MEAS - LV MEAN PG: 1 MMHG
BH CV ECHO MEAS - LV V1 MAX: 79.8 CM/SEC
BH CV ECHO MEAS - LV V1 VTI: 18.1 CM
BH CV ECHO MEAS - LVIDD: 4.4 CM
BH CV ECHO MEAS - LVIDS: 3.2 CM
BH CV ECHO MEAS - LVOT AREA: 3.5 CM2
BH CV ECHO MEAS - LVOT DIAM: 2.1 CM
BH CV ECHO MEAS - LVPWD: 1.1 CM
BH CV ECHO MEAS - MED PEAK E' VEL: 7.6 CM/SEC
BH CV ECHO MEAS - MR MAX PG: 115.3 MMHG
BH CV ECHO MEAS - MR MAX VEL: 537 CM/SEC
BH CV ECHO MEAS - MR MEAN PG: 68 MMHG
BH CV ECHO MEAS - MR MEAN VEL: 379 CM/SEC
BH CV ECHO MEAS - MR VTI: 129 CM
BH CV ECHO MEAS - MV DEC SLOPE: 527 CM/SEC2
BH CV ECHO MEAS - MV DEC TIME: 0.23 SEC
BH CV ECHO MEAS - MV E MAX VEL: 119 CM/SEC
BH CV ECHO MEAS - MV MAX PG: 4.7 MMHG
BH CV ECHO MEAS - MV MEAN PG: 2 MMHG
BH CV ECHO MEAS - MV V2 VTI: 23.4 CM
BH CV ECHO MEAS - MVA(VTI): 2.7 CM2
BH CV ECHO MEAS - PA ACC TIME: 0.11 SEC
BH CV ECHO MEAS - PA V2 MAX: 109.5 CM/SEC
BH CV ECHO MEAS - RAP SYSTOLE: 8 MMHG
BH CV ECHO MEAS - RVSP: 58 MMHG
BH CV ECHO MEAS - SV(LVOT): 62.7 ML
BH CV ECHO MEAS - SV(MOD-SP2): 44 ML
BH CV ECHO MEAS - SV(MOD-SP4): 58 ML
BH CV ECHO MEAS - TAPSE (>1.6): 2.7 CM
BH CV ECHO MEAS - TR MAX PG: 49 MMHG
BH CV ECHO MEAS - TR MAX VEL: 351 CM/SEC
BH CV ECHO MEASUREMENTS AVERAGE E/E' RATIO: 13.6
BH CV VAS BP RIGHT ARM: NORMAL MMHG
BH CV XLRA - RV BASE: 2.8 CM
BH CV XLRA - RV LENGTH: 5.8 CM
BH CV XLRA - RV MID: 1.9 CM
BH CV XLRA - TDI S': 14.7 CM/SEC
BUN SERPL-MCNC: 21 MG/DL (ref 8–23)
BUN/CREAT SERPL: 16.9 (ref 7–25)
CALCIUM SPEC-SCNC: 8.5 MG/DL (ref 8.6–10.5)
CHLORIDE SERPL-SCNC: 89 MMOL/L (ref 98–107)
CO2 SERPL-SCNC: 29 MMOL/L (ref 22–29)
CREAT SERPL-MCNC: 1.24 MG/DL (ref 0.76–1.27)
DEPRECATED RDW RBC AUTO: 41.7 FL (ref 37–54)
EGFRCR SERPLBLD CKD-EPI 2021: 55.9 ML/MIN/1.73
ERYTHROCYTE [DISTWIDTH] IN BLOOD BY AUTOMATED COUNT: 13.5 % (ref 12.3–15.4)
GLUCOSE BLDC GLUCOMTR-MCNC: 145 MG/DL (ref 70–130)
GLUCOSE BLDC GLUCOMTR-MCNC: 180 MG/DL (ref 70–130)
GLUCOSE BLDC GLUCOMTR-MCNC: 247 MG/DL (ref 70–130)
GLUCOSE BLDC GLUCOMTR-MCNC: 82 MG/DL (ref 70–130)
GLUCOSE SERPL-MCNC: 113 MG/DL (ref 65–99)
HCT VFR BLD AUTO: 28.3 % (ref 37.5–51)
HGB BLD-MCNC: 9.8 G/DL (ref 13–17.7)
LEFT ATRIUM VOLUME INDEX: 41.8 ML/M2
MCH RBC QN AUTO: 29.3 PG (ref 26.6–33)
MCHC RBC AUTO-ENTMCNC: 34.6 G/DL (ref 31.5–35.7)
MCV RBC AUTO: 84.7 FL (ref 79–97)
PLATELET # BLD AUTO: 281 10*3/MM3 (ref 140–450)
PMV BLD AUTO: 10.3 FL (ref 6–12)
POTASSIUM SERPL-SCNC: 2.9 MMOL/L (ref 3.5–5.2)
POTASSIUM SERPL-SCNC: 3.8 MMOL/L (ref 3.5–5.2)
RBC # BLD AUTO: 3.34 10*6/MM3 (ref 4.14–5.8)
SODIUM SERPL-SCNC: 131 MMOL/L (ref 136–145)
WBC NRBC COR # BLD AUTO: 10.69 10*3/MM3 (ref 3.4–10.8)

## 2024-03-09 PROCEDURE — 80048 BASIC METABOLIC PNL TOTAL CA: CPT | Performed by: FAMILY MEDICINE

## 2024-03-09 PROCEDURE — 99232 SBSQ HOSP IP/OBS MODERATE 35: CPT | Performed by: FAMILY MEDICINE

## 2024-03-09 PROCEDURE — 25010000002 FUROSEMIDE PER 20 MG: Performed by: NURSE PRACTITIONER

## 2024-03-09 PROCEDURE — 94664 DEMO&/EVAL PT USE INHALER: CPT

## 2024-03-09 PROCEDURE — 85027 COMPLETE CBC AUTOMATED: CPT | Performed by: FAMILY MEDICINE

## 2024-03-09 PROCEDURE — 94761 N-INVAS EAR/PLS OXIMETRY MLT: CPT

## 2024-03-09 PROCEDURE — 84132 ASSAY OF SERUM POTASSIUM: CPT | Performed by: FAMILY MEDICINE

## 2024-03-09 PROCEDURE — 82948 REAGENT STRIP/BLOOD GLUCOSE: CPT

## 2024-03-09 PROCEDURE — 94799 UNLISTED PULMONARY SVC/PX: CPT

## 2024-03-09 PROCEDURE — 63710000001 INSULIN LISPRO (HUMAN) PER 5 UNITS: Performed by: NURSE PRACTITIONER

## 2024-03-09 PROCEDURE — 99231 SBSQ HOSP IP/OBS SF/LOW 25: CPT | Performed by: NURSE PRACTITIONER

## 2024-03-09 RX ORDER — ESOMEPRAZOLE MAGNESIUM 40 MG/1
40 CAPSULE, DELAYED RELEASE ORAL EVERY 12 HOURS SCHEDULED
Status: DISCONTINUED | OUTPATIENT
Start: 2024-03-09 | End: 2024-03-11 | Stop reason: HOSPADM

## 2024-03-09 RX ORDER — NEBIVOLOL 10 MG/1
10 TABLET ORAL DAILY
Status: DISCONTINUED | OUTPATIENT
Start: 2024-03-09 | End: 2024-03-11 | Stop reason: HOSPADM

## 2024-03-09 RX ORDER — FUROSEMIDE 40 MG/1
40 TABLET ORAL
Status: DISCONTINUED | OUTPATIENT
Start: 2024-03-10 | End: 2024-03-10

## 2024-03-09 RX ORDER — POTASSIUM CHLORIDE 20 MEQ/1
40 TABLET, EXTENDED RELEASE ORAL EVERY 4 HOURS
Status: COMPLETED | OUTPATIENT
Start: 2024-03-09 | End: 2024-03-09

## 2024-03-09 RX ORDER — FUROSEMIDE 10 MG/ML
40 INJECTION INTRAMUSCULAR; INTRAVENOUS EVERY 12 HOURS
Status: COMPLETED | OUTPATIENT
Start: 2024-03-09 | End: 2024-03-09

## 2024-03-09 RX ADMIN — ESOMEPRAZOLE MAGNESIUM 40 MG: 40 CAPSULE, DELAYED RELEASE ORAL at 21:17

## 2024-03-09 RX ADMIN — Medication 10 ML: at 08:55

## 2024-03-09 RX ADMIN — Medication 10 ML: at 21:17

## 2024-03-09 RX ADMIN — INSULIN LISPRO 2 UNITS: 100 INJECTION, SOLUTION INTRAVENOUS; SUBCUTANEOUS at 21:17

## 2024-03-09 RX ADMIN — IPRATROPIUM BROMIDE AND ALBUTEROL SULFATE 3 ML: 2.5; .5 SOLUTION RESPIRATORY (INHALATION) at 08:16

## 2024-03-09 RX ADMIN — APIXABAN 5 MG: 5 TABLET, FILM COATED ORAL at 21:17

## 2024-03-09 RX ADMIN — BUDESONIDE 0.5 MG: 0.5 SUSPENSION RESPIRATORY (INHALATION) at 20:01

## 2024-03-09 RX ADMIN — POTASSIUM CHLORIDE 40 MEQ: 1500 TABLET, EXTENDED RELEASE ORAL at 14:22

## 2024-03-09 RX ADMIN — IPRATROPIUM BROMIDE AND ALBUTEROL SULFATE 3 ML: 2.5; .5 SOLUTION RESPIRATORY (INHALATION) at 20:01

## 2024-03-09 RX ADMIN — FUROSEMIDE 40 MG: 10 INJECTION, SOLUTION INTRAMUSCULAR; INTRAVENOUS at 08:53

## 2024-03-09 RX ADMIN — ATORVASTATIN CALCIUM 20 MG: 20 TABLET, FILM COATED ORAL at 21:17

## 2024-03-09 RX ADMIN — POTASSIUM CHLORIDE 40 MEQ: 1500 TABLET, EXTENDED RELEASE ORAL at 08:52

## 2024-03-09 RX ADMIN — IPRATROPIUM BROMIDE AND ALBUTEROL SULFATE 3 ML: 2.5; .5 SOLUTION RESPIRATORY (INHALATION) at 13:29

## 2024-03-09 RX ADMIN — BUDESONIDE 0.5 MG: 0.5 SUSPENSION RESPIRATORY (INHALATION) at 08:16

## 2024-03-09 RX ADMIN — PANTOPRAZOLE SODIUM 40 MG: 40 TABLET, DELAYED RELEASE ORAL at 08:52

## 2024-03-09 RX ADMIN — INSULIN LISPRO 3 UNITS: 100 INJECTION, SOLUTION INTRAVENOUS; SUBCUTANEOUS at 14:21

## 2024-03-09 RX ADMIN — POTASSIUM CHLORIDE 40 MEQ: 1500 TABLET, EXTENDED RELEASE ORAL at 18:34

## 2024-03-09 RX ADMIN — PANTOPRAZOLE SODIUM 40 MG: 40 TABLET, DELAYED RELEASE ORAL at 17:04

## 2024-03-09 RX ADMIN — FUROSEMIDE 40 MG: 10 INJECTION, SOLUTION INTRAMUSCULAR; INTRAVENOUS at 21:17

## 2024-03-09 RX ADMIN — IPRATROPIUM BROMIDE AND ALBUTEROL SULFATE 3 ML: 2.5; .5 SOLUTION RESPIRATORY (INHALATION) at 16:54

## 2024-03-09 NOTE — PROGRESS NOTES
Ricki Trotter  [id number]  1935   LOS: 1 day   Patient Care Team:  Stacey Hanley APRN as PCP - General (Family Medicine)  Delgado Larios MD as Consulting Physician (Medical Oncology)  Demetria Dasilva APRN as Nurse Practitioner (Pulmonary Disease)  Lukasz Singh MD as Consulting Physician (Cardiology)  Gregorio Herbert MD as Consulting Physician (Pulmonary Disease)    Chief Complaint: Follow-up on HFpEF, severe pulmonary hypertension, VHD, atrial fibrillation    Subjective  The patient denies chest pain, edema, sputum production, or increased SOB. He has wheezing at times but says that in the past 24 hours his breathing has significantly improved and he was actually able to sleep last night. He says he can only take the name brand Bystolic and cannot take the generic. He is unaware of being in atrial fibrillation.    Objective     Vital Sign Min/Max for last 24 hours  Temp  Min: 97.8 °F (36.6 °C)  Max: 98.2 °F (36.8 °C)   BP  Min: 115/50  Max: 133/67   Pulse  Min: 69  Max: 114   Resp  Min: 16  Max: 18   SpO2  Min: 90 %  Max: 100 %   No data recorded   No data recorded         03/07/24 1947   Weight: 72.1 kg (159 lb)         Intake/Output Summary (Last 24 hours) at 3/9/2024 0820  Last data filed at 3/9/2024 0557  Gross per 24 hour   Intake 420 ml   Output 1200 ml   Net -780 ml       Physical Exam:     General Appearance:    Alert, cooperative, in no acute distress   Lungs:     Faint wheezing to auscultation,respirations regular, even and                unlabored    Heart:    Irregular and normal rate, normal S1 and S2, grade 2/6            murmur, no gallop, no rub, no click   Abdomen:  Extremities:   Soft, nontender, bowel sounds audible x4    No edema, normal range of motion   Pulses:   Pulses palpable and equal bilaterally      Results Review:   Results from last 7 days   Lab Units 03/09/24  0637 03/08/24  1557 03/08/24  0219 03/08/24  0009 03/07/24 2015   SODIUM mmol/L 131*  126* 125*   < > 125*   POTASSIUM mmol/L 2.9*  --  3.2*  --  3.5   CHLORIDE mmol/L 89*  --  85*  --  86*   CO2 mmol/L 29.0  --  28.0  --  26.0   BUN mg/dL 21  --  25*  --  25*   CREATININE mg/dL 1.24  --  1.22  --  1.29*   GLUCOSE mg/dL 113*  --  139*  --  162*   CALCIUM mg/dL 8.5*  --  8.6  --  9.0    < > = values in this interval not displayed.     Results from last 7 days   Lab Units 03/09/24  0637 03/08/24 0219 03/07/24 2015   WBC 10*3/mm3 10.69 11.47* 14.01*   HEMOGLOBIN g/dL 9.8* 10.4* 11.0*   HEMATOCRIT % 28.3* 30.6* 32.1*   PLATELETS 10*3/mm3 281 234 241         Results from last 7 days   Lab Units 03/08/24 0219   HEMOGLOBIN A1C % 6.40*     Results from last 7 days   Lab Units 03/08/24  0219 03/08/24  0009 03/07/24 2015   HSTROP T ng/L 27* 27* 30*   Magnesium on 3/8/2024-2.2    ECG 3/8/2024:  Atrial fibrillation  Possible Anterior infarct , age undetermined  Abnormal ECG  When compared with ECG of 07-MAR-2024 19:54, (Unconfirmed)  premature ventricular complexes are no longer present  Confirmed by PING SALEH ([id number]) on 3/8/2024 9:02:33 AM    No new chest x-ray to review    CTA chest 3/7/2024: No pulmonary embolus. Moderate right and small left pleural effusions. 3.8 cm x 3 cm rounded lesion overlying the left pleural effusion favored to represent an area of rounded atelectasis. Consider PET/CT and/or biopsy.     Echocardiogram 3/8/2024:   Left ventricular systolic function is normal. Calculated left ventricular EF = 69.1% Normal left ventricular cavity size noted. Left ventricular wall thickness is consistent with mild concentric hypertrophy. Elevated left atrial pressure.    The aortic valve exhibits sclerosis. There is moderate calcification of the aortic valve. There is thickening of the aortic valve. The aortic valve appears trileaflet. No aortic valve regurgitation is present. Mild aortic valve stenosis is present    The tricuspid valve is normal in structure. Moderate tricuspid valve  regurgitation is present. Estimated right ventricular systolic pressure from tricuspid regurgitation is markedly elevated (>55 mmHg).    Medication Review: Reviewed    Assessment & Plan   Patient with HFpEF, severe pulmonary hypertension, atrial fibrillation, mild aortic stenosis, moderate MR.  Patient needs potassium replacement today.  Plans for rate control for atrial fibrillation (Bystolic 10 mg daily), patient now on Eliquis.  Would continue IV Lasix 40 mg twice daily for HFpEF/severe pulmonary hypertension and transition to po tomorrow.  Hyponatremia improving this morning. Chest x ray ordered for the am.       Acute on chronic diastolic congestive heart failure    COPD (chronic obstructive pulmonary disease)    GERD (gastroesophageal reflux disease)    Type 2 diabetes mellitus    Primary hypertension    PVC's (premature ventricular contractions)    HLD (hyperlipidemia)    Leukocytosis    Pleural effusion, bilateral    CKD (chronic kidney disease) stage 3, GFR 30-59 ml/min    Hyponatremia    Lesion of lung    Atrial fibrillation    Pulmonary hypertension    Valvular heart disease    CHF (congestive heart failure)    Electronically signed by LINDA Darden, 03/09/24, 8:49 AM EST.     03/09/24  08:20 EST

## 2024-03-09 NOTE — PLAN OF CARE
Goal Outcome Evaluation:              Outcome Evaluation: VSS on room air. No c/o pain or nausea. Resting in bed at this time. Family at bedside.

## 2024-03-09 NOTE — PLAN OF CARE
"Goal Outcome Evaluation:  Plan of Care Reviewed With: patient        Progress: improving  Outcome Evaluation: patient alert and oriented family at bedside on fluid restriction patient difficulty dealing with restriction requiring a lot of water to take large KCl, family at bedside fall precautions in effect walks with assist of one, not eating much due to fluid restriction \"I feel choked with out a lot of water\" hoping for DC tomorrow Potassium replaced                               "

## 2024-03-09 NOTE — PROGRESS NOTES
[facility] Medicine Services  PROGRESS NOTE    Patient Name: Ricki Trotter  : 1935  MRN: [id number]    Date of Admission: 3/7/2024  Primary Care Physician: Stacey Hanley APRN    Subjective   Subjective     CC:  F/U CHF    HPI:  Patient seen and examined. States he feels 99.9% better today. Slept well. Hoping to go home tomorrow.     Objective   Objective     Vital Signs:   Temp:  [97.8 °F (36.6 °C)-98.2 °F (36.8 °C)] 98 °F (36.7 °C)  Heart Rate:  [] 106  Resp:  [16-18] 16  BP: (115-144)/(50-72) 144/69     Physical Exam:  Constitutional: No acute distress, awake, alert, elderly male, pleasant   HENT: NCAT, mucous membranes moist  Respiratory: Overall improved aeration, occ wheeze, respiratory effort normal RA  Cardiovascular: Irregular rhythm, rate controlled overall, no murmurs, rubs, or gallops  Gastrointestinal: Positive bowel sounds, soft, nontender, nondistended  Musculoskeletal: Trace bilateral ankle edema  Psychiatric: Appropriate affect, cooperative  Neurologic: Oriented x 3, [place], speech clear  Skin: No rashes     Results Reviewed:  LAB RESULTS:      Lab 24  0637 24   WBC 10.69 11.47* 14.01*   HEMOGLOBIN 9.8* 10.4* 11.0*   HEMATOCRIT 28.3* 30.6* 32.1*   PLATELETS 281 234 241   NEUTROS ABS  --  8.42* 10.64*   IMMATURE GRANS (ABS)  --  0.13* 0.17*   LYMPHS ABS  --  1.73 1.84   MONOS ABS  --  1.04* 1.21*   EOS ABS  --  0.13 0.13   MCV 84.7 83.4 84.7   PROCALCITONIN  --   --  0.24   LACTATE  --   --  1.7         Lab 24  0637 24  1557 24  0219 24  0009 24   SODIUM 131* 126* 125* 124* 125*   POTASSIUM 2.9*  --  3.2*  --  3.5   CHLORIDE 89*  --  85*  --  86*   CO2 29.0  --  28.0  --  26.0   ANION GAP 13.0  --  12.0  --  13.0   BUN 21  --  25*  --  25*   CREATININE 1.24  --  1.22  --  1.29*   EGFR 55.9*  --  57.0*  --  53.3*   GLUCOSE 113*  --  139*  --  162*   CALCIUM 8.5*  --  8.6  --  9.0    MAGNESIUM  --   --  2.2  --   --    HEMOGLOBIN A1C  --   --  6.40*  --   --          Lab 03/07/24 2015   TOTAL PROTEIN 6.4   ALBUMIN 3.6   GLOBULIN 2.8   ALT (SGPT) 8   AST (SGOT) 14   BILIRUBIN 0.7   ALK PHOS 55         Lab 03/08/24  0219 03/08/24  0009 03/07/24 2015   PROBNP  --   --  6,418.0*   HSTROP T 27* 27* 30*                 Brief Urine Lab Results  (Last result in the past 365 days)        Color   Clarity   Blood   Leuk Est   Nitrite   Protein   CREAT   Urine HCG        03/08/24 0024             15.9         03/08/24 0024 Yellow   Clear   Negative   Negative   Negative   Negative                   Microbiology Results Abnormal       Procedure Component Value - Date/Time    COVID PRE-OP / PRE-PROCEDURE SCREENING ORDER (NO ISOLATION) - Swab, Nasopharynx [[id number]]  (Normal) Collected: 03/07/24 2018    Lab Status: Final result Specimen: Swab from Nasopharynx Updated: 03/07/24 2127    Narrative:      The following orders were created for panel order COVID PRE-OP / PRE-PROCEDURE SCREENING ORDER (NO ISOLATION) - Swab, Nasopharynx.  Procedure                               Abnormality         Status                     ---------                               -----------         ------                     Respiratory Panel PCR w/...[[id number]]  Normal              Final result                 Please view results for these tests on the individual orders.    Respiratory Panel PCR w/COVID-19(SARS-CoV-2) [facility]/KOKI/PRISCILLA/PAD/COR/AMY In-House, NP Swab in UTM/VTM, 2 HR TAT - Swab, Nasopharynx [[id number]]  (Normal) Collected: 03/07/24 2018    Lab Status: Final result Specimen: Swab from Nasopharynx Updated: 03/07/24 2127     ADENOVIRUS, PCR Not Detected     Coronavirus 229E Not Detected     Coronavirus HKU1 Not Detected     Coronavirus NL63 Not Detected     Coronavirus OC43 Not Detected     COVID19 Not Detected     Human Metapneumovirus Not Detected     Human Rhinovirus/Enterovirus Not Detected     Influenza A PCR Not  Detected     Influenza B PCR Not Detected     Parainfluenza Virus 1 Not Detected     Parainfluenza Virus 2 Not Detected     Parainfluenza Virus 3 Not Detected     Parainfluenza Virus 4 Not Detected     RSV, PCR Not Detected     Bordetella pertussis pcr Not Detected     Bordetella parapertussis PCR Not Detected     Chlamydophila pneumoniae PCR Not Detected     Mycoplasma pneumo by PCR Not Detected    Narrative:      In the setting of a positive respiratory panel with a viral infection PLUS a negative procalcitonin without other underlying concern for bacterial infection, consider observing off antibiotics or discontinuation of antibiotics and continue supportive care. If the respiratory panel is positive for atypical bacterial infection (Bordetella pertussis, Chlamydophila pneumoniae, or Mycoplasma pneumoniae), consider antibiotic de-escalation to target atypical bacterial infection.            Adult Transthoracic Echo Complete W/ Cont if Necessary Per Protocol    Result Date: 3/9/2024    Left ventricular systolic function is normal. Calculated left ventricular EF = 69.1% Normal left ventricular cavity size noted. Left ventricular wall thickness is consistent with mild concentric hypertrophy. Elevated left atrial pressure.   The aortic valve exhibits sclerosis. There is moderate calcification of the aortic valve. There is thickening of the aortic valve. The aortic valve appears trileaflet. No aortic valve regurgitation is present. Mild aortic valve stenosis is present   The tricuspid valve is normal in structure. Moderate tricuspid valve regurgitation is present. Estimated right ventricular systolic pressure from tricuspid regurgitation is markedly elevated (>55 mmHg).     CT Angiogram Chest    Result Date: 3/7/2024  CT ANGIOGRAM CHEST Date of Exam: 3/7/2024 9:22 PM EST Indication: Shortness of breath. Comparison: None available. Technique: CTA of the chest was performed after the uneventful intravenous  administration of 75 cc Isovue-370 . Reconstructed coronal and sagittal images were also obtained. In addition, a 3-D volume rendered image was created for interpretation. Automated exposure control and iterative reconstruction methods were used. Findings: The thyroid gland is normal. The subglottic airway is clear. No evidence of aortic dissection. Severe atheromatous disease of the coronary vessels. No pulmonary embolus. Moderate right and small left pleural effusions. Small hiatal hernia. Extensive calcified mediastinal and hilar lymph nodes consistent with prior granulomatous disease. Infiltrates. Rounded density overlying the left pleural effusion measuring 3.8 cm x 3 cm favored to represent rounded atelectasis though an underlying mass  cannot be excluded. Limited evaluation of the upper abdomen is normal. Thoracic vertebral body height and alignment are normal. No lytic or blastic disease.     Impression: No pulmonary embolus. Moderate right and small left pleural effusions. 3.8 cm x 3 cm rounded lesion overlying the left pleural effusion favored to represent an area of rounded atelectasis. Consider PET/CT and/or biopsy. Electronically Signed: Lukasz Ramirez MD  3/7/2024 9:36 PM EST  Workstation ID: [id number]     Results for orders placed during the hospital encounter of 03/07/24    Adult Transthoracic Echo Complete W/ Cont if Necessary Per Protocol    Interpretation Summary    Left ventricular systolic function is normal. Calculated left ventricular EF = 69.1% Normal left ventricular cavity size noted. Left ventricular wall thickness is consistent with mild concentric hypertrophy. Elevated left atrial pressure.    The aortic valve exhibits sclerosis. There is moderate calcification of the aortic valve. There is thickening of the aortic valve. The aortic valve appears trileaflet. No aortic valve regurgitation is present. Mild aortic valve stenosis is present    The tricuspid valve is normal in structure.  Moderate tricuspid valve regurgitation is present. Estimated right ventricular systolic pressure from tricuspid regurgitation is markedly elevated (>55 mmHg).      Current medications:  Scheduled Meds:apixaban, 5 mg, Oral, Q12H  atorvastatin, 20 mg, Oral, Nightly  budesonide, 0.5 mg, Nebulization, BID - RT  furosemide, 40 mg, Intravenous, Q12H  [START ON 3/10/2024] furosemide, 40 mg, Oral, BID  insulin lispro, 2-7 Units, Subcutaneous, 4x Daily AC & at Bedtime  ipratropium-albuterol, 3 mL, Nebulization, 4x Daily - RT  nebivolol, 10 mg, Oral, Daily  pantoprazole, 40 mg, Oral, BID AC  pharmacy consult - MTM, , Does not apply, Daily  potassium chloride ER, 40 mEq, Oral, Q4H  sodium chloride, 10 mL, Intravenous, Q12H      Continuous Infusions:   PRN Meds:.  acetaminophen **OR** acetaminophen **OR** acetaminophen    senna-docusate sodium **AND** polyethylene glycol **AND** bisacodyl **AND** bisacodyl    dextrose    dextrose    glucagon (human recombinant)    ipratropium-albuterol    melatonin    Potassium Replacement - Follow Nurse / BPA Driven Protocol    sodium chloride    sodium chloride    sodium chloride    Assessment & Plan   Assessment & Plan     Active Hospital Problems    Diagnosis  POA    **Acute on chronic diastolic congestive heart failure [I50.33]  Yes    Pulmonary hypertension [I27.20]  Yes    Valvular heart disease [I38]  Yes    CHF (congestive heart failure) [I50.9]  Yes    HLD (hyperlipidemia) [E78.5]  Yes    Leukocytosis [D72.829]  Yes    Pleural effusion, bilateral [J90]  Yes    CKD (chronic kidney disease) stage 3, GFR 30-59 ml/min [N18.30]  Yes    Hyponatremia [E87.1]  Yes    Lesion of lung [R91.1]  Yes    Atrial fibrillation [I48.91]  Yes    PVC's (premature ventricular contractions) [I49.3]  Yes    COPD (chronic obstructive pulmonary disease) [J44.9]  Yes    GERD (gastroesophageal reflux disease) [K21.9]  Yes    Type 2 diabetes mellitus [E11.9]  Yes    Primary hypertension [I10]  Yes      Resolved  Hospital Problems   No resolved problems to display.        Brief Hospital Course to date:  Ricki Trotter is a 88 y.o. male w/ a hx of COPD, bronchiectasis, HTN, HLD, CHF, CKD Stage III, GERD, T2DM, BPH, hx of dysphagia (s/p esophageal dilatation), hx of recurrent pneumonia who presented to the ED w/ c/o shortness of breath.     This patient's problems and plans were partially entered by my partner and updated as appropriate by me 03/09/24.      Acute CHF exacerbation   Atrial fibrillation (new Dx)  Bilateral pleural effusions (R>L)  HTN, HLD  -Cardiology following. Appreciate assistance. Follows with Dr Singh outpatient.   -Echo reviewed: Normal EF, Mod TR, Elevated RVSP  -Received 80mg IV Lasix in ED. Continue 40mg IV q12  -Strict I's/O's, daily weights  -Eliquis 5mg BID for stroke ppx   -Continue ASA, Bystolic, Statin   -Amlodipine discontinued per Cards   -Plan to transition to PO tomorrow  -CXR in AM     Hyponatremia -> improving   -likely 2/2 volume overload   -Continue diuresis  -Continue fluid restriction, 1500cc daily  -BMP in AM      Leukocytosis -> resolved   -afebrile   -procal and lactic acid both WNL   -treated for PNA at [place] during recent admit (d/c 3/6); records requested; reportedly received dose of steroids recently  -UA negative  -CTA Chest with no evidence of PNA  -monitor      CKD Stage III  -Stable  -Monitor with diuresis      COPD   Hx of bronchiectasis   Lung lesion (chronic)  Hx recurrent PNA  -Follows outpatient with Dr Adams   -Only uses Albuterol PRN   -Continue Pulmicort Nebs, added 3/8     T2DM  -hem A1c 6.4  -hold routine meds   -FSBG q ac/hs w/ LDSS   -If Cr remains stable, consider adding Jardiance tomorrow     Hypokalemia  -Replace per protocol   -Mg normal     Expected Discharge Location and Transportation: Home  Expected Discharge   Expected Discharge Date: 3/10/2024; Expected Discharge Time:      DVT prophylaxis:  Medical DVT prophylaxis orders are present.          AM-PAC 6 Clicks Score (PT): 22 (03/08/24 2000)    CODE STATUS:   Code Status and Medical Interventions:   Ordered at: 03/07/24 6557     Medical Intervention Limits:    NO intubation (DNI)     Level Of Support Discussed With:    Patient     Code Status (Patient has no pulse and is not breathing):    CPR (Attempt to Resuscitate)     Medical Interventions (Patient has pulse or is breathing):    Limited Support       Olga Saucedo,   03/09/24

## 2024-03-10 ENCOUNTER — APPOINTMENT (OUTPATIENT)
Dept: CT IMAGING | Facility: HOSPITAL | Age: 89
DRG: 291 | End: 2024-03-10
Payer: MEDICARE

## 2024-03-10 ENCOUNTER — APPOINTMENT (OUTPATIENT)
Dept: GENERAL RADIOLOGY | Facility: HOSPITAL | Age: 89
DRG: 291 | End: 2024-03-10
Payer: MEDICARE

## 2024-03-10 LAB
ALBUMIN SERPL-MCNC: 4 G/DL (ref 3.5–5.2)
ALBUMIN/GLOB SERPL: 1.6 G/DL
ALP SERPL-CCNC: 53 U/L (ref 39–117)
ALT SERPL W P-5'-P-CCNC: 6 U/L (ref 1–41)
ANION GAP SERPL CALCULATED.3IONS-SCNC: 13 MMOL/L (ref 5–15)
ANION GAP SERPL CALCULATED.3IONS-SCNC: 13 MMOL/L (ref 5–15)
AST SERPL-CCNC: 18 U/L (ref 1–40)
BASOPHILS # BLD AUTO: 0.03 10*3/MM3 (ref 0–0.2)
BASOPHILS NFR BLD AUTO: 0.2 % (ref 0–1.5)
BILIRUB SERPL-MCNC: 0.6 MG/DL (ref 0–1.2)
BILIRUB UR QL STRIP: NEGATIVE
BUN SERPL-MCNC: 17 MG/DL (ref 8–23)
BUN SERPL-MCNC: 17 MG/DL (ref 8–23)
BUN/CREAT SERPL: 12.3 (ref 7–25)
BUN/CREAT SERPL: 13 (ref 7–25)
CALCIUM SPEC-SCNC: 8.6 MG/DL (ref 8.6–10.5)
CALCIUM SPEC-SCNC: 8.8 MG/DL (ref 8.6–10.5)
CHLORIDE SERPL-SCNC: 91 MMOL/L (ref 98–107)
CHLORIDE SERPL-SCNC: 95 MMOL/L (ref 98–107)
CLARITY UR: CLEAR
CO2 SERPL-SCNC: 28 MMOL/L (ref 22–29)
CO2 SERPL-SCNC: 29 MMOL/L (ref 22–29)
COLOR UR: YELLOW
CREAT SERPL-MCNC: 1.31 MG/DL (ref 0.76–1.27)
CREAT SERPL-MCNC: 1.38 MG/DL (ref 0.76–1.27)
DEPRECATED RDW RBC AUTO: 43.3 FL (ref 37–54)
DEPRECATED RDW RBC AUTO: 43.8 FL (ref 37–54)
EGFRCR SERPLBLD CKD-EPI 2021: 49.2 ML/MIN/1.73
EGFRCR SERPLBLD CKD-EPI 2021: 52.4 ML/MIN/1.73
EOSINOPHIL # BLD AUTO: 0.11 10*3/MM3 (ref 0–0.4)
EOSINOPHIL NFR BLD AUTO: 0.9 % (ref 0.3–6.2)
ERYTHROCYTE [DISTWIDTH] IN BLOOD BY AUTOMATED COUNT: 13.9 % (ref 12.3–15.4)
ERYTHROCYTE [DISTWIDTH] IN BLOOD BY AUTOMATED COUNT: 13.9 % (ref 12.3–15.4)
GEN 5 2HR TROPONIN T REFLEX: 39 NG/L
GLOBULIN UR ELPH-MCNC: 2.5 GM/DL
GLUCOSE BLDC GLUCOMTR-MCNC: 100 MG/DL (ref 70–130)
GLUCOSE BLDC GLUCOMTR-MCNC: 107 MG/DL (ref 70–130)
GLUCOSE BLDC GLUCOMTR-MCNC: 142 MG/DL (ref 70–130)
GLUCOSE BLDC GLUCOMTR-MCNC: 199 MG/DL (ref 70–130)
GLUCOSE BLDC GLUCOMTR-MCNC: 210 MG/DL (ref 70–130)
GLUCOSE BLDC GLUCOMTR-MCNC: 74 MG/DL (ref 70–130)
GLUCOSE SERPL-MCNC: 117 MG/DL (ref 65–99)
GLUCOSE SERPL-MCNC: 118 MG/DL (ref 65–99)
GLUCOSE UR STRIP-MCNC: NEGATIVE MG/DL
HCT VFR BLD AUTO: 30.3 % (ref 37.5–51)
HCT VFR BLD AUTO: 34.4 % (ref 37.5–51)
HGB BLD-MCNC: 10.2 G/DL (ref 13–17.7)
HGB BLD-MCNC: 11.7 G/DL (ref 13–17.7)
HGB UR QL STRIP.AUTO: NEGATIVE
IMM GRANULOCYTES # BLD AUTO: 0.23 10*3/MM3 (ref 0–0.05)
IMM GRANULOCYTES NFR BLD AUTO: 1.8 % (ref 0–0.5)
KETONES UR QL STRIP: NEGATIVE
LEUKOCYTE ESTERASE UR QL STRIP.AUTO: NEGATIVE
LYMPHOCYTES # BLD AUTO: 1.91 10*3/MM3 (ref 0.7–3.1)
LYMPHOCYTES NFR BLD AUTO: 15 % (ref 19.6–45.3)
MAGNESIUM SERPL-MCNC: 1.8 MG/DL (ref 1.6–2.4)
MCH RBC QN AUTO: 29 PG (ref 26.6–33)
MCH RBC QN AUTO: 29.3 PG (ref 26.6–33)
MCHC RBC AUTO-ENTMCNC: 33.7 G/DL (ref 31.5–35.7)
MCHC RBC AUTO-ENTMCNC: 34 G/DL (ref 31.5–35.7)
MCV RBC AUTO: 86.1 FL (ref 79–97)
MCV RBC AUTO: 86.2 FL (ref 79–97)
MONOCYTES # BLD AUTO: 1.19 10*3/MM3 (ref 0.1–0.9)
MONOCYTES NFR BLD AUTO: 9.4 % (ref 5–12)
NEUTROPHILS NFR BLD AUTO: 72.7 % (ref 42.7–76)
NEUTROPHILS NFR BLD AUTO: 9.23 10*3/MM3 (ref 1.7–7)
NITRITE UR QL STRIP: NEGATIVE
NRBC BLD AUTO-RTO: 0 /100 WBC (ref 0–0.2)
PH UR STRIP.AUTO: 7.5 [PH] (ref 5–8)
PLATELET # BLD AUTO: 299 10*3/MM3 (ref 140–450)
PLATELET # BLD AUTO: 333 10*3/MM3 (ref 140–450)
PMV BLD AUTO: 9.4 FL (ref 6–12)
PMV BLD AUTO: 9.7 FL (ref 6–12)
POTASSIUM SERPL-SCNC: 3.7 MMOL/L (ref 3.5–5.2)
POTASSIUM SERPL-SCNC: 4 MMOL/L (ref 3.5–5.2)
PROT SERPL-MCNC: 6.5 G/DL (ref 6–8.5)
PROT UR QL STRIP: NEGATIVE
RBC # BLD AUTO: 3.52 10*6/MM3 (ref 4.14–5.8)
RBC # BLD AUTO: 3.99 10*6/MM3 (ref 4.14–5.8)
SODIUM SERPL-SCNC: 133 MMOL/L (ref 136–145)
SODIUM SERPL-SCNC: 136 MMOL/L (ref 136–145)
SP GR UR STRIP: 1.01 (ref 1–1.03)
TROPONIN T DELTA: 9 NG/L
TROPONIN T SERPL HS-MCNC: 30 NG/L
UROBILINOGEN UR QL STRIP: NORMAL
WBC NRBC COR # BLD AUTO: 10.77 10*3/MM3 (ref 3.4–10.8)
WBC NRBC COR # BLD AUTO: 12.7 10*3/MM3 (ref 3.4–10.8)

## 2024-03-10 PROCEDURE — 71045 X-RAY EXAM CHEST 1 VIEW: CPT

## 2024-03-10 PROCEDURE — 94799 UNLISTED PULMONARY SVC/PX: CPT

## 2024-03-10 PROCEDURE — 93005 ELECTROCARDIOGRAM TRACING: CPT | Performed by: NURSE PRACTITIONER

## 2024-03-10 PROCEDURE — 93005 ELECTROCARDIOGRAM TRACING: CPT | Performed by: FAMILY MEDICINE

## 2024-03-10 PROCEDURE — 63710000001 INSULIN LISPRO (HUMAN) PER 5 UNITS: Performed by: NURSE PRACTITIONER

## 2024-03-10 PROCEDURE — 80053 COMPREHEN METABOLIC PANEL: CPT | Performed by: NURSE PRACTITIONER

## 2024-03-10 PROCEDURE — 85027 COMPLETE CBC AUTOMATED: CPT | Performed by: NURSE PRACTITIONER

## 2024-03-10 PROCEDURE — 99232 SBSQ HOSP IP/OBS MODERATE 35: CPT | Performed by: FAMILY MEDICINE

## 2024-03-10 PROCEDURE — 93010 ELECTROCARDIOGRAM REPORT: CPT | Performed by: INTERNAL MEDICINE

## 2024-03-10 PROCEDURE — 94761 N-INVAS EAR/PLS OXIMETRY MLT: CPT

## 2024-03-10 PROCEDURE — 70450 CT HEAD/BRAIN W/O DYE: CPT

## 2024-03-10 PROCEDURE — 85025 COMPLETE CBC W/AUTO DIFF WBC: CPT | Performed by: NURSE PRACTITIONER

## 2024-03-10 PROCEDURE — 99231 SBSQ HOSP IP/OBS SF/LOW 25: CPT | Performed by: NURSE PRACTITIONER

## 2024-03-10 PROCEDURE — 83735 ASSAY OF MAGNESIUM: CPT | Performed by: NURSE PRACTITIONER

## 2024-03-10 PROCEDURE — 82948 REAGENT STRIP/BLOOD GLUCOSE: CPT

## 2024-03-10 PROCEDURE — 94664 DEMO&/EVAL PT USE INHALER: CPT

## 2024-03-10 PROCEDURE — 81003 URINALYSIS AUTO W/O SCOPE: CPT | Performed by: NURSE PRACTITIONER

## 2024-03-10 PROCEDURE — 84484 ASSAY OF TROPONIN QUANT: CPT | Performed by: NURSE PRACTITIONER

## 2024-03-10 RX ORDER — POTASSIUM CHLORIDE 750 MG/1
20 CAPSULE, EXTENDED RELEASE ORAL DAILY
Status: DISCONTINUED | OUTPATIENT
Start: 2024-03-11 | End: 2024-03-11 | Stop reason: HOSPADM

## 2024-03-10 RX ORDER — POTASSIUM CHLORIDE 750 MG/1
20 CAPSULE, EXTENDED RELEASE ORAL DAILY
Status: DISCONTINUED | OUTPATIENT
Start: 2024-03-10 | End: 2024-03-10

## 2024-03-10 RX ORDER — FUROSEMIDE 40 MG/1
40 TABLET ORAL DAILY
Status: DISCONTINUED | OUTPATIENT
Start: 2024-03-11 | End: 2024-03-11

## 2024-03-10 RX ORDER — HYDROXYZINE HYDROCHLORIDE 25 MG/1
25 TABLET, FILM COATED ORAL 3 TIMES DAILY PRN
Status: DISCONTINUED | OUTPATIENT
Start: 2024-03-10 | End: 2024-03-11 | Stop reason: HOSPADM

## 2024-03-10 RX ADMIN — IPRATROPIUM BROMIDE AND ALBUTEROL SULFATE 3 ML: 2.5; .5 SOLUTION RESPIRATORY (INHALATION) at 08:32

## 2024-03-10 RX ADMIN — FUROSEMIDE 40 MG: 40 TABLET ORAL at 07:49

## 2024-03-10 RX ADMIN — Medication 10 ML: at 20:51

## 2024-03-10 RX ADMIN — INSULIN LISPRO 2 UNITS: 100 INJECTION, SOLUTION INTRAVENOUS; SUBCUTANEOUS at 21:59

## 2024-03-10 RX ADMIN — ESOMEPRAZOLE MAGNESIUM 40 MG: 40 CAPSULE, DELAYED RELEASE ORAL at 20:51

## 2024-03-10 RX ADMIN — BUDESONIDE 0.5 MG: 0.5 SUSPENSION RESPIRATORY (INHALATION) at 08:32

## 2024-03-10 RX ADMIN — ATORVASTATIN CALCIUM 20 MG: 20 TABLET, FILM COATED ORAL at 20:50

## 2024-03-10 RX ADMIN — INSULIN LISPRO 3 UNITS: 100 INJECTION, SOLUTION INTRAVENOUS; SUBCUTANEOUS at 12:33

## 2024-03-10 RX ADMIN — ESOMEPRAZOLE MAGNESIUM 40 MG: 40 CAPSULE, DELAYED RELEASE ORAL at 07:50

## 2024-03-10 RX ADMIN — APIXABAN 5 MG: 5 TABLET, FILM COATED ORAL at 07:48

## 2024-03-10 RX ADMIN — HYDROXYZINE HYDROCHLORIDE 25 MG: 25 TABLET, FILM COATED ORAL at 17:04

## 2024-03-10 RX ADMIN — APIXABAN 5 MG: 5 TABLET, FILM COATED ORAL at 20:50

## 2024-03-10 RX ADMIN — NEBIVOLOL 10 MG: 10 TABLET ORAL at 07:50

## 2024-03-10 NOTE — PROGRESS NOTES
Ricki Trotter  [id number]  1935   LOS: 2 days   Patient Care Team:  Stacey Hanley APRN as PCP - General (Family Medicine)  Delgado Larios MD as Consulting Physician (Medical Oncology)  Demetria Dasilva APRN as Nurse Practitioner (Pulmonary Disease)  Lukasz Singh MD as Consulting Physician (Cardiology)  Gregorio Herbert MD as Consulting Physician (Pulmonary Disease)    Chief Complaint:  Follow-up on HFpEF, severe pulmonary hypertension, VHD, atrial fibrillation     Subjective The patient denies any increased shortness of breath, swelling, palpitations, or chest pain.  He says he is feeling a lot better over the past day and a half.  He says that he is supposed to go home tomorrow.    Objective     Vital Sign Min/Max for last 24 hours  Temp  Min: 98.2 °F (36.8 °C)  Max: 99.4 °F (37.4 °C)   BP  Min: 101/68  Max: 143/76   Pulse  Min: 77  Max: 103   Resp  Min: 14  Max: 18   SpO2  Min: 91 %  Max: 99 %   No data recorded   Weight  Min: 71.3 kg (157 lb 1.6 oz)  Max: 71.3 kg (157 lb 1.6 oz)         03/07/24  1947 03/10/24  0600   Weight: 72.1 kg (159 lb) 71.3 kg (157 lb 1.6 oz)         Intake/Output Summary (Last 24 hours) at 3/10/2024 0947  Last data filed at 3/10/2024 0357  Gross per 24 hour   Intake 960 ml   Output 2475 ml   Net -1515 ml       Physical Exam:     General Appearance:    Alert, cooperative, in no acute distress   Lungs:   Expiratory wheezing to auscultation,respirations regular, even and                unlabored    Heart:    Irregular and normal rate with PVCs, normal S1 and S2, grade 2/6            murmur, no gallop, no rub, no click   Abdomen:  Extremities:   Soft, nontender, bowel sounds audible x4    No edema, normal range of motion   Pulses:   Pulses palpable and equal bilaterally      Results Review:   Results from last 7 days   Lab Units 03/10/24  0706 03/09/24  2043 03/09/24  0637 03/08/24  1557 03/08/24  0219   SODIUM mmol/L 136  --  131* 126* 125*   POTASSIUM  mmol/L 4.0 3.8 2.9*  --  3.2*   CHLORIDE mmol/L 95*  --  89*  --  85*   CO2 mmol/L 28.0  --  29.0  --  28.0   BUN mg/dL 17  --  21  --  25*   CREATININE mg/dL 1.38*  --  1.24  --  1.22   GLUCOSE mg/dL 118*  --  113*  --  139*   CALCIUM mg/dL 8.6  --  8.5*  --  8.6     Results from last 7 days   Lab Units 03/10/24  0706 03/09/24  0637 03/08/24 0219   WBC 10*3/mm3 10.77 10.69 11.47*   HEMOGLOBIN g/dL 10.2* 9.8* 10.4*   HEMATOCRIT % 30.3* 28.3* 30.6*   PLATELETS 10*3/mm3 299 281 234         Results from last 7 days   Lab Units 03/08/24  0219   HEMOGLOBIN A1C % 6.40*     Results from last 7 days   Lab Units 03/08/24  0219 03/08/24  0009 03/07/24 2015   HSTROP T ng/L 27* 27* 30*     Echocardiogram 3/8/2024:   Left ventricular systolic function is normal. Calculated left ventricular EF = 69.1% Normal left ventricular cavity size noted. Left ventricular wall thickness is consistent with mild concentric hypertrophy. Elevated left atrial pressure.    The aortic valve exhibits sclerosis. There is moderate calcification of the aortic valve. There is thickening of the aortic valve. The aortic valve appears trileaflet. No aortic valve regurgitation is present. Mild aortic valve stenosis is present    The tricuspid valve is normal in structure. Moderate tricuspid valve regurgitation is present. Estimated right ventricular systolic pressure from tricuspid regurgitation is markedly elevated (>55 mmHg).    No ECG to review    Chest x-ray 3/10/2024 pending      Medication Review: Reviewed    Assessment & Plan   Patient with HFpEF, severe pulmonary hypertension, atrial fibrillation, mild aortic stenosis, and moderate MR. Plans for rate control for atrial fibrillation (Bystolic 10 mg daily), patient now on Eliquis.  Hyponatremia resolved.  Patient had slight bump in creatinine this morning.  Transitioned to p.o. Lasix today.  Would anticipate discharge tomorrow.      Acute on chronic diastolic congestive heart failure    COPD  (chronic obstructive pulmonary disease)    GERD (gastroesophageal reflux disease)    Type 2 diabetes mellitus    Primary hypertension    PVC's (premature ventricular contractions)    HLD (hyperlipidemia)    Leukocytosis    Pleural effusion, bilateral    CKD (chronic kidney disease) stage 3, GFR 30-59 ml/min    Hyponatremia    Lesion of lung    Atrial fibrillation    Pulmonary hypertension    Valvular heart disease    CHF (congestive heart failure)    Electronically signed by LINDA Darden, 03/10/24, 10:05 AM EDT.     03/10/24  09:47 EDT

## 2024-03-10 NOTE — SIGNIFICANT NOTE
Received pt at 1600  1644-pt reports anxiety attack that he said occurred 1 hour prior to reporting. Pt desribed as feeling off but could not describe except his ears feeling funny. Provider notified. Ordered atarax.   1751- pt reports not feeling better after atarax administration. Provider notified. Pt appeared to be in no acute distress. Upon assessment, pt closed eyes and stopped responding verbally but still followed commands.   1757-Rapid response initiated. CT W/o contrast ordered. EKG obtained reading Afib with variable AV block with PVC's.   1845- Pt back in room after CT scan, and reports feeling better. Again he could not describe his symptoms. No distress noted at this time. Family at bedside. Call light within reach.

## 2024-03-10 NOTE — PROGRESS NOTES
[facility] Medicine Services  PROGRESS NOTE    Patient Name: Ricki Trotter  : 1935  MRN: [id number]    Date of Admission: 3/7/2024  Primary Care Physician: Stacey Hanley APRN    Subjective   Subjective     CC:  F/U CHF    HPI:  Patient seen and examined. Feels well. States has been napping off and on. Reviewed labs. Discussed plan to start PO Lasix today and then hopefully home tomorrow.     Objective   Objective     Vital Signs:   Temp:  [98.2 °F (36.8 °C)-99.4 °F (37.4 °C)] 99.4 °F (37.4 °C)  Heart Rate:  [] 85  Resp:  [14-18] 18  BP: (101-143)/(52-84) 142/84     Physical Exam:  Constitutional: No acute distress, awake, alert, elderly male, pleasant   HENT: NCAT, mucous membranes moist  Respiratory: Overall improved aeration, occ wheeze, respiratory effort normal RA  Cardiovascular: Irregular rhythm, rate controlled overall, no murmurs, rubs, or gallops  Gastrointestinal: Positive bowel sounds, soft, nontender, nondistended  Musculoskeletal: No bilateral ankle edema  Psychiatric: Appropriate affect, cooperative  Neurologic: Oriented x 3, [place], speech clear  Skin: No rashes     Results Reviewed:  LAB RESULTS:      Lab 03/10/24  0724   WBC 10.77 10.69 11.47* 14.01*   HEMOGLOBIN 10.2* 9.8* 10.4* 11.0*   HEMATOCRIT 30.3* 28.3* 30.6* 32.1*   PLATELETS 299 281 234 241   NEUTROS ABS  --   --  8.42* 10.64*   IMMATURE GRANS (ABS)  --   --  0.13* 0.17*   LYMPHS ABS  --   --  1.73 1.84   MONOS ABS  --   --  1.04* 1.21*   EOS ABS  --   --  0.13 0.13   MCV 86.1 84.7 83.4 84.7   PROCALCITONIN  --   --   --  0.24   LACTATE  --   --   --  1.7         Lab 03/10/24  0706 2424  1557 24  0219 24  0009 24   SODIUM 136  --  131* 126* 125* 124* 125*   POTASSIUM 4.0 3.8 2.9*  --  3.2*  --  3.5   CHLORIDE 95*  --  89*  --  85*  --  86*   CO2 28.0  --  29.0  --  28.0  --  26.0   ANION GAP  13.0  --  13.0  --  12.0  --  13.0   BUN 17  --  21  --  25*  --  25*   CREATININE 1.38*  --  1.24  --  1.22  --  1.29*   EGFR 49.2*  --  55.9*  --  57.0*  --  53.3*   GLUCOSE 118*  --  113*  --  139*  --  162*   CALCIUM 8.6  --  8.5*  --  8.6  --  9.0   MAGNESIUM 1.8  --   --   --  2.2  --   --    HEMOGLOBIN A1C  --   --   --   --  6.40*  --   --          Lab 03/07/24 2015   TOTAL PROTEIN 6.4   ALBUMIN 3.6   GLOBULIN 2.8   ALT (SGPT) 8   AST (SGOT) 14   BILIRUBIN 0.7   ALK PHOS 55         Lab 03/08/24  0219 03/08/24  0009 03/07/24 2015   PROBNP  --   --  6,418.0*   HSTROP T 27* 27* 30*                 Brief Urine Lab Results  (Last result in the past 365 days)        Color   Clarity   Blood   Leuk Est   Nitrite   Protein   CREAT   Urine HCG        03/08/24 0024             15.9         03/08/24 0024 Yellow   Clear   Negative   Negative   Negative   Negative                   Microbiology Results Abnormal       Procedure Component Value - Date/Time    COVID PRE-OP / PRE-PROCEDURE SCREENING ORDER (NO ISOLATION) - Swab, Nasopharynx [[id number]]  (Normal) Collected: 03/07/24 2018    Lab Status: Final result Specimen: Swab from Nasopharynx Updated: 03/07/24 2127    Narrative:      The following orders were created for panel order COVID PRE-OP / PRE-PROCEDURE SCREENING ORDER (NO ISOLATION) - Swab, Nasopharynx.  Procedure                               Abnormality         Status                     ---------                               -----------         ------                     Respiratory Panel PCR w/...[[id number]]  Normal              Final result                 Please view results for these tests on the individual orders.    Respiratory Panel PCR w/COVID-19(SARS-CoV-2) [facility]/PRISCILLA/PAD/COR/AMY In-House, NP Swab in [facility], 2 HR TAT - Swab, Nasopharynx [[id number]]  (Normal) Collected: 03/07/24 2018    Lab Status: Final result Specimen: Swab from Nasopharynx Updated: 03/07/24 2121     ADENOVIRUS, PCR Not Detected      Coronavirus 229E Not Detected     Coronavirus HKU1 Not Detected     Coronavirus NL63 Not Detected     Coronavirus OC43 Not Detected     COVID19 Not Detected     Human Metapneumovirus Not Detected     Human Rhinovirus/Enterovirus Not Detected     Influenza A PCR Not Detected     Influenza B PCR Not Detected     Parainfluenza Virus 1 Not Detected     Parainfluenza Virus 2 Not Detected     Parainfluenza Virus 3 Not Detected     Parainfluenza Virus 4 Not Detected     RSV, PCR Not Detected     Bordetella pertussis pcr Not Detected     Bordetella parapertussis PCR Not Detected     Chlamydophila pneumoniae PCR Not Detected     Mycoplasma pneumo by PCR Not Detected    Narrative:      In the setting of a positive respiratory panel with a viral infection PLUS a negative procalcitonin without other underlying concern for bacterial infection, consider observing off antibiotics or discontinuation of antibiotics and continue supportive care. If the respiratory panel is positive for atypical bacterial infection (Bordetella pertussis, Chlamydophila pneumoniae, or Mycoplasma pneumoniae), consider antibiotic de-escalation to target atypical bacterial infection.            Adult Transthoracic Echo Complete W/ Cont if Necessary Per Protocol    Result Date: 3/9/2024    Left ventricular systolic function is normal. Calculated left ventricular EF = 69.1% Normal left ventricular cavity size noted. Left ventricular wall thickness is consistent with mild concentric hypertrophy. Elevated left atrial pressure.   The aortic valve exhibits sclerosis. There is moderate calcification of the aortic valve. There is thickening of the aortic valve. The aortic valve appears trileaflet. No aortic valve regurgitation is present. Mild aortic valve stenosis is present   The tricuspid valve is normal in structure. Moderate tricuspid valve regurgitation is present. Estimated right ventricular systolic pressure from tricuspid regurgitation is markedly  elevated (>55 mmHg).      Results for orders placed during the hospital encounter of 03/07/24    Adult Transthoracic Echo Complete W/ Cont if Necessary Per Protocol    Interpretation Summary    Left ventricular systolic function is normal. Calculated left ventricular EF = 69.1% Normal left ventricular cavity size noted. Left ventricular wall thickness is consistent with mild concentric hypertrophy. Elevated left atrial pressure.    The aortic valve exhibits sclerosis. There is moderate calcification of the aortic valve. There is thickening of the aortic valve. The aortic valve appears trileaflet. No aortic valve regurgitation is present. Mild aortic valve stenosis is present    The tricuspid valve is normal in structure. Moderate tricuspid valve regurgitation is present. Estimated right ventricular systolic pressure from tricuspid regurgitation is markedly elevated (>55 mmHg).      Current medications:  Scheduled Meds:apixaban, 5 mg, Oral, Q12H  atorvastatin, 20 mg, Oral, Nightly  budesonide, 0.5 mg, Nebulization, BID - RT  esomeprazole, 40 mg, Oral, Q12H  [START ON 3/11/2024] furosemide, 40 mg, Oral, Daily  insulin lispro, 2-7 Units, Subcutaneous, 4x Daily AC & at Bedtime  ipratropium-albuterol, 3 mL, Nebulization, 4x Daily - RT  nebivolol, 10 mg, Oral, Daily  pharmacy consult - MTM, , Does not apply, Daily  [START ON 3/11/2024] potassium chloride, 20 mEq, Oral, Daily  sodium chloride, 10 mL, Intravenous, Q12H      Continuous Infusions:   PRN Meds:.  acetaminophen **OR** acetaminophen **OR** acetaminophen    senna-docusate sodium **AND** polyethylene glycol **AND** bisacodyl **AND** bisacodyl    dextrose    dextrose    glucagon (human recombinant)    ipratropium-albuterol    melatonin    Potassium Replacement - Follow Nurse / BPA Driven Protocol    sodium chloride    sodium chloride    sodium chloride    Assessment & Plan   Assessment & Plan     Active Hospital Problems    Diagnosis  POA    **Acute on chronic  diastolic congestive heart failure [I50.33]  Yes    Pulmonary hypertension [I27.20]  Yes    Valvular heart disease [I38]  Yes    CHF (congestive heart failure) [I50.9]  Yes    HLD (hyperlipidemia) [E78.5]  Yes    Leukocytosis [D72.829]  Yes    Pleural effusion, bilateral [J90]  Yes    CKD (chronic kidney disease) stage 3, GFR 30-59 ml/min [N18.30]  Yes    Hyponatremia [E87.1]  Yes    Lesion of lung [R91.1]  Yes    Atrial fibrillation [I48.91]  Yes    PVC's (premature ventricular contractions) [I49.3]  Yes    COPD (chronic obstructive pulmonary disease) [J44.9]  Yes    GERD (gastroesophageal reflux disease) [K21.9]  Yes    Type 2 diabetes mellitus [E11.9]  Yes    Primary hypertension [I10]  Yes      Resolved Hospital Problems   No resolved problems to display.        Brief Hospital Course to date:  Ricki Trotter is a 88 y.o. male w/ a hx of COPD, bronchiectasis, HTN, HLD, CHF, CKD Stage III, GERD, T2DM, BPH, hx of dysphagia (s/p esophageal dilatation), hx of recurrent pneumonia who presented to the ED w/ c/o shortness of breath.     This patient's problems and plans were partially entered by my partner and updated as appropriate by me 03/10/24.      Acute CHF exacerbation   Atrial fibrillation (new Dx)  Severe Pulmonary HTN  Bilateral pleural effusions (R>L)  HTN, HLD  -Cardiology following. Appreciate assistance. Follows with Dr Singh outpatient.   -Echo reviewed: Normal EF, Mod TR, Elevated RVSP  -S/P IV Lasix. Now 40mg PO Daily   -Strict I's/O's, daily weights  -Eliquis 5mg BID for stroke ppx   -Continue ASA, Bystolic, Statin   -Amlodipine discontinued per Cards   -CXR reviewed     Hyponatremia -> resolved   -likely 2/2 volume overload   -Continue diuresis  -BMP in AM      Leukocytosis -> resolved   -afebrile   -procal and lactic acid both WNL   -treated for PNA at [place] during recent admit (d/c 3/6); records requested; reportedly received dose of steroids recently  -UA negative  -CTA Chest with no  evidence of PNA  -monitor      CKD Stage III  -Stable  -Monitor with diuresis      COPD   Hx of bronchiectasis   Lung lesion (chronic)  Hx recurrent PNA  -Follows outpatient with Dr Adams   -Only uses Albuterol PRN   -Continue Pulmicort Nebs, added 3/8     T2DM  -hem A1c 6.4  -hold routine meds   -FSBG q ac/hs w/ LDSS   -If Cr remains stable, consider adding Jardiance at DC    Hypokalemia-resolved  -Replaced per protocol   -Mg normal     Expected Discharge Location and Transportation: Home  Expected Discharge   Expected Discharge Date: 3/11/2024; Expected Discharge Time:      DVT prophylaxis:  Medical DVT prophylaxis orders are present.         AM-PAC 6 Clicks Score (PT): 22 (03/09/24 2117)    CODE STATUS:   Code Status and Medical Interventions:   Ordered at: 03/07/24 0734     Medical Intervention Limits:    NO intubation (DNI)     Level Of Support Discussed With:    Patient     Code Status (Patient has no pulse and is not breathing):    CPR (Attempt to Resuscitate)     Medical Interventions (Patient has pulse or is breathing):    Limited Support       Olga Saucedo DO  03/10/24

## 2024-03-10 NOTE — PLAN OF CARE
Problem: Adult Inpatient Plan of Care  Goal: Plan of Care Review  Outcome: Ongoing, Progressing  Goal: Absence of Hospital-Acquired Illness or Injury  Intervention: Identify and Manage Fall Risk  Recent Flowsheet Documentation  Taken 3/10/2024 0400 by Zakia Roman RN  Safety Promotion/Fall Prevention:   assistive device/personal items within reach   activity supervised   fall prevention program maintained   clutter free environment maintained   lighting adjusted   nonskid shoes/slippers when out of bed   room organization consistent   safety round/check completed  Taken 3/10/2024 0000 by Zakia Roman RN  Safety Promotion/Fall Prevention:   activity supervised   assistive device/personal items within reach   clutter free environment maintained   fall prevention program maintained   lighting adjusted   nonskid shoes/slippers when out of bed   safety round/check completed   room organization consistent  Taken 3/9/2024 2117 by Zakia Roman RN  Safety Promotion/Fall Prevention:   activity supervised   assistive device/personal items within reach   clutter free environment maintained   fall prevention program maintained   lighting adjusted   nonskid shoes/slippers when out of bed   room organization consistent   safety round/check completed  Intervention: Prevent Skin Injury  Recent Flowsheet Documentation  Taken 3/10/2024 0400 by Zakia Roman RN  Body Position: position changed independently  Skin Protection:   adhesive use limited   incontinence pads utilized   tubing/devices free from skin contact  Taken 3/10/2024 0000 by Zakia Roman RN  Body Position: position changed independently  Skin Protection:   adhesive use limited   incontinence pads utilized   tubing/devices free from skin contact  Taken 3/9/2024 2117 by Zakia Roman RN  Body Position: position changed independently  Skin Protection:   adhesive use limited   incontinence pads utilized   tubing/devices free from skin  contact  Intervention: Prevent and Manage VTE (Venous Thromboembolism) Risk  Recent Flowsheet Documentation  Taken 3/10/2024 0400 by Zakia Roman RN  Activity Management: activity encouraged  Taken 3/10/2024 0000 by Zakia Roman RN  Activity Management: activity encouraged  Taken 3/9/2024 2117 by Zakia Roman RN  Activity Management: activity encouraged  VTE Prevention/Management: (see MAR) other (see comments)  Range of Motion: active ROM (range of motion) encouraged  Intervention: Prevent Infection  Recent Flowsheet Documentation  Taken 3/10/2024 0400 by Zakia Roman RN  Infection Prevention:   environmental surveillance performed   single patient room provided   rest/sleep promoted  Taken 3/10/2024 0000 by Zakia Roman RN  Infection Prevention:   environmental surveillance performed   rest/sleep promoted   single patient room provided  Taken 3/9/2024 2117 by Zakia Roman RN  Infection Prevention:   environmental surveillance performed   single patient room provided   rest/sleep promoted  Goal: Optimal Comfort and Wellbeing  Intervention: Provide Person-Centered Care  Recent Flowsheet Documentation  Taken 3/9/2024 2117 by Zakia Roman RN  Trust Relationship/Rapport:   care explained   choices provided   questions answered   questions encouraged   thoughts/feelings acknowledged   Goal Outcome Evaluation:                      Pt VSS, Daughter at bedside. Pt sleep throughout night.

## 2024-03-11 ENCOUNTER — READMISSION MANAGEMENT (OUTPATIENT)
Dept: CALL CENTER | Facility: HOSPITAL | Age: 89
End: 2024-03-11
Payer: MEDICARE

## 2024-03-11 VITALS
SYSTOLIC BLOOD PRESSURE: 135 MMHG | OXYGEN SATURATION: 91 % | BODY MASS INDEX: 21.99 KG/M2 | WEIGHT: 157.1 LBS | DIASTOLIC BLOOD PRESSURE: 115 MMHG | HEIGHT: 71 IN | RESPIRATION RATE: 16 BRPM | TEMPERATURE: 98.2 F | HEART RATE: 86 BPM

## 2024-03-11 PROBLEM — I50.31 ACUTE HEART FAILURE WITH PRESERVED EJECTION FRACTION (HFPEF): Status: ACTIVE | Noted: 2024-03-11

## 2024-03-11 LAB
ANION GAP SERPL CALCULATED.3IONS-SCNC: 12 MMOL/L (ref 5–15)
BUN SERPL-MCNC: 15 MG/DL (ref 8–23)
BUN/CREAT SERPL: 11.7 (ref 7–25)
CALCIUM SPEC-SCNC: 8.7 MG/DL (ref 8.6–10.5)
CHLORIDE SERPL-SCNC: 92 MMOL/L (ref 98–107)
CO2 SERPL-SCNC: 29 MMOL/L (ref 22–29)
CREAT SERPL-MCNC: 1.28 MG/DL (ref 0.76–1.27)
EGFRCR SERPLBLD CKD-EPI 2021: 53.8 ML/MIN/1.73
GLUCOSE BLDC GLUCOMTR-MCNC: 111 MG/DL (ref 70–130)
GLUCOSE BLDC GLUCOMTR-MCNC: 257 MG/DL (ref 70–130)
GLUCOSE SERPL-MCNC: 90 MG/DL (ref 65–99)
POTASSIUM SERPL-SCNC: 3.9 MMOL/L (ref 3.5–5.2)
QT INTERVAL: 374 MS
QT INTERVAL: 380 MS
QTC INTERVAL: 441 MS
QTC INTERVAL: 467 MS
SODIUM SERPL-SCNC: 133 MMOL/L (ref 136–145)
TROPONIN T SERPL HS-MCNC: 34 NG/L

## 2024-03-11 PROCEDURE — 80048 BASIC METABOLIC PNL TOTAL CA: CPT | Performed by: FAMILY MEDICINE

## 2024-03-11 PROCEDURE — 84484 ASSAY OF TROPONIN QUANT: CPT | Performed by: NURSE PRACTITIONER

## 2024-03-11 PROCEDURE — 94799 UNLISTED PULMONARY SVC/PX: CPT

## 2024-03-11 PROCEDURE — 94664 DEMO&/EVAL PT USE INHALER: CPT

## 2024-03-11 PROCEDURE — 82948 REAGENT STRIP/BLOOD GLUCOSE: CPT

## 2024-03-11 PROCEDURE — 99232 SBSQ HOSP IP/OBS MODERATE 35: CPT

## 2024-03-11 PROCEDURE — 63710000001 INSULIN LISPRO (HUMAN) PER 5 UNITS: Performed by: NURSE PRACTITIONER

## 2024-03-11 RX ORDER — FUROSEMIDE 40 MG/1
40 TABLET ORAL DAILY
Qty: 30 TABLET | Refills: 5 | Status: SHIPPED | OUTPATIENT
Start: 2024-03-12 | End: 2024-03-11 | Stop reason: HOSPADM

## 2024-03-11 RX ORDER — TAMSULOSIN HYDROCHLORIDE 0.4 MG/1
0.4 CAPSULE ORAL DAILY
Status: DISCONTINUED | OUTPATIENT
Start: 2024-03-11 | End: 2024-03-11 | Stop reason: HOSPADM

## 2024-03-11 RX ORDER — FUROSEMIDE 20 MG/1
20 TABLET ORAL DAILY
Status: DISCONTINUED | OUTPATIENT
Start: 2024-03-12 | End: 2024-03-11 | Stop reason: HOSPADM

## 2024-03-11 RX ORDER — TAMSULOSIN HYDROCHLORIDE 0.4 MG/1
0.4 CAPSULE ORAL DAILY
Qty: 30 CAPSULE | Refills: 11 | Status: SHIPPED | OUTPATIENT
Start: 2024-03-12

## 2024-03-11 RX ORDER — FUROSEMIDE 20 MG/1
20 TABLET ORAL DAILY
Qty: 30 TABLET | Refills: 5 | Status: SHIPPED | OUTPATIENT
Start: 2024-03-12

## 2024-03-11 RX ADMIN — TAMSULOSIN HYDROCHLORIDE 0.4 MG: 0.4 CAPSULE ORAL at 09:14

## 2024-03-11 RX ADMIN — BUDESONIDE 0.5 MG: 0.5 SUSPENSION RESPIRATORY (INHALATION) at 07:54

## 2024-03-11 RX ADMIN — IPRATROPIUM BROMIDE AND ALBUTEROL SULFATE 3 ML: 2.5; .5 SOLUTION RESPIRATORY (INHALATION) at 07:53

## 2024-03-11 RX ADMIN — IPRATROPIUM BROMIDE AND ALBUTEROL SULFATE 3 ML: 2.5; .5 SOLUTION RESPIRATORY (INHALATION) at 11:39

## 2024-03-11 RX ADMIN — ESOMEPRAZOLE MAGNESIUM 40 MG: 40 CAPSULE, DELAYED RELEASE ORAL at 09:13

## 2024-03-11 RX ADMIN — FUROSEMIDE 40 MG: 40 TABLET ORAL at 09:14

## 2024-03-11 RX ADMIN — APIXABAN 5 MG: 5 TABLET, FILM COATED ORAL at 09:14

## 2024-03-11 RX ADMIN — BISACODYL 5 MG: 5 TABLET, COATED ORAL at 09:14

## 2024-03-11 RX ADMIN — Medication 10 ML: at 09:15

## 2024-03-11 RX ADMIN — INSULIN LISPRO 4 UNITS: 100 INJECTION, SOLUTION INTRAVENOUS; SUBCUTANEOUS at 12:31

## 2024-03-11 RX ADMIN — NEBIVOLOL 10 MG: 10 TABLET ORAL at 09:13

## 2024-03-11 RX ADMIN — POTASSIUM CHLORIDE 20 MEQ: 750 CAPSULE, EXTENDED RELEASE ORAL at 09:13

## 2024-03-11 NOTE — PLAN OF CARE
Goal Outcome Evaluation:  Plan of Care Reviewed With: patient        Progress: improving  Outcome Evaluation: patient alert family at bedside, voiding dpontaneously, ambulastes with stasnd by pain and nausea free, on room air, eager for DC

## 2024-03-11 NOTE — DISCHARGE SUMMARY
[facility] Medicine Services  DISCHARGE SUMMARY    Patient Name: Ricki Trotter  : 1935  MRN: [id number]    Date of Admission: 3/7/2024  8:04 PM  Date of Discharge:  3/11/2024  Primary Care Physician: Stacey Hanley APRN    Consults       Date and Time Order Name Status Description    3/7/2024 11:45 PM Inpatient Cardiology Consult Completed             Hospital Course     Presenting Problem: fatigue and dyspnea     Active Hospital Problems    Diagnosis  POA    **Acute on chronic diastolic congestive heart failure [I50.33]  Yes    Acute heart failure with preserved ejection fraction (HFpEF) [I50.31]  Yes    Pulmonary hypertension [I27.20]  Yes    Valvular heart disease [I38]  Yes    CHF (congestive heart failure) [I50.9]  Yes    HLD (hyperlipidemia) [E78.5]  Yes    Leukocytosis [D72.829]  Yes    Pleural effusion, bilateral [J90]  Yes    CKD (chronic kidney disease) stage 3, GFR 30-59 ml/min [N18.30]  Yes    Hyponatremia [E87.1]  Yes    Lesion of lung [R91.1]  Yes    Atrial fibrillation [I48.91]  Yes    PVC's (premature ventricular contractions) [I49.3]  Yes    COPD (chronic obstructive pulmonary disease) [J44.9]  Yes    GERD (gastroesophageal reflux disease) [K21.9]  Yes    Type 2 diabetes mellitus [E11.9]  Yes    Primary hypertension [I10]  Yes      Resolved Hospital Problems   No resolved problems to display.          Hospital Course:  Ricki Trotter is a 88 y.o. male from [facility] with  hx of COPD, bronchiectasis, HTN, HLD, CHF, CKD Stage III, GERD, T2DM, BPH, hx of dysphagia (s/p esophageal dilatation), hx of recurrent pneumonia.  He came to ED with dyspnea.  He had a recent [facility] admission where he was treated for pneumonia, but was home for only a day before coming to [facility] ED.       Acute CHF exacerbation :  HFpEF   Atrial fibrillation (new Dx)  Severe Pulmonary HTN  Bilateral pleural effusions (R>L)  HTN, HLD  - Cardiology was consulted.   Echo: Normal EF, Mod TR, Elevated RVSP  - Afib was diagnosed on admission with rate of 86 (pt is on home Bystolic).  He was started on Eliquis.    - For CHF, he got IV diuresis and is discharged on oral Lasix.      Hyponatremia -> resolved   -likely 2/2 volume overload   -Continue diuresis  -BMP in AM      Leukocytosis -> resolved   -afebrile   -procal and lactic acid both WNL   -treated for PNA at [facility] during recent admit (d/c 3/6); records requested; reportedly received dose of steroids recently  -UA negative  -CTA Chest with no evidence of PNA  -monitor      CKD Stage III  -Stable  -Monitor with diuresis      COPD   Hx of bronchiectasis   Lung lesion (chronic)  Hx recurrent PNA  -Follows outpatient with Dr Adams   -Only uses Albuterol PRN   -Continue Pulmicort Nebs, added 3/8     T2DM  -hem A1c 6.4  -hold routine meds   -FSBG q ac/hs w/ LDSS   -If Cr remains stable, consider adding Jardiance at DC     Hypokalemia-resolved  -Replaced per protocol   -Mg normal         who presented to the ED w/ c/o shortness of breath.   Pt recently admitted to [facility] for 6 days. Pt was d/c home yesterday. Pt admitted for Pneumonia and treated w/ IV antibiotics.   Pt was seen in clinic today for f/u and noted to have room air saturations of 86%. Pt sent to the ED for further evaluation.   Pt c/o ongoing dyspnea, orthopnea, non-productive cough, BLE edema and wheezing. Pt denies fever, chest pain.   Pt evaluated in the ED. CTA Chest w/ moderate right and small left pleural effusion. ProBNP ~6400. EKG concerning for atrial fibrillation. Pt admitted to the hospital medicine service for further evaluation.       Discharge Follow Up Recommendations for outpatient labs/diagnostics:       Day of Discharge     HPI:   feels pretty good and would like to go home .      Review of Systems  With PT he has walked 120 feet     Vital Signs:          Physical Exam:  Constitutional: Awake, alert in bed, nad   HENT:  NCAT, mucous membranes moist  Neck: Supple,   Respiratory: Clear to auscultation bilaterally, nonlabored respirations  Cardiovascular: irreg irreg   Skin: No rashes, arm       Pertinent  and/or Most Recent Results     LAB RESULTS:      Lab 03/10/24  1810 03/10/24  0706   WBC 12.70* 10.77   HEMOGLOBIN 11.7* 10.2*   HEMATOCRIT 34.4* 30.3*   PLATELETS 333 299   NEUTROS ABS 9.23*  --    IMMATURE GRANS (ABS) 0.23*  --    LYMPHS ABS 1.91  --    MONOS ABS 1.19*  --    EOS ABS 0.11  --    MCV 86.2 86.1         Lab 03/11/24  0716 03/10/24  1809 03/10/24  0706 03/09/24  2043   SODIUM 133* 133* 136  --    POTASSIUM 3.9 3.7 4.0 3.8   CHLORIDE 92* 91* 95*  --    CO2 29.0 29.0 28.0  --    ANION GAP 12.0 13.0 13.0  --    BUN 15 17 17  --    CREATININE 1.28* 1.31* 1.38*  --    EGFR 53.8* 52.4* 49.2*  --    GLUCOSE 90 117* 118*  --    CALCIUM 8.7 8.8 8.6  --    MAGNESIUM  --   --  1.8  --          Lab 03/10/24  1809   TOTAL PROTEIN 6.5   ALBUMIN 4.0   GLOBULIN 2.5   ALT (SGPT) 6   AST (SGOT) 18   BILIRUBIN 0.6   ALK PHOS 53         Lab 03/11/24  0024 03/10/24  2049 03/10/24  1809   HSTROP T 34* 39* 30*                 Brief Urine Lab Results  (Last result in the past 365 days)        Color   Clarity   Blood   Leuk Est   Nitrite   Protein   CREAT   Urine HCG        03/10/24 2252 Yellow   Clear   Negative   Negative   Negative   Negative                 Microbiology Results (last 10 days)       Procedure Component Value - Date/Time    COVID PRE-OP / PRE-PROCEDURE SCREENING ORDER (NO ISOLATION) - Swab, Nasopharynx [[id number]]  (Normal) Collected: 03/07/24 2018    Lab Status: Final result Specimen: Swab from Nasopharynx Updated: 03/07/24 2127    Narrative:      The following orders were created for panel order COVID PRE-OP / PRE-PROCEDURE SCREENING ORDER (NO ISOLATION) - Swab, Nasopharynx.  Procedure                               Abnormality         Status                     ---------                               -----------          ------                     Respiratory Panel PCR w/...[[id number]]  Normal              Final result                 Please view results for these tests on the individual orders.    Respiratory Panel PCR w/COVID-19(SARS-CoV-2) [facility]/PR[facility]CILLA/PAD/COR/AMY In-House, NP Swab in UTM/VTM, 2 HR TAT - Swab, Nasopharynx [[id number]]  (Normal) Collected: 03/07/24 2018    Lab Status: Final result Specimen: Swab from Nasopharynx Updated: 03/07/24 2127     ADENOVIRUS, PCR Not Detected     Coronavirus 229E Not Detected     Coronavirus HKU1 Not Detected     Coronavirus NL63 Not Detected     Coronavirus OC43 Not Detected     COVID19 Not Detected     Human Metapneumovirus Not Detected     Human Rhinovirus/Enterovirus Not Detected     Influenza A PCR Not Detected     Influenza B PCR Not Detected     Parainfluenza Virus 1 Not Detected     Parainfluenza Virus 2 Not Detected     Parainfluenza Virus 3 Not Detected     Parainfluenza Virus 4 Not Detected     RSV, PCR Not Detected     Bordetella pertussis pcr Not Detected     Bordetella parapertussis PCR Not Detected     Chlamydophila pneumoniae PCR Not Detected     Mycoplasma pneumo by PCR Not Detected    Narrative:      In the setting of a positive respiratory panel with a viral infection PLUS a negative procalcitonin without other underlying concern for bacterial infection, consider observing off antibiotics or discontinuation of antibiotics and continue supportive care. If the respiratory panel is positive for atypical bacterial infection (Bordetella pertussis, Chlamydophila pneumoniae, or Mycoplasma pneumoniae), consider antibiotic de-escalation to target atypical bacterial infection.            CT Head Without Contrast    Result Date: 3/10/2024  CT HEAD WO CONTRAST Date of Exam: 3/10/2024 6:35 PM EDT Indication: Mental status change, persistent or worsening. Comparison: None available. Technique: Axial CT images were obtained of the head without contrast administration.  Automated exposure  control and iterative construction methods were used. Findings: No area of restricted diffusion is identified to suggest an acute intracranial infarct. Multifocal areas of T2/FLAIR signal increase are noted within the subcortical, deep cerebral, and periventricular white matter consistent with chronic small  vessel/microangiopathic ischemic changes. The ventricles and sulci are normal in size and configuration. No intracranial mass or mass effect. No extra-axial mass or collection. The posterior fossa is normal. Sellar and suprasellar structures are normal.  The major intracranial flow-voids of the [place] are patent. Lens replacements. The visualized paranasal sinuses and ethmoid air cells are aerated. The mastoid air cells are aerated..     Impression: No acute intracranial pathology. Chronic small vessel/microangiopathic ischemic changes. Electronically Signed: Lukasz Ramirez MD  3/10/2024 6:40 PM EDT  Workstation ID: [id number]    XR Chest 1 View    Result Date: 3/10/2024  XR CHEST 1 VW Date of Exam: 3/10/2024 1:40 AM EST Indication: SOB, pleural effusions Comparison: 1/3/2024 at 1157 hours, CT chest 3/7/2024 Findings: Multifocal infiltrates are again seen throughout the lungs bilaterally with associated interstitial changes. Bilateral pleural effusions are observed. The findings appear stable when compared to the CT. The cardiac silhouette and mediastinum are stable.     Impression: Stable multifocal infiltrates throughout the lungs bilaterally with interstitial changes. Small bilateral pleural effusions are also again noted which appear stable. Electronically Signed: Son Romero MD  3/10/2024 10:44 AM EDT  Workstation ID: [id number]    Adult Transthoracic Echo Complete W/ Cont if Necessary Per Protocol    Result Date: 3/9/2024    Left ventricular systolic function is normal. Calculated left ventricular EF = 69.1% Normal left ventricular cavity size noted. Left ventricular wall thickness is consistent  with mild concentric hypertrophy. Elevated left atrial pressure.   The aortic valve exhibits sclerosis. There is moderate calcification of the aortic valve. There is thickening of the aortic valve. The aortic valve appears trileaflet. No aortic valve regurgitation is present. Mild aortic valve stenosis is present   The tricuspid valve is normal in structure. Moderate tricuspid valve regurgitation is present. Estimated right ventricular systolic pressure from tricuspid regurgitation is markedly elevated (>55 mmHg).     CT Angiogram Chest    Result Date: 3/7/2024  CT ANGIOGRAM CHEST Date of Exam: 3/7/2024 9:22 PM EST Indication: Shortness of breath. Comparison: None available. Technique: CTA of the chest was performed after the uneventful intravenous administration of 75 cc Isovue-370 . Reconstructed coronal and sagittal images were also obtained. In addition, a 3-D volume rendered image was created for interpretation. Automated exposure control and iterative reconstruction methods were used. Findings: The thyroid gland is normal. The subglottic airway is clear. No evidence of aortic dissection. Severe atheromatous disease of the coronary vessels. No pulmonary embolus. Moderate right and small left pleural effusions. Small hiatal hernia. Extensive calcified mediastinal and hilar lymph nodes consistent with prior granulomatous disease. Infiltrates. Rounded density overlying the left pleural effusion measuring 3.8 cm x 3 cm favored to represent rounded atelectasis though an underlying mass  cannot be excluded. Limited evaluation of the upper abdomen is normal. Thoracic vertebral body height and alignment are normal. No lytic or blastic disease.     No pulmonary embolus. Moderate right and small left pleural effusions. 3.8 cm x 3 cm rounded lesion overlying the left pleural effusion favored to represent an area of rounded atelectasis. Consider PET/CT and/or biopsy. Electronically Signed: Lukasz Ramirez MD  3/7/2024 9:36 PM  EST  Workstation ID: [id number]             Results for orders placed during the hospital encounter of 03/07/24    Adult Transthoracic Echo Complete W/ Cont if Necessary Per Protocol    Interpretation Summary    Left ventricular systolic function is normal. Calculated left ventricular EF = 69.1% Normal left ventricular cavity size noted. Left ventricular wall thickness is consistent with mild concentric hypertrophy. Elevated left atrial pressure.    The aortic valve exhibits sclerosis. There is moderate calcification of the aortic valve. There is thickening of the aortic valve. The aortic valve appears trileaflet. No aortic valve regurgitation is present. Mild aortic valve stenosis is present    The tricuspid valve is normal in structure. Moderate tricuspid valve regurgitation is present. Estimated right ventricular systolic pressure from tricuspid regurgitation is markedly elevated (>55 mmHg).      Discharge Details        Discharge Medications        New Medications        Instructions Start Date   Eliquis 5 MG tablet tablet  Generic drug: apixaban   Take 1 tablet by mouth Every 12 (Twelve) Hours as directed for Atrial Fibrillation      furosemide 20 MG tablet  Commonly known as: LASIX   20 mg, Oral, Daily             Continue These Medications        Instructions Start Date   albuterol (5 MG/ML) 0.5% nebulizer solution  Commonly known as: PROVENTIL   2.5 mg, Nebulization, Every 4 Hours PRN      albuterol sulfate  (90 Base) MCG/ACT inhaler  Commonly known as: PROVENTIL HFA;VENTOLIN HFA;PROAIR HFA   2 puffs, Inhalation, Every 4 Hours PRN      Bystolic 10 MG tablet  Generic drug: nebivolol   10 mg, Oral, Daily      esomeprazole 40 MG capsule  Commonly known as: nexIUM   40 mg, Oral, 2 Times Daily      fluticasone 50 MCG/ACT nasal spray  Commonly known as: FLONASE   2 sprays, Nasal, Daily      levocetirizine 5 MG tablet  Commonly known as: XYZAL   5 mg, Oral, Every Evening      montelukast 10 MG tablet  Commonly  known as: SINGULAIR   10 mg, Oral, Every Evening      potassium chloride 10 MEQ CR tablet  Commonly known as: KLOR-CON M10   10 mEq, Oral, Daily      simvastatin 40 MG tablet  Commonly known as: ZOCOR   40 mg, Oral, Every Evening      SITagliptin 100 MG tablet  Commonly known as: Januvia   100 mg, Oral, Daily      tamsulosin 0.4 MG capsule 24 hr capsule  Commonly known as: FLOMAX   0.4 mg, Oral, Daily      Toujeo Max SoloStar 300 UNIT/ML solution pen-injector injection  Generic drug: Insulin Glargine (2 Unit Dial)   16 Units, Subcutaneous, Daily      traZODone 50 MG tablet  Commonly known as: DESYREL   TAKE 1 TO 2 TABLETS BY MOUTH AT  BEDTIME AS NEEDED FOR INSOMNIA             Stop These Medications      amLODIPine 10 MG tablet  Commonly known as: NORVASC     aspirin 81 MG tablet     metoprolol succinate XL 25 MG 24 hr tablet  Commonly known as: TOPROL-XL     torsemide 5 MG tablet  Commonly known as: DEMADEX              Allergies   Allergen Reactions    Penicillins Swelling    Sulfa Antibiotics Swelling         Discharge Disposition:  Home or Self Care    Diet:  Hospital:  No active diet order           Activity:         CODE STATUS:    Code Status and Medical Interventions:   Ordered at: 03/07/24 2312     Medical Intervention Limits:    NO intubation (DNI)     Level Of Support Discussed With:    Patient     Code Status (Patient has no pulse and is not breathing):    CPR (Attempt to Resuscitate)     Medical Interventions (Patient has pulse or is breathing):    Limited Support       Future Appointments   Date Time Provider Department Center   3/18/2024 11:00 AM Ev Ventura MD [facility]   3/14/2025  2:30 PM Lukasz Snigh MD [facility]       Additional Instructions for the Follow-ups that You Need to Schedule       Discharge Follow-up with Specialty: heart valve ctr; 1 Month   As directed      Specialty: heart valve ctr   Follow Up: 1 Month                      Kavitha Soriano MD  03/16/24      Time  Spent on Discharge:  I spent  30  minutes on this discharge activity which included: face-to-face encounter with the patient, reviewing the data in the system, coordination of the care with the nursing staff as well as consultants, documentation, and entering orders.

## 2024-03-11 NOTE — PAYOR COMM NOTE
"Madiha Sanchez, RN  Utilization Management  P:[phone]  F:[phone]    Ref #[id number]   DC date 3/11/24  NO dc summary available    Ricki Vázquez (88 y.o. Male)       Date of Birth   1935    Social Security Number       Address   [address] [place] [address] [address] [place] [address]    Home Phone   [phone]    MRN   [id number]       Catholic   None    Marital Status                               Admission Date   3/7/24    Admission Type   Emergency    Admitting Provider   Kavitha Soriano MD    Attending Provider       Department, Room/Bed   [address], [address]/1       Discharge Date   3/11/2024    Discharge Disposition   Home or Self Care    Discharge Destination                                 Attending Provider: (none)   Allergies: Penicillins, Sulfa Antibiotics    Isolation: None   Infection: None   Code Status: Prior    Ht: 180.3 cm (71\")   Wt: 71.3 kg (157 lb 1.6 oz)    Admission Cmt: None   Principal Problem: Acute on chronic diastolic congestive heart failure [I50.33]                   Active Insurance as of 3/7/2024       Primary Coverage       Payor Plan Insurance Group Employer/Plan Group    ANTHEM MEDICARE REPLACEMENT ANTHEM MEDICARE ADVANTAGE [id number]       Payor Plan Address Payor Plan Phone Number Payor Plan Fax Number Effective Dates    [address] [phone]  1/1/2024 - None Entered    [facility] [id number]         Subscriber Name Subscriber Birth Date Member ID       RICKI VÁZQUEZ 1935 [id number]                     Emergency Contacts        (Rel.) Home Phone Work Phone Mobile Phone    Paty Tobar (Daughter) [phone] -- [phone]    Liane Shaw (Grandchild) [phone] -- [phone]    Diogo Vázquez (Son) -- -- [phone]              Discharge Summary    No notes of this type exist for this encounter.       "

## 2024-03-11 NOTE — CASE MANAGEMENT/SOCIAL WORK
Continued Stay Note  [facility]     Patient Name: Ricki Trotter  MRN: [id number]  Today's Date: 3/11/2024    Admit Date: 3/7/2024    Plan: Home   Discharge Plan       Row Name 03/11/24 1231       Plan    Plan Home    Plan Comments Discussed pt. in MDR. Pt. is up for discharge. Plan is home with no d/c needs. Pt.'s daughter to provide transportation. No other needs or concerns.    Final Discharge Disposition Code 01 - home or self-care                   Discharge Codes    No documentation.                 Expected Discharge Date and Time       Expected Discharge Date Expected Discharge Time    Mar 11, 2024               ARCELIA Handy

## 2024-03-11 NOTE — PROGRESS NOTES
"[facility] Cardiology  Hospital Progress Note     LOS: 3 days   Patient Care Team:  Stacey Hanley APRN as PCP - General (Family Medicine)  Delgado Larios MD as Consulting Physician (Medical Oncology)  Demetria Dasilva APRN as Nurse Practitioner (Pulmonary Disease)  Lukasz Singh MD as Consulting Physician (Cardiology)  Gregorio Herbert MD as Consulting Physician (Pulmonary Disease)  PCP:  Stacey Hanley APRN    Chief Complaint: no complaints    SUBJECTIVE:  Feels much better today. On room air      OBJECTIVE:         Vital Sign Min/Max for last 24 hours  Temp  Min: 97.4 °F (36.3 °C)  Max: 98.2 °F (36.8 °C)   BP  Min: 132/59  Max: 164/77   Pulse  Min: 72  Max: 106   Resp  Min: 16  Max: 18   SpO2  Min: 91 %  Max: 97 %   No data recorded   No data recorded     Flowsheet Rows      Flowsheet Row First Filed Value   Admission Height 180.3 cm (71\") Documented at 03/07/2024 1947   Admission Weight 72.1 kg (159 lb) Documented at 03/07/2024 1947            Telemetry: Afib with CVR, frequent PVCs      Intake/Output Summary (Last 24 hours) at 3/11/2024 0939  Last data filed at 3/11/2024 0200  Gross per 24 hour   Intake --   Output 1825 ml   Net -1825 ml     Intake & Output (last 3 days)         03/08 0701  03/09 0700 03/09 0701  03/10 0700 03/10 0701  03/11 0700 03/11 0701  03/12 0700    P.O. 420 1330      Total Intake(mL/kg) 420 (5.8) 1330 (18.7)      Urine (mL/kg/hr) 1200 (0.7) 2775 (1.6) 1825 (1.1)     Total Output 1200 2775 1825     Net -780 -1445 -1825                      Physical Exam:  Vitals reviewed.   Constitutional:       Appearance: Normal appearance. Well-developed and not in distress.   Neck:      Vascular: No JVD.   Pulmonary:      Effort: Pulmonary effort is normal.      Breath sounds: Normal breath sounds.   Cardiovascular:      Normal rate. Irregularly irregular rhythm. Normal S1. Normal S2.       Murmurs: There is a systolic murmur.   Pulses:     Intact distal " pulses.   Edema:     Peripheral edema absent.   Neurological:      Mental Status: Alert.   Psychiatric:         Behavior: Behavior is cooperative.          LABS/DIAGNOSTIC DATA:  Results from last 7 days   Lab Units 03/10/24  1810 03/10/24  0706 03/09/24  0637   WBC 10*3/mm3 12.70* 10.77 10.69   HEMOGLOBIN g/dL 11.7* 10.2* 9.8*   HEMATOCRIT % 34.4* 30.3* 28.3*   PLATELETS 10*3/mm3 333 299 281     Lab Results   Lab Value Date/Time    TROPONINT 34 (H) 03/11/2024 0024    TROPONINT 39 (H) 03/10/2024 2049    TROPONINT 30 (H) 03/10/2024 1809    TROPONINT 27 (H) 03/08/2024 0219    TROPONINT 27 (H) 03/08/2024 0009    TROPONINT 30 (H) 03/07/2024 2015         Results from last 7 days   Lab Units 03/10/24  1809 03/10/24  0706 03/09/24  2043 03/09/24  0637 03/08/24  0009 03/07/24  2015   SODIUM mmol/L 133* 136  --  131*   < > 125*   POTASSIUM mmol/L 3.7 4.0 3.8 2.9*   < > 3.5   CHLORIDE mmol/L 91* 95*  --  89*   < > 86*   CO2 mmol/L 29.0 28.0  --  29.0   < > 26.0   BUN mg/dL 17 17  --  21   < > 25*   CREATININE mg/dL 1.31* 1.38*  --  1.24   < > 1.29*   CALCIUM mg/dL 8.8 8.6  --  8.5*   < > 9.0   BILIRUBIN mg/dL 0.6  --   --   --   --  0.7   ALK PHOS U/L 53  --   --   --   --  55   ALT (SGPT) U/L 6  --   --   --   --  8   AST (SGOT) U/L 18  --   --   --   --  14   GLUCOSE mg/dL 117* 118*  --  113*   < > 162*    < > = values in this interval not displayed.     Results from last 7 days   Lab Units 03/08/24 0219   HEMOGLOBIN A1C % 6.40*     Results for orders placed during the hospital encounter of 03/07/24    Adult Transthoracic Echo Complete W/ Cont if Necessary Per Protocol    Interpretation Summary    Left ventricular systolic function is normal. Calculated left ventricular EF = 69.1% Normal left ventricular cavity size noted. Left ventricular wall thickness is consistent with mild concentric hypertrophy. Elevated left atrial pressure.    The aortic valve exhibits sclerosis. There is moderate calcification of the aortic valve.  There is thickening of the aortic valve. The aortic valve appears trileaflet. No aortic valve regurgitation is present. Mild aortic valve stenosis is present    The tricuspid valve is normal in structure. Moderate tricuspid valve regurgitation is present. Estimated right ventricular systolic pressure from tricuspid regurgitation is markedly elevated (>55 mmHg).                  Medication Review:   apixaban, 5 mg, Oral, Q12H  atorvastatin, 20 mg, Oral, Nightly  budesonide, 0.5 mg, Nebulization, BID - RT  esomeprazole, 40 mg, Oral, Q12H  furosemide, 40 mg, Oral, Daily  insulin lispro, 2-7 Units, Subcutaneous, 4x Daily AC & at Bedtime  ipratropium-albuterol, 3 mL, Nebulization, 4x Daily - RT  nebivolol, 10 mg, Oral, Daily  pharmacy consult - MTM, , Does not apply, Daily  potassium chloride, 20 mEq, Oral, Daily  sodium chloride, 10 mL, Intravenous, Q12H  tamsulosin, 0.4 mg, Oral, Daily             ASSESSMENT/PLAN:    Acute on chronic diastolic congestive heart failure    COPD (chronic obstructive pulmonary disease)    GERD (gastroesophageal reflux disease)    Type 2 diabetes mellitus    Primary hypertension    PVC's (premature ventricular contractions)    HLD (hyperlipidemia)    Leukocytosis    Pleural effusion, bilateral    CKD (chronic kidney disease) stage 3, GFR 30-59 ml/min    Hyponatremia    Lesion of lung    Atrial fibrillation    Pulmonary hypertension    Valvular heart disease    CHF (congestive heart failure)    PAF/flutter, new onset, controlled VR - frequent PVCs. Asymptomatic.  -Started back on home supply nebivolol.  -Anticoagulated on Eliquis 5 mg BID.    -Discontinue aspirin.     Valvular heart disease with preserved LVEF, pulmonary hypertension.   Appears euvolemic. On room air, no distress. Na 133. Creatinine down to 1.31.  -Transitioned to po furosemide 3/10.   -Will decrease furosemide to 20 mg daily with extra dose as needed for 2 lb weight gain in 24 hours.      Hypertension currently controlled  on Bystolic.   -stop amlodipine - can worsen HF.   -Previously allowed HTN in the setting of near syncope.   -Will have him monitor home BP readings.   -Consider addition of losartan, Jardiance if renal function remains stable.      HLD, LDL 79 - continue statin.    Ok to discharge from cardiac standpoint. He will continue daily weights. Continue nebivolol, statin, diuretic with extra dose as needed for weight gain. Follow up with H& V clinic in 1 week with BMP. See Dr. Singh in one month.       Emelia Perez, LINDA   03/11/24  09:39 EDT

## 2024-03-11 NOTE — OUTREACH NOTE
Prep Survey      Flowsheet Row Responses   [facility] patient discharged from? [facility]   Is LACE score < 7 ? No   Eligibility [facility]   Date of Admission 03/07/24   Date of Discharge 03/11/24   Discharge Disposition Home or Self Care   Discharge diagnosis Acute on chronic diastolic congestive heart failure   Does the patient have one of the following disease processes/diagnoses(primary or secondary)? CHF   Does the patient have Home health ordered? No   Is there a DME ordered? No   Prep survey completed? Yes            Jackie SALEEM - Registered Nurse

## 2024-03-12 ENCOUNTER — TRANSITIONAL CARE MANAGEMENT TELEPHONE ENCOUNTER (OUTPATIENT)
Dept: CALL CENTER | Facility: HOSPITAL | Age: 89
End: 2024-03-12
Payer: MEDICARE

## 2024-03-12 NOTE — OUTREACH NOTE
Call Center TCM Note      Flowsheet Row Responses   [facility] [place] patient discharged from? [facility]   Does the patient have one of the following disease processes/diagnoses(primary or secondary)? CHF   TCM attempt successful? No   Unsuccessful attempts Attempt 2             Rosemarie Heredia LPN    3/12/2024, 13:24 EDT         Copied from CRM #[id number]. Topic: MW Messaging - MW Patient Request  >> Dec 17, 2024  1:14 PM Samantha GARCIA wrote:  [facility] program called requesting to send a general message to clinician.   Verified issue is NOT regarding a symptom(s) requiring routine or emergent triage. Verified another message template type and CRM does not apply.    Selected 'Wrap Up CRM' and created new Telephone Encounter after clicking 'Convert to Clinical Call'. Selected appropriate Reason for Call.  Sent Pt message template and routed as routine priority per Clinician KB page to appropriate clinician pool. Readback full message.    -- DO NOT REPLY / DO NOT REPLY ALL --  -- This inbox is not monitored. If this was sent to the wrong provider or department, reroute message to P ECO Reroute pool. 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  Can this be refaxed today please.  The patient has an appointment with [facility] tomorrow.     Fax [phone]  Caller Information       Contact Date/Time Type Contact Phone/Fax    2024 01:10 PM CST Phone (Incoming) [facility] program [phone]            Alternative phone number: na    Can a detailed message be left? Yes with a person   Patient has been advised the message will be addressed within 2-3 business days.

## 2024-03-12 NOTE — OUTREACH NOTE
Call Center TCM Note      Flowsheet Row Responses   [facility] patient discharged from? [facility]   Does the patient have one of the following disease processes/diagnoses(primary or secondary)? CHF   TCM attempt successful? No   Unsuccessful attempts Attempt 1             Rosemarie Heredia LPN    3/12/2024, 11:18 EDT

## 2024-03-13 ENCOUNTER — TRANSITIONAL CARE MANAGEMENT TELEPHONE ENCOUNTER (OUTPATIENT)
Dept: CALL CENTER | Facility: HOSPITAL | Age: 89
End: 2024-03-13
Payer: MEDICARE

## 2024-03-13 NOTE — OUTREACH NOTE
Call Center TCM Note      Flowsheet Row Responses   [facility] patient discharged from? [place]   Does the patient have one of the following disease processes/diagnoses(primary or secondary)? CHF   TCM attempt successful? No   Unsuccessful attempts Attempt 3             Mary Noguera RN    3/13/2024, 10:47 EDT

## 2024-03-18 ENCOUNTER — OFFICE VISIT (OUTPATIENT)
Dept: FAMILY MEDICINE CLINIC | Facility: CLINIC | Age: 89
End: 2024-03-18
Payer: MEDICARE

## 2024-03-18 ENCOUNTER — TELEPHONE (OUTPATIENT)
Dept: CARDIOLOGY | Facility: CLINIC | Age: 89
End: 2024-03-18
Payer: MEDICARE

## 2024-03-18 VITALS
HEART RATE: 62 BPM | DIASTOLIC BLOOD PRESSURE: 88 MMHG | BODY MASS INDEX: 22.61 KG/M2 | OXYGEN SATURATION: 95 % | HEIGHT: 71 IN | WEIGHT: 161.5 LBS | SYSTOLIC BLOOD PRESSURE: 146 MMHG

## 2024-03-18 DIAGNOSIS — Z79.4 TYPE 2 DIABETES MELLITUS WITHOUT COMPLICATION, WITH LONG-TERM CURRENT USE OF INSULIN: Primary | ICD-10-CM

## 2024-03-18 DIAGNOSIS — J44.9 CHRONIC OBSTRUCTIVE PULMONARY DISEASE, UNSPECIFIED COPD TYPE: ICD-10-CM

## 2024-03-18 DIAGNOSIS — I50.31 ACUTE HEART FAILURE WITH PRESERVED EJECTION FRACTION (HFPEF): ICD-10-CM

## 2024-03-18 DIAGNOSIS — E11.9 TYPE 2 DIABETES MELLITUS WITHOUT COMPLICATION, WITH LONG-TERM CURRENT USE OF INSULIN: Primary | ICD-10-CM

## 2024-03-18 DIAGNOSIS — I48.11 LONGSTANDING PERSISTENT ATRIAL FIBRILLATION: ICD-10-CM

## 2024-03-18 RX ORDER — BACLOFEN 5 MG/1
5 TABLET ORAL EVERY 8 HOURS SCHEDULED
COMMUNITY
Start: 2024-03-15

## 2024-03-18 NOTE — TELEPHONE ENCOUNTER
Caller: Paty Tobar    Relationship to patient: Emergency Contact    Best call back number: [phone]    New or established patient?  [] New  [x] Established    Date of discharge:03.11.24    Facility discharged from: [facility]    Diagnosis/Symptoms: SHORTNESS OF BREATH, CHEST PAIN, CHF    Length of stay (If applicable): 3 DAYS    Specialty Only: Did you see a [facility] provider?    [x] Yes  [] No  If so, who?  AND ASSOCIATE

## 2024-03-18 NOTE — PROGRESS NOTES
Office Note     Name: Ricki Trotter    : 1935     MRN: [id number]     Chief Complaint  Hospital Follow Up Visit (Pt is here for a HFU for CHF. He is here with daughter keya. )    Subjective     History of Present Illness:  Ricki Trotter is a 88 y.o. male who presents today for: Transfer of care from a different provider within this office as well as hospital followup.    Was at [facility] for a few days for COPD and pneuminia,  then  bounced back less than a day later , [facility] felt it was more related to CHF and did diuresis, stopped the steroids and antibiotics. Required I&O cath twice during the admission    Changed torsemide to lasix prior to discharge    Changes ASA to eliquis due to  new diagnosis of AFib and stroke risk.    Has to have bystolic for Afib because his heart rate is not well controlled and cardiac symptoms get worse but has never really been on metroprolol despite [facility] putting him on it- had failed metoprolol and generic Bystolic in the past with significant side effects and uncontrolled heart rate.    Breathing is stable since discharge but seemed worse last night.  Swelling has resolved in lower extremities    Has had enlarged prostate      Since getting home feels like he is not eamptying thebladder as well as he was.  Flow startes out pretty normal tehn trickles down    DM: Takes insulin januvia and insulin      Recommended considering jardiance but did not start it in hospital      Review of Systems:   Review of Systems    Past Medical History:   Past Medical History:   Diagnosis Date    Allergic rhinitis     Amnesia     Basal cell carcinoma 2015    Benign prostatic hyperplasia     Chronic kidney disease, stage 3b     COPD (chronic obstructive pulmonary disease)     Drug dependency     Hyperthyroidism     Iron deficiency     Long term (current) use of insulin     Melanoma 2012    Nephrosclerosis        Past Surgical History:   Past Surgical History:   Procedure  Laterality Date    CATARACT EXTRACTION Bilateral     COLONOSCOPY      PROSTHODONTIC PROCEDURE         Family History:   Family History   Problem Relation Age of Onset    Diabetes Mother     Stroke Mother     Breast cancer Sister        Social History:   Social History     Socioeconomic History    Marital status:    Tobacco Use    Smoking status: Former     Current packs/day: 0.00     Average packs/day: 0.5 packs/day for 9.0 years (4.5 ttl pk-yrs)     Types: Cigarettes     Start date:      Quit date:      Years since quittin.2     Passive exposure: Past    Smokeless tobacco: Never   Vaping Use    Vaping status: Never Used   Substance and Sexual Activity    Alcohol use: Yes     Alcohol/week: 2.0 standard drinks of alcohol     Types: 2 Cans of beer per week    Drug use: No    Sexual activity: Defer       Immunizations:   Immunization History   Administered Date(s) Administered    COVID-19 (MODERNA) BIVALENT 12+YRS 10/06/2022    COVID-19 (PFIZER) Purple Cap Monovalent 2021, 2021, 2021    COVID-19 F23 (MODERNA) 12YRS+ (SPIKEVAX) 2023    FLUAD TRI 65YR+ 10/18/2019    Flu Vaccine Quad PF >36MO 10/18/2017, 10/22/2018    Fluad Quad 65+ 10/06/2022    Fluzone High Dose =>65 Years (Vaxcare ONLY) 10/29/2013    Fluzone High-Dose 65+yrs 2021, 11/10/2023    Fluzone Quad >6mos (Multi-dose) 10/28/2014    Hepatitis A 2018, 2018    Influenza TIV (IM) 10/15/2015, 2015    Influenza, Unspecified 2014, 10/15/2015, 2015, 10/22/2018    Pneumococcal Conjugate 20-Valent (PCV20) 2022    Tdap 2017        Medications:     Current Outpatient Medications:     albuterol (PROVENTIL) (5 MG/ML) 0.5% nebulizer solution, Take 0.5 mL by nebulization Every 4 (Four) Hours As Needed for Wheezing., Disp: 1 each, Rfl: 5    albuterol sulfate  (90 Base) MCG/ACT inhaler, Inhale 2 puffs Every 4 (Four) Hours As Needed for Wheezing., Disp: 18 g, Rfl: 2    apixaban  "(ELIQUIS) 5 MG tablet tablet, Take 1 tablet by mouth Every 12 (Twelve) Hours as directed for Atrial Fibrillation, Disp: 60 tablet, Rfl: 5    Baclofen (LIORESAL) 5 MG tablet, Take 1 tablet by mouth Every 8 (Eight) Hours., Disp: , Rfl:     Bystolic 10 MG tablet, Take 1 tablet by mouth Daily., Disp: 90 tablet, Rfl: 1    esomeprazole (nexIUM) 40 MG capsule, Take 1 capsule by mouth 2 (Two) Times a Day., Disp: 180 capsule, Rfl: 1    fluticasone (FLONASE) 50 MCG/ACT nasal spray, 2 sprays into the nostril(s) as directed by provider Daily., Disp: 16 g, Rfl: 11    furosemide (LASIX) 20 MG tablet, Take 1 tablet by mouth Daily., Disp: 30 tablet, Rfl: 5    Insulin Glargine, 2 Unit Dial, (Toujeo Max SoloStar) 300 UNIT/ML solution pen-injector injection, Inject 16 Units under the skin into the appropriate area as directed Daily., Disp: 6 mL, Rfl: 1    levocetirizine (XYZAL) 5 MG tablet, Take 1 tablet by mouth Every Evening., Disp: 90 tablet, Rfl: 1    montelukast (SINGULAIR) 10 MG tablet, Take 1 tablet by mouth Every Evening., Disp: 90 tablet, Rfl: 1    potassium chloride (K-DUR,KLOR-CON) 10 MEQ CR tablet, Take 1 tablet by mouth Daily., Disp: 90 tablet, Rfl: 1    simvastatin (ZOCOR) 40 MG tablet, Take 1 tablet by mouth Every Evening., Disp: 90 tablet, Rfl: 1    SITagliptin (Januvia) 100 MG tablet, Take 1 tablet by mouth Daily., Disp: 90 tablet, Rfl: 1    tamsulosin (FLOMAX) 0.4 MG capsule 24 hr capsule, Take 1 capsule by mouth Daily., Disp: 30 capsule, Rfl: 11    traZODone (DESYREL) 50 MG tablet, TAKE 1 TO 2 TABLETS BY MOUTH AT  BEDTIME AS NEEDED FOR INSOMNIA, Disp: 180 tablet, Rfl: 1    empagliflozin (Jardiance) 10 MG tablet tablet, Take 1 tablet by mouth Daily., Disp: 90 tablet, Rfl: 0    Allergies:   Allergies   Allergen Reactions    Penicillins Swelling    Sulfa Antibiotics Swelling       Objective     Vital Signs  /88   Pulse 62   Ht 180.3 cm (71\")   Wt 73.3 kg (161 lb 8 oz)   SpO2 95%   BMI 22.52 kg/m²   Estimated " "body mass index is 22.52 kg/m² as calculated from the following:    Height as of this encounter: 180.3 cm (71\").    Weight as of this encounter: 73.3 kg (161 lb 8 oz).    BMI is within normal parameters. No other follow-up for BMI required.      Physical Exam  Vitals and nursing note reviewed.   Constitutional:       Appearance: Normal appearance.   Cardiovascular:      Rate and Rhythm: Normal rate and regular rhythm.      Heart sounds: No murmur heard.     No friction rub. No gallop.   Pulmonary:      Effort: Pulmonary effort is normal.      Breath sounds: Normal breath sounds. No wheezing, rhonchi or rales.   Musculoskeletal:      Right lower leg: No edema (trace).      Left lower leg: No edema (trace).   Neurological:      Mental Status: He is alert.            Procedures     Assessment and Plan     1. Type 2 diabetes mellitus without complication, with long-term current use of insulin  START  - empagliflozin (Jardiance) 10 MG tablet tablet; Take 1 tablet by mouth Daily.  Dispense: 90 tablet; Refill: 0  - Basic Metabolic Panel    2. Chronic obstructive pulmonary disease, unspecified COPD type  No med changes today    3. Acute heart failure with preserved ejection fraction (HFpEF)  Continue bystolic    4. Longstanding persistent atrial fibrillation  Continue eliquis       Medical records were obtained and reviewed from patient's recent hospitalization and are summarized and reviewed as follows:  Facility: [facility]  Date of Admission: 3/7/24  Date of Discharge: 3/11/24  Reason for Admission: SOA,   Discharge diagnosis: CHF exacerbation  Hospital Course:  Ricki Trotter is a 88 y.o. male from [facility] with  hx of COPD, bronchiectasis, HTN, HLD, CHF, CKD Stage III, GERD, T2DM, BPH, hx of dysphagia (s/p esophageal dilatation), hx of recurrent pneumonia.  He came to ED with dyspnea.  He had a recent [facility] admission where he was treated for pneumonia, but was home for only a day before coming to [facility] " ED.        Acute CHF exacerbation :  HFpEF   Atrial fibrillation (new Dx)  Severe Pulmonary HTN  Bilateral pleural effusions (R>L)  HTN, HLD  - Cardiology was consulted.  Echo: Normal EF, Mod TR, Elevated RVSP  - Afib was diagnosed on admission with rate of 86 (pt is on home Bystolic).  He was started on Eliquis.    - For CHF, he got IV diuresis and is discharged on oral Lasix.      Hyponatremia -> resolved   -likely 2/2 volume overload   -Continue diuresis  -BMP in AM      Leukocytosis -> resolved   -afebrile   -procal and lactic acid both WNL   -treated for PNA at [place] during recent admit (d/c 3/6); records requested; reportedly received dose of steroids recently  -UA negative  -CTA Chest with no evidence of PNA  -monitor      CKD Stage III  -Stable  -Monitor with diuresis      COPD   Hx of bronchiectasis   Lung lesion (chronic)  Hx recurrent PNA  -Follows outpatient with Dr Adams   -Only uses Albuterol PRN   -Continue Pulmicort Nebs, added 3/8     T2DM  -hem A1c 6.4  -hold routine meds   -FSBG q ac/hs w/ LDSS   -If Cr remains stable, consider adding Jardiance at DC     Hypokalemia-resolved  -Replaced per protocol   -Mg normal                 Discharge Follow Up Recommendations for outpatient labs/diagnostics:  Significant findings:    Adult Transthoracic Echo Complete W/ Cont if Necessary Per Protocol     Interpretation Summary    Left ventricular systolic function is normal. Calculated left ventricular EF = 69.1% Normal left ventricular cavity size noted. Left ventricular wall thickness is consistent with mild concentric hypertrophy. Elevated left atrial pressure.    The aortic valve exhibits sclerosis. There is moderate calcification of the aortic valve. There is thickening of the aortic valve. The aortic valve appears trileaflet. No aortic valve regurgitation is present. Mild aortic valve stenosis is present    The tricuspid valve is normal in structure. Moderate tricuspid valve regurgitation is  present. Estimated right ventricular systolic pressure from tricuspid regurgitation is markedly elevated (>55 mmHg).  CTA Chest w/ moderate right and small left pleural effusion. ProBNP ~6400. EKG concerning for atrial fibrillation.   CTA CHEST 3/7/24   No pulmonary embolus. Moderate right and small left pleural effusions. 3.8 cm x 3 cm rounded lesion overlying the left pleural effusion favored to represent an area of rounded atelectasis. Consider PET/CT and/or biopsy.     Follows Dr Krishnamurthy for pulmology.      I spent 35 minutes caring for this patient on this date of service. This time includes time spent by me in the following activities: preparing for the visit, reviewing tests, obtaining and/or reviewing a separately obtained history, counseling and educating the patient/family/caregiver and documenting information in the medical record.   Follow Up  Return in about 6 weeks (around 4/29/2024).    Patient was advised to call the office or seek medical care if  any issues discussed during this visit worsen or persist or if new concerns arise        MD AUSTYN [facility]  [address] [place] [address] [id number] [phone]

## 2024-03-19 LAB
BUN SERPL-MCNC: 12 MG/DL (ref 8–27)
BUN/CREAT SERPL: 9 (ref 10–24)
CALCIUM SERPL-MCNC: 8.6 MG/DL (ref 8.6–10.2)
CHLORIDE SERPL-SCNC: 95 MMOL/L (ref 96–106)
CO2 SERPL-SCNC: 24 MMOL/L (ref 20–29)
CREAT SERPL-MCNC: 1.36 MG/DL (ref 0.76–1.27)
EGFRCR SERPLBLD CKD-EPI 2021: 50 ML/MIN/1.73
GLUCOSE SERPL-MCNC: 218 MG/DL (ref 70–99)
POTASSIUM SERPL-SCNC: 4.3 MMOL/L (ref 3.5–5.2)
SODIUM SERPL-SCNC: 131 MMOL/L (ref 134–144)

## 2024-03-20 ENCOUNTER — TELEPHONE (OUTPATIENT)
Dept: FAMILY MEDICINE CLINIC | Facility: CLINIC | Age: 89
End: 2024-03-20

## 2024-03-20 NOTE — TELEPHONE ENCOUNTER
Really needed to have discussed this at his appointment earlier this week. Please get him scheudled to be seen. If he is registered for [employer] we could do it that way if needed but this requires a more detailed discussion than a message.

## 2024-03-20 NOTE — TELEPHONE ENCOUNTER
Caller: Paty Tobar    Relationship: Emergency Contact    Best call back number: [phone]     What medication are you requesting: SOMETHING MILD FOR ANXIETY    What are your current symptoms: ANXIETY    How long have you been experiencing symptoms: 2 WEEKS    Have you had these symptoms before:    [] Yes  [x] No    Have you been treated for these symptoms before:   [] Yes  [x] No    If a prescription is needed, what is your preferred pharmacy and phone number: [facility] [address] [place] [address] [phone] [facility] [phone]      Additional notes: AS HE WAS IN THE HOSPITAL SHE COULD TELL HE STARTED TO GET VERY ANXIOUS WITH SYMPTOMS SIMILAR TO PANIC ATTACK. WOULD LIKE TO KNOW IF THERE IS SOMETHING THAT COULD BE SENT IN FOR HIM.

## 2024-03-27 ENCOUNTER — OFFICE VISIT (OUTPATIENT)
Dept: FAMILY MEDICINE CLINIC | Facility: CLINIC | Age: 89
End: 2024-03-27
Payer: MEDICARE

## 2024-03-27 VITALS
SYSTOLIC BLOOD PRESSURE: 122 MMHG | WEIGHT: 161 LBS | BODY MASS INDEX: 22.45 KG/M2 | DIASTOLIC BLOOD PRESSURE: 76 MMHG | OXYGEN SATURATION: 95 % | HEART RATE: 77 BPM

## 2024-03-27 DIAGNOSIS — K44.9 HIATAL HERNIA: ICD-10-CM

## 2024-03-27 DIAGNOSIS — K29.70 GASTRITIS WITHOUT BLEEDING, UNSPECIFIED CHRONICITY, UNSPECIFIED GASTRITIS TYPE: ICD-10-CM

## 2024-03-27 DIAGNOSIS — E11.65 TYPE 2 DIABETES MELLITUS WITH HYPERGLYCEMIA, UNSPECIFIED WHETHER LONG TERM INSULIN USE: Primary | ICD-10-CM

## 2024-03-27 DIAGNOSIS — I50.31 ACUTE HEART FAILURE WITH PRESERVED EJECTION FRACTION (HFPEF): ICD-10-CM

## 2024-03-27 DIAGNOSIS — Z12.5 SCREENING FOR PROSTATE CANCER: ICD-10-CM

## 2024-03-27 DIAGNOSIS — J44.9 CHRONIC OBSTRUCTIVE PULMONARY DISEASE, UNSPECIFIED COPD TYPE: ICD-10-CM

## 2024-03-27 DIAGNOSIS — F41.1 GENERALIZED ANXIETY DISORDER: ICD-10-CM

## 2024-03-27 DIAGNOSIS — N40.0 BENIGN PROSTATIC HYPERPLASIA WITHOUT LOWER URINARY TRACT SYMPTOMS: ICD-10-CM

## 2024-03-27 DIAGNOSIS — E87.1 HYPONATREMIA: ICD-10-CM

## 2024-03-27 LAB
EXPIRATION DATE: NORMAL
Lab: NORMAL
POC CREATININE URINE: 10
POC MICROALBUMIN URINE: 10

## 2024-03-27 RX ORDER — TAMSULOSIN HYDROCHLORIDE 0.4 MG/1
0.4 CAPSULE ORAL DAILY
Qty: 30 CAPSULE | Refills: 11 | Status: SHIPPED | OUTPATIENT
Start: 2024-03-27

## 2024-03-27 RX ORDER — BUSPIRONE HYDROCHLORIDE 5 MG/1
5 TABLET ORAL DAILY PRN
Qty: 30 TABLET | Refills: 1 | Status: SHIPPED | OUTPATIENT
Start: 2024-03-27

## 2024-03-27 RX ORDER — DAPAGLIFLOZIN 5 MG/1
5 TABLET, FILM COATED ORAL DAILY
Qty: 30 TABLET | Refills: 3 | Status: CANCELLED | OUTPATIENT
Start: 2024-03-27

## 2024-03-27 NOTE — PROGRESS NOTES
"    Office Note     Name: Ricki Trotter    : 1935     MRN: [id number]     Chief Complaint  Anxiety (Pt is here for anxiety. ) and Headache (Pt complains of headache. )  Answers submitted by the patient for this visit:  Primary Reason for Visit (Submitted on 3/27/2024)  What is the primary reason for your visit?: Other  Other (Submitted on 3/27/2024)  Please describe your symptoms.: Anxiety, prostate referral , new diabetes medicine, does not like the way it makes him feel.  Have you had these symptoms before?: No  How long have you been having these symptoms?: 5-7 days  Please list any medications you are currently taking for this condition.: Jardiance    Subjective     History of Present Illness:  Ricki Trotter is a 88 y.o. male who presents today for:    DM;  Attempted to start Jardiance last visit but it made him \"feel funny\" but cannot speficy how it makes him feel.  Sugars have been 120s fasting without jardiance, with it it was dropping closer to 100.  Sugars were much higher in the hospital and  he'd also recently been steroids.  Was not eating and sleeping in his normal routine while hospitalized, would prefer to hold off on jardiance  or farxiga    Anxiety: Daughter thinks he was traumatized y 2 back to back hospitalizations with symptoms worsening in between. Is sleeping much better now that he is at home in his own home and in his own bed but still gets nervous and anxious some days.  Sometiems the rowdy grandchildren exacerbate it and other times simply the TV can irritate .    GERD:  Only tolerates brand name only nexium.  Prior  to admission was on brand name and it worked well, now is on generic and it does not work as well and he is having GERD even with just bland diet    BPH: had I&O cath in the hospital  twice because he could not empty his bladder.  Has been on tamsulon in the past.  His discharge instructions were very confusing. They told him to stop          Review of " Systems:   Review of Systems    Past Medical History:   Past Medical History:   Diagnosis Date    Allergic rhinitis     Amnesia     Basal cell carcinoma 2015    Benign prostatic hyperplasia     Chronic kidney disease, stage 3b     COPD (chronic obstructive pulmonary disease)     Drug dependency     Hyperthyroidism     Iron deficiency     Long term (current) use of insulin     Melanoma 2012    Nephrosclerosis        Past Surgical History:   Past Surgical History:   Procedure Laterality Date    CATARACT EXTRACTION Bilateral     COLONOSCOPY      PROSTHODONTIC PROCEDURE         Family History:   Family History   Problem Relation Age of Onset    Diabetes Mother     Stroke Mother     Breast cancer Sister        Social History:   Social History     Socioeconomic History    Marital status:    Tobacco Use    Smoking status: Former     Current packs/day: 0.00     Average packs/day: 0.5 packs/day for 9.0 years (4.5 ttl pk-yrs)     Types: Cigarettes     Start date:      Quit date:      Years since quittin.3     Passive exposure: Past    Smokeless tobacco: Never   Vaping Use    Vaping status: Never Used   Substance and Sexual Activity    Alcohol use: Yes     Alcohol/week: 2.0 standard drinks of alcohol     Types: 2 Cans of beer per week    Drug use: No    Sexual activity: Defer       Immunizations:   Immunization History   Administered Date(s) Administered    COVID-19 (MODERNA) BIVALENT 12+YRS 10/06/2022    COVID-19 (PFIZER) Purple Cap Monovalent 2021, 2021, 2021    COVID-19 F23 (MODERNA) 12YRS+ (SPIKEVAX) 2023    FLUAD TRI 65YR+ 10/18/2019    Flu Vaccine Quad PF >36MO 10/18/2017, 10/22/2018    Fluad Quad 65+ 10/06/2022    Fluzone High Dose =>65 Years (Vaxcare ONLY) 10/29/2013    Fluzone High-Dose 65+yrs 2021, 11/10/2023    Fluzone Quad >6mos (Multi-dose) 10/28/2014    Hepatitis A 2018, 2018    Influenza TIV (IM) 10/15/2015, 2015    Influenza, Unspecified  11/01/2014, 10/15/2015, 11/16/2015, 10/22/2018    Pneumococcal Conjugate 20-Valent (PCV20) 09/21/2022    Tdap 06/13/2017        Medications:     Current Outpatient Medications:     albuterol (PROVENTIL) (5 MG/ML) 0.5% nebulizer solution, Take 0.5 mL by nebulization Every 4 (Four) Hours As Needed for Wheezing., Disp: 1 each, Rfl: 5    albuterol sulfate  (90 Base) MCG/ACT inhaler, Inhale 2 puffs Every 4 (Four) Hours As Needed for Wheezing., Disp: 18 g, Rfl: 2    apixaban (ELIQUIS) 5 MG tablet tablet, Take 1 tablet by mouth Every 12 (Twelve) Hours as directed for Atrial Fibrillation, Disp: 60 tablet, Rfl: 5    Baclofen (LIORESAL) 5 MG tablet, Take 1 tablet by mouth Every 8 (Eight) Hours., Disp: , Rfl:     Bystolic 10 MG tablet, Take 1 tablet by mouth Daily., Disp: 90 tablet, Rfl: 1    esomeprazole (nexIUM) 40 MG capsule, Take 1 capsule by mouth 2 (Two) Times a Day., Disp: 180 capsule, Rfl: 1    fluticasone (FLONASE) 50 MCG/ACT nasal spray, 2 sprays into the nostril(s) as directed by provider Daily., Disp: 16 g, Rfl: 11    furosemide (LASIX) 20 MG tablet, Take 1 tablet by mouth Daily., Disp: 30 tablet, Rfl: 5    Insulin Glargine, 2 Unit Dial, (Toujeo Max SoloStar) 300 UNIT/ML solution pen-injector injection, Inject 16 Units under the skin into the appropriate area as directed Daily., Disp: 6 mL, Rfl: 1    levocetirizine (XYZAL) 5 MG tablet, Take 1 tablet by mouth Every Evening., Disp: 90 tablet, Rfl: 1    montelukast (SINGULAIR) 10 MG tablet, Take 1 tablet by mouth Every Evening., Disp: 90 tablet, Rfl: 1    potassium chloride (K-DUR,KLOR-CON) 10 MEQ CR tablet, Take 1 tablet by mouth Daily., Disp: 90 tablet, Rfl: 1    simvastatin (ZOCOR) 40 MG tablet, Take 1 tablet by mouth Every Evening., Disp: 90 tablet, Rfl: 1    SITagliptin (Januvia) 100 MG tablet, Take 1 tablet by mouth Daily., Disp: 90 tablet, Rfl: 1    tamsulosin (FLOMAX) 0.4 MG capsule 24 hr capsule, Take 1 capsule by mouth Daily., Disp: 30 capsule, Rfl:  "11    traZODone (DESYREL) 50 MG tablet, TAKE 1 TO 2 TABLETS BY MOUTH AT  BEDTIME AS NEEDED FOR INSOMNIA, Disp: 180 tablet, Rfl: 1    empagliflozin (Jardiance) 10 MG tablet tablet, Take 1 tablet by mouth Daily. (Patient not taking: Reported on 3/27/2024), Disp: 90 tablet, Rfl: 0    Allergies:   Allergies   Allergen Reactions    Penicillins Swelling    Sulfa Antibiotics Swelling       Objective     Vital Signs  /76   Pulse 77   Wt 73 kg (161 lb)   SpO2 95%   BMI 22.45 kg/m²   Estimated body mass index is 22.45 kg/m² as calculated from the following:    Height as of 3/18/24: 180.3 cm (71\").    Weight as of this encounter: 73 kg (161 lb).    BMI is within normal parameters. No other follow-up for BMI required.      Physical Exam  Vitals and nursing note reviewed.   Constitutional:       Appearance: Normal appearance.   Cardiovascular:      Rate and Rhythm: Normal rate and regular rhythm.      Heart sounds: No murmur heard.     No friction rub. No gallop.   Pulmonary:      Effort: Pulmonary effort is normal.      Breath sounds: Normal breath sounds. No wheezing, rhonchi or rales.   Neurological:      Mental Status: He is alert.            Procedures     Assessment and Plan     1. Type 2 diabetes mellitus with hyperglycemia, unspecified whether long term insulin use    - POCT microalbumin    2. Benign prostatic hyperplasia without lower urinary tract symptoms    - tamsulosin (FLOMAX) 0.4 MG capsule 24 hr capsule; Take 1 capsule by mouth Daily.  Dispense: 30 capsule; Refill: 11  - PSA Screen    3. Gastritis without bleeding, unspecified chronicity, unspecified gastritis type    - nexIUM 40 MG capsule; Take 1 capsule by mouth Every Morning Before Breakfast.  Dispense: 60 capsule; Refill: 3    4. Hiatal hernia    - nexIUM 40 MG capsule; Take 1 capsule by mouth Every Morning Before Breakfast.  Dispense: 60 capsule; Refill: 3    5. Screening for prostate cancer    - PSA Screen    6. Generalized anxiety disorder    - " busPIRone (BUSPAR) 5 MG tablet; Take 1 tablet by mouth Daily As Needed (anxiety).  Dispense: 30 tablet; Refill: 1    7. Hyponatremia    - Comprehensive Metabolic Panel    8. Acute heart failure with preserved ejection fraction (HFpEF)      9. Chronic obstructive pulmonary disease, unspecified COPD type  Continue current regimen       Follow Up  Return in about 3 months (around 6/27/2024).    Patient was advised to call the office or seek medical care if  any issues discussed during this visit worsen or persist or if new concerns arise        MD [facility]  [address] [place] [id number] [phone]

## 2024-03-28 DIAGNOSIS — N40.0 BENIGN PROSTATIC HYPERPLASIA WITHOUT LOWER URINARY TRACT SYMPTOMS: Primary | ICD-10-CM

## 2024-03-28 LAB
ALBUMIN SERPL-MCNC: 4 G/DL (ref 3.7–4.7)
ALBUMIN/GLOB SERPL: 2.1 {RATIO} (ref 1.2–2.2)
ALP SERPL-CCNC: 58 IU/L (ref 44–121)
ALT SERPL-CCNC: 7 IU/L (ref 0–44)
AST SERPL-CCNC: 13 IU/L (ref 0–40)
BILIRUB SERPL-MCNC: 0.4 MG/DL (ref 0–1.2)
BUN SERPL-MCNC: 10 MG/DL (ref 8–27)
BUN/CREAT SERPL: 7 (ref 10–24)
CALCIUM SERPL-MCNC: 8.9 MG/DL (ref 8.6–10.2)
CHLORIDE SERPL-SCNC: 99 MMOL/L (ref 96–106)
CO2 SERPL-SCNC: 25 MMOL/L (ref 20–29)
CREAT SERPL-MCNC: 1.36 MG/DL (ref 0.76–1.27)
EGFRCR SERPLBLD CKD-EPI 2021: 50 ML/MIN/1.73
GLOBULIN SER CALC-MCNC: 1.9 G/DL (ref 1.5–4.5)
GLUCOSE SERPL-MCNC: 92 MG/DL (ref 70–99)
POTASSIUM SERPL-SCNC: 4.4 MMOL/L (ref 3.5–5.2)
PROT SERPL-MCNC: 5.9 G/DL (ref 6–8.5)
PSA SERPL-MCNC: 2.6 NG/ML (ref 0–4)
SODIUM SERPL-SCNC: 137 MMOL/L (ref 134–144)

## 2024-04-03 DIAGNOSIS — K44.9 HIATAL HERNIA: ICD-10-CM

## 2024-04-03 DIAGNOSIS — K29.70 GASTRITIS WITHOUT BLEEDING, UNSPECIFIED CHRONICITY, UNSPECIFIED GASTRITIS TYPE: ICD-10-CM

## 2024-04-03 NOTE — TELEPHONE ENCOUNTER
Caller: Rose MarieRicki    Relationship: Self    Best call back number: [phone]     Requested Prescriptions:   Requested Prescriptions     Pending Prescriptions Disp Refills    nexIUM 40 MG capsule 60 capsule 3     Sig: Take 1 capsule by mouth Every Morning Before Breakfast.        Pharmacy where request should be sent: [facility], [address] [phone] [facility] [phone] FX     Last office visit with prescribing clinician: 3/27/2024   Last telemedicine visit with prescribing clinician: Visit date not found   Next office visit with prescribing clinician: 4/30/2024     Additional details provided by patient: THIS NEEDS TO BE RESENT TO [facility] FOR ONLY BRAND NAME, AND FOR 30 DAY SUPPLY INSTEAD OF 60. PATIENT TAKES THIS MEDICATION 2X/DAY.    Does the patient have less than a 3 day supply:  [x] Yes  [] No    Would you like a call back once the refill request has been completed: [] Yes [x] No    If the office needs to give you a call back, can they leave a voicemail: [] Yes [x] No    Alva Traylor Rep   04/03/24 12:47 EDT

## 2024-04-04 LAB
QT INTERVAL: 390 MS
QTC INTERVAL: 466 MS

## 2024-04-05 NOTE — TELEPHONE ENCOUNTER
Caller: HooperRicki     Relationship: Self     Best call back number: [phone]      Requested Prescriptions: THIS NEEDS TO BE RESENT TO [facility] FOR ONLY BRAND NAME, AND FOR 30 DAY SUPPLY INSTEAD OF 60. PATIENT TAKES THIS MEDICATION 2X/DAY.     Requested Prescriptions             Pending Prescriptions Disp Refills    nexIUM 40 MG capsule 60 capsule 3       Sig: Take 1 capsule by mouth Every Morning Before Breakfast.         Pharmacy where request should be sent: [facility], [address] [phone] [facility] [phone]       Last office visit with prescribing clinician: 3/27/2024   Last telemedicine visit with prescribing clinician: Visit date not found   Next office visit with prescribing clinician: 4/30/2024      Additional details provided by patient: THIS NEEDS TO BE RESENT TO [facility] FOR ONLY BRAND NAME, AND FOR 30 DAY SUPPLY INSTEAD OF 60. PATIENT TAKES THIS MEDICATION 2X/DAY.     Does the patient have less than a 3 day supply:  [x] Yes  [] No     Would you like a call back once the refill request has been completed: [] Yes [x] No     If the office needs to give you a call back, can they leave a voicemail: [] Yes [x] No

## 2024-04-08 NOTE — TELEPHONE ENCOUNTER
Caller: Paty Tobar    Relationship to patient: Emergency Contact    Best call back number: [phone]     Patient is needing: PATIENTS DAUGHTER IS NEEDING TO HAVE THE PRESCRIPTION SENT IN CORRECTLY ASAP.     THIS NEEDS TO BE RESENT TO [facility] FOR ONLY BRAND NAME, AND FOR 30 DAY SUPPLY INSTEAD OF 60. PATIENT TAKES THIS MEDICATION 2X/DAY.     PRESCRIPTION: NEXIUM     PLEASE CALL BACK, IF NO ANSWER LEAVE A DETAILED MESSAGE.

## 2024-04-10 ENCOUNTER — OFFICE VISIT (OUTPATIENT)
Dept: UROLOGY | Facility: CLINIC | Age: 89
End: 2024-04-10
Payer: MEDICARE

## 2024-04-10 VITALS
OXYGEN SATURATION: 97 % | BODY MASS INDEX: 22.54 KG/M2 | WEIGHT: 161 LBS | HEART RATE: 68 BPM | SYSTOLIC BLOOD PRESSURE: 136 MMHG | HEIGHT: 71 IN | DIASTOLIC BLOOD PRESSURE: 62 MMHG

## 2024-04-10 DIAGNOSIS — N40.0 BENIGN PROSTATIC HYPERPLASIA WITHOUT LOWER URINARY TRACT SYMPTOMS: Primary | ICD-10-CM

## 2024-04-10 LAB
BILIRUB BLD-MCNC: NEGATIVE MG/DL
CLARITY, POC: CLEAR
COLOR UR: YELLOW
EXPIRATION DATE: ABNORMAL
GLUCOSE UR STRIP-MCNC: NEGATIVE MG/DL
KETONES UR QL: NEGATIVE
LEUKOCYTE EST, POC: ABNORMAL
Lab: ABNORMAL
NITRITE UR-MCNC: NEGATIVE MG/ML
PH UR: 6 [PH] (ref 5–8)
PROT UR STRIP-MCNC: NEGATIVE MG/DL
RBC # UR STRIP: NEGATIVE /UL
SP GR UR: 1.01 (ref 1–1.03)
UROBILINOGEN UR QL: NORMAL

## 2024-04-10 NOTE — PROGRESS NOTES
Office Visit New Urology      Patient Name: Ricki Trotter  : 1935   MRN: [id number]     Chief Complaint:    Chief Complaint   Patient presents with    Benign Prostatic Hypertrophy       Referring Provider: Ev Ventura MD    History of Present Illness: Ricki Trotter is a 88 y.o. male who presents to Urology today for LUTS.  He was admitted with SOB with fatigue and dyspnea.  He was found to have A. Fib.  He is now on anticoagulation.  In the hospital he needed a catheter due to urinary retention.      He reports since being discharged he has improved.  He states in the last 2-3 days he has been urinating well.  Some urgency and frequency with his diuretic use.  He has increased his fluid levels. He is currently on tamsulosin and feels like he is voiding well.        Subjective      Review of System:   Review of Systems   Constitutional: Negative.    HENT: Negative.     Eyes: Negative.    Respiratory: Negative.     Cardiovascular:  Positive for leg swelling.   Gastrointestinal: Negative.    Endocrine: Negative.    Genitourinary: Negative.    Musculoskeletal: Negative.    Skin: Negative.    Allergic/Immunologic: Negative.    Neurological: Negative.    Hematological: Negative.    Psychiatric/Behavioral: Negative.        I have reviewed the ROS documented by my clinical staff, I have updated appropriately and I agree. Dakota Moore MD    Past Medical History:   Past Medical History:   Diagnosis Date    Allergic rhinitis     Amnesia     Basal cell carcinoma     Benign prostatic hyperplasia     Chronic kidney disease, stage 3b     COPD (chronic obstructive pulmonary disease)     Drug dependency     Hyperthyroidism     Iron deficiency     Long term (current) use of insulin     Melanoma 2012    Nephrosclerosis        Past Surgical History:   Past Surgical History:   Procedure Laterality Date    CATARACT EXTRACTION Bilateral     COLONOSCOPY      PROSTHODONTIC PROCEDURE         Family History:    Family History   Problem Relation Age of Onset    Diabetes Mother     Stroke Mother     Breast cancer Sister        Social History:   Social History     Socioeconomic History    Marital status:    Tobacco Use    Smoking status: Former     Current packs/day: 0.00     Average packs/day: 0.5 packs/day for 9.0 years (4.5 ttl pk-yrs)     Types: Cigarettes     Start date:      Quit date:      Years since quittin.3     Passive exposure: Past    Smokeless tobacco: Never   Vaping Use    Vaping status: Never Used   Substance and Sexual Activity    Alcohol use: Yes     Alcohol/week: 2.0 standard drinks of alcohol     Types: 2 Cans of beer per week    Drug use: No    Sexual activity: Defer       Medications:     Current Outpatient Medications:     apixaban (ELIQUIS) 5 MG tablet tablet, Take 1 tablet by mouth Every 12 (Twelve) Hours as directed for Atrial Fibrillation, Disp: 60 tablet, Rfl: 5    Baclofen (LIORESAL) 5 MG tablet, Take 1 tablet by mouth Every 8 (Eight) Hours., Disp: , Rfl:     busPIRone (BUSPAR) 5 MG tablet, Take 1 tablet by mouth Daily As Needed (anxiety)., Disp: 30 tablet, Rfl: 1    Bystolic 10 MG tablet, Take 1 tablet by mouth Daily., Disp: 90 tablet, Rfl: 1    fluticasone (FLONASE) 50 MCG/ACT nasal spray, 2 sprays into the nostril(s) as directed by provider Daily., Disp: 16 g, Rfl: 11    furosemide (LASIX) 20 MG tablet, Take 1 tablet by mouth Daily., Disp: 30 tablet, Rfl: 5    Insulin Glargine, 2 Unit Dial, (Toujeo Max SoloStar) 300 UNIT/ML solution pen-injector injection, Inject 16 Units under the skin into the appropriate area as directed Daily., Disp: 6 mL, Rfl: 1    levocetirizine (XYZAL) 5 MG tablet, Take 1 tablet by mouth Every Evening., Disp: 90 tablet, Rfl: 1    montelukast (SINGULAIR) 10 MG tablet, Take 1 tablet by mouth Every Evening., Disp: 90 tablet, Rfl: 1    nexIUM 40 MG capsule, Take 1 capsule by mouth Every Morning Before Breakfast., Disp: 60 capsule, Rfl: 3    potassium  chloride (K-DUR,KLOR-CON) 10 MEQ CR tablet, Take 1 tablet by mouth Daily., Disp: 90 tablet, Rfl: 1    simvastatin (ZOCOR) 40 MG tablet, Take 1 tablet by mouth Every Evening., Disp: 90 tablet, Rfl: 1    SITagliptin (Januvia) 100 MG tablet, Take 1 tablet by mouth Daily., Disp: 90 tablet, Rfl: 1    tamsulosin (FLOMAX) 0.4 MG capsule 24 hr capsule, Take 1 capsule by mouth Daily., Disp: 30 capsule, Rfl: 11    traZODone (DESYREL) 50 MG tablet, TAKE 1 TO 2 TABLETS BY MOUTH AT  BEDTIME AS NEEDED FOR INSOMNIA, Disp: 180 tablet, Rfl: 1    albuterol (PROVENTIL) (5 MG/ML) 0.5% nebulizer solution, Take 0.5 mL by nebulization Every 4 (Four) Hours As Needed for Wheezing., Disp: 1 each, Rfl: 5    albuterol sulfate  (90 Base) MCG/ACT inhaler, Inhale 2 puffs Every 4 (Four) Hours As Needed for Wheezing., Disp: 18 g, Rfl: 2    Allergies:   Allergies   Allergen Reactions    Penicillins Swelling    Sulfa Antibiotics Swelling       IPSS Questionnaire (AUA-7):  Over the past month…    1)  Incomplete Emptying  How often have you had a sensation of not emptying your bladder?  2 - Less than half the time   2)  Frequency  How often have you had to urinate less than every two hours? 2 - Less than half the time   3)  Intermittency  How often have you found you stopped and started again several times when you urinated?  2 - Less than half the time   4) Urgency  How often have you found it difficult to postpone urination?  3 - About half the time   5) Weak Stream  How often have you had a weak urinary stream?  2 - Less than half the time   6) Straining  How often have you had to push or strain to begin urination?  1 - Less than 1 time in 5   7) Nocturia  How many times did you typically get up at night to urinate?  2 - 2 times   Total Score:  14   The International Prostate Symptom Score (IPSS) is used to screen, diagnose, track symptoms of benign prostatic hyperplasia (BPH).    0-7 pts (Mild Symptoms)  / 8-19 pts (Moderate) / 20-35  "(Severe)    Quality of life due to urinary symptoms:  If you were to spend the rest of your life with your urinary condition the way it is now, how would you feel about that? 2-Mostly Satisfied   Urine Leakage (Incontinence) 1-Mild (A few drops a day, no pad use)         Post void residual bladder scan:   94 mL     Objective     Physical Exam:   Vital Signs:   Vitals:    04/10/24 1314   BP: 136/62   Pulse: 68   SpO2: 97%   Weight: 73 kg (161 lb)   Height: 180.3 cm (70.98\")   PainSc: 0-No pain     Body mass index is 22.47 kg/m².     Physical Exam  Vitals and nursing note reviewed.   Constitutional:       General: He is awake. He is not in acute distress.     Appearance: Normal appearance. He is well-developed.   HENT:      Head: Normocephalic and atraumatic.      Right Ear: External ear normal.      Left Ear: External ear normal.      Nose: Nose normal.   Eyes:      Conjunctiva/sclera: Conjunctivae normal.   Pulmonary:      Effort: Pulmonary effort is normal.   Abdominal:      General: There is no distension.      Palpations: Abdomen is soft. There is no mass.      Tenderness: There is no abdominal tenderness. There is no right CVA tenderness, left CVA tenderness, guarding or rebound.      Hernia: No hernia is present. There is no hernia in the left inguinal area or right inguinal area.   Genitourinary:     Pubic Area: No rash.       Penis: Normal.       Testes: Normal.      Prostate: Normal.      Rectum: Normal. No mass or tenderness. Normal anal tone.      Comments: Approx 25g prostate   No nodules   Musculoskeletal:      Cervical back: Normal range of motion.   Lymphadenopathy:      Lower Body: No right inguinal adenopathy. No left inguinal adenopathy.   Skin:     General: Skin is warm.   Neurological:      General: No focal deficit present.      Mental Status: He is alert and oriented to person, place, and time.   Psychiatric:         Behavior: Behavior normal. Behavior is cooperative.         Labs:   Brief Urine " Lab Results  (Last result in the past 365 days)        Color   Clarity   Blood   Leuk Est   Nitrite   Protein   CREAT   Urine HCG        03/27/24 1455             10                      Lab Results   Component Value Date    GLUCOSE 92 03/27/2024    CALCIUM 8.9 03/27/2024     03/27/2024    K 4.4 03/27/2024    CO2 25 03/27/2024    CL 99 03/27/2024    BUN 10 03/27/2024    CREATININE 1.36 (H) 03/27/2024    BCR 7 (L) 03/27/2024    ANIONGAP 12.0 03/11/2024       Lab Results   Component Value Date    WBC 12.70 (H) 03/10/2024    HGB 11.7 (L) 03/10/2024    HCT 34.4 (L) 03/10/2024    MCV 86.2 03/10/2024     03/10/2024       Lab Results   Component Value Date    PSA 2.6 03/27/2024        Images:   CT Head Without Contrast    Result Date: 3/10/2024  Impression: No acute intracranial pathology. Chronic small vessel/microangiopathic ischemic changes. Electronically Signed: Lukasz Ramirez MD  3/10/2024 6:40 PM EDT  Workstation ID: [id number]    XR Chest 1 View    Result Date: 3/10/2024  Impression: Stable multifocal infiltrates throughout the lungs bilaterally with interstitial changes. Small bilateral pleural effusions are also again noted which appear stable. Electronically Signed: Son Romero MD  3/10/2024 10:44 AM EDT  Workstation ID: [id number]    CT Angiogram Chest    Result Date: 3/7/2024  No pulmonary embolus. Moderate right and small left pleural effusions. 3.8 cm x 3 cm rounded lesion overlying the left pleural effusion favored to represent an area of rounded atelectasis. Consider PET/CT and/or biopsy. Electronically Signed: Lukasz Ramirez MD  3/7/2024 9:36 PM EST  Workstation ID: [id number]    XR Chest PA & Lateral    Result Date: 1/3/2024  Impression: 1. Left-sided pleural effusion with left basilar consolidation. This finding has not changed significantly from the previous radiograph. 2. Chronic upper lobe pulmonary scarring. 3. No significant change compared with the last study Electronically Signed: Paresh HOGAN  MD Daniela  1/3/2024 1:21 PM EST  Workstation ID: [id number]      Measures:   Tobacco:   Ricki Trotter  reports that he quit smoking about 63 years ago. His smoking use included cigarettes. He started smoking about 71 years ago. He has a 4.5 pack-year smoking history. He has been exposed to tobacco smoke. He has never used smokeless tobacco.       Urine Incontinence: Patient reports that he is not currently experiencing any symptoms of urinary incontinence.       Assessment / Plan      Assessment/Plan:   Ricki Trotter is a 88 y.o. male who presented today for urinary retention as inpatient.  His retention has resolved and he feels like he is voiding without issues.  He is not interested in an outlet work up or any surgery for his bladder outlet obstruction.   Given his age and comorbidity, this is very reasonable.  We will continue his tamsulosin for now.   I will see him back in 6 months.    Diagnoses and all orders for this visit:    1. Benign prostatic hyperplasia without lower urinary tract symptoms (Primary)        Patient Education:        Follow Up:   Return in about 6 months (around 10/10/2024) for Recheck.    Dakota Moore MD  [facility]

## 2024-04-18 ENCOUNTER — OFFICE VISIT (OUTPATIENT)
Dept: CARDIOLOGY | Facility: CLINIC | Age: 89
End: 2024-04-18
Payer: MEDICARE

## 2024-04-18 VITALS
WEIGHT: 160 LBS | BODY MASS INDEX: 22.4 KG/M2 | OXYGEN SATURATION: 97 % | HEIGHT: 71 IN | DIASTOLIC BLOOD PRESSURE: 55 MMHG | HEART RATE: 74 BPM | SYSTOLIC BLOOD PRESSURE: 124 MMHG

## 2024-04-18 DIAGNOSIS — I10 PRIMARY HYPERTENSION: ICD-10-CM

## 2024-04-18 DIAGNOSIS — Z79.4 TYPE 2 DIABETES MELLITUS WITHOUT COMPLICATION, WITH LONG-TERM CURRENT USE OF INSULIN: ICD-10-CM

## 2024-04-18 DIAGNOSIS — I35.0 NONRHEUMATIC AORTIC VALVE STENOSIS: Primary | ICD-10-CM

## 2024-04-18 DIAGNOSIS — E11.9 TYPE 2 DIABETES MELLITUS WITHOUT COMPLICATION, WITH LONG-TERM CURRENT USE OF INSULIN: ICD-10-CM

## 2024-04-18 DIAGNOSIS — E78.2 MIXED HYPERLIPIDEMIA: ICD-10-CM

## 2024-04-18 NOTE — PROGRESS NOTES
[facility]  Office Progress Note  Ricki Trotter  1935  [address]  [place] [address]       Visit Date: 04/18/24    PCP: Ev Ventura MD  [address] [address]    IDENTIFICATION: A 88 y.o. male  resident of [place], [place]     PROBLEM LIST:   Valvular heart disease  Echo, 6/28/2023: EF 51-55%, mild AS, max PG 28, mean PG 14, moderate MR, moderate-severe TR, RVSP 60 mmHg  3/24 echo EF>60% mild AV stenosis(peak 22). TR >55  PVC/ AFib  9d Holter 7/2020: avg 66,  bpm, VE 8.3%, SVE 4.6%, 5 runs of NSVT, 20 runs of SVT, no A-fib  Nuclear stress 7/2020: Normal MPS with no evidence of ischemia, frequent PVCs noted at rest actually decreased with increased heart rate, EF 58%, lower study  PVCs adequately controlled following initiation of beta-blocker  3/24 EKG afib   Shortness of Breath (2020)  Echo 7/2020: EF 50-55%, mild concentric LVH, grade 2 diastolic dysfunction, severely increased LA volume, aortic sclerosis without significant stenosis, mild to moderate MR, mild to moderate TR, RVSP 52 mmHg  Normal stress 7/2020 as noted above  Hypertension  Dyslipidemia  3/2023 , trig 78, HDL 43, LDL 79 on Zocor  Type 2 diabetes, controlled  3/23 A1c 6.0  COPD with recurrent pneumonia  Former smoker, asbestos exposure and other chemicals at [employer]  Some entrapped lung d/t scarring and bronchiectasis  Normocytic Anemia  BPH        CC:   Chief Complaint   Patient presents with    Acute on chronic diastolic congestive heart failure     HFU   Referred back by PCP for murmur    Allergies  Allergies   Allergen Reactions    Penicillins Swelling    Sulfa Antibiotics Swelling       Current Medications  Current Outpatient Medications   Medication Instructions    apixaban (ELIQUIS) 5 MG tablet tablet Take 1 tablet by mouth Every 12 (Twelve) Hours as directed for Atrial Fibrillation    Baclofen (LIORESAL) 5 mg, Oral, Every 8 Hours Scheduled  "   busPIRone (BUSPAR) 5 mg, Oral, Daily PRN    Bystolic 10 mg, Oral, Daily    fluticasone (FLONASE) 50 MCG/ACT nasal spray 2 sprays, Nasal, Daily    furosemide (LASIX) 20 mg, Oral, Daily    levocetirizine (XYZAL) 5 mg, Oral, Every Evening    montelukast (SINGULAIR) 10 mg, Oral, Every Evening    nexIUM 40 mg, Oral, 2 Times Daily    potassium chloride (K-DUR,KLOR-CON) 10 MEQ CR tablet 10 mEq, Oral, Daily    simvastatin (ZOCOR) 40 mg, Oral, Every Evening    SITagliptin (JANUVIA) 100 mg, Oral, Daily    tamsulosin (FLOMAX) 0.4 mg, Oral, Daily    Toujeo Max SoloStar 16 Units, Subcutaneous, Daily    traZODone (DESYREL) 50 MG tablet TAKE 1 TO 2 TABLETS BY MOUTH AT  BEDTIME AS NEEDED FOR INSOMNIA        History of Present Illness   Ricki Trotter is a 88 y.o. year old male here for hospital follow up.    He was initially seen at [facility] and transferred to [facility].  According to his daughter [facility] was attempting to get him to [place] to do a \"valve procedure\"  He was diuresed rate control and able to be discharged and has been in good spirits since discharge.    OBJECTIVE:  Vitals:    04/18/24 1259   BP: 124/55   BP Location: Right arm   Patient Position: Sitting   Pulse: 74   SpO2: 97%   Weight: 72.6 kg (160 lb)   Height: 180.1 cm (70.9\")       Body mass index is 22.38 kg/m².    Constitutional:       Appearance: Healthy appearance. Not in distress.   Neck:      Vascular: No JVR. JVD normal.   Pulmonary:      Effort: Pulmonary effort is normal.      Breath sounds: Normal breath sounds. No wheezing. No rhonchi. No rales.   Chest:      Chest wall: Not tender to palpatation.   Cardiovascular:      PMI at left midclavicular line. Normal rate. Regular rhythm. Normal S1. Normal S2.       Murmurs: There is a systolic murmur.      No gallop.  No click. No rub.   Pulses:     Intact distal pulses.   Edema:     Peripheral edema absent.   Abdominal:      General: Bowel sounds are normal.     "  Palpations: Abdomen is soft.      Tenderness: There is no abdominal tenderness.   Musculoskeletal: Normal range of motion.         General: No tenderness. Skin:     General: Skin is warm and dry.   Neurological:      General: No focal deficit present.      Mental Status: Alert and oriented to person, place and time.         Diagnostic Data:  Procedures        ASSESSMENT:   Diagnosis Plan   1. Nonrheumatic aortic valve stenosis        2. Primary hypertension        3. Mixed hyperlipidemia        4. Type 2 diabetes mellitus without complication, with long-term current use of insulin              PLAN:  Valvular heart disease with preserved LVEF.  Pulmonary hypertension.      Hypertension currently controlled on Bystolic amlodipine    Dyslipidemia controlled on statin therapy    Insulin req diabetes on sliding scale insulin dosing.          Lukasz Singh MD, [facility]

## 2024-04-22 DIAGNOSIS — R09.82 POST-NASAL DRAINAGE: ICD-10-CM

## 2024-04-22 NOTE — TELEPHONE ENCOUNTER
Pt last seen by you, has upcoming appt 4/30/23 with you also. Would you please review and advise if you would like to continue this Rx?

## 2024-04-23 RX ORDER — LEVOCETIRIZINE DIHYDROCHLORIDE 5 MG/1
2.5 TABLET, FILM COATED ORAL EVERY EVENING
Qty: 90 TABLET | Refills: 0 | Status: SHIPPED | OUTPATIENT
Start: 2024-04-23

## 2024-05-06 ENCOUNTER — OFFICE VISIT (OUTPATIENT)
Dept: PULMONOLOGY | Facility: CLINIC | Age: 89
End: 2024-05-06
Payer: MEDICARE

## 2024-05-06 VITALS
WEIGHT: 152 LBS | TEMPERATURE: 97.5 F | HEART RATE: 53 BPM | HEIGHT: 71 IN | SYSTOLIC BLOOD PRESSURE: 144 MMHG | OXYGEN SATURATION: 99 % | BODY MASS INDEX: 21.28 KG/M2 | DIASTOLIC BLOOD PRESSURE: 82 MMHG

## 2024-05-06 DIAGNOSIS — J44.9 CHRONIC OBSTRUCTIVE PULMONARY DISEASE, UNSPECIFIED COPD TYPE: ICD-10-CM

## 2024-05-06 DIAGNOSIS — K21.9 GASTROESOPHAGEAL REFLUX DISEASE WITHOUT ESOPHAGITIS: ICD-10-CM

## 2024-05-06 DIAGNOSIS — J90 PLEURAL EFFUSION, BILATERAL: Primary | ICD-10-CM

## 2024-05-06 PROCEDURE — 94375 RESPIRATORY FLOW VOLUME LOOP: CPT | Performed by: NURSE PRACTITIONER

## 2024-05-06 PROCEDURE — 99214 OFFICE O/P EST MOD 30 MIN: CPT | Performed by: NURSE PRACTITIONER

## 2024-05-06 PROCEDURE — 1159F MED LIST DOCD IN RCRD: CPT | Performed by: NURSE PRACTITIONER

## 2024-05-06 PROCEDURE — 94729 DIFFUSING CAPACITY: CPT | Performed by: NURSE PRACTITIONER

## 2024-05-06 PROCEDURE — 1160F RVW MEDS BY RX/DR IN RCRD: CPT | Performed by: NURSE PRACTITIONER

## 2024-05-06 PROCEDURE — 94726 PLETHYSMOGRAPHY LUNG VOLUMES: CPT | Performed by: NURSE PRACTITIONER

## 2024-05-08 ENCOUNTER — TELEPHONE (OUTPATIENT)
Dept: FAMILY MEDICINE CLINIC | Facility: CLINIC | Age: 89
End: 2024-05-08
Payer: MEDICARE

## 2024-05-08 ENCOUNTER — TELEPHONE (OUTPATIENT)
Dept: FAMILY MEDICINE CLINIC | Facility: CLINIC | Age: 89
End: 2024-05-08

## 2024-05-08 NOTE — TELEPHONE ENCOUNTER
Pharmacy Name: [email or web address] - [place], [place] - [address] [phone] [facility] [phone] [facility]     Reference Number (if applicable): [id number]    Pharmacy representative name: SAHIL      What medication are you calling in regards to: nexIUM 40 MG capsule     What question does the pharmacy have: WHAT IS DX  WHAT HAVE WE TRIED PREVIOUSLY    Who is the provider that prescribed the medication: DR PETERS    Additional notes: SAHIL STATED HE HAS SEVERAL QUESTIONS TO COMPLETE PA FOR THIS RX SO NEEDS CLINICAL STAFF TO RETURN HIS CALL PLEASE.  THANK YOU

## 2024-05-08 NOTE — TELEPHONE ENCOUNTER
Caller: Paty Tobar    Relationship: DAUGHTER    Best call back number: [phone]     Who is your current provider: Ev Ventura MD     Is your current provider offboarding? NO     Who would you like your new provider to be: LINDA HARMON     What are your reasons for transferring care: NA     Additional notes: PATIENT NEEDS TO BE SCHEDULED AN APPOINTMENT TO DISCUSS MEDICATIONS. PLEASE CONTACT HIS DAUGHTER FOR SCHEDULING

## 2024-05-12 ENCOUNTER — HOSPITAL ENCOUNTER (INPATIENT)
Facility: HOSPITAL | Age: 89
LOS: 1 days | Discharge: HOME | End: 2024-05-15
Attending: EMERGENCY MEDICINE | Admitting: HOSPITALIST
Payer: MEDICARE

## 2024-05-12 ENCOUNTER — APPOINTMENT (OUTPATIENT)
Dept: CT IMAGING | Facility: HOSPITAL | Age: 89
End: 2024-05-12
Payer: MEDICARE

## 2024-05-12 ENCOUNTER — APPOINTMENT (OUTPATIENT)
Dept: GENERAL RADIOLOGY | Facility: HOSPITAL | Age: 89
End: 2024-05-12
Payer: MEDICARE

## 2024-05-12 DIAGNOSIS — I50.33 ACUTE ON CHRONIC HEART FAILURE WITH PRESERVED EJECTION FRACTION: ICD-10-CM

## 2024-05-12 DIAGNOSIS — E11.9 TYPE 2 DIABETES MELLITUS WITHOUT COMPLICATION, WITH LONG-TERM CURRENT USE OF INSULIN: ICD-10-CM

## 2024-05-12 DIAGNOSIS — J18.9 PNEUMONIA DUE TO INFECTIOUS ORGANISM, UNSPECIFIED LATERALITY, UNSPECIFIED PART OF LUNG: Primary | ICD-10-CM

## 2024-05-12 DIAGNOSIS — R13.19 ESOPHAGEAL DYSPHAGIA: ICD-10-CM

## 2024-05-12 DIAGNOSIS — Z79.4 TYPE 2 DIABETES MELLITUS WITHOUT COMPLICATION, WITH LONG-TERM CURRENT USE OF INSULIN: ICD-10-CM

## 2024-05-12 DIAGNOSIS — I50.9 CONGESTIVE HEART FAILURE, UNSPECIFIED HF CHRONICITY, UNSPECIFIED HEART FAILURE TYPE: ICD-10-CM

## 2024-05-12 PROBLEM — I50.30 (HFPEF) HEART FAILURE WITH PRESERVED EJECTION FRACTION: Status: ACTIVE | Noted: 2024-03-11

## 2024-05-12 LAB
ALBUMIN SERPL-MCNC: 4.1 G/DL (ref 3.5–5.2)
ALBUMIN/GLOB SERPL: 1.8 G/DL
ALP SERPL-CCNC: 73 U/L (ref 39–117)
ALT SERPL W P-5'-P-CCNC: 7 U/L (ref 1–41)
ANION GAP SERPL CALCULATED.3IONS-SCNC: 11 MMOL/L (ref 5–15)
AST SERPL-CCNC: 11 U/L (ref 1–40)
B PARAPERT DNA SPEC QL NAA+PROBE: NOT DETECTED
B PERT DNA SPEC QL NAA+PROBE: NOT DETECTED
BASOPHILS # BLD AUTO: 0.03 10*3/MM3 (ref 0–0.2)
BASOPHILS NFR BLD AUTO: 0.2 % (ref 0–1.5)
BILIRUB SERPL-MCNC: 0.7 MG/DL (ref 0–1.2)
BUN SERPL-MCNC: 13 MG/DL (ref 8–23)
BUN/CREAT SERPL: 10.7 (ref 7–25)
C PNEUM DNA NPH QL NAA+NON-PROBE: NOT DETECTED
CALCIUM SPEC-SCNC: 9.1 MG/DL (ref 8.6–10.5)
CHLORIDE SERPL-SCNC: 98 MMOL/L (ref 98–107)
CO2 SERPL-SCNC: 27 MMOL/L (ref 22–29)
CREAT SERPL-MCNC: 1.22 MG/DL (ref 0.76–1.27)
D DIMER PPP FEU-MCNC: 2.25 MCGFEU/ML (ref 0–0.88)
D-LACTATE SERPL-SCNC: 1.7 MMOL/L (ref 0.5–2)
DEPRECATED RDW RBC AUTO: 46.9 FL (ref 37–54)
EGFRCR SERPLBLD CKD-EPI 2021: 57 ML/MIN/1.73
EOSINOPHIL # BLD AUTO: 0.04 10*3/MM3 (ref 0–0.4)
EOSINOPHIL NFR BLD AUTO: 0.3 % (ref 0.3–6.2)
ERYTHROCYTE [DISTWIDTH] IN BLOOD BY AUTOMATED COUNT: 14.1 % (ref 12.3–15.4)
FLUAV SUBTYP SPEC NAA+PROBE: NOT DETECTED
FLUAV SUBTYP SPEC NAA+PROBE: NOT DETECTED
FLUBV RNA ISLT QL NAA+PROBE: NOT DETECTED
FLUBV RNA ISLT QL NAA+PROBE: NOT DETECTED
GLOBULIN UR ELPH-MCNC: 2.3 GM/DL
GLUCOSE BLDC GLUCOMTR-MCNC: 119 MG/DL (ref 70–130)
GLUCOSE SERPL-MCNC: 115 MG/DL (ref 65–99)
HADV DNA SPEC NAA+PROBE: NOT DETECTED
HCOV 229E RNA SPEC QL NAA+PROBE: NOT DETECTED
HCOV HKU1 RNA SPEC QL NAA+PROBE: NOT DETECTED
HCOV NL63 RNA SPEC QL NAA+PROBE: NOT DETECTED
HCOV OC43 RNA SPEC QL NAA+PROBE: NOT DETECTED
HCT VFR BLD AUTO: 34.8 % (ref 37.5–51)
HGB BLD-MCNC: 11.1 G/DL (ref 13–17.7)
HMPV RNA NPH QL NAA+NON-PROBE: NOT DETECTED
HOLD SPECIMEN: NORMAL
HPIV1 RNA ISLT QL NAA+PROBE: NOT DETECTED
HPIV2 RNA SPEC QL NAA+PROBE: NOT DETECTED
HPIV3 RNA NPH QL NAA+PROBE: NOT DETECTED
HPIV4 P GENE NPH QL NAA+PROBE: NOT DETECTED
IMM GRANULOCYTES # BLD AUTO: 0.12 10*3/MM3 (ref 0–0.05)
IMM GRANULOCYTES NFR BLD AUTO: 0.9 % (ref 0–0.5)
LYMPHOCYTES # BLD AUTO: 2.02 10*3/MM3 (ref 0.7–3.1)
LYMPHOCYTES NFR BLD AUTO: 15.1 % (ref 19.6–45.3)
M PNEUMO IGG SER IA-ACNC: NOT DETECTED
MCH RBC QN AUTO: 28.9 PG (ref 26.6–33)
MCHC RBC AUTO-ENTMCNC: 31.9 G/DL (ref 31.5–35.7)
MCV RBC AUTO: 90.6 FL (ref 79–97)
MONOCYTES # BLD AUTO: 0.8 10*3/MM3 (ref 0.1–0.9)
MONOCYTES NFR BLD AUTO: 6 % (ref 5–12)
NEUTROPHILS NFR BLD AUTO: 10.37 10*3/MM3 (ref 1.7–7)
NEUTROPHILS NFR BLD AUTO: 77.5 % (ref 42.7–76)
NRBC BLD AUTO-RTO: 0 /100 WBC (ref 0–0.2)
NT-PROBNP SERPL-MCNC: 8099 PG/ML (ref 0–1800)
PLAT MORPH BLD: NORMAL
PLATELET # BLD AUTO: 186 10*3/MM3 (ref 140–450)
PMV BLD AUTO: 10.4 FL (ref 6–12)
POTASSIUM SERPL-SCNC: 4.4 MMOL/L (ref 3.5–5.2)
PROCALCITONIN SERPL-MCNC: 0.06 NG/ML (ref 0–0.25)
PROT SERPL-MCNC: 6.4 G/DL (ref 6–8.5)
RBC # BLD AUTO: 3.84 10*6/MM3 (ref 4.14–5.8)
RBC MORPH BLD: NORMAL
RHINOVIRUS RNA SPEC NAA+PROBE: NOT DETECTED
RSV RNA NPH QL NAA+NON-PROBE: NOT DETECTED
SARS-COV-2 RNA NPH QL NAA+NON-PROBE: NOT DETECTED
SARS-COV-2 RNA RESP QL NAA+PROBE: NOT DETECTED
SODIUM SERPL-SCNC: 136 MMOL/L (ref 136–145)
TROPONIN T SERPL HS-MCNC: 32 NG/L
WBC MORPH BLD: NORMAL
WBC NRBC COR # BLD AUTO: 13.38 10*3/MM3 (ref 3.4–10.8)
WHOLE BLOOD HOLD COAG: NORMAL
WHOLE BLOOD HOLD SPECIMEN: NORMAL

## 2024-05-12 PROCEDURE — 94640 AIRWAY INHALATION TREATMENT: CPT

## 2024-05-12 PROCEDURE — 85379 FIBRIN DEGRADATION QUANT: CPT | Performed by: INTERNAL MEDICINE

## 2024-05-12 PROCEDURE — 71250 CT THORAX DX C-: CPT

## 2024-05-12 PROCEDURE — 80053 COMPREHEN METABOLIC PANEL: CPT | Performed by: EMERGENCY MEDICINE

## 2024-05-12 PROCEDURE — 71045 X-RAY EXAM CHEST 1 VIEW: CPT

## 2024-05-12 PROCEDURE — G0378 HOSPITAL OBSERVATION PER HR: HCPCS

## 2024-05-12 PROCEDURE — 83605 ASSAY OF LACTIC ACID: CPT

## 2024-05-12 PROCEDURE — 94760 N-INVAS EAR/PLS OXIMETRY 1: CPT

## 2024-05-12 PROCEDURE — 85025 COMPLETE CBC W/AUTO DIFF WBC: CPT | Performed by: EMERGENCY MEDICINE

## 2024-05-12 PROCEDURE — 25010000002 FUROSEMIDE PER 20 MG

## 2024-05-12 PROCEDURE — 93005 ELECTROCARDIOGRAM TRACING: CPT | Performed by: EMERGENCY MEDICINE

## 2024-05-12 PROCEDURE — 87040 BLOOD CULTURE FOR BACTERIA: CPT

## 2024-05-12 PROCEDURE — 25010000002 METRONIDAZOLE 500 MG/100ML SOLUTION: Performed by: INTERNAL MEDICINE

## 2024-05-12 PROCEDURE — 99285 EMERGENCY DEPT VISIT HI MDM: CPT

## 2024-05-12 PROCEDURE — 85007 BL SMEAR W/DIFF WBC COUNT: CPT | Performed by: EMERGENCY MEDICINE

## 2024-05-12 PROCEDURE — 84145 PROCALCITONIN (PCT): CPT | Performed by: INTERNAL MEDICINE

## 2024-05-12 PROCEDURE — 25010000002 CEFTRIAXONE PER 250 MG: Performed by: INTERNAL MEDICINE

## 2024-05-12 PROCEDURE — 36415 COLL VENOUS BLD VENIPUNCTURE: CPT

## 2024-05-12 PROCEDURE — 94799 UNLISTED PULMONARY SVC/PX: CPT

## 2024-05-12 PROCEDURE — 82948 REAGENT STRIP/BLOOD GLUCOSE: CPT

## 2024-05-12 PROCEDURE — 84484 ASSAY OF TROPONIN QUANT: CPT | Performed by: EMERGENCY MEDICINE

## 2024-05-12 PROCEDURE — 87449 NOS EACH ORGANISM AG IA: CPT | Performed by: INTERNAL MEDICINE

## 2024-05-12 PROCEDURE — 87636 SARSCOV2 & INF A&B AMP PRB: CPT

## 2024-05-12 PROCEDURE — 0202U NFCT DS 22 TRGT SARS-COV-2: CPT | Performed by: INTERNAL MEDICINE

## 2024-05-12 PROCEDURE — 99223 1ST HOSP IP/OBS HIGH 75: CPT | Performed by: INTERNAL MEDICINE

## 2024-05-12 PROCEDURE — 83880 ASSAY OF NATRIURETIC PEPTIDE: CPT | Performed by: EMERGENCY MEDICINE

## 2024-05-12 RX ORDER — SODIUM CHLORIDE 0.9 % (FLUSH) 0.9 %
10 SYRINGE (ML) INJECTION EVERY 12 HOURS SCHEDULED
Status: DISCONTINUED | OUTPATIENT
Start: 2024-05-13 | End: 2024-05-15 | Stop reason: HOSPADM

## 2024-05-12 RX ORDER — IPRATROPIUM BROMIDE AND ALBUTEROL SULFATE 2.5; .5 MG/3ML; MG/3ML
3 SOLUTION RESPIRATORY (INHALATION)
Status: DISCONTINUED | OUTPATIENT
Start: 2024-05-12 | End: 2024-05-15 | Stop reason: HOSPADM

## 2024-05-12 RX ORDER — DEXTROSE MONOHYDRATE 25 G/50ML
25 INJECTION, SOLUTION INTRAVENOUS
Status: DISCONTINUED | OUTPATIENT
Start: 2024-05-12 | End: 2024-05-15 | Stop reason: HOSPADM

## 2024-05-12 RX ORDER — BUSPIRONE HYDROCHLORIDE 10 MG/1
5 TABLET ORAL DAILY PRN
Status: DISCONTINUED | OUTPATIENT
Start: 2024-05-12 | End: 2024-05-15 | Stop reason: HOSPADM

## 2024-05-12 RX ORDER — NICOTINE POLACRILEX 4 MG
15 LOZENGE BUCCAL
Status: DISCONTINUED | OUTPATIENT
Start: 2024-05-12 | End: 2024-05-15 | Stop reason: HOSPADM

## 2024-05-12 RX ORDER — FLUTICASONE PROPIONATE 50 MCG
2 SPRAY, SUSPENSION (ML) NASAL DAILY
Status: DISCONTINUED | OUTPATIENT
Start: 2024-05-13 | End: 2024-05-15 | Stop reason: HOSPADM

## 2024-05-12 RX ORDER — ACETAMINOPHEN 650 MG/1
650 SUPPOSITORY RECTAL EVERY 4 HOURS PRN
Status: DISCONTINUED | OUTPATIENT
Start: 2024-05-12 | End: 2024-05-15 | Stop reason: HOSPADM

## 2024-05-12 RX ORDER — METRONIDAZOLE 500 MG/100ML
500 INJECTION, SOLUTION INTRAVENOUS EVERY 8 HOURS
Qty: 1500 ML | Refills: 0 | Status: DISCONTINUED | OUTPATIENT
Start: 2024-05-12 | End: 2024-05-13

## 2024-05-12 RX ORDER — CHOLECALCIFEROL (VITAMIN D3) 125 MCG
5 CAPSULE ORAL NIGHTLY PRN
Status: DISCONTINUED | OUTPATIENT
Start: 2024-05-12 | End: 2024-05-15 | Stop reason: HOSPADM

## 2024-05-12 RX ORDER — BISACODYL 10 MG
10 SUPPOSITORY, RECTAL RECTAL DAILY PRN
Status: DISCONTINUED | OUTPATIENT
Start: 2024-05-12 | End: 2024-05-15 | Stop reason: HOSPADM

## 2024-05-12 RX ORDER — SODIUM CHLORIDE 0.9 % (FLUSH) 0.9 %
10 SYRINGE (ML) INJECTION AS NEEDED
Status: DISCONTINUED | OUTPATIENT
Start: 2024-05-12 | End: 2024-05-15 | Stop reason: HOSPADM

## 2024-05-12 RX ORDER — AMOXICILLIN 250 MG
2 CAPSULE ORAL 2 TIMES DAILY PRN
Status: DISCONTINUED | OUTPATIENT
Start: 2024-05-12 | End: 2024-05-15 | Stop reason: HOSPADM

## 2024-05-12 RX ORDER — MONTELUKAST SODIUM 10 MG/1
10 TABLET ORAL EVERY EVENING
Status: DISCONTINUED | OUTPATIENT
Start: 2024-05-13 | End: 2024-05-15 | Stop reason: HOSPADM

## 2024-05-12 RX ORDER — NEBIVOLOL 5 MG/1
10 TABLET ORAL DAILY
Status: DISCONTINUED | OUTPATIENT
Start: 2024-05-13 | End: 2024-05-13

## 2024-05-12 RX ORDER — FUROSEMIDE 10 MG/ML
40 INJECTION INTRAMUSCULAR; INTRAVENOUS ONCE
Status: COMPLETED | OUTPATIENT
Start: 2024-05-12 | End: 2024-05-12

## 2024-05-12 RX ORDER — PANTOPRAZOLE SODIUM 40 MG/1
40 TABLET, DELAYED RELEASE ORAL
Status: DISCONTINUED | OUTPATIENT
Start: 2024-05-13 | End: 2024-05-13

## 2024-05-12 RX ORDER — IPRATROPIUM BROMIDE AND ALBUTEROL SULFATE 2.5; .5 MG/3ML; MG/3ML
3 SOLUTION RESPIRATORY (INHALATION) EVERY 4 HOURS PRN
Status: DISCONTINUED | OUTPATIENT
Start: 2024-05-12 | End: 2024-05-15 | Stop reason: HOSPADM

## 2024-05-12 RX ORDER — IBUPROFEN 600 MG/1
1 TABLET ORAL
Status: DISCONTINUED | OUTPATIENT
Start: 2024-05-12 | End: 2024-05-15 | Stop reason: HOSPADM

## 2024-05-12 RX ORDER — FUROSEMIDE 10 MG/ML
40 INJECTION INTRAMUSCULAR; INTRAVENOUS EVERY 12 HOURS
Status: DISCONTINUED | OUTPATIENT
Start: 2024-05-13 | End: 2024-05-14

## 2024-05-12 RX ORDER — BISACODYL 5 MG/1
5 TABLET, DELAYED RELEASE ORAL DAILY PRN
Status: DISCONTINUED | OUTPATIENT
Start: 2024-05-12 | End: 2024-05-15 | Stop reason: HOSPADM

## 2024-05-12 RX ORDER — TRAZODONE HYDROCHLORIDE 50 MG/1
50 TABLET ORAL NIGHTLY
Status: DISCONTINUED | OUTPATIENT
Start: 2024-05-13 | End: 2024-05-15 | Stop reason: HOSPADM

## 2024-05-12 RX ORDER — SODIUM CHLORIDE 9 MG/ML
40 INJECTION, SOLUTION INTRAVENOUS AS NEEDED
Status: DISCONTINUED | OUTPATIENT
Start: 2024-05-12 | End: 2024-05-15 | Stop reason: HOSPADM

## 2024-05-12 RX ORDER — INSULIN LISPRO 100 [IU]/ML
2-7 INJECTION, SOLUTION INTRAVENOUS; SUBCUTANEOUS
Status: DISCONTINUED | OUTPATIENT
Start: 2024-05-13 | End: 2024-05-15 | Stop reason: HOSPADM

## 2024-05-12 RX ORDER — ONDANSETRON 2 MG/ML
4 INJECTION INTRAMUSCULAR; INTRAVENOUS EVERY 6 HOURS PRN
Status: DISCONTINUED | OUTPATIENT
Start: 2024-05-12 | End: 2024-05-15 | Stop reason: HOSPADM

## 2024-05-12 RX ORDER — ACETAMINOPHEN 325 MG/1
650 TABLET ORAL EVERY 4 HOURS PRN
Status: DISCONTINUED | OUTPATIENT
Start: 2024-05-12 | End: 2024-05-15 | Stop reason: HOSPADM

## 2024-05-12 RX ORDER — CETIRIZINE HYDROCHLORIDE 10 MG/1
5 TABLET ORAL DAILY
Status: DISCONTINUED | OUTPATIENT
Start: 2024-05-13 | End: 2024-05-15 | Stop reason: HOSPADM

## 2024-05-12 RX ORDER — TAMSULOSIN HYDROCHLORIDE 0.4 MG/1
0.4 CAPSULE ORAL DAILY
Status: DISCONTINUED | OUTPATIENT
Start: 2024-05-13 | End: 2024-05-15 | Stop reason: HOSPADM

## 2024-05-12 RX ORDER — POLYETHYLENE GLYCOL 3350 17 G/17G
17 POWDER, FOR SOLUTION ORAL DAILY PRN
Status: DISCONTINUED | OUTPATIENT
Start: 2024-05-12 | End: 2024-05-15 | Stop reason: HOSPADM

## 2024-05-12 RX ORDER — ACETAMINOPHEN 160 MG/5ML
650 SOLUTION ORAL EVERY 4 HOURS PRN
Status: DISCONTINUED | OUTPATIENT
Start: 2024-05-12 | End: 2024-05-15 | Stop reason: HOSPADM

## 2024-05-12 RX ORDER — IPRATROPIUM BROMIDE AND ALBUTEROL SULFATE 2.5; .5 MG/3ML; MG/3ML
3 SOLUTION RESPIRATORY (INHALATION) ONCE
Status: COMPLETED | OUTPATIENT
Start: 2024-05-12 | End: 2024-05-12

## 2024-05-12 RX ORDER — ATORVASTATIN CALCIUM 20 MG/1
20 TABLET, FILM COATED ORAL NIGHTLY
Status: DISCONTINUED | OUTPATIENT
Start: 2024-05-12 | End: 2024-05-13

## 2024-05-12 RX ORDER — ONDANSETRON 4 MG/1
4 TABLET, ORALLY DISINTEGRATING ORAL EVERY 6 HOURS PRN
Status: DISCONTINUED | OUTPATIENT
Start: 2024-05-12 | End: 2024-05-15 | Stop reason: HOSPADM

## 2024-05-12 RX ADMIN — DOXYCYCLINE 100 MG: 100 INJECTION, POWDER, LYOPHILIZED, FOR SOLUTION INTRAVENOUS at 23:34

## 2024-05-12 RX ADMIN — METRONIDAZOLE 500 MG: 500 INJECTION, SOLUTION INTRAVENOUS at 22:36

## 2024-05-12 RX ADMIN — SODIUM CHLORIDE 1000 MG: 900 INJECTION INTRAVENOUS at 23:33

## 2024-05-12 RX ADMIN — FUROSEMIDE 40 MG: 10 INJECTION, SOLUTION INTRAMUSCULAR; INTRAVENOUS at 22:36

## 2024-05-12 RX ADMIN — APIXABAN 5 MG: 5 TABLET, FILM COATED ORAL at 23:34

## 2024-05-12 RX ADMIN — TRAZODONE HYDROCHLORIDE 50 MG: 50 TABLET ORAL at 23:34

## 2024-05-12 RX ADMIN — IPRATROPIUM BROMIDE AND ALBUTEROL SULFATE 3 ML: .5; 2.5 SOLUTION RESPIRATORY (INHALATION) at 19:41

## 2024-05-12 NOTE — ED PROVIDER NOTES
Subjective   History of Present Illness patient is an 88-year-old gentleman accompanied by his daughter and son-in-law, complaining of several days of shortness of breath following an exacerbation of his acid reflux symptoms and multiple episode of vomiting approximately week ago.  Daughter reports that his Nexium prescription has been held up with prior authorization other prescriber insurance issues, and the over-the-counter version is not well-tolerated, contributing to increase in his GERD symptoms.  Daughter reports that he was hospitalized for pneumonia 2 months ago, has just regained some energy when he began developing the shortness of breath 3 days ago.  Patient is awake, alert, nontoxic-appearing at this time, with clear rhinorrhea, which she reports is constant due to allergies to [place] pollens.    Review of Systems   Constitutional: Negative.    HENT:  Positive for congestion and rhinorrhea.    Respiratory:  Positive for cough and shortness of breath.    Cardiovascular:  Positive for chest pain and leg swelling.   Gastrointestinal:  Positive for nausea and vomiting.   Genitourinary: Negative.    Musculoskeletal: Negative.    Skin: Negative.    Neurological: Negative.        Past Medical History:   Diagnosis Date    Allergic rhinitis     Amnesia     Arrhythmia     Atrial fibrillation     Basal cell carcinoma 2015    Benign prostatic hyperplasia     CHF (congestive heart failure) 3-2024    Chronic kidney disease, stage 3b     COPD (chronic obstructive pulmonary disease)     Diabetes mellitus N/A    Drug dependency     Heart murmur     Hyperlipidemia     Hypertension     Hyperthyroidism     Iron deficiency     Long term (current) use of insulin     Melanoma 2012    Nephrosclerosis        Allergies   Allergen Reactions    Penicillins Swelling    Sulfa Antibiotics Swelling       Past Surgical History:   Procedure Laterality Date    CATARACT EXTRACTION Bilateral     COLONOSCOPY      PROSTHODONTIC PROCEDURE          Family History   Problem Relation Age of Onset    Diabetes Mother     Stroke Mother     Hypertension Mother     Breast cancer Sister        Social History     Socioeconomic History    Marital status:    Tobacco Use    Smoking status: Former     Current packs/day: 0.00     Average packs/day: 0.5 packs/day for 9.0 years (4.5 ttl pk-yrs)     Types: Cigarettes     Start date: 1953     Quit date: 1962     Years since quittin.4     Passive exposure: Past    Smokeless tobacco: Never   Vaping Use    Vaping status: Never Used   Substance and Sexual Activity    Alcohol use: Yes     Alcohol/week: 2.0 standard drinks of alcohol     Types: 2 Cans of beer per week    Drug use: No    Sexual activity: Not Currently     Partners: Female     Birth control/protection: None           Objective   Physical Exam  Constitutional:       Appearance: He is well-developed. He is not toxic-appearing.   HENT:      Head: Normocephalic and atraumatic.   Eyes:      Pupils: Pupils are equal, round, and reactive to light.   Cardiovascular:      Rate and Rhythm: Normal rate. Rhythm irregular.   Pulmonary:      Effort: Pulmonary effort is normal. No respiratory distress.      Breath sounds: Examination of the right-lower field reveals wheezing. Examination of the left-lower field reveals wheezing. Wheezing present.   Chest:      Chest wall: No mass or tenderness.   Abdominal:      General: Bowel sounds are normal.      Palpations: Abdomen is soft.   Musculoskeletal:         General: Normal range of motion.      Cervical back: Normal range of motion.      Right lower leg: No edema.      Left lower leg: No edema.   Skin:     General: Skin is warm and dry.   Neurological:      General: No focal deficit present.      Mental Status: He is alert and oriented to person, place, and time.   Psychiatric:         Mood and Affect: Affect is tearful.      Comments: Patient reports losing his wife of 69 years approximately 2 years ago, and  on the holiday today he is noticeably tearful.  His daughter reports that he has been more anxious as well following that significant loss.         Procedures           ED Course  ED Course as of 05/12/24 2209   Sun May 12, 2024   1910 Patient initially evaluated in ED room 5, accompanied by daughter and son-in-law. []   2016 CBC with mild leukocytosis 13,000.  Serum chemistry without abnormality.,  Cardiac troponin slightly elevated, BNP also elevated. []   2017 I added CT imaging of the chest. []   2138 Follow up evaluation of the patient, communicating to the family members, the findings consistent with CHF, and pneumonia, the recommendation to admit the patient for IV antibiotics and diuresis.  Will reach out to the hospitalist. []   2209 Hospitalist agreed to admit the patient. []      ED Course User Index  [] Satya Yanes, LINDA                                             Medical Decision Making  Given the patient's presenting symptoms, report of history including pulmonary disease and previous hospitalizations for pneumonia as well as acid reflux, diagnosis includes aspiration pneumonia, pleural effusion, acute coronary syndrome cannot be excluded, acute kidney injury, electrolyte derangement, upper respiratory viral infection, gastritis.  Patient will have serum screening labs, twelve-lead ECG, cardiac telemetry monitoring, plain film imaging of chest initially.  Patient given a bronchodilator medication nebulized, and reevaluated for improvement in symptoms as well as communication of results and disposition.  Patient and family emergency agreeable with this plan.    Amount and/or Complexity of Data Reviewed  Labs: ordered.  Radiology: ordered.  ECG/medicine tests: ordered.    Risk  Prescription drug management.        Final diagnoses:   Pneumonia due to infectious organism, unspecified laterality, unspecified part of lung   Congestive heart failure, unspecified HF chronicity, unspecified heart  failure type       ED Disposition  ED Disposition       ED Disposition   Decision to Admit    Condition   --    Comment   Level of Care: Telemetry [5]   Diagnosis: CHF exacerbation [615555]   Admitting Physician: MICHAEL MIN [[id number]]   Attending Physician: MICHAEL MIN [[id number]]   Bed Request Comments: tele                 No follow-up provider specified.       Medication List      No changes were made to your prescriptions during this visit.            Satya Yanes, APRN  05/13/24 [id number]

## 2024-05-12 NOTE — Clinical Note
Level of Care: Telemetry [5]   Diagnosis: CHF exacerbation [648768]   Admitting Physician: MICHAEL MIN [[id number]]   Attending Physician: MICHAEL MIN [[id number]]   Bed Request Comments: tele

## 2024-05-13 ENCOUNTER — APPOINTMENT (OUTPATIENT)
Dept: GENERAL RADIOLOGY | Facility: HOSPITAL | Age: 89
End: 2024-05-13
Payer: MEDICARE

## 2024-05-13 ENCOUNTER — APPOINTMENT (OUTPATIENT)
Dept: NUCLEAR MEDICINE | Facility: HOSPITAL | Age: 89
End: 2024-05-13
Payer: MEDICARE

## 2024-05-13 ENCOUNTER — APPOINTMENT (OUTPATIENT)
Dept: ULTRASOUND IMAGING | Facility: HOSPITAL | Age: 89
End: 2024-05-13
Payer: MEDICARE

## 2024-05-13 PROBLEM — I50.33 ACUTE ON CHRONIC HEART FAILURE WITH PRESERVED EJECTION FRACTION (HFPEF): Status: ACTIVE | Noted: 2024-05-12

## 2024-05-13 LAB
ANION GAP SERPL CALCULATED.3IONS-SCNC: 11 MMOL/L (ref 5–15)
BASOPHILS # BLD AUTO: 0.02 10*3/MM3 (ref 0–0.2)
BASOPHILS NFR BLD AUTO: 0.2 % (ref 0–1.5)
BUN SERPL-MCNC: 15 MG/DL (ref 8–23)
BUN/CREAT SERPL: 12.9 (ref 7–25)
CALCIUM SPEC-SCNC: 8.5 MG/DL (ref 8.6–10.5)
CHLORIDE SERPL-SCNC: 96 MMOL/L (ref 98–107)
CHOLEST SERPL-MCNC: 102 MG/DL (ref 0–200)
CO2 SERPL-SCNC: 28 MMOL/L (ref 22–29)
CREAT SERPL-MCNC: 1.16 MG/DL (ref 0.76–1.27)
DEPRECATED RDW RBC AUTO: 45.6 FL (ref 37–54)
EGFRCR SERPLBLD CKD-EPI 2021: 60.6 ML/MIN/1.73
EOSINOPHIL # BLD AUTO: 0.05 10*3/MM3 (ref 0–0.4)
EOSINOPHIL NFR BLD AUTO: 0.5 % (ref 0.3–6.2)
ERYTHROCYTE [DISTWIDTH] IN BLOOD BY AUTOMATED COUNT: 14.1 % (ref 12.3–15.4)
GLUCOSE BLDC GLUCOMTR-MCNC: 115 MG/DL (ref 70–130)
GLUCOSE BLDC GLUCOMTR-MCNC: 127 MG/DL (ref 70–130)
GLUCOSE BLDC GLUCOMTR-MCNC: 145 MG/DL (ref 70–130)
GLUCOSE BLDC GLUCOMTR-MCNC: 76 MG/DL (ref 70–130)
GLUCOSE SERPL-MCNC: 64 MG/DL (ref 65–99)
HBA1C MFR BLD: 5.8 % (ref 4.8–5.6)
HCT VFR BLD AUTO: 31.5 % (ref 37.5–51)
HDLC SERPL-MCNC: 44 MG/DL (ref 40–60)
HGB BLD-MCNC: 10.2 G/DL (ref 13–17.7)
IMM GRANULOCYTES # BLD AUTO: 0.07 10*3/MM3 (ref 0–0.05)
IMM GRANULOCYTES NFR BLD AUTO: 0.7 % (ref 0–0.5)
INR PPP: 1.55 (ref 0.89–1.12)
L PNEUMO1 AG UR QL IA: NEGATIVE
LDLC SERPL CALC-MCNC: 44 MG/DL (ref 0–100)
LDLC/HDLC SERPL: 1.03 {RATIO}
LYMPHOCYTES # BLD AUTO: 2.47 10*3/MM3 (ref 0.7–3.1)
LYMPHOCYTES NFR BLD AUTO: 24.5 % (ref 19.6–45.3)
MAGNESIUM SERPL-MCNC: 1.7 MG/DL (ref 1.6–2.4)
MCH RBC QN AUTO: 28.8 PG (ref 26.6–33)
MCHC RBC AUTO-ENTMCNC: 32.4 G/DL (ref 31.5–35.7)
MCV RBC AUTO: 89 FL (ref 79–97)
MONOCYTES # BLD AUTO: 0.74 10*3/MM3 (ref 0.1–0.9)
MONOCYTES NFR BLD AUTO: 7.3 % (ref 5–12)
NEUTROPHILS NFR BLD AUTO: 6.75 10*3/MM3 (ref 1.7–7)
NEUTROPHILS NFR BLD AUTO: 66.8 % (ref 42.7–76)
NRBC BLD AUTO-RTO: 0 /100 WBC (ref 0–0.2)
PHOSPHATE SERPL-MCNC: 3.6 MG/DL (ref 2.5–4.5)
PLATELET # BLD AUTO: 166 10*3/MM3 (ref 140–450)
PMV BLD AUTO: 10.5 FL (ref 6–12)
POTASSIUM SERPL-SCNC: 3.9 MMOL/L (ref 3.5–5.2)
PROTHROMBIN TIME: 18.7 SECONDS (ref 12.2–14.5)
QT INTERVAL: 344 MS
QTC INTERVAL: 409 MS
RBC # BLD AUTO: 3.54 10*6/MM3 (ref 4.14–5.8)
S PNEUM AG SPEC QL LA: NEGATIVE
SODIUM SERPL-SCNC: 135 MMOL/L (ref 136–145)
TRIGL SERPL-MCNC: 64 MG/DL (ref 0–150)
VLDLC SERPL-MCNC: 14 MG/DL (ref 5–40)
WBC NRBC COR # BLD AUTO: 10.1 10*3/MM3 (ref 3.4–10.8)

## 2024-05-13 PROCEDURE — 78580 LUNG PERFUSION IMAGING: CPT

## 2024-05-13 PROCEDURE — C1729 CATH, DRAINAGE: HCPCS

## 2024-05-13 PROCEDURE — 85025 COMPLETE CBC W/AUTO DIFF WBC: CPT | Performed by: INTERNAL MEDICINE

## 2024-05-13 PROCEDURE — 94799 UNLISTED PULMONARY SVC/PX: CPT

## 2024-05-13 PROCEDURE — 99232 SBSQ HOSP IP/OBS MODERATE 35: CPT | Performed by: HOSPITALIST

## 2024-05-13 PROCEDURE — 83735 ASSAY OF MAGNESIUM: CPT | Performed by: INTERNAL MEDICINE

## 2024-05-13 PROCEDURE — 25010000002 FUROSEMIDE PER 20 MG: Performed by: INTERNAL MEDICINE

## 2024-05-13 PROCEDURE — 97161 PT EVAL LOW COMPLEX 20 MIN: CPT

## 2024-05-13 PROCEDURE — 97165 OT EVAL LOW COMPLEX 30 MIN: CPT

## 2024-05-13 PROCEDURE — A9540 TC99M MAA: HCPCS | Performed by: HOSPITALIST

## 2024-05-13 PROCEDURE — 85610 PROTHROMBIN TIME: CPT | Performed by: RADIOLOGY

## 2024-05-13 PROCEDURE — 80048 BASIC METABOLIC PNL TOTAL CA: CPT | Performed by: INTERNAL MEDICINE

## 2024-05-13 PROCEDURE — 92610 EVALUATE SWALLOWING FUNCTION: CPT

## 2024-05-13 PROCEDURE — 82948 REAGENT STRIP/BLOOD GLUCOSE: CPT

## 2024-05-13 PROCEDURE — 83036 HEMOGLOBIN GLYCOSYLATED A1C: CPT | Performed by: INTERNAL MEDICINE

## 2024-05-13 PROCEDURE — G0378 HOSPITAL OBSERVATION PER HR: HCPCS

## 2024-05-13 PROCEDURE — 84100 ASSAY OF PHOSPHORUS: CPT | Performed by: INTERNAL MEDICINE

## 2024-05-13 PROCEDURE — 25010000002 CEFTRIAXONE PER 250 MG: Performed by: INTERNAL MEDICINE

## 2024-05-13 PROCEDURE — 0 TECHNETIUM ALBUMIN AGGREGATED: Performed by: HOSPITALIST

## 2024-05-13 PROCEDURE — 0W993ZX DRAINAGE OF RIGHT PLEURAL CAVITY, PERCUTANEOUS APPROACH, DIAGNOSTIC: ICD-10-PCS | Performed by: RADIOLOGY

## 2024-05-13 PROCEDURE — 25010000002 METRONIDAZOLE 500 MG/100ML SOLUTION: Performed by: INTERNAL MEDICINE

## 2024-05-13 PROCEDURE — 71045 X-RAY EXAM CHEST 1 VIEW: CPT

## 2024-05-13 PROCEDURE — 76942 ECHO GUIDE FOR BIOPSY: CPT

## 2024-05-13 PROCEDURE — 80061 LIPID PANEL: CPT | Performed by: INTERNAL MEDICINE

## 2024-05-13 RX ORDER — LIDOCAINE HYDROCHLORIDE 10 MG/ML
10 INJECTION, SOLUTION EPIDURAL; INFILTRATION; INTRACAUDAL; PERINEURAL ONCE
Status: COMPLETED | OUTPATIENT
Start: 2024-05-13 | End: 2024-05-13

## 2024-05-13 RX ORDER — SIMVASTATIN 40 MG
40 TABLET ORAL NIGHTLY
Status: DISCONTINUED | OUTPATIENT
Start: 2024-05-13 | End: 2024-05-15 | Stop reason: HOSPADM

## 2024-05-13 RX ORDER — ESOMEPRAZOLE MAGNESIUM 40 MG/1
40 GRANULE, DELAYED RELEASE ORAL DAILY
Status: DISCONTINUED | OUTPATIENT
Start: 2024-05-13 | End: 2024-05-15 | Stop reason: HOSPADM

## 2024-05-13 RX ORDER — NEBIVOLOL 10 MG/1
10 TABLET ORAL DAILY
Status: DISCONTINUED | OUTPATIENT
Start: 2024-05-13 | End: 2024-05-15 | Stop reason: HOSPADM

## 2024-05-13 RX ADMIN — TAMSULOSIN HYDROCHLORIDE 0.4 MG: 0.4 CAPSULE ORAL at 11:12

## 2024-05-13 RX ADMIN — DOXYCYCLINE 100 MG: 100 INJECTION, POWDER, LYOPHILIZED, FOR SOLUTION INTRAVENOUS at 22:02

## 2024-05-13 RX ADMIN — CETIRIZINE HYDROCHLORIDE 5 MG: 10 TABLET, FILM COATED ORAL at 11:11

## 2024-05-13 RX ADMIN — TRAZODONE HYDROCHLORIDE 50 MG: 50 TABLET ORAL at 22:03

## 2024-05-13 RX ADMIN — DOXYCYCLINE 100 MG: 100 INJECTION, POWDER, LYOPHILIZED, FOR SOLUTION INTRAVENOUS at 08:45

## 2024-05-13 RX ADMIN — SODIUM CHLORIDE 1000 MG: 900 INJECTION INTRAVENOUS at 22:01

## 2024-05-13 RX ADMIN — LIDOCAINE HYDROCHLORIDE 10 ML: 10 INJECTION, SOLUTION EPIDURAL; INFILTRATION; INTRACAUDAL; PERINEURAL at 13:01

## 2024-05-13 RX ADMIN — ESOMEPRAZOLE MAGNESIUM 40 MG: 40 GRANULE, DELAYED RELEASE ORAL at 14:09

## 2024-05-13 RX ADMIN — FUROSEMIDE 40 MG: 10 INJECTION, SOLUTION INTRAMUSCULAR; INTRAVENOUS at 08:44

## 2024-05-13 RX ADMIN — Medication 5 MG: at 20:11

## 2024-05-13 RX ADMIN — LINAGLIPTIN 5 MG: 5 TABLET, FILM COATED ORAL at 11:12

## 2024-05-13 RX ADMIN — IPRATROPIUM BROMIDE AND ALBUTEROL SULFATE 3 ML: 2.5; .5 SOLUTION RESPIRATORY (INHALATION) at 19:21

## 2024-05-13 RX ADMIN — Medication 10 ML: at 20:11

## 2024-05-13 RX ADMIN — Medication 40 MG: at 22:03

## 2024-05-13 RX ADMIN — MONTELUKAST 10 MG: 10 TABLET, FILM COATED ORAL at 20:10

## 2024-05-13 RX ADMIN — APIXABAN 5 MG: 5 TABLET, FILM COATED ORAL at 20:11

## 2024-05-13 RX ADMIN — NEBIVOLOL 10 MG: 10 TABLET ORAL at 14:09

## 2024-05-13 RX ADMIN — FUROSEMIDE 40 MG: 10 INJECTION, SOLUTION INTRAMUSCULAR; INTRAVENOUS at 20:10

## 2024-05-13 RX ADMIN — METRONIDAZOLE 500 MG: 500 INJECTION, SOLUTION INTRAVENOUS at 06:17

## 2024-05-13 RX ADMIN — KIT FOR THE PREPARATION OF TECHNETIUM TC 99M ALBUMIN AGGREGATED 1 DOSE: 2.5 INJECTION, POWDER, FOR SOLUTION INTRAVENOUS at 12:36

## 2024-05-13 NOTE — NURSING NOTE
Image guided right thoracentesis performed by Dr. Daniela MD. 900 mls serosanguinous fluid removed from pleural space. Patient tolerated well. Report called to TRE Gross.

## 2024-05-13 NOTE — THERAPY EVALUATION
Patient Name: Ricki Trotter  : 1935    MRN: [id number]                              Today's Date: 2024       Admit Date: 2024    Visit Dx:     ICD-10-CM ICD-9-CM   1. Pneumonia due to infectious organism, unspecified laterality, unspecified part of lung  J18.9 486   2. Congestive heart failure, unspecified HF chronicity, unspecified heart failure type  I50.9 428.0     Patient Active Problem List   Diagnosis    Mass of lower lobe of left lung    COPD (chronic obstructive pulmonary disease)    Recurrent pneumonia    GERD (gastroesophageal reflux disease)    Primary hypertension    PVC's (premature ventricular contractions)    Acute on chronic diastolic congestive heart failure    HLD (hyperlipidemia)    Leukocytosis    Pleural effusion, bilateral    CKD (chronic kidney disease) stage 3, GFR 30-59 ml/min    Hyponatremia    Lesion of lung    Atrial fibrillation    Pulmonary hypertension    Valvular heart disease    CHF (congestive heart failure)    (HFpEF) heart failure with preserved ejection fraction    Type 2 diabetes mellitus without complication, with long-term current use of insulin    Acute on chronic heart failure with preserved ejection fraction (HFpEF)     Past Medical History:   Diagnosis Date    Allergic rhinitis     Amnesia     Arrhythmia     Atrial fibrillation     Basal cell carcinoma 2015    Benign prostatic hyperplasia     CHF (congestive heart failure) 3-    Chronic kidney disease, stage 3b     COPD (chronic obstructive pulmonary disease)     Diabetes mellitus N/A    Drug dependency     Heart murmur     Hyperlipidemia     Hypertension     Hyperthyroidism     Iron deficiency     Long term (current) use of insulin     Melanoma 2012    Nephrosclerosis      Past Surgical History:   Procedure Laterality Date    CATARACT EXTRACTION Bilateral     COLONOSCOPY      PROSTHODONTIC PROCEDURE        General Information       Row Name 24 1318          OT Time and Intention     Document Type evaluation  -KF     Mode of Treatment occupational therapy  -KF       Row Name 05/13/24 1318          General Information    Patient Profile Reviewed yes  -KF     Prior Level of Function independent:;all household mobility;community mobility;bed mobility;ADL's  Independent with no AD or rollator most recently  -KF     Existing Precautions/Restrictions fall  -KF     Barriers to Rehab medically complex;previous functional deficit  -KF       Row Name 05/13/24 1318          Occupational Profile    Environmental Supports and Barriers (Occupational Profile) walk-in shower with seat available  -KF       Row Name 05/13/24 1318          Living Environment    People in Home alone  -KF       Row Name 05/13/24 1318          Home Main Entrance    Number of Stairs, Main Entrance one  -KF     Stair Railings, Main Entrance none  -KF       Row Name 05/13/24 1318          Stairs Within Home, Primary    Number of Stairs, Within Home, Primary none  -KF       Row Name 05/13/24 1318          Cognition    Orientation Status (Cognition) oriented x 3  -       Row Name 05/13/24 1318          Safety Issues, Functional Mobility    Safety Issues Affecting Function (Mobility) awareness of need for assistance;insight into deficits/self-awareness;safety precaution awareness;safety precautions follow-through/compliance;sequencing abilities;positioning of assistive device  -KF     Impairments Affecting Function (Mobility) balance;endurance/activity tolerance;strength;postural/trunk control  -KF               User Key  (r) = Recorded By, (t) = Taken By, (c) = Cosigned By      Initials Name Provider Type    KF Arline Young OT Occupational Therapist                     Mobility/ADL's       Row Name 05/13/24 1319          Bed Mobility    Bed Mobility supine-sit  -KF     Supine-Sit [place] (Bed Mobility) standby assist  -KF     Assistive Device (Bed Mobility) head of bed elevated;bed rails  -KF     Comment, (Bed Mobility) No  physical assistance needed  -KF       Row Name 05/13/24 1319          Transfers    Transfers sit-stand transfer;stand-sit transfer  -KF     Comment, (Transfers) Verbal cues for hand placement  -KF       Row Name 05/13/24 1319          Sit-Stand Transfer    Sit-Stand [place] (Transfers) contact guard  -KF     Assistive Device (Sit-Stand Transfers) walker, front-wheeled  -KF     Comment, (Sit-Stand Transfer) Pt required x3 attempts to stand with momentum from low bed height.  -KF       Row Name 05/13/24 1319          Stand-Sit Transfer    Stand-Sit [place] (Transfers) contact guard  -KF     Assistive Device (Stand-Sit Transfers) walker, front-wheeled  -KF       [facility] Name 05/13/24 1319          Functional Mobility    Functional Mobility- Ind. Level contact guard assist  -KF     Functional Mobility- Device walker, front-wheeled  -KF     Functional Mobility-Distance (Feet) --  <HH distance  -KF       [facility] Name 05/13/24 1319          Activities of Daily Living    BADL Assessment/Intervention lower body dressing;grooming  -[facility] Name 05/13/24 1319          Lower Body Dressing Assessment/Training    [place] Level (Lower Body Dressing) doff;don;socks;standby assist  -KF     Position (Lower Body Dressing) supported sitting  -KF       [facility] Name 05/13/24 1319          Grooming Assessment/Training    [place] Level (Grooming) wash face, hands;set up  -KF     Position (Grooming) supported sitting  -KF               User Key  (r) = Recorded By, (t) = Taken By, (c) = Cosigned By      Initials Name Provider Type    KF Arline Young OT Occupational Therapist                   Obj/Interventions       Row Name 05/13/24 1321          Sensory Assessment (Somatosensory)    Sensory Assessment (Somatosensory) UE sensation intact  -KF       [facility] Name 05/13/24 1321          Vision Assessment/Intervention    Visual Impairment/Limitations WFL  -KF       [facility] Name 05/13/24 1321          Range of Motion Comprehensive     General Range of Motion bilateral upper extremity ROM WFL  -KF       Row Name 05/13/24 1321          Strength Comprehensive (MMT)    General Manual Muscle Testing (MMT) Assessment upper extremity strength deficits identified  -KF     Comment, General Manual Muscle Testing (MMT) Assessment BUE grossly 4/5  -KF       Row Name 05/13/24 1321          Balance    Balance Assessment sitting static balance;sitting dynamic balance;sit to stand dynamic balance;standing static balance;standing dynamic balance  -KF     Static Sitting Balance standby assist  -KF     Dynamic Sitting Balance standby assist  -KF     Position, Sitting Balance unsupported;sitting edge of bed;sitting in chair  -KF     Sit to Stand Dynamic Balance contact guard  -KF     Static Standing Balance contact guard  -KF     Dynamic Standing Balance contact guard  -KF     Position/Device Used, Standing Balance supported;walker, front-wheeled  -KF     Balance Interventions sitting;standing;sit to stand;supported;static;dynamic;occupation based/functional task  -KF     Comment, Balance No overt LOB  -KF               User Key  (r) = Recorded By, (t) = Taken By, (c) = Cosigned By      Initials Name Provider Type    KF Arline Young OT Occupational Therapist                   Goals/Plan       Row Name 05/13/24 1323          Transfer Goal 1 (OT)    Activity/Assistive Device (Transfer Goal 1, OT) commode;bed-to-chair/chair-to-bed  -KF     [place] Level/Cues Needed (Transfer Goal 1, OT) supervision required  -KF     Time Frame (Transfer Goal 1, OT) long term goal (LTG);10 days  -KF     Progress/Outcome (Transfer Goal 1, OT) goal ongoing  -KF       Row Name 05/13/24 1323          Dressing Goal 1 (OT)    Activity/Device (Dressing Goal 1, OT) upper body dressing;lower body dressing  -KF     [place]/Cues Needed (Dressing Goal 1, OT) supervision required  -KF     Time Frame (Dressing Goal 1, OT) long term goal (LTG);10 days  -KF     Progress/Outcome (Dressing  Goal 1, OT) goal ongoing  -KF       Row Name 05/13/24 1323          Toileting Goal 1 (OT)    Activity/Device (Toileting Goal 1, OT) adjust/manage clothing;perform perineal hygiene  -KF     [place] Level/Cues Needed (Toileting Goal 1, OT) supervision required  -KF     Time Frame (Toileting Goal 1, OT) long term goal (LTG);10 days  -KF     Progress/Outcome (Toileting Goal 1, OT) goal ongoing  -KF       Row Name 05/13/24 1323          Grooming Goal 1 (OT)    Activity/Device (Grooming Goal 1, OT) hair care;oral care;wash face, hands  -KF     [place] (Grooming Goal 1, OT) supervision required  -KF     Time Frame (Grooming Goal 1, OT) long term goal (LTG);10 days  -KF     Strategies/Barriers (Grooming Goal 1, OT) sink side  -KF     Progress/Outcome (Grooming Goal 1, OT) goal ongoing  -KF       Row Name 05/13/24 1323          Therapy Assessment/Plan (OT)    Planned Therapy Interventions (OT) activity tolerance training;adaptive equipment training;BADL retraining;functional balance retraining;occupation/activity based interventions;patient/caregiver education/training;ROM/therapeutic exercise;strengthening exercise;transfer/mobility retraining  -KF               User Key  (r) = Recorded By, (t) = Taken By, (c) = Cosigned By      Initials Name Provider Type    Arline Lai, CALLUM Occupational Therapist                   Clinical Impression       Row Name 05/13/24 1321          Pain Assessment    Pretreatment Pain Rating 0/10 - no pain  -KF     Posttreatment Pain Rating 0/10 - no pain  -KF     Pain Intervention(s) Repositioned;Ambulation/increased activity  -KF       Row Name 05/13/24 1321          Plan of Care Review    Plan of Care Reviewed With patient  -KF     Progress no change  -KF     Outcome Evaluation OT evaluation completed. The pt presents below his functional baseline with generalized weakness, decreased activity tolerance, and minor balance deficits. The pt performed bed mobility with SBA and  ambulated <HH distance using a RWx with CGA for safety. The pt had no LOB, though admitted to feeling weaker compared to his baseline. The pt will benefit from continued IP OT services to increase the pt's safety and independence during ADLs and functional mobility. Recommend a d/c home with assist and HHOT when medically ready.  -       Row Name 05/13/24 1321          Therapy Assessment/Plan (OT)    Rehab Potential (OT) good, to achieve stated therapy goals  -KF     Criteria for Skilled Therapeutic Interventions Met (OT) yes;skilled treatment is necessary  -KF     Therapy Frequency (OT) daily  -KF       Row Name 05/13/24 1321          Therapy Plan Review/Discharge Plan (OT)    Anticipated Discharge Disposition (OT) home with assist;home with home health  -       Row Name 05/13/24 1321          Vital Signs    Pre Systolic BP Rehab 133  -KF     Pre Treatment Diastolic BP 62  -KF     Pretreatment Heart Rate (beats/min) 84  -KF     Pre SpO2 (%) 95  -KF     O2 Delivery Pre Treatment room air  -KF     Pre Patient Position Supine  -KF     Intra Patient Position Standing  -KF     Post Patient Position Sitting  -KF       Row Name 05/13/24 1321          Positioning and Restraints    Pre-Treatment Position in bed  -KF     Post Treatment Position chair  -KF     In Chair notified nsg;reclined;call light within reach;encouraged to call for assist;exit alarm on;waffle cushion;legs elevated  -KF               User Key  (r) = Recorded By, (t) = Taken By, (c) = Cosigned By      Initials Name Provider Type    Arline Lai, OT Occupational Therapist                   Outcome Measures       Row Name 05/13/24 1323          How much help from another is currently needed...    Putting on and taking off regular lower body clothing? 3  -KF     Bathing (including washing, rinsing, and drying) 3  -KF     Toileting (which includes using toilet bed pan or urinal) 3  -KF     Putting on and taking off regular upper body clothing 4  -KF      Taking care of personal grooming (such as brushing teeth) 3  -KF     Eating meals 4  -KF     AM-PAC 6 Clicks Score (OT) 20  -KF       Row Name 05/13/24 0947 05/13/24 0800       How much help from another person do you currently need...    Turning from your back to your side while in flat bed without using bedrails? 4  -KG 4  -CB    Moving from lying on back to sitting on the side of a flat bed without bedrails? 3  -KG 4  -CB    Moving to and from a bed to a chair (including a wheelchair)? 3  -KG 4  -CB    Standing up from a chair using your arms (e.g., wheelchair, bedside chair)? 3  -KG 4  -CB    Climbing 3-5 steps with a railing? 3  -KG 3  -CB    To walk in hospital room? 3  -KG 3  -CB    AM-PAC 6 Clicks Score (PT) 19  -KG 22  -CB    Highest Level of Mobility Goal 6 --> Walk 10 steps or more  -KG 7 --> Walk 25 feet or more  -CB      Row Name 05/13/24 1324 05/13/24 0947       Functional Assessment    Outcome Measure Options AM-PAC 6 Clicks Daily Activity (OT)  -KF AM-PAC 6 Clicks Basic Mobility (PT)  -KG              User Key  (r) = Recorded By, (t) = Taken By, (c) = Cosigned By      Initials Name Provider Type    Soumya Warren, RN Registered Nurse    Devika Mock Physical Therapist    Arline Lai OT Occupational Therapist                    Occupational Therapy Education       Title: PT OT SLP Therapies (In Progress)       Topic: Occupational Therapy (In Progress)       Point: ADL training (Done)       Description:   Instruct learner(s) on proper safety adaptation and remediation techniques during self care or transfers.   Instruct in proper use of assistive devices.                  Learning Progress Summary             Patient Acceptance, E,TB, VU,DU by KF at 5/13/2024 0830                         Point: Home exercise program (Not Started)       Description:   Instruct learner(s) on appropriate technique for monitoring, assisting and/or progressing therapeutic exercises/activities.                   Learner Progress:  Not documented in this visit.              Point: Precautions (Done)       Description:   Instruct learner(s) on prescribed precautions during self-care and functional transfers.                  Learning Progress Summary             Patient Acceptance, E,TB, VU,DU by  at 5/13/2024 0830                         Point: Body mechanics (Done)       Description:   Instruct learner(s) on proper positioning and spine alignment during self-care, functional mobility activities and/or exercises.                  Learning Progress Summary             Patient Acceptance, E,TB, VU,DU by  at 5/13/2024 0830                                         User Key       Initials Effective Dates Name Provider Type Discipline     08/09/23 -  Arline Young OT Occupational Therapist OT                  OT Recommendation and Plan  Planned Therapy Interventions (OT): activity tolerance training, adaptive equipment training, BADL retraining, functional balance retraining, occupation/activity based interventions, patient/caregiver education/training, ROM/therapeutic exercise, strengthening exercise, transfer/mobility retraining  Therapy Frequency (OT): daily  Plan of Care Review  Plan of Care Reviewed With: patient  Progress: no change  Outcome Evaluation: OT evaluation completed. The pt presents below his functional baseline with generalized weakness, decreased activity tolerance, and minor balance deficits. The pt performed bed mobility with SBA and ambulated <HH distance using a RWx with CGA for safety. The pt had no LOB, though admitted to feeling weaker compared to his baseline. The pt will benefit from continued IP OT services to increase the pt's safety and independence during ADLs and functional mobility. Recommend a d/c home with assist and HHOT when medically ready.     Time Calculation:   Evaluation Complexity (OT)  Review Occupational Profile/Medical/Therapy History Complexity: brief/low  complexity  Assessment, Occupational Performance/Identification of Deficit Complexity: 1-3 performance deficits  Clinical Decision Making Complexity (OT): problem focused assessment/low complexity  Overall Complexity of Evaluation (OT): low complexity     Time Calculation- OT       Row Name 05/13/24 1325             Time Calculation- OT    OT Start Time 0830  -KF      OT Received On 05/13/24  -KF      OT Goal Re-Cert Due Date 05/23/24  -KF         Untimed Charges    OT Eval/Re-eval Minutes 46  -KF         Total Minutes    Untimed Charges Total Minutes 46  -KF       Total Minutes 46  -KF                User Key  (r) = Recorded By, (t) = Taken By, (c) = Cosigned By      Initials Name Provider Type    KF Arline Young OT Occupational Therapist                  Therapy Charges for Today       Code Description Service Date Service Provider Modifiers Qty    [id number] HC OT EVAL LOW COMPLEXITY 4 5/13/2024 Arline Young OT GO 1                 Arline Young OT  5/13/2024

## 2024-05-13 NOTE — THERAPY EVALUATION
Patient Name: Ricki Trotter  : 1935    MRN: [id number]                              Today's Date: 2024       Admit Date: 2024    Visit Dx:     ICD-10-CM ICD-9-CM   1. Pneumonia due to infectious organism, unspecified laterality, unspecified part of lung  J18.9 486   2. Congestive heart failure, unspecified HF chronicity, unspecified heart failure type  I50.9 428.0     Patient Active Problem List   Diagnosis    Mass of lower lobe of left lung    COPD (chronic obstructive pulmonary disease)    Recurrent pneumonia    GERD (gastroesophageal reflux disease)    Primary hypertension    PVC's (premature ventricular contractions)    Acute on chronic diastolic congestive heart failure    HLD (hyperlipidemia)    Leukocytosis    Pleural effusion, bilateral    CKD (chronic kidney disease) stage 3, GFR 30-59 ml/min    Hyponatremia    Lesion of lung    Atrial fibrillation    Pulmonary hypertension    Valvular heart disease    CHF (congestive heart failure)    (HFpEF) heart failure with preserved ejection fraction    Type 2 diabetes mellitus without complication, with long-term current use of insulin    Acute on chronic heart failure with preserved ejection fraction (HFpEF)     Past Medical History:   Diagnosis Date    Allergic rhinitis     Amnesia     Arrhythmia     Atrial fibrillation     Basal cell carcinoma 2015    Benign prostatic hyperplasia     CHF (congestive heart failure) 3-    Chronic kidney disease, stage 3b     COPD (chronic obstructive pulmonary disease)     Diabetes mellitus N/A    Drug dependency     Heart murmur     Hyperlipidemia     Hypertension     Hyperthyroidism     Iron deficiency     Long term (current) use of insulin     Melanoma 2012    Nephrosclerosis      Past Surgical History:   Procedure Laterality Date    CATARACT EXTRACTION Bilateral     COLONOSCOPY      PROSTHODONTIC PROCEDURE        General Information       Row Name 24 2246          Physical Therapy Time and  Intention    Document Type evaluation  -KG     Mode of Treatment physical therapy  -KG       Row Name 05/13/24 0933          General Information    Patient Profile Reviewed yes  -KG     Prior Level of Function independent:;all household mobility;ADL's  reports has recently been using occasional use of a rollator  -KG     Existing Precautions/Restrictions fall  -KG     Barriers to Rehab medically complex  -KG       Row Name 05/13/24 0933          Living Environment    People in Home alone;other (see comments)  daughter lives very close by  -KG       Row Name 05/13/24 0933          Home Main Entrance    Number of Stairs, Main Entrance one  -KG       Row Name 05/13/24 0933          Stairs Within Home, Primary    Number of Stairs, Within Home, Primary none  -KG               User Key  (r) = Recorded By, (t) = Taken By, (c) = Cosigned By      Initials Name Provider Type    STEVIE Devika Knight Physical Therapist                   Mobility       Row Name 05/13/24 0935          Bed Mobility    Bed Mobility supine-sit  -KG     Supine-Sit [place] (Bed Mobility) contact guard  -KG     Assistive Device (Bed Mobility) head of bed elevated  -KG       Row Name 05/13/24 0935          Transfers    Comment, (Transfers) CGA, FWW, extra time required from low bed height  -KG       Row Name 05/13/24 0935          Sit-Stand Transfer    Sit-Stand [place] (Transfers) contact guard  -KG     Assistive Device (Sit-Stand Transfers) walker, front-wheeled  -KG       Row Name 05/13/24 0935          Gait/Stairs (Locomotion)    [place] Level (Gait) contact guard  -KG     Assistive Device (Gait) walker, front-wheeled  -KG     Distance in Feet (Gait) 100  -KG     Deviations/Abnormal Patterns (Gait) gait speed decreased  -KG     Bilateral Gait Deviations forward flexed posture;heel strike decreased  -KG     Comment, (Gait/Stairs) Pt able to ambulate 100', FWW, CGA with slow cautious gait, limited by stamina  -KG               User Key   (r) = Recorded By, (t) = Taken By, (c) = Cosigned By      Initials Name Provider Type    STEVIE Devika Knight Physical Therapist                   Obj/Interventions       Row Name 05/13/24 0940          Range of Motion Comprehensive    General Range of Motion no range of motion deficits identified  -KG       Row Name 05/13/24 0940          Strength Comprehensive (MMT)    General Manual Muscle Testing (MMT) Assessment lower extremity strength deficits identified  -KG     Comment, General Manual Muscle Testing (MMT) Assessment 4/5 BLE  -KG       [facility] Name 05/13/24 0940          Motor Skills    Therapeutic Exercise other (see comments)  SLR, ankle pumps, heel slides  -KG       Row Name 05/13/24 0940          Balance    Balance Assessment standing dynamic balance  -KG     Dynamic Standing Balance contact guard  -KG     Position/Device Used, Standing Balance supported;walker, front-wheeled  -KG     Balance Interventions standing;sit to stand  -KG               User Key  (r) = Recorded By, (t) = Taken By, (c) = Cosigned By      Initials Name Provider Type    STEVIE Devika Knight Physical Therapist                   Goals/Plan       Row Name 05/13/24 0945          Bed Mobility Goal 1 (PT)    Activity/Assistive Device (Bed Mobility Goal 1, PT) sit to supine/supine to sit  -KG     [place] Level/Cues Needed (Bed Mobility Goal 1, PT) standby assist  -KG     Time Frame (Bed Mobility Goal 1, PT) short term goal (STG);3 days  -KG     Progress/Outcomes (Bed Mobility Goal 1, PT) continuing progress toward goal  -KG       Row Name 05/13/24 0945          Transfer Goal 1 (PT)    Activity/Assistive Device (Transfer Goal 1, PT) sit-to-stand/stand-to-sit;bed-to-chair/chair-to-bed  -KG     [place] Level/Cues Needed (Transfer Goal 1, PT) standby assist  -KG     Time Frame (Transfer Goal 1, PT) short term goal (STG);3 days  -KG     Progress/Outcome (Transfer Goal 1, PT) continuing progress toward goal  -KG       Row Name 05/13/24  0945          Gait Training Goal 1 (PT)    Activity/Assistive Device (Gait Training Goal 1, PT) gait (walking locomotion);walker, rolling  -KG     [place] Level (Gait Training Goal 1, PT) modified independence  -KG     Distance (Gait Training Goal 1, PT) 300  -KG     Time Frame (Gait Training Goal 1, PT) long term goal (LTG);10 days  -KG     Progress/Outcome (Gait Training Goal 1, PT) continuing progress toward goal  -KG               User Key  (r) = Recorded By, (t) = Taken By, (c) = Cosigned By      Initials Name Provider Type    STEVIE Devika Knight Physical Therapist                   Clinical Impression       Row Name 05/13/24 0941          Pain    Pretreatment Pain Rating 0/10 - no pain  -KG     Posttreatment Pain Rating 0/10 - no pain  -KG       Row Name 05/13/24 0941          Plan of Care Review    Plan of Care Reviewed With patient  -KG     Progress no change  -KG     Outcome Evaluation PT eval performed: Pt presenting with decline in functional mobility.  CGA for transfers.  Pt able to ambulate 100', FWW, CGA with slow cautious gait, limited by stamina.  Recommend home with assist and HHPT at d/c (pt's daughter planning on staying with him initially when first d/c'd.  -KG       Row Name 05/13/24 0941          Therapy Assessment/Plan (PT)    Rehab Potential (PT) good, to achieve stated therapy goals  -KG     Criteria for Skilled Interventions Met (PT) yes;skilled treatment is necessary  -KG     Therapy Frequency (PT) daily  -KG       Row Name 05/13/24 0941          Vital Signs    Pre Systolic BP Rehab 133  -KG     Pre Treatment Diastolic BP 62  -KG     Posttreatment Heart Rate (beats/min) 82  -KG     O2 Delivery Pre Treatment room air  -KG     O2 Delivery Intra Treatment room air  -KG     Post SpO2 (%) 92  -KG     O2 Delivery Post Treatment room air  -KG       Row Name 05/13/24 0941          Positioning and Restraints    Pre-Treatment Position in bed  -KG     Post Treatment Position chair  -KG     In  Chair notified nsg;call light within reach;encouraged to call for assist;exit alarm on;waffle cushion;legs elevated  -KG               User Key  (r) = Recorded By, (t) = Taken By, (c) = Cosigned By      Initials Name Provider Type    Devika Mock Physical Therapist                   Outcome Measures       Row Name 05/13/24 0947 05/13/24 0800       How much help from another person do you currently need...    Turning from your back to your side while in flat bed without using bedrails? 4  -KG 4  -CB    Moving from lying on back to sitting on the side of a flat bed without bedrails? 3  -KG 4  -CB    Moving to and from a bed to a chair (including a wheelchair)? 3  -KG 4  -CB    Standing up from a chair using your arms (e.g., wheelchair, bedside chair)? 3  -KG 4  -CB    Climbing 3-5 steps with a railing? 3  -KG 3  -CB    To walk in hospital room? 3  -KG 3  -CB    AM-PAC 6 Clicks Score (PT) 19  -KG 22  -CB    Highest Level of Mobility Goal 6 --> Walk 10 steps or more  -KG 7 --> Walk 25 feet or more  -CB      Row Name 05/12/24 2308          How much help from another person do you currently need...    Turning from your back to your side while in flat bed without using bedrails? 4  -DC     Moving from lying on back to sitting on the side of a flat bed without bedrails? 4  -DC     Moving to and from a bed to a chair (including a wheelchair)? 4  -DC     Standing up from a chair using your arms (e.g., wheelchair, bedside chair)? 4  -DC     Climbing 3-5 steps with a railing? 3  -DC     To walk in hospital room? 3  -DC     AM-PAC 6 Clicks Score (PT) 22  -DC     Highest Level of Mobility Goal 7 --> Walk 25 feet or more  -DC       Row Name 05/13/24 0947          Functional Assessment    Outcome Measure Options AM-PAC 6 Clicks Basic Mobility (PT)  -KG               User Key  (r) = Recorded By, (t) = Taken By, (c) = Cosigned By      Initials Name Provider Type    Soumya Warren, RN Registered Nurse    KHARI Allison  Paty, RN Registered Nurse    Devika Mock Physical Therapist                                 Physical Therapy Education       Title: PT OT SLP Therapies (In Progress)       Topic: Physical Therapy (In Progress)       Point: Mobility training (Done)       Learning Progress Summary             Patient Acceptance, E, VU by KG at 5/13/2024 0948                         Point: Home exercise program (Done)       Learning Progress Summary             Patient Acceptance, E, VU by KG at 5/13/2024 0948                         Point: Body mechanics (Done)       Learning Progress Summary             Patient Acceptance, E, VU by KG at 5/13/2024 0948                         Point: Precautions (Not Started)       Learner Progress:  Not documented in this visit.                              User Key       Initials Effective Dates Name Provider Type Discipline    KG 01/04/23 -  Devika Knight Physical Therapist PT                  PT Recommendation and Plan     Plan of Care Reviewed With: patient  Progress: no change  Outcome Evaluation: PT eval performed: Pt presenting with decline in functional mobility.  CGA for transfers.  Pt able to ambulate 100', FWW, CGA with slow cautious gait, limited by stamina.  Recommend home with assist and HHPT at d/c (pt's daughter planning on staying with him initially when first d/c'd.     Time Calculation:         PT Charges       Row Name 05/13/24 0949             Time Calculation    Start Time 0823  -KG      PT Received On 05/13/24  -KG      PT Goal Re-Cert Due Date 05/23/24  -KG                User Key  (r) = Recorded By, (t) = Taken By, (c) = Cosigned By      Initials Name Provider Type    STEVIE Devika Knight Physical Therapist                  Therapy Charges for Today       Code Description Service Date Service Provider Modifiers Qty    [id number] HC PT EVAL LOW COMPLEXITY 4 5/13/2024 Devika Knight GP 1            PT G-Codes  Outcome Measure Options: AM-PAC 6 Clicks Basic  Mobility (PT)  AM-PAC 6 Clicks Score (PT): 19  PT Discharge Summary  Anticipated Discharge Disposition (PT): home with home health, home with assist    Devika Knight  5/13/2024

## 2024-05-13 NOTE — H&P
[facility] Medicine Services  HISTORY AND PHYSICAL    Patient Name: Ricki Trotter  : 1935  MRN: [id number]  Primary Care Physician: Ev Ventura MD  Date of admission: 2024      Subjective   Subjective     Chief Complaint:  Shortness of breath    HPI:  Ricki Trotter is a 88 y.o. male with a PMH significant for atrial fibrillation on Eliquis, BPH, Paff, HTN, HLD, hypothyroidism, GERD, history of aspiration pneumonia, pulmonary hypertension, COPD, insulin-dependent diabetes mellitus who comes to the ED due to shortness of breath.  He has a history of GERD with aspiration pneumonia.  He has had increased symptoms of GERD over the past several days.  Patient says that he has not felt well for the past several days.  He complains of increased fatigue, chills, subjective fever, cough and shortness of breath.      Personal History     Past Medical History:   Diagnosis Date    Allergic rhinitis     Amnesia     Arrhythmia     Atrial fibrillation     Basal cell carcinoma     Benign prostatic hyperplasia     CHF (congestive heart failure) 3-    Chronic kidney disease, stage 3b     COPD (chronic obstructive pulmonary disease)     Diabetes mellitus N/A    Drug dependency     Heart murmur     Hyperlipidemia     Hypertension     Hyperthyroidism     Iron deficiency     Long term (current) use of insulin     Melanoma 2012    Nephrosclerosis            Past Surgical History:   Procedure Laterality Date    CATARACT EXTRACTION Bilateral     COLONOSCOPY      PROSTHODONTIC PROCEDURE         Family History: family history includes Breast cancer in his sister; Diabetes in his mother; Hypertension in his mother; Stroke in his mother.     Social History:  reports that he quit smoking about 62 years ago. His smoking use included cigarettes. He started smoking about 71 years ago. He has a 4.5 pack-year smoking history. He has been exposed to tobacco smoke. He has never used smokeless  tobacco. He reports current alcohol use of about 2.0 standard drinks of alcohol per week. He reports that he does not use drugs.  Social History     Social History Narrative    caffeine use: 2-3 serving of coffee daily       Medications:  Available home medication information reviewed.  Baclofen, Insulin Glargine (2 Unit Dial), SITagliptin, apixaban, busPIRone, esomeprazole, fluticasone, furosemide, levocetirizine, montelukast, nebivolol, potassium chloride, simvastatin, tamsulosin, and traZODone    Allergies   Allergen Reactions    Penicillins Swelling     Patient has tolerated PO Cefdinir    Sulfa Antibiotics Swelling       Objective   Objective     Vital Signs:   Temp:  [98.8 °F (37.1 °C)] 98.8 °F (37.1 °C)  Heart Rate:  [71-85] 81  Resp:  [16-18] 16  BP: (123-153)/(64-84) 151/78       Physical Exam   Constitutional: Awake, alert  Eyes: PERRLA, sclerae anicteric, no conjunctival injection  HENT: NCAT, mucous membranes moist  Neck: Supple, no thyromegaly, no lymphadenopathy, trachea midline  Respiratory: Decreased air movement on the right, inspiratory crackles on the left with scant scattered wheezes  Cardiovascular: RRR, no murmurs, rubs, or gallops, palpable pedal pulses bilaterally  Gastrointestinal: Positive bowel sounds, soft, nontender, nondistended  Musculoskeletal: No RLE edema, trace-1+ LLE pitting edema, no clubbing or cyanosis to extremities  Psychiatric: Appropriate affect, cooperative  Neurologic: Oriented x 3, strength symmetric in all extremities, Cranial Nerves grossly intact to confrontation, speech clear  Skin: No rashes      Result Review:  I have personally reviewed the results from the time of this admission to 5/13/2024 00:10 EDT and agree with these findings:  [x]  Laboratory list / accordion  [x]  Microbiology  [x]  Radiology  [x]  EKG/Telemetry   []  Cardiology/Vascular   []  Pathology  [x]  Old records      LAB RESULTS:      Lab 05/12/24 1930   WBC 13.38*   HEMOGLOBIN 11.1*   HEMATOCRIT  34.8*   PLATELETS 186   NEUTROS ABS 10.37*   IMMATURE GRANS (ABS) 0.12*   LYMPHS ABS 2.02   MONOS ABS 0.80   EOS ABS 0.04   MCV 90.6   PROCALCITONIN 0.06   LACTATE 1.7   D DIMER QUANT 2.25*         Lab 05/12/24 1930   SODIUM 136   POTASSIUM 4.4   CHLORIDE 98   CO2 27.0   ANION GAP 11.0   BUN 13   CREATININE 1.22   EGFR 57.0*   GLUCOSE 115*   CALCIUM 9.1         Lab 05/12/24 1930   TOTAL PROTEIN 6.4   ALBUMIN 4.1   GLOBULIN 2.3   ALT (SGPT) 7   AST (SGOT) 11   BILIRUBIN 0.7   ALK PHOS 73         Lab 05/12/24 1930   PROBNP 8,099.0*   HSTROP T 32*                 UA          3/8/2024    00:24 3/10/2024    22:52 4/10/2024    16:04   Urinalysis   Specific Gravity, UA 1.011  1.007     Ketones, UA Negative  Negative  Negative    Blood, UA Negative  Negative     Leukocytes, UA Negative  Negative  Moderate (2+)    Nitrite, UA Negative  Negative         Microbiology Results (last 10 days)       Procedure Component Value - Date/Time    COVID PRE-OP / PRE-PROCEDURE SCREENING ORDER (NO ISOLATION) - Swab, Nasopharynx [[id number]]  (Normal) Collected: 05/12/24 2158    Lab Status: Final result Specimen: Swab from Nasopharynx Updated: 05/12/24 2305    Narrative:      The following orders were created for panel order COVID PRE-OP / PRE-PROCEDURE SCREENING ORDER (NO ISOLATION) - Swab, Nasopharynx.  Procedure                               Abnormality         Status                     ---------                               -----------         ------                     Respiratory Panel PCR w/...[[id number]]  Normal              Final result                 Please view results for these tests on the individual orders.    Respiratory Panel PCR w/COVID-19(SARS-CoV-2) [facility]/KOKI/PRISCILLA/PAD/COR/AMY In-House, NP Swab in [place], [place], 2 HR TAT - Swab, Nasopharynx [[id number]]  (Normal) Collected: 05/12/24 2158    Lab Status: Final result Specimen: Swab from Nasopharynx Updated: 05/12/24 2305     ADENOVIRUS, PCR Not Detected     Coronavirus 229E Not  Detected     Coronavirus HKU1 Not Detected     Coronavirus NL63 Not Detected     Coronavirus OC43 Not Detected     COVID19 Not Detected     Human Metapneumovirus Not Detected     Human Rhinovirus/Enterovirus Not Detected     Influenza A PCR Not Detected     Influenza B PCR Not Detected     Parainfluenza Virus 1 Not Detected     Parainfluenza Virus 2 Not Detected     Parainfluenza Virus 3 Not Detected     Parainfluenza Virus 4 Not Detected     RSV, PCR Not Detected     Bordetella pertussis pcr Not Detected     Bordetella parapertussis PCR Not Detected     Chlamydophila pneumoniae PCR Not Detected     Mycoplasma pneumo by PCR Not Detected    Narrative:      In the setting of a positive respiratory panel with a viral infection PLUS a negative procalcitonin without other underlying concern for bacterial infection, consider observing off antibiotics or discontinuation of antibiotics and continue supportive care. If the respiratory panel is positive for atypical bacterial infection (Bordetella pertussis, Chlamydophila pneumoniae, or Mycoplasma pneumoniae), consider antibiotic de-escalation to target atypical bacterial infection.    COVID-19 and FLU A/B PCR, 1 HR TAT - Swab, Nasopharynx [[id number]]  (Normal) Collected: 05/12/24 1934    Lab Status: Final result Specimen: Swab from Nasopharynx Updated: 05/12/24 2125     COVID19 Not Detected     Influenza A PCR Not Detected     Influenza B PCR Not Detected    Narrative:      Fact sheet for providers: https://www.fda.gov/media/821196/download    Fact sheet for patients: https://www.fda.gov/media/791271/download    Test performed by PCR.            CT Chest Without Contrast Diagnostic    Result Date: 5/12/2024  CT CHEST WO CONTRAST DIAGNOSTIC Date of Exam: 5/12/2024 8:34 PM EDT Indication: acute dyspnea, cough, fatigue, eval XR findings of interstitial changes, effusion. Comparison: 5/12/2024. Technique: Axial CT images were obtained of the chest without contrast  administration.  Reconstructed coronal and sagittal images were also obtained. Automated exposure control and iterative construction methods were used. Findings: Hilum and Mediastinum: No pathologically enlarged lymph nodes. The heart appears mildly enlarged. Atherosclerotic calcifications are present including within the coronary arteries.   No pericardial effusion.  Unremarkable thoracic aorta and pulmonary arteries. Lung Parenchyma and Pleura: Airspace consolidation present within the left lower lobe. Air bronchograms are present. Mild subsegmental atelectatic changes and scarring present within the right upper lobe as well as the left lower lobe. Moderate size right-sided pleural effusion present with small left-sided pleural effusion present. Granulomatous calcifications are present. Effusions. Upper Abdomen: No acute process. Soft tissues: Unremarkable. Osseous structures: No aggressive focal lytic or sclerotic osseous lesions.     Impression: Impression: 1. Airspace consolidation within the left lower lobe consistent with pneumonia. Follow-up to resolution recommended to exclude underlying nodule or mass given the configuration. 2. Moderate size right-sided pleural effusion with small left-sided pleural effusion. Chronic interstitial changes and scarring present. 3. Ancillary findings as described above. Electronically Signed: Arlette Altamirano MD  5/12/2024 8:51 PM EDT  Workstation ID: [id number]    XR Chest 1 View    Result Date: 5/12/2024  XR CHEST 1 VW Date of Exam: 5/12/2024 6:55 PM EDT Indication: SOA triage protocol Comparison: 5/6/2024 Findings: Cardiomediastinal silhouette is unchanged. There is pulmonary vascular congestion with interstitial coarsening, similar to prior examination. Slight worsening of basilar airspace disease with persistent small to moderate left effusion. No airspace disease, pneumothorax, nor pleural effusion. No acute osseous abnormality identified.     Impression: Impression: Slight  worsening of basilar airspace disease suggestive of ongoing pneumonia with persistent small left effusion. Electronically Signed: Nir Ibarra MD  5/12/2024 7:35 PM EDT  Workstation ID: [id number]     Results for orders placed during the hospital encounter of 03/07/24    Adult Transthoracic Echo Complete W/ Cont if Necessary Per Protocol    Interpretation Summary    Left ventricular systolic function is normal. Calculated left ventricular EF = 69.1% Normal left ventricular cavity size noted. Left ventricular wall thickness is consistent with mild concentric hypertrophy. Elevated left atrial pressure.    The aortic valve exhibits sclerosis. There is moderate calcification of the aortic valve. There is thickening of the aortic valve. The aortic valve appears trileaflet. No aortic valve regurgitation is present. Mild aortic valve stenosis is present    The tricuspid valve is normal in structure. Moderate tricuspid valve regurgitation is present. Estimated right ventricular systolic pressure from tricuspid regurgitation is markedly elevated (>55 mmHg).      Assessment & Plan   Assessment & Plan       Acute on chronic heart failure with preserved ejection fraction (HFpEF)    COPD (chronic obstructive pulmonary disease)    Recurrent pneumonia    GERD (gastroesophageal reflux disease)    Primary hypertension    PVC's (premature ventricular contractions)    HLD (hyperlipidemia)    Pleural effusion, bilateral    CKD (chronic kidney disease) stage 3, GFR 30-59 ml/min    Atrial fibrillation    Type 2 diabetes mellitus without complication, with long-term current use of insulin    Ricki Trotter is a 88 y.o. male with a PMH significant for atrial fibrillation on Eliquis, BPH, Paff, HTN, HLD, hypothyroidism, GERD, history of aspiration pneumonia, pulmonary hypertension, COPD, insulin-dependent diabetes mellitus who comes to the ED due to shortness of breath.      Acute on chronic HFpEF exacerbation  Bilateral pleural  effusions, right greater than left  History of pulmonary hypertension  --given 40 mg IV in ER. Continue diuresis with IV Lasix 40 mg Lasix twice daily  --stricts is and os, daily weights  --echo  -- Nasal cannula  -- Consider adding nitro drip  - Consult IR for right-sided thoracentesis  - N.p.o. after midnight  - TTE attained on 3/7/2024  - D-dimer elevated, V/Q for a.m.  - Hold Eliquis for thoracentesis in a.m., restart as soon as clinically appropriate    Pneumonia  COPD  - History of aspiration/recurrent pneumonia  - Rocephin, Flagyl, doxycycline  - Respiratory PCR negative  - Blood cultures, sputum cultures, urine antigens  - DuoNebs    Atrial fibrillation  - On Eliquis, hold for thoracentesis.  Restart as soon as clinically appropriate  - Continue home Bystolic    CKD stage III  - At baseline    HTN  HLD  - Continue home meds    Diabetes mellitus type 2  - Takes 16 units Toujeo daily  - Start with SSI with Accu-Cheks  - Hemoglobin A1c for a.m.    DVT prophylaxis:  Mechanical DVT prophylaxis orders are present.          CODE STATUS:    Code Status and Medical Interventions:   Ordered at: 05/12/24 1018     Level Of Support Discussed With:    Patient     Code Status (Patient has no pulse and is not breathing):    CPR (Attempt to Resuscitate)     Medical Interventions (Patient has pulse or is breathing):    Full Support       Expected Discharge   Expected discharge date/ time has not been documented.     Rhina Maurer,   05/13/24

## 2024-05-13 NOTE — NURSING NOTE
Pt is unable to tolerate generic bystolic, and protonix in the place of nexium. Daughter is bringing in meds so that we can label them and use them. Pt resting well, afib on monitor with very frequent PVCs. VSS. No other changes at this time.

## 2024-05-13 NOTE — THERAPY EVALUATION
Acute Care - Speech Language Pathology   Swallow Initial Evaluation [facility]  Clinical Swallow Evaluation       Patient Name: Ricki Trotter  : 1935  MRN: [id number]  Today's Date: 2024               Admit Date: 2024    Visit Dx:     ICD-10-CM ICD-9-CM   1. Pneumonia due to infectious organism, unspecified laterality, unspecified part of lung  J18.9 486   2. Congestive heart failure, unspecified HF chronicity, unspecified heart failure type  I50.9 428.0     Patient Active Problem List   Diagnosis    Mass of lower lobe of left lung    COPD (chronic obstructive pulmonary disease)    Recurrent pneumonia    GERD (gastroesophageal reflux disease)    Primary hypertension    PVC's (premature ventricular contractions)    Acute on chronic diastolic congestive heart failure    HLD (hyperlipidemia)    Leukocytosis    Pleural effusion, bilateral    CKD (chronic kidney disease) stage 3, GFR 30-59 ml/min    Hyponatremia    Lesion of lung    Atrial fibrillation    Pulmonary hypertension    Valvular heart disease    CHF (congestive heart failure)    (HFpEF) heart failure with preserved ejection fraction    Type 2 diabetes mellitus without complication, with long-term current use of insulin    Acute on chronic heart failure with preserved ejection fraction (HFpEF)     Past Medical History:   Diagnosis Date    Allergic rhinitis     Amnesia     Arrhythmia     Atrial fibrillation     Basal cell carcinoma     Benign prostatic hyperplasia     CHF (congestive heart failure) 3-    Chronic kidney disease, stage 3b     COPD (chronic obstructive pulmonary disease)     Diabetes mellitus N/A    Drug dependency     Heart murmur     Hyperlipidemia     Hypertension     Hyperthyroidism     Iron deficiency     Long term (current) use of insulin     Melanoma 2012    Nephrosclerosis      Past Surgical History:   Procedure Laterality Date    CATARACT EXTRACTION Bilateral     COLONOSCOPY      PROSTHODONTIC PROCEDURE          SLP Recommendation and Plan  SLP Swallowing Diagnosis: R/O pharyngeal dysphagia, suspected esophageal dysphagia (05/13/24 1105)  SLP Diet Recommendation: soft to chew textures, chopped, thin liquids (05/13/24 1105)  Recommended Precautions and Strategies: general aspiration precautions, reflux precautions, upright posture during/after eating (05/13/24 1105)  SLP Rec. for Method of Medication Administration: meds whole, with thin liquids, meds crushed, with puree, as tolerated (05/13/24 1105)     Monitor for Signs of Aspiration: yes, notify SLP if any concerns (05/13/24 1105)  Recommended Diagnostics: VFSS ([facility] [place]), with esophageal screen, other (see comments) (5/14) (05/13/24 1105)  Swallow Criteria for Skilled Therapeutic Interventions Met: demonstrates skilled criteria (05/13/24 1105)  Anticipated Discharge Disposition (SLP): unknown, home with home health (05/13/24 1105)  Rehab Potential/Prognosis, Swallowing: good, to achieve stated therapy goals (05/13/24 1105)     Predicted Duration Therapy Intervention (Days): until discharge (05/13/24 1105)  Oral Care Recommendations: Oral Care BID/PRN, Toothbrush (05/13/24 1105)                                               SWALLOW EVALUATION (Last 72 Hours)       SLP Adult Swallow Evaluation       Row Name 05/13/24 1105                   Rehab Evaluation    Document Type evaluation  -        Subjective Information no complaints  -        Patient Observations alert;cooperative  -        Patient/Family/Caregiver Comments/Observations No family present  -        Patient Effort good  -        Symptoms Noted During/After Treatment none  -           General Information    Patient Profile Reviewed yes  -        Pertinent History Of Current Problem Adm with c/o shortness of breath. Hx: a-fib, HTN, HLD, GERD, hypothyroidism, COPD, PNA, diabetes. CXR: airspace consolidation in L lower lobe c/w PNA  -        Current Method of Nutrition NPO  -         Precautions/Limitations, Vision WFL;for purposes of eval  -        Precautions/Limitations, Hearing WFL;for purposes of eval  -        Prior Level of Function-Communication unknown  -        Prior Level of Function-Swallowing esophageal concerns;other (see comments)  per chart/pt report  -        Plans/Goals Discussed with patient;agreed upon  [facility]        Barriers to Rehab none identified  -        Patient's Goals for Discharge patient did not state  -           Pain    Additional Documentation Pain Scale: FACES Pre/Post-Treatment (Group)  -           Pain Scale: FACES Pre/Post-Treatment    Pain: FACES Scale, Pretreatment 0-->no hurt  -        Posttreatment Pain Rating 0-->no hurt  -           Oral Motor Structure and Function    Dentition Assessment natural, present and adequate  -        Secretion Management WNL/WFL  -        Mucosal Quality moist, healthy  -           Oral Musculature and Cranial Nerve Assessment    Oral Motor General Assessment [facility]  -           General Eating/Swallowing Observations    Respiratory Support Currently in Use room air  -        Eating/Swallowing Skills fed by SLP;self-fed  -        Positioning During Eating upright in chair  -        Utensils Used spoon;cup;straw  -        Consistencies Trialed ice chips;thin liquids;pureed;regular textures  -           Clinical Swallow Eval    Pharyngeal Phase no overt signs/symptoms of pharyngeal impairment  -        Esophageal Phase suspected esophageal impairment  -        Clinical Swallow Evaluation Summary No overt s/s of aspiration across trials of thin liquid, pureed, or regular solid consistencies; even when pushed for consecutive sips of thin. Adequate oral manipulation/mastication of regular solid trial and no significant residue. Pt states preference for softer solid diet w/ regular solid diet. Pt c/o worsening discomfort/globus sensation w/ dry, non-cohesive solid consistencies. Pt also observed  belching t/o eval following liquid trials. Recommend soft/chopped solid diet w/ thin liquids, general aspiration/reflux precautions. Plan to [facility] 5/14  -           Esophageal Phase Concerns    Esophageal Phase Concerns globus;belching  -        Globus regular consistencies  -        Belching thin  -           SLP Evaluation Clinical Impression    SLP Swallowing Diagnosis R/O pharyngeal dysphagia;suspected esophageal dysphagia  -        Functional Impact risk of aspiration/pneumonia  -        Rehab Potential/Prognosis, Swallowing good, to achieve stated therapy goals  -        Swallow Criteria for Skilled Therapeutic Interventions Met demonstrates skilled criteria  -           Recommendations    Predicted Duration Therapy Intervention (Days) until discharge  -        SLP Diet Recommendation soft to chew textures;chopped;thin liquids  -        Recommended Diagnostics VFSS ([facility] [place]);with esophageal screen;other (see comments)  5/14  -        Recommended Precautions and Strategies general aspiration precautions;reflux precautions;upright posture during/after eating  -        Oral Care Recommendations Oral Care BID/PRN;Toothbrush  -        SLP Rec. for Method of Medication Administration meds whole;with thin liquids;meds crushed;with puree;as tolerated  -        Monitor for Signs of Aspiration yes;notify SLP if any concerns  -        Anticipated Discharge Disposition (SLP) unknown;home with home health  -                  User Key  (r) = Recorded By, (t) = Taken By, (c) = Cosigned By      Initials Name Effective Dates     Ciera Walker, MS [facility]-SLP 05/12/23 -                     EDUCATION  The patient has been educated in the following areas:   Dysphagia (Swallowing Impairment) Modified Diet Instruction.              Time Calculation:    Time Calculation- SLP       Row Name 05/13/24 1305             Time Calculation- [facility]    SLP Start Time 1105  -      SLP Received On 05/13/24  -          Untimed Charges    [id number] Eval Oral Pharyng Swallow Minutes 40  -MH         Total Minutes    Untimed Charges Total Minutes 40  -MH       Total Minutes 40  -MH                User Key  (r) = Recorded By, (t) = Taken By, (c) = Cosigned By      Initials Name Provider Type     Ciera Walker, MS CCC-SLP Speech and Language Pathologist                    Therapy Charges for Today       Code Description Service Date Service Provider Modifiers Qty    [id number] HC ST EVAL ORAL PHARYNG SWALLOW 3 5/13/2024 Ciera Walker MS [facility]-SLP GN 1                 Ciera Walker MS CCC-SLP  5/13/2024

## 2024-05-13 NOTE — PROGRESS NOTES
"          Clinical Nutrition Assessment     Patient Name: Ricki Trotter  YOB: 1935  MRN: [id number]  Date of Encounter: 05/13/24 12:27 EDT  Admission date: 5/12/2024  Reason for Visit: Identified at risk by screening criteria, MST score 2+    Assessment   Nutrition Assessment   Admission Diagnosis:  CHF exacerbation [I50.9]    Problem List:    Acute on chronic heart failure with preserved ejection fraction (HFpEF)    COPD (chronic obstructive pulmonary disease)    Recurrent pneumonia    GERD (gastroesophageal reflux disease)    Primary hypertension    PVC's (premature ventricular contractions)    HLD (hyperlipidemia)    Pleural effusion, bilateral    CKD (chronic kidney disease) stage 3, GFR 30-59 ml/min    Atrial fibrillation    Type 2 diabetes mellitus without complication, with long-term current use of insulin      PMH:   He  has a past medical history of Allergic rhinitis, Amnesia, Arrhythmia, Atrial fibrillation, Basal cell carcinoma (2015), Benign prostatic hyperplasia, CHF (congestive heart failure) (3-2024), Chronic kidney disease, stage 3b, COPD (chronic obstructive pulmonary disease), Diabetes mellitus (N/A), Drug dependency, Heart murmur, Hyperlipidemia, Hypertension, Hyperthyroidism, Iron deficiency, Long term (current) use of insulin, Melanoma (2012), and Nephrosclerosis.    PSH:  He  has a past surgical history that includes prosthodontic procedure; Colonoscopy; and Cataract extraction (Bilateral).    Applicable Nutrition History:   (5/13) SLP eval: Soft to chew; chopped.  MBS pending 5/14    Anthropometrics     Height: Height: 180.3 cm (71\")  Last Filed Weight: Weight: 69.6 kg (153 lb 8 oz) (05/12/24 1851)  Method: Weight Method: Stated  BMI: BMI (Calculated): 21.4    UBW:  170lb; 160lb since March 2024  Weight change:  10lb weight loss x1 months reported per patient  (6.3%)     Nutrition Focused Physical Exam    Date:     Patient meets criteria for malnutrition diagnosis, see MSA " note.     Subjective   Reported/Observed/Food/Nutrition Related History:     Very pleasant.  Sitting in bed chair and able to provide all information. Reports on going weight loss for the past 3 months. + ~ 10lb weight loss sine hospital admission in March.  Reports not appetite to eat his food.  Gradual poor appetite started about 1.5 years ago after his wife .  Patient reports he is still not used to not having her around.  Lives on his own and and prepares his owns meals, ect.  Daughter in the room and reports patient has done well making sure he eats enough despite not having an appetite.  She feels his decline in intake is due to on going grieving from losing his wife. ONS offered, patient declined reported he tried them and does not like flavor.     Current Nutrition Prescription   PO: Diet: Diabetic, Cardiac; Healthy Heart (2-3 Na+); Consistent Carbohydrate; Texture: Soft to Chew (NDD 3); Soft to Chew: Chopped Meat; Fluid Consistency: Thin (IDDSI 0)  Oral Nutrition Supplement:   Intake: Insufficient data    Assessment & Plan   Nutrition Diagnosis   Date:              Updated:    Problem Malnutrition  acute/severe    Etiology Intake < needs    Signs/Symptoms Weight loss >5% x 1 month. Some muscle and fat wasting on exam    Status: New    Goal / Objectives:   Nutrition to support treatment and Establish PO      Nutrition Intervention      Follow treatment progress, Care plan reviewed, Interview for preferences, Menu provided, Encourage intake, Supplement offered/refused    Meets criteria for MSA  Patient reports plan for MBS tomorrow   ONS offered, patient declined at this time     Monitoring/Evaluation:   Per protocol, I&O, PO intake, Pertinent labs    Cindy Ayoub RD  Time Spent: 25min

## 2024-05-13 NOTE — PROGRESS NOTES
[facility] Services  PROGRESS NOTE    Patient Name: Ricki Trotter  : 1935  MRN: [id number]    Date of Admission: 2024  Primary Care Physician: Ev Ventura MD    Subjective   Subjective     CC:  F/U SOA    HPI:  Patient seen this morning. Asking to eat. Breathing better today. No other complaints.       Objective   Objective     Vital Signs:   Temp:  [97.2 °F (36.2 °C)-98.8 °F (37.1 °C)] 97.9 °F (36.6 °C)  Heart Rate:  [61-97] 84  Resp:  [16-20] 19  BP: (107-153)/(39-84) 149/61     Physical Exam:  Gen-no acute distress  HENT-NCAT, mucous membranes moist  CV-RRR, S1 S2 normal, no m/r/g  Resp-decreased at the bases, no wheezes or rales  Abd-soft, NT, ND, +BS  Ext-no edema  Neuro-A&Ox3, no focal deficits  Skin-no rashes  Psych-appropriate mood      Results Reviewed:  LAB RESULTS:      Lab 24  0757 24   WBC 10.10 13.38*   HEMOGLOBIN 10.2* 11.1*   HEMATOCRIT 31.5* 34.8*   PLATELETS 166 186   NEUTROS ABS 6.75 10.37*   IMMATURE GRANS (ABS) 0.07* 0.12*   LYMPHS ABS 2.47 2.02   MONOS ABS 0.74 0.80   EOS ABS 0.05 0.04   MCV 89.0 90.6   PROCALCITONIN  --  0.06   LACTATE  --  1.7   PROTIME 18.7*  --    D DIMER QUANT  --  2.25*         Lab 24  0757 24   SODIUM 135* 136   POTASSIUM 3.9 4.4   CHLORIDE 96* 98   CO2 28.0 27.0   ANION GAP 11.0 11.0   BUN 15 13   CREATININE 1.16 1.22   EGFR 60.6 57.0*   GLUCOSE 64* 115*   CALCIUM 8.5* 9.1   MAGNESIUM 1.7  --    PHOSPHORUS 3.6  --    HEMOGLOBIN A1C 5.80*  --          Lab 24   TOTAL PROTEIN 6.4   ALBUMIN 4.1   GLOBULIN 2.3   ALT (SGPT) 7   AST (SGOT) 11   BILIRUBIN 0.7   ALK PHOS 73         Lab 24  0757 24  1930   PROBNP  --  8,099.0*   HSTROP T  --  32*   PROTIME 18.7*  --    INR 1.55*  --          Lab 24  0757   CHOLESTEROL 102   LDL CHOL 44   HDL CHOL 44   TRIGLYCERIDES 64             Brief Urine Lab Results  (Last result in the past 365 days)        Color    Clarity   Blood   Leuk Est   Nitrite   Protein   CREAT   Urine HCG        04/10/24 1604 Yellow   Clear   Negative   Moderate (2+)   Negative   Negative                   Microbiology Results Abnormal       Procedure Component Value - Date/Time    S. Pneumo Ag Urine or CSF - Urine, Urine, Clean Catch [[id number]]  (Normal) Collected: 05/12/24 2207    Lab Status: Final result Specimen: Urine, Clean Catch Updated: 05/13/24 0945     Strep Pneumo Ag Negative    Legionella Antigen, Urine - Urine, Urine, Clean Catch [[id number]]  (Normal) Collected: 05/12/24 2207    Lab Status: Final result Specimen: Urine, Clean Catch Updated: 05/13/24 0942     LEGIONELLA ANTIGEN, URINE Negative    COVID PRE-OP / PRE-PROCEDURE SCREENING ORDER (NO ISOLATION) - Swab, Nasopharynx [[id number]]  (Normal) Collected: 05/12/24 2158    Lab Status: Final result Specimen: Swab from Nasopharynx Updated: 05/12/24 2305    Narrative:      The following orders were created for panel order COVID PRE-OP / PRE-PROCEDURE SCREENING ORDER (NO ISOLATION) - Swab, Nasopharynx.  Procedure                               Abnormality         Status                     ---------                               -----------         ------                     Respiratory Panel PCR w/...[[id number]]  Normal              Final result                 Please view results for these tests on the individual orders.    Respiratory Panel PCR w/COVID-19(SARS-CoV-2) SEBAS/KOKI/PRISCILLA/PAD/COR/AMY In-House, NP Swab in UTM/VTM, 2 HR TAT - Swab, Nasopharynx [[id number]]  (Normal) Collected: 05/12/24 2158    Lab Status: Final result Specimen: Swab from Nasopharynx Updated: 05/12/24 2305     ADENOVIRUS, PCR Not Detected     Coronavirus 229E Not Detected     Coronavirus HKU1 Not Detected     Coronavirus NL63 Not Detected     Coronavirus OC43 Not Detected     COVID19 Not Detected     Human Metapneumovirus Not Detected     Human Rhinovirus/Enterovirus Not Detected     Influenza A PCR Not Detected      Influenza B PCR Not Detected     Parainfluenza Virus 1 Not Detected     Parainfluenza Virus 2 Not Detected     Parainfluenza Virus 3 Not Detected     Parainfluenza Virus 4 Not Detected     RSV, PCR Not Detected     Bordetella pertussis pcr Not Detected     Bordetella parapertussis PCR Not Detected     Chlamydophila pneumoniae PCR Not Detected     Mycoplasma pneumo by PCR Not Detected    Narrative:      In the setting of a positive respiratory panel with a viral infection PLUS a negative procalcitonin without other underlying concern for bacterial infection, consider observing off antibiotics or discontinuation of antibiotics and continue supportive care. If the respiratory panel is positive for atypical bacterial infection (Bordetella pertussis, Chlamydophila pneumoniae, or Mycoplasma pneumoniae), consider antibiotic de-escalation to target atypical bacterial infection.    COVID-19 and FLU A/B PCR, 1 HR TAT - Swab, Nasopharynx [[id number]]  (Normal) Collected: 05/12/24 1934    Lab Status: Final result Specimen: Swab from Nasopharynx Updated: 05/12/24 2125     COVID19 Not Detected     Influenza A PCR Not Detected     Influenza B PCR Not Detected    Narrative:      Fact sheet for providers: https://www.fda.gov/media/737991/download    Fact sheet for patients: https://www.fda.gov/media/060938/download    Test performed by PCR.            US Thoracentesis    Result Date: 5/13/2024  US THORACENTESIS Date of Exam: 5/13/2024 1:01 PM EDT Indication: right. Pleural effusion Comparison: Chest CT 5/12/2024 Technique: The procedure, risks and options were discussed with the patient and informed written consent was obtained. The patient was placed in the seated position and ultrasound was performed of the right chest. Moderate to large right-sided pleural effusion was identified. A site was chosen and the skin was marked, prepped and draped. Using maximal sterile barrier technique and under local anesthesia a 5 Kiswahili catheter  was inserted by trocar technique. A total of 900 cc of straw-colored fluid was removed. Appropriate specimens were sent to the lab. The catheter was removed and hemostasis achieved. Post procedure chest radiograph is pending.     Impression: Impression: Technically successful right thoracentesis with removal of 900 cc of fluid. Electronically Signed: Paresh Suarez MD  5/13/2024 1:44 PM EDT  Workstation ID: [id number]    XR Chest 1 View    Result Date: 5/13/2024  XR CHEST 1 VW Date of Exam: 5/13/2024 1:13 PM EDT Indication: s/p thoracentesis (right) Comparison: Chest CT and chest radiograph 5/12/2024. Findings: Cardiomediastinal silhouette is unchanged. Essentially resolved right pleural effusion after thoracentesis. Unchanged small left pleural effusion and left basilar airspace disease that is suspicious for pneumonia. Scarring/subsegmental atelectasis in the  right mid/upper lung. No pneumothorax. Osseous structures are unchanged.     Impression: Impression: Essentially resolved right pleural effusion after thoracentesis without pneumothorax. Remainder of examination is unchanged compared to yesterday's imaging. Electronically Signed: Donato Crowe MD  5/13/2024 1:40 PM EDT  Workstation ID: [id number]    CT Chest Without Contrast Diagnostic    Result Date: 5/12/2024  CT CHEST WO CONTRAST DIAGNOSTIC Date of Exam: 5/12/2024 8:34 PM EDT Indication: acute dyspnea, cough, fatigue, eval XR findings of interstitial changes, effusion. Comparison: 5/12/2024. Technique: Axial CT images were obtained of the chest without contrast administration.  Reconstructed coronal and sagittal images were also obtained. Automated exposure control and iterative construction methods were used. Findings: Hilum and Mediastinum: No pathologically enlarged lymph nodes. The heart appears mildly enlarged. Atherosclerotic calcifications are present including within the coronary arteries.   No pericardial effusion.  Unremarkable thoracic aorta and  pulmonary arteries. Lung Parenchyma and Pleura: Airspace consolidation present within the left lower lobe. Air bronchograms are present. Mild subsegmental atelectatic changes and scarring present within the right upper lobe as well as the left lower lobe. Moderate size right-sided pleural effusion present with small left-sided pleural effusion present. Granulomatous calcifications are present. Effusions. Upper Abdomen: No acute process. Soft tissues: Unremarkable. Osseous structures: No aggressive focal lytic or sclerotic osseous lesions.     Impression: Impression: 1. Airspace consolidation within the left lower lobe consistent with pneumonia. Follow-up to resolution recommended to exclude underlying nodule or mass given the configuration. 2. Moderate size right-sided pleural effusion with small left-sided pleural effusion. Chronic interstitial changes and scarring present. 3. Ancillary findings as described above. Electronically Signed: Arlette Altamirano MD  5/12/2024 8:51 PM EDT  Workstation ID: [id number]    XR Chest 1 View    Result Date: 5/12/2024  XR CHEST 1 VW Date of Exam: 5/12/2024 6:55 PM EDT Indication: SOA triage protocol Comparison: 5/6/2024 Findings: Cardiomediastinal silhouette is unchanged. There is pulmonary vascular congestion with interstitial coarsening, similar to prior examination. Slight worsening of basilar airspace disease with persistent small to moderate left effusion. No airspace disease, pneumothorax, nor pleural effusion. No acute osseous abnormality identified.     Impression: Impression: Slight worsening of basilar airspace disease suggestive of ongoing pneumonia with persistent small left effusion. Electronically Signed: Nir Ibarra MD  5/12/2024 7:35 PM EDT  Workstation ID: [id number]     Results for orders placed during the hospital encounter of 03/07/24    Adult Transthoracic Echo Complete W/ Cont if Necessary Per Protocol    Interpretation Summary    Left ventricular systolic function  is normal. Calculated left ventricular EF = 69.1% Normal left ventricular cavity size noted. Left ventricular wall thickness is consistent with mild concentric hypertrophy. Elevated left atrial pressure.    The aortic valve exhibits sclerosis. There is moderate calcification of the aortic valve. There is thickening of the aortic valve. The aortic valve appears trileaflet. No aortic valve regurgitation is present. Mild aortic valve stenosis is present    The tricuspid valve is normal in structure. Moderate tricuspid valve regurgitation is present. Estimated right ventricular systolic pressure from tricuspid regurgitation is markedly elevated (>55 mmHg).      Current medications:  Scheduled Meds:apixaban, 5 mg, Oral, Q12H  cefTRIAXone, 1,000 mg, Intravenous, Q24H  cetirizine, 5 mg, Oral, Daily  doxycycline, 100 mg, Intravenous, Q12H  esomeprazole, 40 mg, Oral, Daily  fluticasone, 2 spray, Nasal, Daily  furosemide, 40 mg, Intravenous, Q12H  insulin lispro, 2-7 Units, Subcutaneous, 4x Daily AC & at Bedtime  ipratropium-albuterol, 3 mL, Nebulization, 4x Daily - RT  linagliptin, 5 mg, Oral, Daily  montelukast, 10 mg, Oral, Q PM  nebivolol, 10 mg, Oral, Daily  simvastatin, 40 mg, Oral, Nightly  sodium chloride, 10 mL, Intravenous, Q12H  tamsulosin, 0.4 mg, Oral, Daily  traZODone, 50 mg, Oral, Nightly      Continuous Infusions:   PRN Meds:.  acetaminophen **OR** acetaminophen **OR** acetaminophen    senna-docusate sodium **AND** polyethylene glycol **AND** bisacodyl **AND** bisacodyl    busPIRone    Calcium Replacement - Follow Nurse / BPA Driven Protocol    dextrose    dextrose    glucagon (human recombinant)    ipratropium-albuterol    Magnesium Standard Dose Replacement - Follow Nurse / BPA Driven Protocol    melatonin    ondansetron ODT **OR** ondansetron    Phosphorus Replacement - Follow Nurse / BPA Driven Protocol    Potassium Replacement - Follow Nurse / BPA Driven Protocol    sodium chloride    sodium chloride     sodium chloride    Assessment & Plan   Assessment & Plan     Active Hospital Problems    Diagnosis  POA    **Acute on chronic heart failure with preserved ejection fraction (HFpEF) [I50.33]  Yes    Type 2 diabetes mellitus without complication, with long-term current use of insulin [E11.9, Z79.4]  Not Applicable    Pleural effusion, bilateral [J90]  Yes    HLD (hyperlipidemia) [E78.5]  Yes    CKD (chronic kidney disease) stage 3, GFR 30-59 ml/min [N18.30]  Yes    Atrial fibrillation [I48.91]  Yes    PVC's (premature ventricular contractions) [I49.3]  Yes    GERD (gastroesophageal reflux disease) [K21.9]  Yes    COPD (chronic obstructive pulmonary disease) [J44.9]  Yes    Recurrent pneumonia [J18.9]  Yes    Primary hypertension [I10]  Yes      Resolved Hospital Problems   No resolved problems to display.        Brief Hospital Course to date:  Ricki Trotter is a 88 y.o. male with PMH significant for atrial fibrillation on Eliquis, BPH, HTN, HLD, hypothyroidism, GERD, history of aspiration pneumonia, pulmonary hypertension, COPD, and insulin-dependent diabetes mellitus who presents to the ED due to shortness of breath.      This patient's problems and plans were partially entered by my partner and updated as appropriate by me 05/13/24. Copied text in this note has been reviewed and is accurate as of today's date.      Acute on chronic HFpEF   Bilateral pleural effusions, right greater than left  History of pulmonary hypertension  --proBNP elevated 8099  --CT chest with moderate right pleural effusion and small left pleural effusion; LLL consolidation concerning for pneumonia  --Given 40 mg IV Lasix in ER  --Continue diuresis with IV Lasix 40 mg Lasix twice daily  --Strict I/O, daily weights  --Echo pending  --IR performed right-sided thoracentesis 5/13/24, 900 mL removed  --D-dimer elevated, V/Q scan pending (no CTA due to renal function)  --Stable on room air     Questionable LLL pneumonia  COPD  --History of  aspiration/recurrent pneumonia  --CT chest with moderate right pleural effusion and small left pleural effusion; LLL consolidation concerning for pneumonia however does not appear significantly changed from CT scan 3/7/24   --Of note patient follows with Pulmonology and was recently seen on 5/6/24: they mention a chronic masslike density in the LLL with associated bronchiectasis and pleural thickening - they have been following this area for many years and it has remained stable  --WBC 13K on admission, however procal and lactate are WNL  --I think his symptoms are more likely related to CHF/effusions rather than PNA, but given his history will go ahead and treat with Rocephin and Doxycycline for now and monitor  --Respiratory PCR panel negative  --Negative urine Strep and Legionella  --Blood cultures NGTD  --Duonebs  --Stable on room air     Atrial fibrillation  --On Eliquis, resume today following thoracentesis  -Continue home Bystolic     CKD stage III  --Baseline Cr ~1.3  --Stable, monitor with diuresis    GERD  --Family reports worsening reflux symptoms   --Continue PPI  --Family requesting GI consult for further management, possible endoscopy      HTN  HLD  --Continue home meds     Diabetes mellitus type 2  --HbA1c 5.8%  --SSI    Expected Discharge Location and Transportation: home  Expected Discharge   Expected Discharge Date: 5/15/2024; Expected Discharge Time:      DVT prophylaxis:  Medical and mechanical DVT prophylaxis orders are present.         AM-PAC 6 Clicks Score (PT): 19 (05/13/24 2698)    CODE STATUS:   Code Status and Medical Interventions:   Ordered at: 05/12/24 4916     Level Of Support Discussed With:    Patient     Code Status (Patient has no pulse and is not breathing):    CPR (Attempt to Resuscitate)     Medical Interventions (Patient has pulse or is breathing):    Full Support       Love Schmidt MD  05/13/24

## 2024-05-13 NOTE — ED NOTES
Ricki Trotter    Nursing Report ED to Floor:  Mental status: aox4  Ambulatory status: assistx1  Oxygen Therapy:  ra  Cardiac Rhythm: sinus serge  Admitted from: home  Safety Concerns:  fall risk  Social Issues: none  ED Room #:  05    ED Nurse Phone Extension - [phone] or may call [phone].      HPI:   Chief Complaint   Patient presents with    Shortness of Breath       Past Medical History:  Past Medical History:   Diagnosis Date    Allergic rhinitis     Amnesia     Arrhythmia     Atrial fibrillation     Basal cell carcinoma 2015    Benign prostatic hyperplasia     CHF (congestive heart failure) 3-2024    Chronic kidney disease, stage 3b     COPD (chronic obstructive pulmonary disease)     Diabetes mellitus N/A    Drug dependency     Heart murmur     Hyperlipidemia     Hypertension     Hyperthyroidism     Iron deficiency     Long term (current) use of insulin     Melanoma 2012    Nephrosclerosis         Past Surgical History:  Past Surgical History:   Procedure Laterality Date    CATARACT EXTRACTION Bilateral     COLONOSCOPY      PROSTHODONTIC PROCEDURE          Admitting Doctor:   Rhina Maurer DO    Consulting Provider(s):  Consults       No orders found from 4/13/2024 to 5/13/2024.             Admitting Diagnosis:   The primary encounter diagnosis was Pneumonia due to infectious organism, unspecified laterality, unspecified part of lung. A diagnosis of Congestive heart failure, unspecified HF chronicity, unspecified heart failure type was also pertinent to this visit.    Most Recent Vitals:   Vitals:    05/12/24 2042 05/12/24 2100 05/12/24 2130 05/12/24 2131   BP: 153/75 123/64 151/78    BP Location:       Patient Position:       Pulse: 85 80  81   Resp:       Temp:       TempSrc:       SpO2: 95% 97%  96%   Weight:       Height:           Active LDAs/IV Access:   Lines, Drains & Airways       Active LDAs       Name Placement date Placement time Site Days    Peripheral IV 05/12/24 1931 Left Antecubital 05/12/24   1931  Antecubital  less than 1                    Labs (abnormal labs have a star):   Labs Reviewed   COMPREHENSIVE METABOLIC PANEL - Abnormal; Notable for the following components:       Result Value    Glucose 115 (*)     eGFR 57.0 (*)     All other components within normal limits    Narrative:     GFR Normal >60  Chronic Kidney Disease <60  Kidney Failure <15    The GFR formula is only valid for adults with stable renal function between ages 18 and 70.   BNP (IN-HOUSE) - Abnormal; Notable for the following components:    proBNP 8,099.0 (*)     All other components within normal limits    Narrative:     This assay is used as an aid in the diagnosis of individuals suspected of having heart failure. It can be used as an aid in the diagnosis of acute decompensated heart failure (ADHF) in patients presenting with signs and symptoms of ADHF to the emergency department (ED). In addition, NT-proBNP of <300 pg/mL indicates ADHF is not likely.    Age Range Result Interpretation  NT-proBNP Concentration (pg/mL:      <50             Positive            >450                   Gray                 300-450                    Negative             <300    50-75           Positive            >900                  Gray                300-900                  Negative            <300      >75             Positive            >1800                  Gray                300-1800                  Negative            <300   SINGLE HS TROPONIN T - Abnormal; Notable for the following components:    HS Troponin T 32 (*)     All other components within normal limits    Narrative:     High Sensitive Troponin T Reference Range:  <14.0 ng/L- Negative Female for AMI  <22.0 ng/L- Negative Male for AMI  >=14 - Abnormal Female indicating possible myocardial injury.  >=22 - Abnormal Male indicating possible myocardial injury.   Clinicians would have to utilize clinical acumen, EKG, Troponin, and serial changes to determine if it is an Acute  "Myocardial Infarction or myocardial injury due to an underlying chronic condition.        CBC WITH AUTO DIFFERENTIAL - Abnormal; Notable for the following components:    WBC 13.38 (*)     RBC 3.84 (*)     Hemoglobin 11.1 (*)     Hematocrit 34.8 (*)     Neutrophil % 77.5 (*)     Lymphocyte % 15.1 (*)     Immature Grans % 0.9 (*)     Neutrophils, Absolute 10.37 (*)     Immature Grans, Absolute 0.12 (*)     All other components within normal limits    Narrative:     Appended report. These results have been appended to a previously verified report.   D-DIMER, QUANTITATIVE - Abnormal; Notable for the following components:    D-Dimer, Quantitative 2.25 (*)     All other components within normal limits    Narrative:     According to the assay 's published package insert, a normal (<0.50 MCGFEU/mL) D-dimer result in conjunction with a non-high clinical probability assessment, excludes deep vein thrombosis (DVT) and pulmonary embolism (PE) with high sensitivity.    D-dimer values increase with age and this can make VTE exclusion of an older population difficult. To address this, the [employer], based on best available evidence and recent guidelines, recommends that clinicians use age-adjusted D-dimer thresholds in patients greater than 50 years of age with: a) a low probability of PE who do not meet all Pulmonary Embolism Rule Out Criteria, or b) in those with intermediate probability of PE.   The formula for an age-adjusted D-dimer cut-off is \"age/100\".  For example, a 60 year old patient would have an age-adjusted cut-off of 0.60 MCGFEU/mL and an 80 year old 0.80 MCGFEU/mL.   COVID-19 AND FLU A/B, NP SWAB IN TRANSPORT MEDIA 1 HR TAT - Normal    Narrative:     Fact sheet for providers: https://www.fda.gov/media/821953/download    Fact sheet for patients: https://www.fda.gov/media/266418/download    Test performed by PCR.   SCAN SLIDE - Normal   LACTIC ACID, PLASMA - Normal   BLOOD CULTURE "   BLOOD CULTURE   COVID PRE-OP / PRE-PROCEDURE SCREENING ORDER (NO ISOLATION)    Narrative:     The following orders were created for panel order COVID PRE-OP / PRE-PROCEDURE SCREENING ORDER (NO ISOLATION) - Swab, Nasopharynx.  Procedure                               Abnormality         Status                     ---------                               -----------         ------                     Respiratory Panel PCR w/...[[id number]]                      In process                   Please view results for these tests on the individual orders.   RESPIRATORY CULTURE   STREP PNEUMO AG, URINE OR CSF   LEGIONELLA ANTIGEN, URINE   RESPIRATORY PANEL PCR W/ COVID-19 (SARS-COV-2), NP SWAB IN UTM/VTP, 2 HR TAT   RAINBOW DRAW    Narrative:     The following orders were created for panel order [place] Draw.  Procedure                               Abnormality         Status                     ---------                               -----------         ------                     Green Top (Gel)[[id number]]                                  Final result               Lavender Top[[id number]]                                     Final result               Gold Top - SST[[id number]]                                   Final result               Gray Top[[id number]]                                         Final result               Light Blue Top[[id number]]                                   Final result                 Please view results for these tests on the individual orders.   PROCALCITONIN   CBC AND DIFFERENTIAL    Narrative:     The following orders were created for panel order CBC & Differential.  Procedure                               Abnormality         Status                     ---------                               -----------         ------                     CBC Auto Differential[[id number]]        Abnormal            Final result               Scan Slide[[id number]]                   Normal              Final result                  Please view results for these tests on the individual orders.   GREEN TOP   LAVENDER TOP   GOLD TOP - SST   GRAY TOP   LIGHT BLUE TOP       Meds Given in ED:   Medications   sodium chloride 0.9 % flush 10 mL (has no administration in time range)   furosemide (LASIX) injection 40 mg (has no administration in time range)   doxycycline (VIBRAMYCIN) 100 mg in sodium chloride 0.9 % 100 mL MBP (has no administration in time range)   ipratropium-albuterol (DUO-NEB) nebulizer solution 3 mL (3 mL Nebulization Not Given 5/12/24 2155)   ipratropium-albuterol (DUO-NEB) nebulizer solution 3 mL (has no administration in time range)   metroNIDAZOLE (FLAGYL) IVPB 500 mg (has no administration in time range)   cefTRIAXone (ROCEPHIN) 1,000 mg in sodium chloride 0.9 % 100 mL MBP (has no administration in time range)   ipratropium-albuterol (DUO-NEB) nebulizer solution 3 mL (3 mL Nebulization Given 5/12/24 1941)           Last NIH score:                                                          Dysphagia screening results:  Patient Factors Component (Dysphagia:Stroke or Rule-out)  Best Eye Response: 4-->(E4) spontaneous (05/12/24 1932)  Best Motor Response: 6-->(M6) obeys commands (05/12/24 1932)  Best Verbal Response: 5-->(V5) oriented (05/12/24 1932)  [place] Coma Scale Score: 15 (05/12/24 1932)     [place] Coma Scale:  No data recorded     CIWA:        Restraint Type:            Isolation Status:  No active isolations

## 2024-05-13 NOTE — PROGRESS NOTES
Malnutrition Severity Assessment    Patient Name:  Ricki Trotter  YOB: 1935  MRN: [id number]  Admit Date:  5/12/2024    Patient meets criteria for : Severe Malnutrition    Comments:  Based on patient recent wt loss and poor PO intake, patient meets criteria for severe malnutrition r/t acute illness/injury.  Nutrition Focused Physical Exam completed to determine fat and muscle wasting.  MD please attest and include in pt diagnosis as you deem appropriate.        Malnutrition Severity Assessment  Malnutrition Type: Acute Disease or Injury - Related Malnutrition  Malnutrition Type (Last 8 Hours)       Malnutrition Severity Assessment       Row Name 05/13/24 1240       Malnutrition Severity Assessment    Malnutrition Type Acute Disease or Injury - Related Malnutrition      Row Name 05/13/24 1240       Insufficient Energy Intake     Insufficient Energy Intake Findings Severe    Insufficient Energy Intake  <75% of est. energy requirement for > or equal to 1 month      Row Name 05/13/24 1240       Unintentional Weight Loss     Unintentional Weight Loss Findings Severe    Unintentional Weight Loss  Weight loss greater than 5% in one month      Row Name 05/13/24 1240       Muscle Loss    Loss of Muscle Mass Findings Moderate    [place] Region Moderate - slight depression    Clavicle Bone Region Moderate - some protrusion in females, visible in males    Acromion Bone Region Moderate - acromion may slightly protrude    Scapular Bone Region Moderate - mild depression, bones may show slightly      Row Name 05/13/24 1240       Fat Loss    Subcutaneous Fat Loss Findings Moderate    Orbital Region  Moderate -  somewhat hollowness, slightly dark circles    Upper Arm Region Moderate - some fat tissue, not ample    Thoracic & Lumbar Region Moderate - ribs visible with mild depressions, iliac crest somewhat prominent      Row Name 05/13/24 1240       Criteria Met (Must meet criteria for severity in at least 2 of  these categories: M Wasting, Fat Loss, Fluid, Secondary Signs, Wt. Status, Intake)    Patient meets criteria for  Severe Malnutrition                    Electronically signed by:  Cindy Ayoub RD  05/13/24 12:42 EDT

## 2024-05-13 NOTE — CASE MANAGEMENT/SOCIAL WORK
Discharge Planning Assessment  [facility]     Patient Name: Ricki Trotter  MRN: [id number]  Today's Date: 5/13/2024    Admit Date: 5/12/2024    Plan: Home   Discharge Needs Assessment       Row Name 05/13/24 1144       Living Environment    People in Home alone    Current Living Arrangements home    Potentially Unsafe Housing Conditions none    Primary Care Provided by self    Provides Primary Care For no one    Family Caregiver if Needed child(bianca), adult    Family Caregiver Names Paty Tobar    Quality of Family Relationships involved;supportive    Able to Return to Prior Arrangements yes       Resource/Environmental Concerns    Resource/Environmental Concerns none       Transition Planning    Patient/Family Anticipates Transition to home    Transportation Anticipated family or friend will provide       Discharge Needs Assessment    Readmission Within the Last 30 Days no previous admission in last 30 days    Equipment Currently Used at Home shower chair;grab bar;bp cuff;glucometer    Concerns to be Addressed denies needs/concerns at this time;discharge planning    Anticipated Changes Related to Illness none    Equipment Needed After Discharge none    Current Discharge Risk lives alone    Discharge Coordination/Progress Home                   Discharge Plan       Row Name 05/13/24 1146       Plan    Plan Home    Patient/Family in Agreement with Plan yes    Plan Comments Spoke with patient at bedside regarding discharge planning.  Patient denies use of HH, never needed it, and has a Shower Chair, Grab Bar, Blood Pressure machine and Glucometer.  He reports having prescription coverage and medications are affordable.  He lives alone in a single level house with one  stepin from the garge and indicates he still drives.  Daughter reports she lives only 15 minutes from him and checks on him regularly.  No discharge needs verbalized, PT has recommended HH.  CM following.  Patient plan is to discharge home via car  with family to transport.    Final Discharge Disposition Code 01 - home or self-care                  Continued Care and Services - Admitted Since 5/12/2024    No active coordination exists for this encounter.          Demographic Summary       Row Name 05/13/24 1143       General Information    Admission Type observation    Arrived From home    Referral Source admission list    Reason for Consult discharge planning    Preferred Language English    General Information Comments Ev Ventura MD       Contact Information    Permission Granted to Share Info With     Contact Information Comments Paty Tobar, daughter  [phone]  Liane Shaw, Grandchild  [phone]  Diogo Trotter, son  [phone]                   Functional Status       Row Name 05/13/24 1144       Functional Status    Usual Activity Tolerance good    Current Activity Tolerance good       Functional Status, IADL    Medications independent    Meal Preparation independent    Housekeeping independent    Laundry independent    Shopping independent       Employment/    Employment/ Comments [employer] Medicare Replacement                   Psychosocial    No documentation.                  Abuse/Neglect    No documentation.                  Legal    No documentation.                  Substance Abuse    No documentation.                  Patient Forms    No documentation.                     Sonia Pride RN

## 2024-05-14 ENCOUNTER — APPOINTMENT (OUTPATIENT)
Dept: GENERAL RADIOLOGY | Facility: HOSPITAL | Age: 89
End: 2024-05-14
Payer: MEDICARE

## 2024-05-14 PROBLEM — R13.19 ESOPHAGEAL DYSPHAGIA: Status: ACTIVE | Noted: 2024-05-12

## 2024-05-14 PROBLEM — I50.33 ACUTE ON CHRONIC HEART FAILURE WITH PRESERVED EJECTION FRACTION: Status: ACTIVE | Noted: 2024-05-14

## 2024-05-14 LAB
ANION GAP SERPL CALCULATED.3IONS-SCNC: 10 MMOL/L (ref 5–15)
BUN SERPL-MCNC: 19 MG/DL (ref 8–23)
BUN/CREAT SERPL: 13.9 (ref 7–25)
CALCIUM SPEC-SCNC: 8.5 MG/DL (ref 8.6–10.5)
CHLORIDE SERPL-SCNC: 98 MMOL/L (ref 98–107)
CO2 SERPL-SCNC: 28 MMOL/L (ref 22–29)
CREAT SERPL-MCNC: 1.37 MG/DL (ref 0.76–1.27)
DEPRECATED RDW RBC AUTO: 44.6 FL (ref 37–54)
EGFRCR SERPLBLD CKD-EPI 2021: 49.6 ML/MIN/1.73
ERYTHROCYTE [DISTWIDTH] IN BLOOD BY AUTOMATED COUNT: 13.9 % (ref 12.3–15.4)
GLUCOSE BLDC GLUCOMTR-MCNC: 135 MG/DL (ref 70–130)
GLUCOSE BLDC GLUCOMTR-MCNC: 136 MG/DL (ref 70–130)
GLUCOSE BLDC GLUCOMTR-MCNC: 233 MG/DL (ref 70–130)
GLUCOSE BLDC GLUCOMTR-MCNC: 83 MG/DL (ref 70–130)
GLUCOSE SERPL-MCNC: 66 MG/DL (ref 65–99)
HCT VFR BLD AUTO: 28.6 % (ref 37.5–51)
HGB BLD-MCNC: 9.3 G/DL (ref 13–17.7)
MCH RBC QN AUTO: 28.5 PG (ref 26.6–33)
MCHC RBC AUTO-ENTMCNC: 32.5 G/DL (ref 31.5–35.7)
MCV RBC AUTO: 87.7 FL (ref 79–97)
PLATELET # BLD AUTO: 144 10*3/MM3 (ref 140–450)
PMV BLD AUTO: 11 FL (ref 6–12)
POTASSIUM SERPL-SCNC: 3.4 MMOL/L (ref 3.5–5.2)
POTASSIUM SERPL-SCNC: 4.1 MMOL/L (ref 3.5–5.2)
RBC # BLD AUTO: 3.26 10*6/MM3 (ref 4.14–5.8)
SODIUM SERPL-SCNC: 136 MMOL/L (ref 136–145)
WBC NRBC COR # BLD AUTO: 5.77 10*3/MM3 (ref 3.4–10.8)

## 2024-05-14 PROCEDURE — 94799 UNLISTED PULMONARY SVC/PX: CPT

## 2024-05-14 PROCEDURE — 74240 X-RAY XM UPR GI TRC 1CNTRST: CPT

## 2024-05-14 PROCEDURE — 84132 ASSAY OF SERUM POTASSIUM: CPT | Performed by: HOSPITALIST

## 2024-05-14 PROCEDURE — 99222 1ST HOSP IP/OBS MODERATE 55: CPT | Performed by: PHYSICIAN ASSISTANT

## 2024-05-14 PROCEDURE — 63710000001 INSULIN LISPRO (HUMAN) PER 5 UNITS: Performed by: INTERNAL MEDICINE

## 2024-05-14 PROCEDURE — 94664 DEMO&/EVAL PT USE INHALER: CPT

## 2024-05-14 PROCEDURE — 82948 REAGENT STRIP/BLOOD GLUCOSE: CPT

## 2024-05-14 PROCEDURE — 99232 SBSQ HOSP IP/OBS MODERATE 35: CPT | Performed by: HOSPITALIST

## 2024-05-14 PROCEDURE — 80048 BASIC METABOLIC PNL TOTAL CA: CPT | Performed by: HOSPITALIST

## 2024-05-14 PROCEDURE — 92611 MOTION FLUOROSCOPY/SWALLOW: CPT

## 2024-05-14 PROCEDURE — 85027 COMPLETE CBC AUTOMATED: CPT | Performed by: HOSPITALIST

## 2024-05-14 PROCEDURE — 74230 X-RAY XM SWLNG FUNCJ C+: CPT

## 2024-05-14 RX ORDER — FUROSEMIDE 40 MG/1
40 TABLET ORAL DAILY
Status: DISCONTINUED | OUTPATIENT
Start: 2024-05-14 | End: 2024-05-15 | Stop reason: HOSPADM

## 2024-05-14 RX ORDER — POTASSIUM CHLORIDE 20 MEQ/1
40 TABLET, EXTENDED RELEASE ORAL EVERY 4 HOURS
Status: COMPLETED | OUTPATIENT
Start: 2024-05-14 | End: 2024-05-14

## 2024-05-14 RX ORDER — DOXYCYCLINE 100 MG/1
100 CAPSULE ORAL EVERY 12 HOURS SCHEDULED
Status: DISCONTINUED | OUTPATIENT
Start: 2024-05-14 | End: 2024-05-15 | Stop reason: HOSPADM

## 2024-05-14 RX ADMIN — LINAGLIPTIN 5 MG: 5 TABLET, FILM COATED ORAL at 09:21

## 2024-05-14 RX ADMIN — ESOMEPRAZOLE MAGNESIUM 40 MG: 40 GRANULE, DELAYED RELEASE ORAL at 06:35

## 2024-05-14 RX ADMIN — Medication 10 ML: at 09:22

## 2024-05-14 RX ADMIN — Medication 5 MG: at 20:24

## 2024-05-14 RX ADMIN — POTASSIUM CHLORIDE 40 MEQ: 1500 TABLET, EXTENDED RELEASE ORAL at 12:11

## 2024-05-14 RX ADMIN — POTASSIUM CHLORIDE 40 MEQ: 1500 TABLET, EXTENDED RELEASE ORAL at 09:20

## 2024-05-14 RX ADMIN — CETIRIZINE HYDROCHLORIDE 5 MG: 10 TABLET, FILM COATED ORAL at 09:20

## 2024-05-14 RX ADMIN — TRAZODONE HYDROCHLORIDE 50 MG: 50 TABLET ORAL at 20:24

## 2024-05-14 RX ADMIN — BARIUM SULFATE 100 ML: 0.81 POWDER, FOR SUSPENSION ORAL at 14:02

## 2024-05-14 RX ADMIN — APIXABAN 5 MG: 5 TABLET, FILM COATED ORAL at 09:20

## 2024-05-14 RX ADMIN — DOXYCYCLINE 100 MG: 100 INJECTION, POWDER, LYOPHILIZED, FOR SOLUTION INTRAVENOUS at 10:53

## 2024-05-14 RX ADMIN — IPRATROPIUM BROMIDE AND ALBUTEROL SULFATE 3 ML: 2.5; .5 SOLUTION RESPIRATORY (INHALATION) at 12:12

## 2024-05-14 RX ADMIN — FUROSEMIDE 40 MG: 40 TABLET ORAL at 09:20

## 2024-05-14 RX ADMIN — TAMSULOSIN HYDROCHLORIDE 0.4 MG: 0.4 CAPSULE ORAL at 09:20

## 2024-05-14 RX ADMIN — INSULIN LISPRO 3 UNITS: 100 INJECTION, SOLUTION INTRAVENOUS; SUBCUTANEOUS at 20:24

## 2024-05-14 RX ADMIN — FLUTICASONE PROPIONATE 2 SPRAY: 50 SPRAY, METERED NASAL at 09:21

## 2024-05-14 RX ADMIN — DOXYCYCLINE 100 MG: 100 CAPSULE ORAL at 20:24

## 2024-05-14 RX ADMIN — IPRATROPIUM BROMIDE AND ALBUTEROL SULFATE 3 ML: 2.5; .5 SOLUTION RESPIRATORY (INHALATION) at 20:48

## 2024-05-14 RX ADMIN — IPRATROPIUM BROMIDE AND ALBUTEROL SULFATE 3 ML: 2.5; .5 SOLUTION RESPIRATORY (INHALATION) at 07:35

## 2024-05-14 RX ADMIN — NEBIVOLOL 10 MG: 10 TABLET ORAL at 20:25

## 2024-05-14 RX ADMIN — MONTELUKAST 10 MG: 10 TABLET, FILM COATED ORAL at 17:32

## 2024-05-14 RX ADMIN — IPRATROPIUM BROMIDE AND ALBUTEROL SULFATE 3 ML: 2.5; .5 SOLUTION RESPIRATORY (INHALATION) at 15:45

## 2024-05-14 RX ADMIN — BARIUM SULFATE 20 ML: 400 PASTE ORAL at 14:02

## 2024-05-14 NOTE — PROGRESS NOTES
[facility] Medicine Services  PROGRESS NOTE    Patient Name: Ricki Trotter  : 1935  MRN: [id number]    Date of Admission: 2024  Primary Care Physician: Ev Ventura MD    Subjective   Subjective     CC:  F/U SOA    HPI:  Patient seen this morning. No complaints, overall feels better, no complaints of dyspnea.       Objective   Objective     Vital Signs:   Temp:  [97.4 °F (36.3 °C)-98 °F (36.7 °C)] 97.8 °F (36.6 °C)  Heart Rate:  [53-86] 73  Resp:  [16-20] 18  BP: (109-149)/(45-70) 110/60     Physical Exam:  Gen-no acute distress  HENT-NCAT, mucous membranes moist  CV-RRR, S1 S2 normal, no m/r/g  Resp-decreased at the bases, no wheezes or rales  Abd-soft, NT, ND, +BS  Ext-no edema  Neuro-A&Ox3, no focal deficits  Skin-no rashes  Psych-appropriate mood  No change from 24      Results Reviewed:  LAB RESULTS:      Lab 24   WBC 5.77 10.10 13.38*   HEMOGLOBIN 9.3* 10.2* 11.1*   HEMATOCRIT 28.6* 31.5* 34.8*   PLATELETS 144 166 186   NEUTROS ABS  --  6.75 10.37*   IMMATURE GRANS (ABS)  --  0.07* 0.12*   LYMPHS ABS  --  2.47 2.02   MONOS ABS  --  0.74 0.80   EOS ABS  --  0.05 0.04   MCV 87.7 89.0 90.6   PROCALCITONIN  --   --  0.06   LACTATE  --   --  1.7   PROTIME  --  18.7*  --    D DIMER QUANT  --   --  2.25*         Lab 24   SODIUM 136 135* 136   POTASSIUM 3.4* 3.9 4.4   CHLORIDE 98 96* 98   CO2 28.0 28.0 27.0   ANION GAP 10.0 11.0 11.0   BUN 19 15 13   CREATININE 1.37* 1.16 1.22   EGFR 49.6* 60.6 57.0*   GLUCOSE 66 64* 115*   CALCIUM 8.5* 8.5* 9.1   MAGNESIUM  --  1.7  --    PHOSPHORUS  --  3.6  --    HEMOGLOBIN A1C  --  5.80*  --          Lab 24   TOTAL PROTEIN 6.4   ALBUMIN 4.1   GLOBULIN 2.3   ALT (SGPT) 7   AST (SGOT) 11   BILIRUBIN 0.7   ALK PHOS 73         Lab 24  0757 24   PROBNP  --  8,099.0*   HSTROP T  --  32*   PROTIME 18.7*  --    INR 1.55*  --           Lab 05/13/24  0757   CHOLESTEROL 102   LDL CHOL 44   HDL CHOL 44   TRIGLYCERIDES 64             Brief Urine Lab Results  (Last result in the past 365 days)        Color   Clarity   Blood   Leuk Est   Nitrite   Protein   CREAT   Urine HCG        04/10/24 1604 Yellow   Clear   Negative   Moderate (2+)   Negative   Negative                   Microbiology Results Abnormal       Procedure Component Value - Date/Time    Blood Culture - Blood, Arm, Left [[id number]]  (Normal) Collected: 05/12/24 1930    Lab Status: Preliminary result Specimen: Blood from Arm, Left Updated: 05/13/24 2200     Blood Culture No growth at 24 hours    Narrative:      Less than seven (7) mL's of blood was collected.  Insufficient quantity may yield false negative results.    Blood Culture - Blood, Wrist, Left [[id number]]  (Normal) Collected: 05/12/24 2145    Lab Status: Preliminary result Specimen: Blood from Wrist, Left Updated: 05/13/24 2200     Blood Culture No growth at 24 hours    Narrative:      Less than seven (7) mL's of blood was collected.  Insufficient quantity may yield false negative results.    S. Pneumo Ag Urine or CSF - Urine, Urine, Clean Catch [[id number]]  (Normal) Collected: 05/12/24 2207    Lab Status: Final result Specimen: Urine, Clean Catch Updated: 05/13/24 0945     Strep Pneumo Ag Negative    Legionella Antigen, Urine - Urine, Urine, Clean Catch [[id number]]  (Normal) Collected: 05/12/24 2207    Lab Status: Final result Specimen: Urine, Clean Catch Updated: 05/13/24 0942     LEGIONELLA ANTIGEN, URINE Negative    COVID PRE-OP / PRE-PROCEDURE SCREENING ORDER (NO ISOLATION) - Swab, Nasopharynx [[id number]]  (Normal) Collected: 05/12/24 2158    Lab Status: Final result Specimen: Swab from Nasopharynx Updated: 05/12/24 2305    Narrative:      The following orders were created for panel order COVID PRE-OP / PRE-PROCEDURE SCREENING ORDER (NO ISOLATION) - Swab, Nasopharynx.  Procedure                                Abnormality         Status                     ---------                               -----------         ------                     Respiratory Panel PCR w/...[[id number]]  Normal              Final result                 Please view results for these tests on the individual orders.    Respiratory Panel PCR w/COVID-19(SARS-CoV-2) [facility]/PR[facility]LLA/PAD/COR/AMY In-House, NP Swab in UTM/VTM, 2 HR TAT - Swab, Nasopharynx [[id number]]  (Normal) Collected: 05/12/24 2158    Lab Status: Final result Specimen: Swab from Nasopharynx Updated: 05/12/24 2305     ADENOVIRUS, PCR Not Detected     Coronavirus 229E Not Detected     Coronavirus HKU1 Not Detected     Coronavirus NL63 Not Detected     Coronavirus OC43 Not Detected     COVID19 Not Detected     Human Metapneumovirus Not Detected     Human Rhinovirus/Enterovirus Not Detected     Influenza A PCR Not Detected     Influenza B PCR Not Detected     Parainfluenza Virus 1 Not Detected     Parainfluenza Virus 2 Not Detected     Parainfluenza Virus 3 Not Detected     Parainfluenza Virus 4 Not Detected     RSV, PCR Not Detected     Bordetella pertussis pcr Not Detected     Bordetella parapertussis PCR Not Detected     Chlamydophila pneumoniae PCR Not Detected     Mycoplasma pneumo by PCR Not Detected    Narrative:      In the setting of a positive respiratory panel with a viral infection PLUS a negative procalcitonin without other underlying concern for bacterial infection, consider observing off antibiotics or discontinuation of antibiotics and continue supportive care. If the respiratory panel is positive for atypical bacterial infection (Bordetella pertussis, Chlamydophila pneumoniae, or Mycoplasma pneumoniae), consider antibiotic de-escalation to target atypical bacterial infection.    COVID-19 and FLU A/B PCR, 1 HR TAT - Swab, Nasopharynx [[id number]]  (Normal) Collected: 05/12/24 1934    Lab Status: Final result Specimen: Swab from Nasopharynx Updated: 05/12/24 2125     COVID19 Not  Detected     Influenza A PCR Not Detected     Influenza B PCR Not Detected    Narrative:      Fact sheet for providers: https://www.fda.gov/media/130787/download    Fact sheet for patients: https://www.fda.gov/media/500647/download    Test performed by PCR.            NM Lung Scan Perfusion Particulate    Result Date: 5/14/2024  DATE OF EXAM: 5/13/2024 12:35 PM EDT PROCEDURE: NM LUNG SCAN PERFUSION PARTICULATE INDICATIONS: eval for PE COMPARISON: Chest radiograph 5/13/2024 TECHNIQUE: 5.19 mCi of technetium 99m labeled MAA was administered intravenously for the perfusion portion of the pulmonary exam. Scintigraphic images of the lungs were obtained in multiple projections to assess perfusion. FINDINGS: Review of the images show areas of linear scarring in the right upper lobe. There is blunting of the left costophrenic angle with some scarring peripherally at the left lung base. These findings are evident on the patient's chest radiograph. 40.5% of counts localized to the left lung and 59.5% of counts localized to the right lung. Differential counts in the upper middle and lower lung fields were acquired and are available for review.     Impression: 1. Chronic right upper lobe scarring. Left pleural effusion and left basilar scarring. 2. Differential perfusion with 59.5% of counts on the right and 40.5% of counts on the left. Electronically Signed: Paresh Suarez MD  5/14/2024 9:46 AM EDT  Workstation ID: [id number]    US Thoracentesis    Result Date: 5/13/2024  US THORACENTESIS Date of Exam: 5/13/2024 1:01 PM EDT Indication: right. Pleural effusion Comparison: Chest CT 5/12/2024 Technique: The procedure, risks and options were discussed with the patient and informed written consent was obtained. The patient was placed in the seated position and ultrasound was performed of the right chest. Moderate to large right-sided pleural effusion was identified. A site was chosen and the skin was marked, prepped and draped. Using  maximal sterile barrier technique and under local anesthesia a 5 Dutch catheter was inserted by trocar technique. A total of 900 cc of straw-colored fluid was removed. Appropriate specimens were sent to the lab. The catheter was removed and hemostasis achieved. Post procedure chest radiograph is pending.     Impression: Impression: Technically successful right thoracentesis with removal of 900 cc of fluid. Electronically Signed: Paresh Suarez MD  5/13/2024 1:44 PM EDT  Workstation ID: [id number]    XR Chest 1 View    Result Date: 5/13/2024  XR CHEST 1 VW Date of Exam: 5/13/2024 1:13 PM EDT Indication: s/p thoracentesis (right) Comparison: Chest CT and chest radiograph 5/12/2024. Findings: Cardiomediastinal silhouette is unchanged. Essentially resolved right pleural effusion after thoracentesis. Unchanged small left pleural effusion and left basilar airspace disease that is suspicious for pneumonia. Scarring/subsegmental atelectasis in the  right mid/upper lung. No pneumothorax. Osseous structures are unchanged.     Impression: Impression: Essentially resolved right pleural effusion after thoracentesis without pneumothorax. Remainder of examination is unchanged compared to yesterday's imaging. Electronically Signed: Donato Crowe MD  5/13/2024 1:40 PM EDT  Workstation ID: [id number]    CT Chest Without Contrast Diagnostic    Result Date: 5/12/2024  CT CHEST WO CONTRAST DIAGNOSTIC Date of Exam: 5/12/2024 8:34 PM EDT Indication: acute dyspnea, cough, fatigue, eval XR findings of interstitial changes, effusion. Comparison: 5/12/2024. Technique: Axial CT images were obtained of the chest without contrast administration.  Reconstructed coronal and sagittal images were also obtained. Automated exposure control and iterative construction methods were used. Findings: Hilum and Mediastinum: No pathologically enlarged lymph nodes. The heart appears mildly enlarged. Atherosclerotic calcifications are present including within the  Speaking Coherently coronary arteries.   No pericardial effusion.  Unremarkable thoracic aorta and pulmonary arteries. Lung Parenchyma and Pleura: Airspace consolidation present within the left lower lobe. Air bronchograms are present. Mild subsegmental atelectatic changes and scarring present within the right upper lobe as well as the left lower lobe. Moderate size right-sided pleural effusion present with small left-sided pleural effusion present. Granulomatous calcifications are present. Effusions. Upper Abdomen: No acute process. Soft tissues: Unremarkable. Osseous structures: No aggressive focal lytic or sclerotic osseous lesions.     Impression: Impression: 1. Airspace consolidation within the left lower lobe consistent with pneumonia. Follow-up to resolution recommended to exclude underlying nodule or mass given the configuration. 2. Moderate size right-sided pleural effusion with small left-sided pleural effusion. Chronic interstitial changes and scarring present. 3. Ancillary findings as described above. Electronically Signed: Arlette Altamirano MD  5/12/2024 8:51 PM EDT  Workstation ID: [id number]    XR Chest 1 View    Result Date: 5/12/2024  XR CHEST 1 VW Date of Exam: 5/12/2024 6:55 PM EDT Indication: SOA triage protocol Comparison: 5/6/2024 Findings: Cardiomediastinal silhouette is unchanged. There is pulmonary vascular congestion with interstitial coarsening, similar to prior examination. Slight worsening of basilar airspace disease with persistent small to moderate left effusion. No airspace disease, pneumothorax, nor pleural effusion. No acute osseous abnormality identified.     Impression: Impression: Slight worsening of basilar airspace disease suggestive of ongoing pneumonia with persistent small left effusion. Electronically Signed: Nir Ibarra MD  5/12/2024 7:35 PM EDT  Workstation ID: [id number]     Results for orders placed during the hospital encounter of 03/07/24    Adult Transthoracic Echo Complete W/ Cont if  Necessary Per Protocol    Interpretation Summary    Left ventricular systolic function is normal. Calculated left ventricular EF = 69.1% Normal left ventricular cavity size noted. Left ventricular wall thickness is consistent with mild concentric hypertrophy. Elevated left atrial pressure.    The aortic valve exhibits sclerosis. There is moderate calcification of the aortic valve. There is thickening of the aortic valve. The aortic valve appears trileaflet. No aortic valve regurgitation is present. Mild aortic valve stenosis is present    The tricuspid valve is normal in structure. Moderate tricuspid valve regurgitation is present. Estimated right ventricular systolic pressure from tricuspid regurgitation is markedly elevated (>55 mmHg).      Current medications:  Scheduled Meds:cefTRIAXone, 1,000 mg, Intravenous, Q24H  cetirizine, 5 mg, Oral, Daily  doxycycline, 100 mg, Oral, Q12H  esomeprazole, 40 mg, Oral, Daily  fluticasone, 2 spray, Nasal, Daily  furosemide, 40 mg, Oral, Daily  insulin lispro, 2-7 Units, Subcutaneous, 4x Daily AC & at Bedtime  ipratropium-albuterol, 3 mL, Nebulization, 4x Daily - RT  linagliptin, 5 mg, Oral, Daily  montelukast, 10 mg, Oral, Q PM  nebivolol, 10 mg, Oral, Daily  simvastatin, 40 mg, Oral, Nightly  sodium chloride, 10 mL, Intravenous, Q12H  tamsulosin, 0.4 mg, Oral, Daily  traZODone, 50 mg, Oral, Nightly      Continuous Infusions:   PRN Meds:.  acetaminophen **OR** acetaminophen **OR** acetaminophen    senna-docusate sodium **AND** polyethylene glycol **AND** bisacodyl **AND** bisacodyl    busPIRone    Calcium Replacement - Follow Nurse / BPA Driven Protocol    dextrose    dextrose    glucagon (human recombinant)    ipratropium-albuterol    Magnesium Standard Dose Replacement - Follow Nurse / BPA Driven Protocol    melatonin    ondansetron ODT **OR** ondansetron    Phosphorus Replacement - Follow Nurse / BPA Driven Protocol    Potassium Replacement - Follow Nurse / BPA Driven  Protocol    sodium chloride    sodium chloride    sodium chloride    Assessment & Plan   Assessment & Plan     Active Hospital Problems    Diagnosis  POA    **Acute on chronic heart failure with preserved ejection fraction (HFpEF) [I50.33]  Yes    Esophageal dysphagia [R13.19]  Unknown    Type 2 diabetes mellitus without complication, with long-term current use of insulin [E11.9, Z79.4]  Not Applicable    Pleural effusion, bilateral [J90]  Yes    HLD (hyperlipidemia) [E78.5]  Yes    CKD (chronic kidney disease) stage 3, GFR 30-59 ml/min [N18.30]  Yes    Atrial fibrillation [I48.91]  Yes    PVC's (premature ventricular contractions) [I49.3]  Yes    GERD (gastroesophageal reflux disease) [K21.9]  Yes    COPD (chronic obstructive pulmonary disease) [J44.9]  Yes    Recurrent pneumonia [J18.9]  Yes    Primary hypertension [I10]  Yes      Resolved Hospital Problems   No resolved problems to display.        Brief Hospital Course to date:  Ricki Trotter is a 88 y.o. male with PMH significant for atrial fibrillation on Eliquis, BPH, HTN, HLD, hypothyroidism, GERD, history of aspiration pneumonia, pulmonary hypertension, COPD, and insulin-dependent diabetes mellitus who presents to the ED due to shortness of breath.      This patient's problems and plans were partially entered by my partner and updated as appropriate by me 05/14/24. Copied text in this note has been reviewed and is accurate as of today's date.      Acute on chronic HFpEF   Bilateral pleural effusions, right greater than left  Pulmonary hypertension  --proBNP elevated 8099  --CT chest with moderate right pleural effusion and small left pleural effusion; LLL consolidation concerning for pneumonia  --Given 40 mg IV Lasix in ER  --s/p diuresis with IV Lasix 40 mg Lasix twice daily - switch to PO Lasix 40 mg daily (increased from home dose 20 mg daily)  --Strict I/O, daily weights  --Echo 3/8/24: EF 69%, mild AS, moderate TR, markedly elevated RVSP >55  mmHg  --IR performed right-sided thoracentesis 5/13/24, 900 mL removed  --Defer V/Q scan at this time given symptoms improved with thoracentesis, PE is unlikely  --Stable on room air    Hypokalemia  --Replace per protocol     Questionable LLL pneumonia  COPD  --History of aspiration/recurrent pneumonia  --CT chest with moderate right pleural effusion and small left pleural effusion; LLL consolidation concerning for pneumonia however does not appear significantly changed from CT scan 3/7/24   --Of note patient follows with Pulmonology and was recently seen on 5/6/24: they mention a chronic masslike density in the LLL with associated bronchiectasis and pleural thickening - they have been following this area for many years and it has remained stable  --WBC 13K on admission, however procal and lactate are WNL  --I think his symptoms are more likely related to CHF/effusions rather than PNA, but given his history will go ahead and treat with Rocephin and Doxycycline for now, can likely stop at discharge  --Respiratory PCR panel negative  --Negative urine Strep and Legionella  --Blood cultures NGTD  --Duonebs  --Stable on room air     Atrial fibrillation  --On Eliquis, held currently for procedures  -Continue home Bystolic     CKD stage III  --Baseline Cr ~1.3  --Stable, monitor with diuresis  --BMP in AM    GERD  --Family reports worsening reflux symptoms   --Continue PPI  --Family requesting GI consult for further management, possible endoscopy - GI consulted and plan for EGD tomorrow      HTN  HLD  --Continue home meds     Diabetes mellitus type 2  --HbA1c 5.8%  --SSI    Expected Discharge Location and Transportation: home  Expected Discharge likely home tomorrow following EGD  Expected Discharge Date: 5/15/2024; Expected Discharge Time:      DVT prophylaxis:  Mechanical DVT prophylaxis orders are present.         AM-PAC 6 Clicks Score (PT): 24 (05/14/24 3130)    CODE STATUS:   Code Status and Medical Interventions:    Ordered at: 05/12/24 8019     Level Of Support Discussed With:    Patient     Code Status (Patient has no pulse and is not breathing):    CPR (Attempt to Resuscitate)     Medical Interventions (Patient has pulse or is breathing):    Full Support       Love Schmidt MD  05/14/24

## 2024-05-14 NOTE — PAYOR COMM NOTE
"Ricki Vázquez (88 y.o. Male)     [id number]     Anya Escalante, TRE  Utilization Review  Oeaeo-[phone]  [phone]    OBS to INPT        Date of Birth   1935    Social Security Number       Address   [address] [place] [address] [address] [place] [address]    Home Phone   [phone]    MRN   [id number]       Moravian   Anabaptist    Marital Status                               Admission Date   5/12/24    Admission Type   Emergency    Admitting Provider   Love Schmidt MD    Attending Provider   Love Schmidt MD    Department, Room/Bed   [facility] 6A, N615/1       Discharge Date       Discharge Disposition       Discharge Destination                                 Attending Provider: Love Schmdit MD    Allergies: Penicillins, Sulfa Antibiotics    Isolation: None   Infection: None   Code Status: CPR    Ht: 180.3 cm (71\")   Wt: 63.2 kg (139 lb 5.3 oz)    Admission Cmt: None   Principal Problem: Acute on chronic heart failure with preserved ejection fraction (HFpEF) [I50.33]                   Active Insurance as of 5/12/2024       Primary Coverage       Payor Plan Insurance Group Employer/Plan Group    ANTHEM MEDICARE REPLACEMENT ANTHEM MEDICARE ADVANTAGE [id number]       Payor Plan Address Payor Plan Phone Number Payor Plan Fax Number Effective Dates    [address] [phone]  1/1/2024 - None Entered    [facility] [address]         Subscriber Name Subscriber Birth Date Member ID       RICKI VÁZQUEZ 1935 [id number]                     Emergency Contacts        (Rel.) Home Phone Work Phone Mobile Phone    Paty Tobar (Daughter) [phone] -- [phone]    Liane Shaw (Grandchild) [phone] -- [phone]    Diogo Vázquez (Son) -- -- [phone]                 History & Physical        Rhina Maurer DO at 05/12/24 2356              [facility] Medicine Services  HISTORY AND PHYSICAL    Patient Name: " Ricki Trotter  : 1935  MRN: [id number]  Primary Care Physician: Ev Ventura MD  Date of admission: 2024      Subjective  Subjective     Chief Complaint:  Shortness of breath    HPI:  Ricki Trotter is a 88 y.o. male with a PMH significant for atrial fibrillation on Eliquis, BPH, Paff, HTN, HLD, hypothyroidism, GERD, history of aspiration pneumonia, pulmonary hypertension, COPD, insulin-dependent diabetes mellitus who comes to the ED due to shortness of breath.  He has a history of GERD with aspiration pneumonia.  He has had increased symptoms of GERD over the past several days.  Patient says that he has not felt well for the past several days.  He complains of increased fatigue, chills, subjective fever, cough and shortness of breath.      Personal History     Past Medical History:   Diagnosis Date    Allergic rhinitis     Amnesia     Arrhythmia     Atrial fibrillation     Basal cell carcinoma     Benign prostatic hyperplasia     CHF (congestive heart failure) 3-    Chronic kidney disease, stage 3b     COPD (chronic obstructive pulmonary disease)     Diabetes mellitus N/A    Drug dependency     Heart murmur     Hyperlipidemia     Hypertension     Hyperthyroidism     Iron deficiency     Long term (current) use of insulin     Melanoma 2012    Nephrosclerosis            Past Surgical History:   Procedure Laterality Date    CATARACT EXTRACTION Bilateral     COLONOSCOPY      PROSTHODONTIC PROCEDURE         Family History: family history includes Breast cancer in his sister; Diabetes in his mother; Hypertension in his mother; Stroke in his mother.     Social History:  reports that he quit smoking about 62 years ago. His smoking use included cigarettes. He started smoking about 71 years ago. He has a 4.5 pack-year smoking history. He has been exposed to tobacco smoke. He has never used smokeless tobacco. He reports current alcohol use of about 2.0 standard drinks of alcohol per week. He  reports that he does not use drugs.  Social History     Social History Narrative    caffeine use: 2-3 serving of coffee daily       Medications:  Available home medication information reviewed.  Baclofen, Insulin Glargine (2 Unit Dial), SITagliptin, apixaban, busPIRone, esomeprazole, fluticasone, furosemide, levocetirizine, montelukast, nebivolol, potassium chloride, simvastatin, tamsulosin, and traZODone    Allergies   Allergen Reactions    Penicillins Swelling     Patient has tolerated PO Cefdinir    Sulfa Antibiotics Swelling       Objective  Objective     Vital Signs:   Temp:  [98.8 °F (37.1 °C)] 98.8 °F (37.1 °C)  Heart Rate:  [71-85] 81  Resp:  [16-18] 16  BP: (123-153)/(64-84) 151/78       Physical Exam   Constitutional: Awake, alert  Eyes: PERRLA, sclerae anicteric, no conjunctival injection  HENT: NCAT, mucous membranes moist  Neck: Supple, no thyromegaly, no lymphadenopathy, trachea midline  Respiratory: Decreased air movement on the right, inspiratory crackles on the left with scant scattered wheezes  Cardiovascular: RRR, no murmurs, rubs, or gallops, palpable pedal pulses bilaterally  Gastrointestinal: Positive bowel sounds, soft, nontender, nondistended  Musculoskeletal: No RLE edema, trace-1+ LLE pitting edema, no clubbing or cyanosis to extremities  Psychiatric: Appropriate affect, cooperative  Neurologic: Oriented x 3, strength symmetric in all extremities, Cranial Nerves grossly intact to confrontation, speech clear  Skin: No rashes      Result Review:  I have personally reviewed the results from the time of this admission to 5/13/2024 00:10 EDT and agree with these findings:  [x]  Laboratory list / accordion  [x]  Microbiology  [x]  Radiology  [x]  EKG/Telemetry   []  Cardiology/Vascular   []  Pathology  [x]  Old records      LAB RESULTS:      Lab 05/12/24  1930   WBC 13.38*   HEMOGLOBIN 11.1*   HEMATOCRIT 34.8*   PLATELETS 186   NEUTROS ABS 10.37*   IMMATURE GRANS (ABS) 0.12*   LYMPHS ABS 2.02    MONOS ABS 0.80   EOS ABS 0.04   MCV 90.6   PROCALCITONIN 0.06   LACTATE 1.7   D DIMER QUANT 2.25*         Lab 05/12/24 1930   SODIUM 136   POTASSIUM 4.4   CHLORIDE 98   CO2 27.0   ANION GAP 11.0   BUN 13   CREATININE 1.22   EGFR 57.0*   GLUCOSE 115*   CALCIUM 9.1         Lab 05/12/24 1930   TOTAL PROTEIN 6.4   ALBUMIN 4.1   GLOBULIN 2.3   ALT (SGPT) 7   AST (SGOT) 11   BILIRUBIN 0.7   ALK PHOS 73         Lab 05/12/24 1930   PROBNP 8,099.0*   HSTROP T 32*                 UA          3/8/2024    00:24 3/10/2024    22:52 4/10/2024    16:04   Urinalysis   Specific Gravity, UA 1.011  1.007     Ketones, UA Negative  Negative  Negative    Blood, UA Negative  Negative     Leukocytes, UA Negative  Negative  Moderate (2+)    Nitrite, UA Negative  Negative         Microbiology Results (last 10 days)       Procedure Component Value - Date/Time    COVID PRE-OP / PRE-PROCEDURE SCREENING ORDER (NO ISOLATION) - Swab, Nasopharynx [[id number]]  (Normal) Collected: 05/12/24 2158    Lab Status: Final result Specimen: Swab from Nasopharynx Updated: 05/12/24 2305    Narrative:      The following orders were created for panel order COVID PRE-OP / PRE-PROCEDURE SCREENING ORDER (NO ISOLATION) - Swab, Nasopharynx.  Procedure                               Abnormality         Status                     ---------                               -----------         ------                     Respiratory Panel PCR w/...[[id number]]  Normal              Final result                 Please view results for these tests on the individual orders.    Respiratory Panel PCR w/COVID-19(SARS-CoV-2) SEBAS/KOKI/PRISCILLA/PAD/COR/AMY In-House, NP Swab in UTM/VTM, 2 HR TAT - Swab, Nasopharynx [[id number]]  (Normal) Collected: 05/12/24 2158    Lab Status: Final result Specimen: Swab from Nasopharynx Updated: 05/12/24 2305     ADENOVIRUS, PCR Not Detected     Coronavirus 229E Not Detected     Coronavirus HKU1 Not Detected     Coronavirus NL63 Not Detected     Coronavirus  OC43 Not Detected     COVID19 Not Detected     Human Metapneumovirus Not Detected     Human Rhinovirus/Enterovirus Not Detected     Influenza A PCR Not Detected     Influenza B PCR Not Detected     Parainfluenza Virus 1 Not Detected     Parainfluenza Virus 2 Not Detected     Parainfluenza Virus 3 Not Detected     Parainfluenza Virus 4 Not Detected     RSV, PCR Not Detected     Bordetella pertussis pcr Not Detected     Bordetella parapertussis PCR Not Detected     Chlamydophila pneumoniae PCR Not Detected     Mycoplasma pneumo by PCR Not Detected    Narrative:      In the setting of a positive respiratory panel with a viral infection PLUS a negative procalcitonin without other underlying concern for bacterial infection, consider observing off antibiotics or discontinuation of antibiotics and continue supportive care. If the respiratory panel is positive for atypical bacterial infection (Bordetella pertussis, Chlamydophila pneumoniae, or Mycoplasma pneumoniae), consider antibiotic de-escalation to target atypical bacterial infection.    COVID-19 and FLU A/B PCR, 1 HR TAT - Swab, Nasopharynx [[id number]]  (Normal) Collected: 05/12/24 1934    Lab Status: Final result Specimen: Swab from Nasopharynx Updated: 05/12/24 2125     COVID19 Not Detected     Influenza A PCR Not Detected     Influenza B PCR Not Detected    Narrative:      Fact sheet for providers: https://www.fda.gov/media/596071/download    Fact sheet for patients: https://www.fda.gov/media/970397/download    Test performed by PCR.            CT Chest Without Contrast Diagnostic    Result Date: 5/12/2024  CT CHEST WO CONTRAST DIAGNOSTIC Date of Exam: 5/12/2024 8:34 PM EDT Indication: acute dyspnea, cough, fatigue, eval XR findings of interstitial changes, effusion. Comparison: 5/12/2024. Technique: Axial CT images were obtained of the chest without contrast administration.  Reconstructed coronal and sagittal images were also obtained. Automated exposure control  and iterative construction methods were used. Findings: Hilum and Mediastinum: No pathologically enlarged lymph nodes. The heart appears mildly enlarged. Atherosclerotic calcifications are present including within the coronary arteries.   No pericardial effusion.  Unremarkable thoracic aorta and pulmonary arteries. Lung Parenchyma and Pleura: Airspace consolidation present within the left lower lobe. Air bronchograms are present. Mild subsegmental atelectatic changes and scarring present within the right upper lobe as well as the left lower lobe. Moderate size right-sided pleural effusion present with small left-sided pleural effusion present. Granulomatous calcifications are present. Effusions. Upper Abdomen: No acute process. Soft tissues: Unremarkable. Osseous structures: No aggressive focal lytic or sclerotic osseous lesions.     Impression: Impression: 1. Airspace consolidation within the left lower lobe consistent with pneumonia. Follow-up to resolution recommended to exclude underlying nodule or mass given the configuration. 2. Moderate size right-sided pleural effusion with small left-sided pleural effusion. Chronic interstitial changes and scarring present. 3. Ancillary findings as described above. Electronically Signed: Arlette Altamirano MD  5/12/2024 8:51 PM EDT  Workstation ID: [id number]    XR Chest 1 View    Result Date: 5/12/2024  XR CHEST 1 VW Date of Exam: 5/12/2024 6:55 PM EDT Indication: SOA triage protocol Comparison: 5/6/2024 Findings: Cardiomediastinal silhouette is unchanged. There is pulmonary vascular congestion with interstitial coarsening, similar to prior examination. Slight worsening of basilar airspace disease with persistent small to moderate left effusion. No airspace disease, pneumothorax, nor pleural effusion. No acute osseous abnormality identified.     Impression: Impression: Slight worsening of basilar airspace disease suggestive of ongoing pneumonia with persistent small left effusion.  Electronically Signed: Nir Ibarra MD  5/12/2024 7:35 PM EDT  Workstation ID: [id number]     Results for orders placed during the hospital encounter of 03/07/24    Adult Transthoracic Echo Complete W/ Cont if Necessary Per Protocol    Interpretation Summary    Left ventricular systolic function is normal. Calculated left ventricular EF = 69.1% Normal left ventricular cavity size noted. Left ventricular wall thickness is consistent with mild concentric hypertrophy. Elevated left atrial pressure.    The aortic valve exhibits sclerosis. There is moderate calcification of the aortic valve. There is thickening of the aortic valve. The aortic valve appears trileaflet. No aortic valve regurgitation is present. Mild aortic valve stenosis is present    The tricuspid valve is normal in structure. Moderate tricuspid valve regurgitation is present. Estimated right ventricular systolic pressure from tricuspid regurgitation is markedly elevated (>55 mmHg).      Assessment & Plan  Assessment & Plan       Acute on chronic heart failure with preserved ejection fraction (HFpEF)    COPD (chronic obstructive pulmonary disease)    Recurrent pneumonia    GERD (gastroesophageal reflux disease)    Primary hypertension    PVC's (premature ventricular contractions)    HLD (hyperlipidemia)    Pleural effusion, bilateral    CKD (chronic kidney disease) stage 3, GFR 30-59 ml/min    Atrial fibrillation    Type 2 diabetes mellitus without complication, with long-term current use of insulin    Ricki Trotter is a 88 y.o. male with a PMH significant for atrial fibrillation on Eliquis, BPH, Paff, HTN, HLD, hypothyroidism, GERD, history of aspiration pneumonia, pulmonary hypertension, COPD, insulin-dependent diabetes mellitus who comes to the ED due to shortness of breath.      Acute on chronic HFpEF exacerbation  Bilateral pleural effusions, right greater than left  History of pulmonary hypertension  --given 40 mg IV in ER. Continue diuresis  with IV Lasix 40 mg Lasix twice daily  --stricts is and os, daily weights  --echo  -- Nasal cannula  -- Consider adding nitro drip  - Consult IR for right-sided thoracentesis  - N.p.o. after midnight  - TTE attained on 3/7/2024  - D-dimer elevated, V/Q for a.m.  - Hold Eliquis for thoracentesis in a.m., restart as soon as clinically appropriate    Pneumonia  COPD  - History of aspiration/recurrent pneumonia  - Rocephin, Flagyl, doxycycline  - Respiratory PCR negative  - Blood cultures, sputum cultures, urine antigens  - DuoNebs    Atrial fibrillation  - On Eliquis, hold for thoracentesis.  Restart as soon as clinically appropriate  - Continue home Bystolic    CKD stage III  - At baseline    HTN  HLD  - Continue home meds    Diabetes mellitus type 2  - Takes 16 units Toujeo daily  - Start with SSI with Accu-Cheks  - Hemoglobin A1c for a.m.    DVT prophylaxis:  Mechanical DVT prophylaxis orders are present.          CODE STATUS:    Code Status and Medical Interventions:   Ordered at: 05/12/24 2312     Level Of Support Discussed With:    Patient     Code Status (Patient has no pulse and is not breathing):    CPR (Attempt to Resuscitate)     Medical Interventions (Patient has pulse or is breathing):    Full Support       Expected Discharge   Expected discharge date/ time has not been documented.     Rhina Maurer DO  05/13/24      Electronically signed by Rhina Maurer DO at 05/13/24 0010          Emergency Department Notes        Maryam Woody, RN at 05/12/24 2222           Ricki Trotter    Nursing Report ED to Floor:  Mental status: aox4  Ambulatory status: assistx1  Oxygen Therapy:  ra  Cardiac Rhythm: sinus serge  Admitted from: home  Safety Concerns:  fall risk  Social Issues: none  ED Room #:  05    ED Nurse Phone Extension - [phone] or may call [phone].      HPI:   Chief Complaint   Patient presents with    Shortness of Breath       Past Medical History:  Past Medical History:   Diagnosis Date    Allergic  rhinitis     Amnesia     Arrhythmia     Atrial fibrillation     Basal cell carcinoma 2015    Benign prostatic hyperplasia     CHF (congestive heart failure) 3-2024    Chronic kidney disease, stage 3b     COPD (chronic obstructive pulmonary disease)     Diabetes mellitus N/A    Drug dependency     Heart murmur     Hyperlipidemia     Hypertension     Hyperthyroidism     Iron deficiency     Long term (current) use of insulin     Melanoma 2012    Nephrosclerosis         Past Surgical History:  Past Surgical History:   Procedure Laterality Date    CATARACT EXTRACTION Bilateral     COLONOSCOPY      PROSTHODONTIC PROCEDURE          Admitting Doctor:   Rhina Maurer DO    Consulting Provider(s):  Consults       No orders found from 4/13/2024 to 5/13/2024.             Admitting Diagnosis:   The primary encounter diagnosis was Pneumonia due to infectious organism, unspecified laterality, unspecified part of lung. A diagnosis of Congestive heart failure, unspecified HF chronicity, unspecified heart failure type was also pertinent to this visit.    Most Recent Vitals:   Vitals:    05/12/24 2042 05/12/24 2100 05/12/24 2130 05/12/24 2131   BP: 153/75 123/64 151/78    BP Location:       Patient Position:       Pulse: 85 80  81   Resp:       Temp:       TempSrc:       SpO2: 95% 97%  96%   Weight:       Height:           Active LDAs/IV Access:   Lines, Drains & Airways       Active LDAs       Name Placement date Placement time Site Days    Peripheral IV 05/12/24 1931 Left Antecubital 05/12/24 1931  Antecubital  less than 1                    Labs (abnormal labs have a star):   Labs Reviewed   COMPREHENSIVE METABOLIC PANEL - Abnormal; Notable for the following components:       Result Value    Glucose 115 (*)     eGFR 57.0 (*)     All other components within normal limits    Narrative:     GFR Normal >60  Chronic Kidney Disease <60  Kidney Failure <15    The GFR formula is only valid for adults with stable renal function between  ages 18 and 70.   BNP (IN-HOUSE) - Abnormal; Notable for the following components:    proBNP 8,099.0 (*)     All other components within normal limits    Narrative:     This assay is used as an aid in the diagnosis of individuals suspected of having heart failure. It can be used as an aid in the diagnosis of acute decompensated heart failure (ADHF) in patients presenting with signs and symptoms of ADHF to the emergency department (ED). In addition, NT-proBNP of <300 pg/mL indicates ADHF is not likely.    Age Range Result Interpretation  NT-proBNP Concentration (pg/mL:      <50             Positive            >450                   Gray                 300-450                    Negative             <300    50-75           Positive            >900                  Gray                300-900                  Negative            <300      >75             Positive            >1800                  Gray                300-1800                  Negative            <300   SINGLE HS TROPONIN T - Abnormal; Notable for the following components:    HS Troponin T 32 (*)     All other components within normal limits    Narrative:     High Sensitive Troponin T Reference Range:  <14.0 ng/L- Negative Female for AMI  <22.0 ng/L- Negative Male for AMI  >=14 - Abnormal Female indicating possible myocardial injury.  >=22 - Abnormal Male indicating possible myocardial injury.   Clinicians would have to utilize clinical acumen, EKG, Troponin, and serial changes to determine if it is an Acute Myocardial Infarction or myocardial injury due to an underlying chronic condition.        CBC WITH AUTO DIFFERENTIAL - Abnormal; Notable for the following components:    WBC 13.38 (*)     RBC 3.84 (*)     Hemoglobin 11.1 (*)     Hematocrit 34.8 (*)     Neutrophil % 77.5 (*)     Lymphocyte % 15.1 (*)     Immature Grans % 0.9 (*)     Neutrophils, Absolute 10.37 (*)     Immature Grans, Absolute 0.12 (*)     All other components within normal limits     "Narrative:     Appended report. These results have been appended to a previously verified report.   D-DIMER, QUANTITATIVE - Abnormal; Notable for the following components:    D-Dimer, Quantitative 2.25 (*)     All other components within normal limits    Narrative:     According to the assay 's published package insert, a normal (<0.50 MCGFEU/mL) D-dimer result in conjunction with a non-high clinical probability assessment, excludes deep vein thrombosis (DVT) and pulmonary embolism (PE) with high sensitivity.    D-dimer values increase with age and this can make VTE exclusion of an older population difficult. To address this, the American College [employer], based on best available evidence and recent guidelines, recommends that clinicians use age-adjusted D-dimer thresholds in patients greater than 50 years of age with: a) a low probability of PE who do not meet all Pulmonary Embolism Rule Out Criteria, or b) in those with intermediate probability of PE.   The formula for an age-adjusted D-dimer cut-off is \"age/100\".  For example, a 60 year old patient would have an age-adjusted cut-off of 0.60 MCGFEU/mL and an 80 year old 0.80 MCGFEU/mL.   COVID-19 AND FLU A/B, NP SWAB IN TRANSPORT MEDIA 1 HR TAT - Normal    Narrative:     Fact sheet for providers: https://www.fda.gov/media/908997/download    Fact sheet for patients: https://www.fda.gov/media/765011/download    Test performed by PCR.   SCAN SLIDE - Normal   LACTIC ACID, PLASMA - Normal   BLOOD CULTURE   BLOOD CULTURE   COVID PRE-OP / PRE-PROCEDURE SCREENING ORDER (NO ISOLATION)    Narrative:     The following orders were created for panel order COVID PRE-OP / PRE-PROCEDURE SCREENING ORDER (NO ISOLATION) - Swab, Nasopharynx.  Procedure                               Abnormality         Status                     ---------                               -----------         ------                     Respiratory Panel PCR w/...[[id number]]                 "      In process                   Please view results for these tests on the individual orders.   RESPIRATORY CULTURE   STREP PNEUMO AG, URINE OR CSF   LEGIONELLA ANTIGEN, URINE   RESPIRATORY PANEL PCR W/ COVID-19 (SARS-COV-2), NP SWAB IN UTM/VTP, 2 HR TAT   RAINBOW DRAW    Narrative:     The following orders were created for panel order [place] Draw.  Procedure                               Abnormality         Status                     ---------                               -----------         ------                     Green Top (Gel)[[id number]]                                  Final result               Lavender Top[[id number]]                                     Final result               Gold Top - SST[[id number]]                                   Final result               Gray Top[[id number]]                                         Final result               Light Blue Top[[id number]]                                   Final result                 Please view results for these tests on the individual orders.   PROCALCITONIN   CBC AND DIFFERENTIAL    Narrative:     The following orders were created for panel order CBC & Differential.  Procedure                               Abnormality         Status                     ---------                               -----------         ------                     CBC Auto Differential[[id number]]        Abnormal            Final result               Scan Slide[[id number]]                   Normal              Final result                 Please view results for these tests on the individual orders.   GREEN TOP   LAVENDER TOP   GOLD TOP - SST   GRAY TOP   LIGHT BLUE TOP       Meds Given in ED:   Medications   sodium chloride 0.9 % flush 10 mL (has no administration in time range)   furosemide (LASIX) injection 40 mg (has no administration in time range)   doxycycline (VIBRAMYCIN) 100 mg in sodium chloride 0.9 % 100 mL MBP (has no administration in time range)    ipratropium-albuterol (DUO-NEB) nebulizer solution 3 mL (3 mL Nebulization Not Given 5/12/24 2155)   ipratropium-albuterol (DUO-NEB) nebulizer solution 3 mL (has no administration in time range)   metroNIDAZOLE (FLAGYL) IVPB 500 mg (has no administration in time range)   cefTRIAXone (ROCEPHIN) 1,000 mg in sodium chloride 0.9 % 100 mL MBP (has no administration in time range)   ipratropium-albuterol (DUO-NEB) nebulizer solution 3 mL (3 mL Nebulization Given 5/12/24 1941)           Last NIH score:                                                          Dysphagia screening results:  Patient Factors Component (Dysphagia:Stroke or Rule-out)  Best Eye Response: 4-->(E4) spontaneous (05/12/24 1932)  Best Motor Response: 6-->(M6) obeys commands (05/12/24 1932)  Best Verbal Response: 5-->(V5) oriented (05/12/24 1932)  Eduin Coma Scale Score: 15 (05/12/24 1932)     Eduin Coma Scale:  No data recorded     CIWA:        Restraint Type:            Isolation Status:  No active isolations          Electronically signed by Maryam Woody RN at 05/12/24 2230       Satya Yanes APRN at 05/12/24 1927       Attestation signed by Escobar Dorado DO at 05/13/24 2899        SUPERVISE: For this patient encounter, I reviewed the APC's documentation, treatment plan, and medical decision making.  Escobar Dorado DO 5/13/2024 23:25 EDT                         Subjective   History of Present Illness patient is an 88-year-old gentleman accompanied by his daughter and son-in-law, complaining of several days of shortness of breath following an exacerbation of his acid reflux symptoms and multiple episode of vomiting approximately week ago.  Daughter reports that his Nexium prescription has been held up with prior authorization other prescriber insurance issues, and the over-the-counter version is not well-tolerated, contributing to increase in his GERD symptoms.  Daughter reports that he was hospitalized for pneumonia 2 months  ago, has just regained some energy when he began developing the shortness of breath 3 days ago.  Patient is awake, alert, nontoxic-appearing at this time, with clear rhinorrhea, which she reports is constant due to allergies to [place] pollens.    Review of Systems   Constitutional: Negative.    HENT:  Positive for congestion and rhinorrhea.    Respiratory:  Positive for cough and shortness of breath.    Cardiovascular:  Positive for chest pain and leg swelling.   Gastrointestinal:  Positive for nausea and vomiting.   Genitourinary: Negative.    Musculoskeletal: Negative.    Skin: Negative.    Neurological: Negative.        Past Medical History:   Diagnosis Date    Allergic rhinitis     Amnesia     Arrhythmia     Atrial fibrillation     Basal cell carcinoma     Benign prostatic hyperplasia     CHF (congestive heart failure) 3-    Chronic kidney disease, stage 3b     COPD (chronic obstructive pulmonary disease)     Diabetes mellitus N/A    Drug dependency     Heart murmur     Hyperlipidemia     Hypertension     Hyperthyroidism     Iron deficiency     Long term (current) use of insulin     Melanoma     Nephrosclerosis        Allergies   Allergen Reactions    Penicillins Swelling    Sulfa Antibiotics Swelling       Past Surgical History:   Procedure Laterality Date    CATARACT EXTRACTION Bilateral     COLONOSCOPY      PROSTHODONTIC PROCEDURE         Family History   Problem Relation Age of Onset    Diabetes Mother     Stroke Mother     Hypertension Mother     Breast cancer Sister        Social History     Socioeconomic History    Marital status:    Tobacco Use    Smoking status: Former     Current packs/day: 0.00     Average packs/day: 0.5 packs/day for 9.0 years (4.5 ttl pk-yrs)     Types: Cigarettes     Start date: 1953     Quit date: 1962     Years since quittin.4     Passive exposure: Past    Smokeless tobacco: Never   Vaping Use    Vaping status: Never Used   Substance and Sexual  Activity    Alcohol use: Yes     Alcohol/week: 2.0 standard drinks of alcohol     Types: 2 Cans of beer per week    Drug use: No    Sexual activity: Not Currently     Partners: Female     Birth control/protection: None           Objective   Physical Exam  Constitutional:       Appearance: He is well-developed. He is not toxic-appearing.   HENT:      Head: Normocephalic and atraumatic.   Eyes:      Pupils: Pupils are equal, round, and reactive to light.   Cardiovascular:      Rate and Rhythm: Normal rate. Rhythm irregular.   Pulmonary:      Effort: Pulmonary effort is normal. No respiratory distress.      Breath sounds: Examination of the right-lower field reveals wheezing. Examination of the left-lower field reveals wheezing. Wheezing present.   Chest:      Chest wall: No mass or tenderness.   Abdominal:      General: Bowel sounds are normal.      Palpations: Abdomen is soft.   Musculoskeletal:         General: Normal range of motion.      Cervical back: Normal range of motion.      Right lower leg: No edema.      Left lower leg: No edema.   Skin:     General: Skin is warm and dry.   Neurological:      General: No focal deficit present.      Mental Status: He is alert and oriented to person, place, and time.   Psychiatric:         Mood and Affect: Affect is tearful.      Comments: Patient reports losing his wife of 69 years approximately 2 years ago, and on the holiday today he is noticeably tearful.  His daughter reports that he has been more anxious as well following that significant loss.         Procedures          ED Course  ED Course as of 05/12/24 2209   Sun May 12, 2024   1910 Patient initially evaluated in ED room 5, accompanied by daughter and son-in-law. []   2016 CBC with mild leukocytosis 13,000.  Serum chemistry without abnormality.,  Cardiac troponin slightly elevated, BNP also elevated. []   2017 I added CT imaging of the chest. []   2138 Follow up evaluation of the patient, communicating to  the family members, the findings consistent with CHF, and pneumonia, the recommendation to admit the patient for IV antibiotics and diuresis.  Will reach out to the hospitalist. []   2209 Hospitalist agreed to admit the patient. [JH]      ED Course User Index  [JH] Satya Yanes APRN                                             Medical Decision Making  Given the patient's presenting symptoms, report of history including pulmonary disease and previous hospitalizations for pneumonia as well as acid reflux, diagnosis includes aspiration pneumonia, pleural effusion, acute coronary syndrome cannot be excluded, acute kidney injury, electrolyte derangement, upper respiratory viral infection, gastritis.  Patient will have serum screening labs, twelve-lead ECG, cardiac telemetry monitoring, plain film imaging of chest initially.  Patient given a bronchodilator medication nebulized, and reevaluated for improvement in symptoms as well as communication of results and disposition.  Patient and family emergency agreeable with this plan.    Amount and/or Complexity of Data Reviewed  Labs: ordered.  Radiology: ordered.  ECG/medicine tests: ordered.    Risk  Prescription drug management.        Final diagnoses:   Pneumonia due to infectious organism, unspecified laterality, unspecified part of lung   Congestive heart failure, unspecified HF chronicity, unspecified heart failure type       ED Disposition  ED Disposition       ED Disposition   Decision to Admit    Condition   --    Comment   Level of Care: Telemetry [5]   Diagnosis: CHF exacerbation [[id number]]   Admitting Physician: MICHAEL MIN [[id number]]   Attending Physician: MICHAEL MIN [[id number]]   Bed Request Comments: tele                 No follow-up provider specified.       Medication List      No changes were made to your prescriptions during this visit.            Satya Yanes APRN  05/13/24 6162      Electronically signed by Escobar Dorado DO at 05/13/24  2325       Vital Signs (last 2 days)       Date/Time Temp Temp src Pulse Resp BP Patient Position SpO2    05/14/24 1212 -- -- 73 18 -- -- --    05/14/24 0750 97.8 (36.6) Oral 75 18 110/60 Lying 94    05/14/24 0735 -- -- 83 18 -- -- 93    05/14/24 0600 -- -- 61 -- -- -- 93    05/14/24 0400 -- -- 53 -- -- -- 95    05/14/24 0333 97.5 (36.4) Oral -- 18 109/66 Lying --    05/14/24 0200 -- -- 61 -- -- -- 94    05/14/24 0000 -- -- 63 -- -- -- 95    05/13/24 2320 98 (36.7) Oral -- 18 109/59 Lying --    05/13/24 2203 -- -- 71 -- -- -- 96    05/13/24 2119 -- -- 86 -- -- -- 94    05/13/24 1948 97.4 (36.3) Oral -- 18 124/45 Sitting 96    05/13/24 1921 -- -- 78 16 -- -- --    05/13/24 1704 97.8 (36.6) Oral -- 18 125/53 Sitting --    05/13/24 1309 -- -- 84 19 149/61 -- 96    05/13/24 1300 -- -- 83 20 142/70 -- 95    05/13/24 1127 97.9 (36.6) Oral 65 18 144/52 Sitting --    05/13/24 0900 -- -- -- -- -- -- 94    05/13/24 0836 -- -- 97 -- 133/62 -- 93    05/13/24 0800 -- -- -- -- -- -- 91    05/13/24 0743 97.2 (36.2) Oral 81 18 134/64 Lying --    05/13/24 0617 -- -- 84 -- 140/68 -- 93    05/13/24 0254 98 (36.7) Oral 61 16 107/39 Lying 94    05/12/24 2131 -- -- 81 -- -- -- 96    05/12/24 2130 -- -- -- -- 151/78 -- --    05/12/24 2100 -- -- 80 -- 123/64 -- 97    05/12/24 2042 -- -- 85 -- 153/75 -- 95    05/12/24 1943 -- -- 83 16 -- -- 95    05/12/24 1851 98.8 (37.1) Oral 71 18 153/84 Sitting 93          Oxygen Therapy (last 2 days)       Date/Time SpO2 Device (Oxygen Therapy) Flow (L/min) Oxygen Concentration (%) ETCO2 (mmHg)    05/14/24 1212 -- room air -- -- --    05/14/24 0750 94 room air -- -- --    05/14/24 0735 93 room air -- -- --    05/14/24 0600 93 room air -- -- --    05/14/24 0400 95 room air -- -- --    05/14/24 0333 -- room air -- -- --    05/14/24 0200 94 room air -- -- --    05/14/24 0000 95 room air -- -- --    05/13/24 2320 -- room air -- -- --    05/13/24 2203 96 room air -- -- --    05/13/24 2119 94 room air -- --  --    05/13/24 1948 96 room air -- -- --    05/13/24 1921 -- room air -- -- --    05/13/24 1800 -- room air -- -- --    05/13/24 1600 -- room air -- -- --    05/13/24 1410 -- room air -- -- --    05/13/24 1309 96 room air -- -- --    05/13/24 1300 95 room air -- -- --    05/13/24 1200 -- room air -- -- --    05/13/24 1000 -- room air -- -- --    05/13/24 0900 94 room air -- -- --    05/13/24 0836 93 room air -- -- --    05/13/24 0800 91 room air -- -- --    05/13/24 0617 93 room air -- -- --    05/13/24 0339 -- room air -- -- --    05/13/24 0254 94 room air -- -- --    05/13/24 0200 -- room air -- -- --    05/12/24 2334 -- room air -- -- --    05/12/24 2131 96 -- -- -- --    05/12/24 2100 97 -- -- -- --    05/12/24 2042 95 -- -- -- --    05/12/24 1943 95 room air -- -- --    05/12/24 1851 93 room air -- -- --          Lines, Drains & Airways       Active LDAs       Name Placement date Placement time Site Days    Peripheral IV 05/14/24 0000 Anterior;Right Forearm 05/14/24  0000  Forearm  less than 1                  Current Facility-Administered Medications   Medication Dose Route Frequency Provider Last Rate Last Admin    acetaminophen (TYLENOL) tablet 650 mg  650 mg Oral Q4H PRN Rhina Maurer DO        Or    acetaminophen (TYLENOL) 160 MG/5ML oral solution 650 mg  650 mg Oral Q4H PRN Rhina Maurer DO        Or    acetaminophen (TYLENOL) suppository 650 mg  650 mg Rectal Q4H PRN Rhina Maurer DO        sennosides-docusate (PERICOLACE) 8.6-50 MG per tablet 2 tablet  2 tablet Oral BID PRN Libertad, Rhina G, DO        And    polyethylene glycol (MIRALAX) packet 17 g  17 g Oral Daily PRN Libertad, Rhina G, DO        And    bisacodyl (DULCOLAX) EC tablet 5 mg  5 mg Oral Daily PRN Libertad, Rhina G, DO        And    bisacodyl (DULCOLAX) suppository 10 mg  10 mg Rectal Daily PRN Libertad, Rhina G, DO        busPIRone (BUSPAR) tablet 5 mg  5 mg Oral Daily PRN Libertad, Rhina G, DO        Calcium Replacement - Follow Nurse /  BPA Driven Protocol   Does not apply PRN Libertad, Rhina G, DO        cefTRIAXone (ROCEPHIN) 1,000 mg in sodium chloride 0.9 % 100 mL MBP  1,000 mg Intravenous Q24H LibertadRhina aviles G,  mL/hr at 05/13/24 2201 1,000 mg at 05/13/24 2201    cetirizine (zyrTEC) tablet 5 mg  5 mg Oral Daily LibertadRhina aviles G, DO   5 mg at 05/14/24 0920    dextrose (D50W) (25 g/50 mL) IV injection 25 g  25 g Intravenous Q15 Min PRN Libertad, Rhina G, DO        dextrose (GLUTOSE) oral gel 15 g  15 g Oral Q15 Min PRN LibertadRhina aviles G, DO        doxycycline (MONODOX) capsule 100 mg  100 mg Oral Q12H Love Schmidt MD        esomeprazole (NexIUM) 40 mg caps **Patient Supplied**  40 mg Oral Daily Love Schmidt MD   40 mg at 05/14/24 0635    fluticasone (FLONASE) 50 MCG/ACT nasal spray 2 spray  2 spray Nasal Daily LibertadMorena avilessie G, DO   2 spray at 05/14/24 0921    furosemide (LASIX) tablet 40 mg  40 mg Oral Daily Love Schmidt MD   40 mg at 05/14/24 0920    glucagon (GLUCAGEN) injection 1 mg  1 mg Intramuscular Q15 Min PRN LibertadMorena avilessie G, DO        Insulin Lispro (humaLOG) injection 2-7 Units  2-7 Units Subcutaneous 4x Daily AC & at Bedtime Morena Maurersie G, DO        ipratropium-albuterol (DUO-NEB) nebulizer solution 3 mL  3 mL Nebulization 4x Daily - RT LibertadMorena avilessie G, DO   3 mL at 05/14/24 1212    ipratropium-albuterol (DUO-NEB) nebulizer solution 3 mL  3 mL Nebulization Q4H PRN Libertad, Rhina G, DO        linagliptin (TRADJENTA) tablet 5 mg  5 mg Oral Daily LibertadKelsey avilese G, DO   5 mg at 05/14/24 0921    Magnesium Standard Dose Replacement - Follow Nurse / BPA Driven Protocol   Does not apply PRN Libertad, Rhina G, DO        melatonin tablet 5 mg  5 mg Oral Nightly PRN Rhina Maurer DO   5 mg at 05/13/24 2011    montelukast (SINGULAIR) tablet 10 mg  10 mg Oral Q PM Rhina Maurer DO   10 mg at 05/13/24 2010    nebivolol (BYSTOLIC) tablet 10 mg **Patient Supplied**  10 mg Oral Daily Love Schmidt MD   10 mg at 05/13/24 4057     ondansetron ODT (ZOFRAN-ODT) disintegrating tablet 4 mg  4 mg Oral Q6H PRN Libertad, Rhina G, DO        Or    ondansetron (ZOFRAN) injection 4 mg  4 mg Intravenous Q6H PRN Libertad, Rhina G, DO        Phosphorus Replacement - Follow Nurse / BPA Driven Protocol   Does not apply PRN Libertad, Rhina G, DO        Potassium Replacement - Follow Nurse / BPA Driven Protocol   Does not apply PRN Libertad, Rhina G, DO        simvastatin (ZOCOR) tablet 40 mg **patient supplied**  40 mg Oral Nightly Love Schmidt MD   40 mg at 05/13/24 2203    sodium chloride 0.9 % flush 10 mL  10 mL Intravenous PRN Libertad, Rhina G, DO        sodium chloride 0.9 % flush 10 mL  10 mL Intravenous Q12H Libertad, Rhina G, DO   10 mL at 05/14/24 0922    sodium chloride 0.9 % flush 10 mL  10 mL Intravenous PRN Libertad, Rhina G, DO        sodium chloride 0.9 % infusion 40 mL  40 mL Intravenous PRN Libertad, Rhina G, DO        tamsulosin (FLOMAX) 24 hr capsule 0.4 mg  0.4 mg Oral Daily Libertad, Rhina G, DO   0.4 mg at 05/14/24 0920    traZODone (DESYREL) tablet 50 mg  50 mg Oral Nightly Libertad, Rhina G, DO   50 mg at 05/13/24 2203     Lab Results (last 48 hours)       Procedure Component Value Units Date/Time    POC Glucose Once [[id number]]  (Abnormal) Collected: 05/14/24 1142    Specimen: Blood Updated: 05/14/24 1144     Glucose 136 mg/dL     POC Glucose Once [[id number]]  (Normal) Collected: 05/14/24 0754    Specimen: Blood Updated: 05/14/24 0755     Glucose 83 mg/dL     Basic Metabolic Panel [[id number]]  (Abnormal) Collected: 05/14/24 0430    Specimen: Blood Updated: 05/14/24 0551     Glucose 66 mg/dL      BUN 19 mg/dL      Creatinine 1.37 mg/dL      Sodium 136 mmol/L      Potassium 3.4 mmol/L      Comment: Slight hemolysis detected by analyzer. Result may be falsely elevated.        Chloride 98 mmol/L      CO2 28.0 mmol/L      Calcium 8.5 mg/dL      BUN/Creatinine Ratio 13.9     Anion Gap 10.0 mmol/L      eGFR 49.6 mL/min/1.73     Narrative:      GFR Normal  >60  Chronic Kidney Disease <60  Kidney Failure <15    The GFR formula is only valid for adults with stable renal function between ages 18 and 70.    CBC (No Diff) [[id number]]  (Abnormal) Collected: 05/14/24 0430    Specimen: Blood Updated: 05/14/24 0524     WBC 5.77 10*3/mm3      RBC 3.26 10*6/mm3      Hemoglobin 9.3 g/dL      Hematocrit 28.6 %      MCV 87.7 fL      MCH 28.5 pg      MCHC 32.5 g/dL      RDW 13.9 %      RDW-SD 44.6 fl      MPV 11.0 fL      Platelets 144 10*3/mm3     Blood Culture - Blood, Arm, Left [[id number]]  (Normal) Collected: 05/12/24 1930    Specimen: Blood from Arm, Left Updated: 05/13/24 2200     Blood Culture No growth at 24 hours    Narrative:      Less than seven (7) mL's of blood was collected.  Insufficient quantity may yield false negative results.    Blood Culture - Blood, Wrist, Left [[id number]]  (Normal) Collected: 05/12/24 2145    Specimen: Blood from Wrist, Left Updated: 05/13/24 2200     Blood Culture No growth at 24 hours    Narrative:      Less than seven (7) mL's of blood was collected.  Insufficient quantity may yield false negative results.    POC Glucose Once [[id number]]  (Abnormal) Collected: 05/13/24 2002    Specimen: Blood Updated: 05/13/24 2004     Glucose 145 mg/dL     POC Glucose Once [[id number]]  (Normal) Collected: 05/13/24 1707    Specimen: Blood Updated: 05/13/24 1711     Glucose 127 mg/dL     POC Glucose Once [[id number]]  (Normal) Collected: 05/13/24 1129    Specimen: Blood Updated: 05/13/24 1142     Glucose 115 mg/dL     Hemoglobin A1c [[id number]]  (Abnormal) Collected: 05/13/24 0757    Specimen: Blood Updated: 05/13/24 1014     Hemoglobin A1C 5.80 %     Narrative:      Hemoglobin A1C Ranges:    Increased Risk for Diabetes  5.7% to 6.4%  Diabetes                     >= 6.5%  Diabetic Goal                < 7.0%    S. Pneumo Ag Urine or CSF - Urine, Urine, Clean Catch [[id number]]  (Normal) Collected: 05/12/24 2207    Specimen: Urine, Clean Catch Updated: 05/13/24  0945     Strep Pneumo Ag Negative    Legionella Antigen, Urine - Urine, Urine, Clean Catch [[id number]]  (Normal) Collected: 05/12/24 2207    Specimen: Urine, Clean Catch Updated: 05/13/24 0942     LEGIONELLA ANTIGEN, URINE Negative    Protime-INR [[id number]]  (Abnormal) Collected: 05/13/24 0757    Specimen: Blood Updated: 05/13/24 0835     Protime 18.7 Seconds      INR 1.55    Basic Metabolic Panel [[id number]]  (Abnormal) Collected: 05/13/24 0757    Specimen: Blood Updated: 05/13/24 0834     Glucose 64 mg/dL      BUN 15 mg/dL      Creatinine 1.16 mg/dL      Sodium 135 mmol/L      Potassium 3.9 mmol/L      Comment: Slight hemolysis detected by analyzer. Result may be falsely elevated.        Chloride 96 mmol/L      CO2 28.0 mmol/L      Calcium 8.5 mg/dL      BUN/Creatinine Ratio 12.9     Anion Gap 11.0 mmol/L      eGFR 60.6 mL/min/1.73     Narrative:      GFR Normal >60  Chronic Kidney Disease <60  Kidney Failure <15    The GFR formula is only valid for adults with stable renal function between ages 18 and 70.    Lipid Panel [[id number]] Collected: 05/13/24 0757    Specimen: Blood Updated: 05/13/24 0834     Total Cholesterol 102 mg/dL      Triglycerides 64 mg/dL      HDL Cholesterol 44 mg/dL      LDL Cholesterol  44 mg/dL      VLDL Cholesterol 14 mg/dL      LDL/HDL Ratio 1.03    Narrative:      Cholesterol Reference Ranges  (U.S. Department of Health and Human Services ATP III Classifications)    Desirable          <200 mg/dL  Borderline High    200-239 mg/dL  High Risk          >240 mg/dL      Triglyceride Reference Ranges  (U.S. Department of Health and Human Services ATP III Classifications)    Normal           <150 mg/dL  Borderline High  150-199 mg/dL  High             200-499 mg/dL  Very High        >500 mg/dL    HDL Reference Ranges  (U.S. Department of Health and Human Services ATP III Classifications)    Low     <40 mg/dl (major risk factor for CHD)  High    >60 mg/dl ('negative' risk factor for  CHD)        LDL Reference Ranges  (U.S. Department of Health and Human Services ATP III Classifications)    Optimal          <100 mg/dL  Near Optimal     100-129 mg/dL  Borderline High  130-159 mg/dL  High             160-189 mg/dL  Very High        >189 mg/dL    Magnesium [[id number]]  (Normal) Collected: 05/13/24 0757    Specimen: Blood Updated: 05/13/24 0834     Magnesium 1.7 mg/dL     Phosphorus [[id number]]  (Normal) Collected: 05/13/24 0757    Specimen: Blood Updated: 05/13/24 0834     Phosphorus 3.6 mg/dL     CBC Auto Differential [[id number]]  (Abnormal) Collected: 05/13/24 0757    Specimen: Blood Updated: 05/13/24 0814     WBC 10.10 10*3/mm3      RBC 3.54 10*6/mm3      Hemoglobin 10.2 g/dL      Hematocrit 31.5 %      MCV 89.0 fL      MCH 28.8 pg      MCHC 32.4 g/dL      RDW 14.1 %      RDW-SD 45.6 fl      MPV 10.5 fL      Platelets 166 10*3/mm3      Neutrophil % 66.8 %      Lymphocyte % 24.5 %      Monocyte % 7.3 %      Eosinophil % 0.5 %      Basophil % 0.2 %      Immature Grans % 0.7 %      Neutrophils, Absolute 6.75 10*3/mm3      Lymphocytes, Absolute 2.47 10*3/mm3      Monocytes, Absolute 0.74 10*3/mm3      Eosinophils, Absolute 0.05 10*3/mm3      Basophils, Absolute 0.02 10*3/mm3      Immature Grans, Absolute 0.07 10*3/mm3      nRBC 0.0 /100 WBC     POC Glucose Once [[id number]]  (Normal) Collected: 05/13/24 0745    Specimen: Blood Updated: 05/13/24 0757     Glucose 76 mg/dL     POC Glucose Once [[id number]]  (Normal) Collected: 05/12/24 2307    Specimen: Blood Updated: 05/12/24 2309     Glucose 119 mg/dL     COVID PRE-OP / PRE-PROCEDURE SCREENING ORDER (NO ISOLATION) - Swab, Nasopharynx [[id number]]  (Normal) Collected: 05/12/24 2158    Specimen: Swab from Nasopharynx Updated: 05/12/24 2305    Narrative:      The following orders were created for panel order COVID PRE-OP / PRE-PROCEDURE SCREENING ORDER (NO ISOLATION) - Swab, Nasopharynx.  Procedure                               Abnormality         Status                      ---------                               -----------         ------                     Respiratory Panel PCR w/...[[id number]]  Normal              Final result                 Please view results for these tests on the individual orders.    Respiratory Panel PCR w/COVID-19(SARS-CoV-2) [facility] In-House, NP Swab in UTM/VTM, 2 HR TAT - Swab, Nasopharynx [[id number]]  (Normal) Collected: 05/12/24 2158    Specimen: Swab from Nasopharynx Updated: 05/12/24 2305     ADENOVIRUS, PCR Not Detected     Coronavirus 229E Not Detected     Coronavirus HKU1 Not Detected     Coronavirus NL63 Not Detected     Coronavirus OC43 Not Detected     COVID19 Not Detected     Human Metapneumovirus Not Detected     Human Rhinovirus/Enterovirus Not Detected     Influenza A PCR Not Detected     Influenza B PCR Not Detected     Parainfluenza Virus 1 Not Detected     Parainfluenza Virus 2 Not Detected     Parainfluenza Virus 3 Not Detected     Parainfluenza Virus 4 Not Detected     RSV, PCR Not Detected     Bordetella pertussis pcr Not Detected     Bordetella parapertussis PCR Not Detected     Chlamydophila pneumoniae PCR Not Detected     Mycoplasma pneumo by PCR Not Detected    Narrative:      In the setting of a positive respiratory panel with a viral infection PLUS a negative procalcitonin without other underlying concern for bacterial infection, consider observing off antibiotics or discontinuation of antibiotics and continue supportive care. If the respiratory panel is positive for atypical bacterial infection (Bordetella pertussis, Chlamydophila pneumoniae, or Mycoplasma pneumoniae), consider antibiotic de-escalation to target atypical bacterial infection.    Procalcitonin [[id number]]  (Normal) Collected: 05/12/24 1930    Specimen: Blood Updated: 05/12/24 2231     Procalcitonin 0.06 ng/mL     Narrative:      As a Marker for Sepsis (Non-Neonates):    1. <0.5 ng/mL represents a low risk of severe sepsis and/or  "septic shock.  2. >2 ng/mL represents a high risk of severe sepsis and/or septic shock.    As a Marker for Lower Respiratory Tract Infections that require antibiotic therapy:    PCT on Admission    Antibiotic Therapy       6-12 Hrs later    >0.5                Strongly Recommended  >0.25 - <0.5        Recommended   0.1 - 0.25          Discouraged              Remeasure/reassess PCT  <0.1                Strongly Discouraged     Remeasure/reassess PCT    As 28 day mortality risk marker: \"Change in Procalcitonin Result\" (>80% or <=80%) if Day 0 (or Day 1) and Day 4 values are available. Refer to [email or web address]://[email or web address] [email or web address][facility][email or web address]    Change in PCT <=80%  A decrease of PCT levels below or equal to 80% defines a positive change in PCT test result representing a higher risk for 28-day all-cause mortality of patients diagnosed with severe sepsis for septic shock.    Change in PCT >80%  A decrease of PCT levels of more than 80% defines a negative change in PCT result representing a lower risk for 28-day all-cause mortality of patients diagnosed with severe sepsis or septic shock.       D-dimer, Quantitative [[id number]]  (Abnormal) Collected: 05/12/24 1930    Specimen: Blood Updated: 05/12/24 2207     D-Dimer, Quantitative 2.25 MCGFEU/mL     Narrative:      According to the assay 's published package insert, a normal (<0.50 MCGFEU/mL) D-dimer result in conjunction with a non-high clinical probability assessment, excludes deep vein thrombosis (DVT) and pulmonary embolism (PE) with high sensitivity.    D-dimer values increase with age and this can make VTE exclusion of an older population difficult. To address this, the [employer], based on best available evidence and recent guidelines, recommends that clinicians use age-adjusted D-dimer thresholds in patients greater than 50 years of age with: a) a low probability of PE who do not meet all Pulmonary Embolism Rule Out Criteria, or b) in those " "with intermediate probability of PE.   The formula for an age-adjusted D-dimer cut-off is \"age/100\".  For example, a 60 year old patient would have an age-adjusted cut-off of 0.60 MCGFEU/mL and an 80 year old 0.80 MCGFEU/mL.    Lactic Acid, Plasma [[id number]]  (Normal) Collected: 05/12/24 1930    Specimen: Blood Updated: 05/12/24 2147     Lactate 1.7 mmol/L      Comment: Falsely depressed results may occur on samples drawn from patients receiving N-Acetylcysteine (NAC) or Metamizole.       COVID-19 and FLU A/B PCR, 1 HR TAT - Swab, Nasopharynx [[id number]]  (Normal) Collected: 05/12/24 1934    Specimen: Swab from Nasopharynx Updated: 05/12/24 2125     COVID19 Not Detected     Influenza A PCR Not Detected     Influenza B PCR Not Detected    Narrative:      Fact sheet for providers: https://www.fda.gov/media/361499/download    Fact sheet for patients: https://www.fda.gov/media/798762/download    Test performed by PCR.    CBC & Differential [[id number]]  (Abnormal) Collected: 05/12/24 1930    Specimen: Blood Updated: 05/12/24 2012    Narrative:      The following orders were created for panel order CBC & Differential.  Procedure                               Abnormality         Status                     ---------                               -----------         ------                     CBC Auto Differential[[id number]]        Abnormal            Final result               Scan Slide[[id number]]                   Normal              Final result                 Please view results for these tests on the individual orders.    CBC Auto Differential [[id number]]  (Abnormal) Collected: 05/12/24 1930    Specimen: Blood Updated: 05/12/24 2012     WBC 13.38 10*3/mm3      RBC 3.84 10*6/mm3      Hemoglobin 11.1 g/dL      Hematocrit 34.8 %      MCV 90.6 fL      MCH 28.9 pg      MCHC 31.9 g/dL      RDW 14.1 %      RDW-SD 46.9 fl      MPV 10.4 fL      Platelets 186 10*3/mm3      Neutrophil % 77.5 %      Lymphocyte % 15.1 %      " Monocyte % 6.0 %      Eosinophil % 0.3 %      Basophil % 0.2 %      Immature Grans % 0.9 %      Neutrophils, Absolute 10.37 10*3/mm3      Lymphocytes, Absolute 2.02 10*3/mm3      Monocytes, Absolute 0.80 10*3/mm3      Eosinophils, Absolute 0.04 10*3/mm3      Basophils, Absolute 0.03 10*3/mm3      Immature Grans, Absolute 0.12 10*3/mm3      nRBC 0.0 /100 WBC     Narrative:      Appended report. These results have been appended to a previously verified report.    Scan Slide [[id number]]  (Normal) Collected: 05/12/24 1930    Specimen: Blood Updated: 05/12/24 2012     RBC Morphology Normal     WBC Morphology Normal     Platelet Morphology Normal    Comprehensive Metabolic Panel [[id number]]  (Abnormal) Collected: 05/12/24 1930    Specimen: Blood Updated: 05/12/24 2008     Glucose 115 mg/dL      BUN 13 mg/dL      Creatinine 1.22 mg/dL      Sodium 136 mmol/L      Potassium 4.4 mmol/L      Chloride 98 mmol/L      CO2 27.0 mmol/L      Calcium 9.1 mg/dL      Total Protein 6.4 g/dL      Albumin 4.1 g/dL      ALT (SGPT) 7 U/L      AST (SGOT) 11 U/L      Alkaline Phosphatase 73 U/L      Total Bilirubin 0.7 mg/dL      Globulin 2.3 gm/dL      Comment: Calculated Result        A/G Ratio 1.8 g/dL      BUN/Creatinine Ratio 10.7     Anion Gap 11.0 mmol/L      eGFR 57.0 mL/min/1.73     Narrative:      GFR Normal >60  Chronic Kidney Disease <60  Kidney Failure <15    The GFR formula is only valid for adults with stable renal function between ages 18 and 70.    BNP [[id number]]  (Abnormal) Collected: 05/12/24 1930    Specimen: Blood Updated: 05/12/24 2007     proBNP 8,099.0 pg/mL     Narrative:      This assay is used as an aid in the diagnosis of individuals suspected of having heart failure. It can be used as an aid in the diagnosis of acute decompensated heart failure (ADHF) in patients presenting with signs and symptoms of ADHF to the emergency department (ED). In addition, NT-proBNP of <300 pg/mL indicates ADHF is not likely.    Age  Range Result Interpretation  NT-proBNP Concentration (pg/mL:      <50             Positive            >450                   Gray                 300-450                    Negative             <300    50-75           Positive            >900                  Gray                300-900                  Negative            <300      >75             Positive            >1800                  Gray                300-1800                  Negative            <300    Single High Sensitivity Troponin T [[id number]]  (Abnormal) Collected: 05/12/24 1930    Specimen: Blood Updated: 05/12/24 2007     HS Troponin T 32 ng/L     Narrative:      High Sensitive Troponin T Reference Range:  <14.0 ng/L- Negative Female for AMI  <22.0 ng/L- Negative Male for AMI  >=14 - Abnormal Female indicating possible myocardial injury.  >=22 - Abnormal Male indicating possible myocardial injury.   Clinicians would have to utilize clinical acumen, EKG, Troponin, and serial changes to determine if it is an Acute Myocardial Infarction or myocardial injury due to an underlying chronic condition.         [place] Draw [[id number]] Collected: 05/12/24 1930    Specimen: Blood Updated: 05/12/24 1946    Narrative:      The following orders were created for panel order [place] Draw.  Procedure                               Abnormality         Status                     ---------                               -----------         ------                     Green Top (Gel)[[id number]]                                  Final result               Lavender Top[[id number]]                                     Final result               Gold Top - SST[[id number]]                                   Final result               Gray Top[[id number]]                                         Final result               Light Blue Top[[id number]]                                   Final result                 Please view results for these tests on the individual orders.    Lavender  Top [[id number]] Collected: 05/12/24 1930    Specimen: Blood Updated: 05/12/24 1946     Extra Tube hold for add-on     Comment: Auto resulted       Gold Top - SST [[id number]] Collected: 05/12/24 1930    Specimen: Blood Updated: 05/12/24 1946     Extra Tube Hold for add-ons.     Comment: Auto resulted.       Gray Top [[id number]] Collected: 05/12/24 1930    Specimen: Blood Updated: 05/12/24 1946     Extra Tube Hold for add-ons.     Comment: Auto resulted.       Light Blue Top [[id number]] Collected: 05/12/24 1930    Specimen: Blood Updated: 05/12/24 1946     Extra Tube Hold for add-ons.     Comment: Auto resulted       Green Top (Gel) [[id number]] Collected: 05/12/24 1930    Specimen: Blood Updated: 05/12/24 1946     Extra Tube Hold for add-ons.     Comment: Auto resulted.             Imaging Results (Last 48 Hours)       Procedure Component Value Units Date/Time    FL Limited Ugi For Mbs Reflux Single-Contrast [[id number]] Resulted: 05/14/24 1351     Updated: 05/14/24 1403    FL Video Swallow With Speech Single Contrast [[id number]] Resulted: 05/14/24 1351     Updated: 05/14/24 1402    NM Lung Scan Perfusion Particulate [[id number]] Collected: 05/14/24 0943     Updated: 05/14/24 0950    Narrative:      DATE OF EXAM: 5/13/2024 12:35 PM EDT    PROCEDURE: NM LUNG SCAN PERFUSION PARTICULATE    INDICATIONS: eval for PE    COMPARISON: Chest radiograph 5/13/2024    TECHNIQUE: 5.19 mCi of technetium 99m labeled MAA was administered intravenously for the perfusion portion of the pulmonary exam. Scintigraphic images of the lungs were obtained in multiple projections to assess perfusion.    FINDINGS:   Review of the images show areas of linear scarring in the right upper lobe. There is blunting of the left costophrenic angle with some scarring peripherally at the left lung base. These findings are evident on the patient's chest radiograph.    40.5% of counts localized to the left lung and 59.5% of counts localized to the right lung.  Differential counts in the upper middle and lower lung fields were acquired and are available for review.      Impression:        1. Chronic right upper lobe scarring. Left pleural effusion and left basilar scarring.  2. Differential perfusion with 59.5% of counts on the right and 40.5% of counts on the left.      Electronically Signed: Paresh Suarez MD    5/14/2024 9:46 AM EDT    Workstation ID: [id number]    US Thoracentesis [[id number]] Collected: 05/13/24 1342    Specimen: Body Fluid Updated: 05/13/24 1347    Narrative:      US THORACENTESIS    Date of Exam: 5/13/2024 1:01 PM EDT    Indication: right. Pleural effusion    Comparison: Chest CT 5/12/2024    Technique: The procedure, risks and options were discussed with the patient and informed written consent was obtained.    The patient was placed in the seated position and ultrasound was performed of the right chest. Moderate to large right-sided pleural effusion was identified. A site was chosen and the skin was marked, prepped and draped. Using maximal sterile barrier   technique and under local anesthesia a 5 Romanian catheter was inserted by trocar technique. A total of 900 cc of straw-colored fluid was removed. Appropriate specimens were sent to the lab. The catheter was removed and hemostasis achieved. Post procedure   chest radiograph is pending.          Impression:      Impression:  Technically successful right thoracentesis with removal of 900 cc of fluid.        Electronically Signed: Paresh Suarez MD    5/13/2024 1:44 PM EDT    Workstation ID: [id number]    XR Chest 1 View [[id number]] Collected: 05/13/24 1338     Updated: 05/13/24 1343    Narrative:      XR CHEST 1 VW    Date of Exam: 5/13/2024 1:13 PM EDT    Indication: s/p thoracentesis (right)    Comparison: Chest CT and chest radiograph 5/12/2024.    Findings:  Cardiomediastinal silhouette is unchanged. Essentially resolved right pleural effusion after thoracentesis. Unchanged small left pleural  effusion and left basilar airspace disease that is suspicious for pneumonia. Scarring/subsegmental atelectasis in the   right mid/upper lung. No pneumothorax. Osseous structures are unchanged.      Impression:      Impression:  Essentially resolved right pleural effusion after thoracentesis without pneumothorax. Remainder of examination is unchanged compared to yesterday's imaging.      Electronically Signed: Donato Crowe MD    5/13/2024 1:40 PM EDT    Workstation ID: [id number]    CT Chest Without Contrast Diagnostic [[id number]] Collected: 05/12/24 2049     Updated: 05/12/24 2054    Narrative:      CT CHEST WO CONTRAST DIAGNOSTIC    Date of Exam: 5/12/2024 8:34 PM EDT    Indication: acute dyspnea, cough, fatigue, eval XR findings of interstitial changes, effusion.    Comparison: 5/12/2024.    Technique: Axial CT images were obtained of the chest without contrast administration.  Reconstructed coronal and sagittal images were also obtained. Automated exposure control and iterative construction methods were used.      Findings:  Hilum and Mediastinum: No pathologically enlarged lymph nodes. The heart appears mildly enlarged. Atherosclerotic calcifications are present including within the coronary arteries.   No pericardial effusion.  Unremarkable thoracic aorta and pulmonary   arteries.    Lung Parenchyma and Pleura: Airspace consolidation present within the left lower lobe. Air bronchograms are present. Mild subsegmental atelectatic changes and scarring present within the right upper lobe as well as the left lower lobe. Moderate size   right-sided pleural effusion present with small left-sided pleural effusion present. Granulomatous calcifications are present. Effusions.    Upper Abdomen: No acute process.     Soft tissues: Unremarkable.    Osseous structures: No aggressive focal lytic or sclerotic osseous lesions.      Impression:      Impression:    1. Airspace consolidation within the left lower lobe consistent  with pneumonia. Follow-up to resolution recommended to exclude underlying nodule or mass given the configuration.  2. Moderate size right-sided pleural effusion with small left-sided pleural effusion. Chronic interstitial changes and scarring present.  3. Ancillary findings as described above.        Electronically Signed: Arlette Altamirano MD    5/12/2024 8:51 PM EDT    Workstation ID: [id number]    XR Chest 1 View [[id number]] Collected: 05/1935     Updated: 05/12/24 1939    Narrative:      XR CHEST 1 VW    Date of Exam: 5/12/2024 6:55 PM EDT    Indication: SOA triage protocol    Comparison: 5/6/2024    Findings:  Cardiomediastinal silhouette is unchanged. There is pulmonary vascular congestion with interstitial coarsening, similar to prior examination. Slight worsening of basilar airspace disease with persistent small to moderate left effusion. No airspace   disease, pneumothorax, nor pleural effusion. No acute osseous abnormality identified.      Impression:      Impression:  Slight worsening of basilar airspace disease suggestive of ongoing pneumonia with persistent small left effusion.      Electronically Signed: Nir Ibarra MD    5/12/2024 7:35 PM EDT    Workstation ID: [id number]          ECG/EMG Results (last 48 hours)       Procedure Component Value Units Date/Time    Telemetry Scan [[id number]] Resulted: 05/12/24     Updated: 05/13/24 1707    ECG 12 Lead ED Triage Standing Order; SOA [[id number]] Collected: 05/12/24 1900     Updated: 05/13/24 1918     QT Interval 344 ms      QTC Interval 409 ms     Narrative:      Test Reason : SOA  Blood Pressure :   */*   mmHG  Vent. Rate :  85 BPM     Atrial Rate :  93 BPM     P-R Int :   * ms          QRS Dur :  86 ms      QT Int : 344 ms       P-R-T Axes :   *   6  43 degrees     QTc Int : 409 ms    afib with pac  Otherwise normal ECG  When compared with ECG of 10-MAR-2024 22:41,  Current undetermined rhythm precludes rhythm comparison, needs review  Nonspecific T wave  abnormality no longer evident in Inferior leads  Confirmed by MARISA HAIDER MD ([id number]) on 2024 7:18:07 PM    Referred By: EDMD           Confirmed By: MARISA HAIDER MD             Physician Progress Notes (last 48 hours)        Love Schmidt MD at 24 1226              [facility] Medicine Services  PROGRESS NOTE    Patient Name: Ricki Trotter  : 1935  MRN: [id number]    Date of Admission: 2024  Primary Care Physician: Ev Ventura MD    Subjective   Subjective     CC:  F/U SOA    HPI:  Patient seen this morning. No complaints, overall feels better, no complaints of dyspnea.       Objective   Objective     Vital Signs:   Temp:  [97.4 °F (36.3 °C)-98 °F (36.7 °C)] 97.8 °F (36.6 °C)  Heart Rate:  [53-86] 73  Resp:  [16-20] 18  BP: (109-149)/(45-70) 110/60     Physical Exam:  Gen-no acute distress  HENT-NCAT, mucous membranes moist  CV-RRR, S1 S2 normal, no m/r/g  Resp-decreased at the bases, no wheezes or rales  Abd-soft, NT, ND, +BS  Ext-no edema  Neuro-A&Ox3, no focal deficits  Skin-no rashes  Psych-appropriate mood  No change from 24      Results Reviewed:  LAB RESULTS:      Lab 24   WBC 5.77 10.10 13.38*   HEMOGLOBIN 9.3* 10.2* 11.1*   HEMATOCRIT 28.6* 31.5* 34.8*   PLATELETS 144 166 186   NEUTROS ABS  --  6.75 10.37*   IMMATURE GRANS (ABS)  --  0.07* 0.12*   LYMPHS ABS  --  2.47 2.02   MONOS ABS  --  0.74 0.80   EOS ABS  --  0.05 0.04   MCV 87.7 89.0 90.6   PROCALCITONIN  --   --  0.06   LACTATE  --   --  1.7   PROTIME  --  18.7*  --    D DIMER QUANT  --   --  2.25*         Lab 24  0430 24  0757 240   SODIUM 136 135* 136   POTASSIUM 3.4* 3.9 4.4   CHLORIDE 98 96* 98   CO2 28.0 28.0 27.0   ANION GAP 10.0 11.0 11.0   BUN 19 15 13   CREATININE 1.37* 1.16 1.22   EGFR 49.6* 60.6 57.0*   GLUCOSE 66 64* 115*   CALCIUM 8.5* 8.5* 9.1   MAGNESIUM  --  1.7  --    PHOSPHORUS  --  3.6  --     HEMOGLOBIN A1C  --  5.80*  --          Lab 05/12/24 1930   TOTAL PROTEIN 6.4   ALBUMIN 4.1   GLOBULIN 2.3   ALT (SGPT) 7   AST (SGOT) 11   BILIRUBIN 0.7   ALK PHOS 73         Lab 05/13/24 0757 05/12/24 1930   PROBNP  --  8,099.0*   HSTROP T  --  32*   PROTIME 18.7*  --    INR 1.55*  --          Lab 05/13/24 0757   CHOLESTEROL 102   LDL CHOL 44   HDL CHOL 44   TRIGLYCERIDES 64             Brief Urine Lab Results  (Last result in the past 365 days)        Color   Clarity   Blood   Leuk Est   Nitrite   Protein   CREAT   Urine HCG        04/10/24 1604 Yellow   Clear   Negative   Moderate (2+)   Negative   Negative                   Microbiology Results Abnormal       Procedure Component Value - Date/Time    Blood Culture - Blood, Arm, Left [[id number]]  (Normal) Collected: 05/12/24 1930    Lab Status: Preliminary result Specimen: Blood from Arm, Left Updated: 05/13/24 2200     Blood Culture No growth at 24 hours    Narrative:      Less than seven (7) mL's of blood was collected.  Insufficient quantity may yield false negative results.    Blood Culture - Blood, Wrist, Left [[id number]]  (Normal) Collected: 05/12/24 2145    Lab Status: Preliminary result Specimen: Blood from Wrist, Left Updated: 05/13/24 2200     Blood Culture No growth at 24 hours    Narrative:      Less than seven (7) mL's of blood was collected.  Insufficient quantity may yield false negative results.    S. Pneumo Ag Urine or CSF - Urine, Urine, Clean Catch [[id number]]  (Normal) Collected: 05/12/24 2207    Lab Status: Final result Specimen: Urine, Clean Catch Updated: 05/13/24 0945     Strep Pneumo Ag Negative    Legionella Antigen, Urine - Urine, Urine, Clean Catch [[id number]]  (Normal) Collected: 05/12/24 2207    Lab Status: Final result Specimen: Urine, Clean Catch Updated: 05/13/24 0942     LEGIONELLA ANTIGEN, URINE Negative    COVID PRE-OP / PRE-PROCEDURE SCREENING ORDER (NO ISOLATION) - Swab, Nasopharynx [[id number]]  (Normal) Collected:  05/12/24 2158    Lab Status: Final result Specimen: Swab from Nasopharynx Updated: 05/12/24 2305    Narrative:      The following orders were created for panel order COVID PRE-OP / PRE-PROCEDURE SCREENING ORDER (NO ISOLATION) - Swab, Nasopharynx.  Procedure                               Abnormality         Status                     ---------                               -----------         ------                     Respiratory Panel PCR w/...[[id number]]  Normal              Final result                 Please view results for these tests on the individual orders.    Respiratory Panel PCR w/COVID-19(SARS-CoV-2) [facility]/PR[facility]CILLA/PAD/COR/AMY In-House, NP Swab in UTM/VTM, 2 HR TAT - Swab, Nasopharynx [[id number]]  (Normal) Collected: 05/12/24 2158    Lab Status: Final result Specimen: Swab from Nasopharynx Updated: 05/12/24 2305     ADENOVIRUS, PCR Not Detected     Coronavirus 229E Not Detected     Coronavirus HKU1 Not Detected     Coronavirus NL63 Not Detected     Coronavirus OC43 Not Detected     COVID19 Not Detected     Human Metapneumovirus Not Detected     Human Rhinovirus/Enterovirus Not Detected     Influenza A PCR Not Detected     Influenza B PCR Not Detected     Parainfluenza Virus 1 Not Detected     Parainfluenza Virus 2 Not Detected     Parainfluenza Virus 3 Not Detected     Parainfluenza Virus 4 Not Detected     RSV, PCR Not Detected     Bordetella pertussis pcr Not Detected     Bordetella parapertussis PCR Not Detected     Chlamydophila pneumoniae PCR Not Detected     Mycoplasma pneumo by PCR Not Detected    Narrative:      In the setting of a positive respiratory panel with a viral infection PLUS a negative procalcitonin without other underlying concern for bacterial infection, consider observing off antibiotics or discontinuation of antibiotics and continue supportive care. If the respiratory panel is positive for atypical bacterial infection (Bordetella pertussis, Chlamydophila pneumoniae, or Mycoplasma  pneumoniae), consider antibiotic de-escalation to target atypical bacterial infection.    COVID-19 and FLU A/B PCR, 1 HR TAT - Swab, Nasopharynx [[id number]]  (Normal) Collected: 05/12/24 1934    Lab Status: Final result Specimen: Swab from Nasopharynx Updated: 05/12/24 2125     COVID19 Not Detected     Influenza A PCR Not Detected     Influenza B PCR Not Detected    Narrative:      Fact sheet for providers: https://www.fda.gov/media/465372/download    Fact sheet for patients: https://www.fda.gov/media/595621/download    Test performed by PCR.            NM Lung Scan Perfusion Particulate    Result Date: 5/14/2024  DATE OF EXAM: 5/13/2024 12:35 PM EDT PROCEDURE: NM LUNG SCAN PERFUSION PARTICULATE INDICATIONS: eval for PE COMPARISON: Chest radiograph 5/13/2024 TECHNIQUE: 5.19 mCi of technetium 99m labeled MAA was administered intravenously for the perfusion portion of the pulmonary exam. Scintigraphic images of the lungs were obtained in multiple projections to assess perfusion. FINDINGS: Review of the images show areas of linear scarring in the right upper lobe. There is blunting of the left costophrenic angle with some scarring peripherally at the left lung base. These findings are evident on the patient's chest radiograph. 40.5% of counts localized to the left lung and 59.5% of counts localized to the right lung. Differential counts in the upper middle and lower lung fields were acquired and are available for review.     Impression: 1. Chronic right upper lobe scarring. Left pleural effusion and left basilar scarring. 2. Differential perfusion with 59.5% of counts on the right and 40.5% of counts on the left. Electronically Signed: Paresh Suarez MD  5/14/2024 9:46 AM EDT  Workstation ID: [id number]    US Thoracentesis    Result Date: 5/13/2024  US THORACENTESIS Date of Exam: 5/13/2024 1:01 PM EDT Indication: right. Pleural effusion Comparison: Chest CT 5/12/2024 Technique: The procedure, risks and options were  discussed with the patient and informed written consent was obtained. The patient was placed in the seated position and ultrasound was performed of the right chest. Moderate to large right-sided pleural effusion was identified. A site was chosen and the skin was marked, prepped and draped. Using maximal sterile barrier technique and under local anesthesia a 5 Japanese catheter was inserted by trocar technique. A total of 900 cc of straw-colored fluid was removed. Appropriate specimens were sent to the lab. The catheter was removed and hemostasis achieved. Post procedure chest radiograph is pending.     Impression: Impression: Technically successful right thoracentesis with removal of 900 cc of fluid. Electronically Signed: Paresh Suarez MD  5/13/2024 1:44 PM EDT  Workstation ID: [id number]    XR Chest 1 View    Result Date: 5/13/2024  XR CHEST 1 VW Date of Exam: 5/13/2024 1:13 PM EDT Indication: s/p thoracentesis (right) Comparison: Chest CT and chest radiograph 5/12/2024. Findings: Cardiomediastinal silhouette is unchanged. Essentially resolved right pleural effusion after thoracentesis. Unchanged small left pleural effusion and left basilar airspace disease that is suspicious for pneumonia. Scarring/subsegmental atelectasis in the  right mid/upper lung. No pneumothorax. Osseous structures are unchanged.     Impression: Impression: Essentially resolved right pleural effusion after thoracentesis without pneumothorax. Remainder of examination is unchanged compared to yesterday's imaging. Electronically Signed: Donato Crowe MD  5/13/2024 1:40 PM EDT  Workstation ID: [id number]    CT Chest Without Contrast Diagnostic    Result Date: 5/12/2024  CT CHEST WO CONTRAST DIAGNOSTIC Date of Exam: 5/12/2024 8:34 PM EDT Indication: acute dyspnea, cough, fatigue, eval XR findings of interstitial changes, effusion. Comparison: 5/12/2024. Technique: Axial CT images were obtained of the chest without contrast administration.   Reconstructed coronal and sagittal images were also obtained. Automated exposure control and iterative construction methods were used. Findings: Hilum and Mediastinum: No pathologically enlarged lymph nodes. The heart appears mildly enlarged. Atherosclerotic calcifications are present including within the coronary arteries.   No pericardial effusion.  Unremarkable thoracic aorta and pulmonary arteries. Lung Parenchyma and Pleura: Airspace consolidation present within the left lower lobe. Air bronchograms are present. Mild subsegmental atelectatic changes and scarring present within the right upper lobe as well as the left lower lobe. Moderate size right-sided pleural effusion present with small left-sided pleural effusion present. Granulomatous calcifications are present. Effusions. Upper Abdomen: No acute process. Soft tissues: Unremarkable. Osseous structures: No aggressive focal lytic or sclerotic osseous lesions.     Impression: Impression: 1. Airspace consolidation within the left lower lobe consistent with pneumonia. Follow-up to resolution recommended to exclude underlying nodule or mass given the configuration. 2. Moderate size right-sided pleural effusion with small left-sided pleural effusion. Chronic interstitial changes and scarring present. 3. Ancillary findings as described above. Electronically Signed: Arlette Altamirano MD  5/12/2024 8:51 PM EDT  Workstation ID: [id number]    XR Chest 1 View    Result Date: 5/12/2024  XR CHEST 1 VW Date of Exam: 5/12/2024 6:55 PM EDT Indication: SOA triage protocol Comparison: 5/6/2024 Findings: Cardiomediastinal silhouette is unchanged. There is pulmonary vascular congestion with interstitial coarsening, similar to prior examination. Slight worsening of basilar airspace disease with persistent small to moderate left effusion. No airspace disease, pneumothorax, nor pleural effusion. No acute osseous abnormality identified.     Impression: Impression: Slight worsening of  basilar airspace disease suggestive of ongoing pneumonia with persistent small left effusion. Electronically Signed: Nir Ibarra MD  5/12/2024 7:35 PM EDT  Workstation ID: [id number]     Results for orders placed during the hospital encounter of 03/07/24    Adult Transthoracic Echo Complete W/ Cont if Necessary Per Protocol    Interpretation Summary    Left ventricular systolic function is normal. Calculated left ventricular EF = 69.1% Normal left ventricular cavity size noted. Left ventricular wall thickness is consistent with mild concentric hypertrophy. Elevated left atrial pressure.    The aortic valve exhibits sclerosis. There is moderate calcification of the aortic valve. There is thickening of the aortic valve. The aortic valve appears trileaflet. No aortic valve regurgitation is present. Mild aortic valve stenosis is present    The tricuspid valve is normal in structure. Moderate tricuspid valve regurgitation is present. Estimated right ventricular systolic pressure from tricuspid regurgitation is markedly elevated (>55 mmHg).      Current medications:  Scheduled Meds:cefTRIAXone, 1,000 mg, Intravenous, Q24H  cetirizine, 5 mg, Oral, Daily  doxycycline, 100 mg, Oral, Q12H  esomeprazole, 40 mg, Oral, Daily  fluticasone, 2 spray, Nasal, Daily  furosemide, 40 mg, Oral, Daily  insulin lispro, 2-7 Units, Subcutaneous, 4x Daily AC & at Bedtime  ipratropium-albuterol, 3 mL, Nebulization, 4x Daily - RT  linagliptin, 5 mg, Oral, Daily  montelukast, 10 mg, Oral, Q PM  nebivolol, 10 mg, Oral, Daily  simvastatin, 40 mg, Oral, Nightly  sodium chloride, 10 mL, Intravenous, Q12H  tamsulosin, 0.4 mg, Oral, Daily  traZODone, 50 mg, Oral, Nightly      Continuous Infusions:   PRN Meds:.  acetaminophen **OR** acetaminophen **OR** acetaminophen    senna-docusate sodium **AND** polyethylene glycol **AND** bisacodyl **AND** bisacodyl    busPIRone    Calcium Replacement - Follow Nurse / BPA Driven Protocol    dextrose    dextrose     glucagon (human recombinant)    ipratropium-albuterol    Magnesium Standard Dose Replacement - Follow Nurse / BPA Driven Protocol    melatonin    ondansetron ODT **OR** ondansetron    Phosphorus Replacement - Follow Nurse / BPA Driven Protocol    Potassium Replacement - Follow Nurse / BPA Driven Protocol    sodium chloride    sodium chloride    sodium chloride    Assessment & Plan   Assessment & Plan     Active Hospital Problems    Diagnosis  POA    **Acute on chronic heart failure with preserved ejection fraction (HFpEF) [I50.33]  Yes    Esophageal dysphagia [R13.19]  Unknown    Type 2 diabetes mellitus without complication, with long-term current use of insulin [E11.9, Z79.4]  Not Applicable    Pleural effusion, bilateral [J90]  Yes    HLD (hyperlipidemia) [E78.5]  Yes    CKD (chronic kidney disease) stage 3, GFR 30-59 ml/min [N18.30]  Yes    Atrial fibrillation [I48.91]  Yes    PVC's (premature ventricular contractions) [I49.3]  Yes    GERD (gastroesophageal reflux disease) [K21.9]  Yes    COPD (chronic obstructive pulmonary disease) [J44.9]  Yes    Recurrent pneumonia [J18.9]  Yes    Primary hypertension [I10]  Yes      Resolved Hospital Problems   No resolved problems to display.        Brief Hospital Course to date:  Ricki Trotter is a 88 y.o. male with PMH significant for atrial fibrillation on Eliquis, BPH, HTN, HLD, hypothyroidism, GERD, history of aspiration pneumonia, pulmonary hypertension, COPD, and insulin-dependent diabetes mellitus who presents to the ED due to shortness of breath.      This patient's problems and plans were partially entered by my partner and updated as appropriate by me 05/14/24. Copied text in this note has been reviewed and is accurate as of today's date.      Acute on chronic HFpEF   Bilateral pleural effusions, right greater than left  Pulmonary hypertension  --proBNP elevated 8099  --CT chest with moderate right pleural effusion and small left pleural effusion; LLL  consolidation concerning for pneumonia  --Given 40 mg IV Lasix in ER  --s/p diuresis with IV Lasix 40 mg Lasix twice daily - switch to PO Lasix 40 mg daily (increased from home dose 20 mg daily)  --Strict I/O, daily weights  --Echo 3/8/24: EF 69%, mild AS, moderate TR, markedly elevated RVSP >55 mmHg  --IR performed right-sided thoracentesis 5/13/24, 900 mL removed  --Defer V/Q scan at this time given symptoms improved with thoracentesis, PE is unlikely  --Stable on room air    Hypokalemia  --Replace per protocol     Questionable LLL pneumonia  COPD  --History of aspiration/recurrent pneumonia  --CT chest with moderate right pleural effusion and small left pleural effusion; LLL consolidation concerning for pneumonia however does not appear significantly changed from CT scan 3/7/24   --Of note patient follows with Pulmonology and was recently seen on 5/6/24: they mention a chronic masslike density in the LLL with associated bronchiectasis and pleural thickening - they have been following this area for many years and it has remained stable  --WBC 13K on admission, however procal and lactate are WNL  --I think his symptoms are more likely related to CHF/effusions rather than PNA, but given his history will go ahead and treat with Rocephin and Doxycycline for now, can likely stop at discharge  --Respiratory PCR panel negative  --Negative urine Strep and Legionella  --Blood cultures NGTD  --Duonebs  --Stable on room air     Atrial fibrillation  --On Eliquis, held currently for procedures  -Continue home Bystolic     CKD stage III  --Baseline Cr ~1.3  --Stable, monitor with diuresis  --BMP in AM    GERD  --Family reports worsening reflux symptoms   --Continue PPI  --Family requesting GI consult for further management, possible endoscopy - GI consulted and plan for EGD tomorrow      HTN  HLD  --Continue home meds     Diabetes mellitus type 2  --HbA1c 5.8%  --SSI    Expected Discharge Location and Transportation:  home  Expected Discharge likely home tomorrow following EGD  Expected Discharge Date: 5/15/2024; Expected Discharge Time:      DVT prophylaxis:  Mechanical DVT prophylaxis orders are present.         AM-PAC 6 Clicks Score (PT): 24 (24 0830)    CODE STATUS:   Code Status and Medical Interventions:   Ordered at: 24 2318     Level Of Support Discussed With:    Patient     Code Status (Patient has no pulse and is not breathing):    CPR (Attempt to Resuscitate)     Medical Interventions (Patient has pulse or is breathing):    Full Support       Love Schmidt MD  24        Electronically signed by Love Schmidt MD at 24 1231       Love Schmidt MD at 24 1506              [facility] Medicine Services  PROGRESS NOTE    Patient Name: Ricki Trotter  : 1935  MRN: [id number]    Date of Admission: 2024  Primary Care Physician: Ev Ventura MD    Subjective   Subjective     CC:  F/U SOA    HPI:  Patient seen this morning. Asking to eat. Breathing better today. No other complaints.       Objective   Objective     Vital Signs:   Temp:  [97.2 °F (36.2 °C)-98.8 °F (37.1 °C)] 97.9 °F (36.6 °C)  Heart Rate:  [61-97] 84  Resp:  [16-20] 19  BP: (107-153)/(39-84) 149/61     Physical Exam:  Gen-no acute distress  HENT-NCAT, mucous membranes moist  CV-RRR, S1 S2 normal, no m/r/g  Resp-decreased at the bases, no wheezes or rales  Abd-soft, NT, ND, +BS  Ext-no edema  Neuro-A&Ox3, no focal deficits  Skin-no rashes  Psych-appropriate mood      Results Reviewed:  LAB RESULTS:      Lab 24  0757 24  1930   WBC 10.10 13.38*   HEMOGLOBIN 10.2* 11.1*   HEMATOCRIT 31.5* 34.8*   PLATELETS 166 186   NEUTROS ABS 6.75 10.37*   IMMATURE GRANS (ABS) 0.07* 0.12*   LYMPHS ABS 2.47 2.02   MONOS ABS 0.74 0.80   EOS ABS 0.05 0.04   MCV 89.0 90.6   PROCALCITONIN  --  0.06   LACTATE  --  1.7   PROTIME 18.7*  --    D DIMER QUANT  --  2.25*         Lab 24  0757  05/12/24 1930   SODIUM 135* 136   POTASSIUM 3.9 4.4   CHLORIDE 96* 98   CO2 28.0 27.0   ANION GAP 11.0 11.0   BUN 15 13   CREATININE 1.16 1.22   EGFR 60.6 57.0*   GLUCOSE 64* 115*   CALCIUM 8.5* 9.1   MAGNESIUM 1.7  --    PHOSPHORUS 3.6  --    HEMOGLOBIN A1C 5.80*  --          Lab 05/12/24 1930   TOTAL PROTEIN 6.4   ALBUMIN 4.1   GLOBULIN 2.3   ALT (SGPT) 7   AST (SGOT) 11   BILIRUBIN 0.7   ALK PHOS 73         Lab 05/13/24 0757 05/12/24 1930   PROBNP  --  8,099.0*   HSTROP T  --  32*   PROTIME 18.7*  --    INR 1.55*  --          Lab 05/13/24 0757   CHOLESTEROL 102   LDL CHOL 44   HDL CHOL 44   TRIGLYCERIDES 64             Brief Urine Lab Results  (Last result in the past 365 days)        Color   Clarity   Blood   Leuk Est   Nitrite   Protein   CREAT   Urine HCG        04/10/24 1604 Yellow   Clear   Negative   Moderate (2+)   Negative   Negative                   Microbiology Results Abnormal       Procedure Component Value - Date/Time    S. Pneumo Ag Urine or CSF - Urine, Urine, Clean Catch [[id number]]  (Normal) Collected: 05/12/24 2207    Lab Status: Final result Specimen: Urine, Clean Catch Updated: 05/13/24 0945     Strep Pneumo Ag Negative    Legionella Antigen, Urine - Urine, Urine, Clean Catch [[id number]]  (Normal) Collected: 05/12/24 2207    Lab Status: Final result Specimen: Urine, Clean Catch Updated: 05/13/24 0942     LEGIONELLA ANTIGEN, URINE Negative    COVID PRE-OP / PRE-PROCEDURE SCREENING ORDER (NO ISOLATION) - Swab, Nasopharynx [[id number]]  (Normal) Collected: 05/12/24 2158    Lab Status: Final result Specimen: Swab from Nasopharynx Updated: 05/12/24 2305    Narrative:      The following orders were created for panel order COVID PRE-OP / PRE-PROCEDURE SCREENING ORDER (NO ISOLATION) - Swab, Nasopharynx.  Procedure                               Abnormality         Status                     ---------                               -----------         ------                     Respiratory Panel PCR  w/...[[id number]]  Normal              Final result                 Please view results for these tests on the individual orders.    Respiratory Panel PCR w/COVID-19(SARS-CoV-2) [facility]/PRISCILLA/PAD/COR/AMY In-House, NP Swab in UTM/VTM, 2 HR TAT - Swab, Nasopharynx [[id number]]  (Normal) Collected: 05/12/24 2158    Lab Status: Final result Specimen: Swab from Nasopharynx Updated: 05/12/24 2305     ADENOVIRUS, PCR Not Detected     Coronavirus 229E Not Detected     Coronavirus HKU1 Not Detected     Coronavirus NL63 Not Detected     Coronavirus OC43 Not Detected     COVID19 Not Detected     Human Metapneumovirus Not Detected     Human Rhinovirus/Enterovirus Not Detected     Influenza A PCR Not Detected     Influenza B PCR Not Detected     Parainfluenza Virus 1 Not Detected     Parainfluenza Virus 2 Not Detected     Parainfluenza Virus 3 Not Detected     Parainfluenza Virus 4 Not Detected     RSV, PCR Not Detected     Bordetella pertussis pcr Not Detected     Bordetella parapertussis PCR Not Detected     Chlamydophila pneumoniae PCR Not Detected     Mycoplasma pneumo by PCR Not Detected    Narrative:      In the setting of a positive respiratory panel with a viral infection PLUS a negative procalcitonin without other underlying concern for bacterial infection, consider observing off antibiotics or discontinuation of antibiotics and continue supportive care. If the respiratory panel is positive for atypical bacterial infection (Bordetella pertussis, Chlamydophila pneumoniae, or Mycoplasma pneumoniae), consider antibiotic de-escalation to target atypical bacterial infection.    COVID-19 and FLU A/B PCR, 1 HR TAT - Swab, Nasopharynx [[id number]]  (Normal) Collected: 05/12/24 1934    Lab Status: Final result Specimen: Swab from Nasopharynx Updated: 05/12/24 2125     COVID19 Not Detected     Influenza A PCR Not Detected     Influenza B PCR Not Detected    Narrative:      Fact sheet for providers:  https://www.fda.gov/media/329183/download    Fact sheet for patients: https://www.fda.gov/media/671559/download    Test performed by PCR.            US Thoracentesis    Result Date: 5/13/2024  US THORACENTESIS Date of Exam: 5/13/2024 1:01 PM EDT Indication: right. Pleural effusion Comparison: Chest CT 5/12/2024 Technique: The procedure, risks and options were discussed with the patient and informed written consent was obtained. The patient was placed in the seated position and ultrasound was performed of the right chest. Moderate to large right-sided pleural effusion was identified. A site was chosen and the skin was marked, prepped and draped. Using maximal sterile barrier technique and under local anesthesia a 5 [place] catheter was inserted by trocar technique. A total of 900 cc of straw-colored fluid was removed. Appropriate specimens were sent to the lab. The catheter was removed and hemostasis achieved. Post procedure chest radiograph is pending.     Impression: Impression: Technically successful right thoracentesis with removal of 900 cc of fluid. Electronically Signed: Paresh Suarez MD  5/13/2024 1:44 PM EDT  Workstation ID: [id number]    XR Chest 1 View    Result Date: 5/13/2024  XR CHEST 1 VW Date of Exam: 5/13/2024 1:13 PM EDT Indication: s/p thoracentesis (right) Comparison: Chest CT and chest radiograph 5/12/2024. Findings: Cardiomediastinal silhouette is unchanged. Essentially resolved right pleural effusion after thoracentesis. Unchanged small left pleural effusion and left basilar airspace disease that is suspicious for pneumonia. Scarring/subsegmental atelectasis in the  right mid/upper lung. No pneumothorax. Osseous structures are unchanged.     Impression: Impression: Essentially resolved right pleural effusion after thoracentesis without pneumothorax. Remainder of examination is unchanged compared to yesterday's imaging. Electronically Signed: Donato Crowe MD  5/13/2024 1:40 PM EDT  Workstation ID:  [id number]    CT Chest Without Contrast Diagnostic    Result Date: 5/12/2024  CT CHEST WO CONTRAST DIAGNOSTIC Date of Exam: 5/12/2024 8:34 PM EDT Indication: acute dyspnea, cough, fatigue, eval XR findings of interstitial changes, effusion. Comparison: 5/12/2024. Technique: Axial CT images were obtained of the chest without contrast administration.  Reconstructed coronal and sagittal images were also obtained. Automated exposure control and iterative construction methods were used. Findings: Hilum and Mediastinum: No pathologically enlarged lymph nodes. The heart appears mildly enlarged. Atherosclerotic calcifications are present including within the coronary arteries.   No pericardial effusion.  Unremarkable thoracic aorta and pulmonary arteries. Lung Parenchyma and Pleura: Airspace consolidation present within the left lower lobe. Air bronchograms are present. Mild subsegmental atelectatic changes and scarring present within the right upper lobe as well as the left lower lobe. Moderate size right-sided pleural effusion present with small left-sided pleural effusion present. Granulomatous calcifications are present. Effusions. Upper Abdomen: No acute process. Soft tissues: Unremarkable. Osseous structures: No aggressive focal lytic or sclerotic osseous lesions.     Impression: Impression: 1. Airspace consolidation within the left lower lobe consistent with pneumonia. Follow-up to resolution recommended to exclude underlying nodule or mass given the configuration. 2. Moderate size right-sided pleural effusion with small left-sided pleural effusion. Chronic interstitial changes and scarring present. 3. Ancillary findings as described above. Electronically Signed: Arlette Altamirano MD  5/12/2024 8:51 PM EDT  Workstation ID: [id number]    XR Chest 1 View    Result Date: 5/12/2024  XR CHEST 1 VW Date of Exam: 5/12/2024 6:55 PM EDT Indication: SOA triage protocol Comparison: 5/6/2024 Findings: Cardiomediastinal silhouette is  unchanged. There is pulmonary vascular congestion with interstitial coarsening, similar to prior examination. Slight worsening of basilar airspace disease with persistent small to moderate left effusion. No airspace disease, pneumothorax, nor pleural effusion. No acute osseous abnormality identified.     Impression: Impression: Slight worsening of basilar airspace disease suggestive of ongoing pneumonia with persistent small left effusion. Electronically Signed: Nir Ibarra MD  5/12/2024 7:35 PM EDT  Workstation ID: [id number]     Results for orders placed during the hospital encounter of 03/07/24    Adult Transthoracic Echo Complete W/ Cont if Necessary Per Protocol    Interpretation Summary    Left ventricular systolic function is normal. Calculated left ventricular EF = 69.1% Normal left ventricular cavity size noted. Left ventricular wall thickness is consistent with mild concentric hypertrophy. Elevated left atrial pressure.    The aortic valve exhibits sclerosis. There is moderate calcification of the aortic valve. There is thickening of the aortic valve. The aortic valve appears trileaflet. No aortic valve regurgitation is present. Mild aortic valve stenosis is present    The tricuspid valve is normal in structure. Moderate tricuspid valve regurgitation is present. Estimated right ventricular systolic pressure from tricuspid regurgitation is markedly elevated (>55 mmHg).      Current medications:  Scheduled Meds:apixaban, 5 mg, Oral, Q12H  cefTRIAXone, 1,000 mg, Intravenous, Q24H  cetirizine, 5 mg, Oral, Daily  doxycycline, 100 mg, Intravenous, Q12H  esomeprazole, 40 mg, Oral, Daily  fluticasone, 2 spray, Nasal, Daily  furosemide, 40 mg, Intravenous, Q12H  insulin lispro, 2-7 Units, Subcutaneous, 4x Daily AC & at Bedtime  ipratropium-albuterol, 3 mL, Nebulization, 4x Daily - RT  linagliptin, 5 mg, Oral, Daily  montelukast, 10 mg, Oral, Q PM  nebivolol, 10 mg, Oral, Daily  simvastatin, 40 mg, Oral,  Nightly  sodium chloride, 10 mL, Intravenous, Q12H  tamsulosin, 0.4 mg, Oral, Daily  traZODone, 50 mg, Oral, Nightly      Continuous Infusions:   PRN Meds:.  acetaminophen **OR** acetaminophen **OR** acetaminophen    senna-docusate sodium **AND** polyethylene glycol **AND** bisacodyl **AND** bisacodyl    busPIRone    Calcium Replacement - Follow Nurse / BPA Driven Protocol    dextrose    dextrose    glucagon (human recombinant)    ipratropium-albuterol    Magnesium Standard Dose Replacement - Follow Nurse / BPA Driven Protocol    melatonin    ondansetron ODT **OR** ondansetron    Phosphorus Replacement - Follow Nurse / BPA Driven Protocol    Potassium Replacement - Follow Nurse / BPA Driven Protocol    sodium chloride    sodium chloride    sodium chloride    Assessment & Plan   Assessment & Plan     Active Hospital Problems    Diagnosis  POA    **Acute on chronic heart failure with preserved ejection fraction (HFpEF) [I50.33]  Yes    Type 2 diabetes mellitus without complication, with long-term current use of insulin [E11.9, Z79.4]  Not Applicable    Pleural effusion, bilateral [J90]  Yes    HLD (hyperlipidemia) [E78.5]  Yes    CKD (chronic kidney disease) stage 3, GFR 30-59 ml/min [N18.30]  Yes    Atrial fibrillation [I48.91]  Yes    PVC's (premature ventricular contractions) [I49.3]  Yes    GERD (gastroesophageal reflux disease) [K21.9]  Yes    COPD (chronic obstructive pulmonary disease) [J44.9]  Yes    Recurrent pneumonia [J18.9]  Yes    Primary hypertension [I10]  Yes      Resolved Hospital Problems   No resolved problems to display.        Brief Hospital Course to date:  Ricki Trotter is a 88 y.o. male with PMH significant for atrial fibrillation on Eliquis, BPH, HTN, HLD, hypothyroidism, GERD, history of aspiration pneumonia, pulmonary hypertension, COPD, and insulin-dependent diabetes mellitus who presents to the ED due to shortness of breath.      This patient's problems and plans were partially  entered by my partner and updated as appropriate by me 05/13/24. Copied text in this note has been reviewed and is accurate as of today's date.      Acute on chronic HFpEF   Bilateral pleural effusions, right greater than left  History of pulmonary hypertension  --proBNP elevated 8099  --CT chest with moderate right pleural effusion and small left pleural effusion; LLL consolidation concerning for pneumonia  --Given 40 mg IV Lasix in ER  --Continue diuresis with IV Lasix 40 mg Lasix twice daily  --Strict I/O, daily weights  --Echo pending  --IR performed right-sided thoracentesis 5/13/24, 900 mL removed  --D-dimer elevated, V/Q scan pending (no CTA due to renal function)  --Stable on room air     Questionable LLL pneumonia  COPD  --History of aspiration/recurrent pneumonia  --CT chest with moderate right pleural effusion and small left pleural effusion; LLL consolidation concerning for pneumonia however does not appear significantly changed from CT scan 3/7/24   --Of note patient follows with Pulmonology and was recently seen on 5/6/24: they mention a chronic masslike density in the LLL with associated bronchiectasis and pleural thickening - they have been following this area for many years and it has remained stable  --WBC 13K on admission, however procal and lactate are WNL  --I think his symptoms are more likely related to CHF/effusions rather than PNA, but given his history will go ahead and treat with Rocephin and Doxycycline for now and monitor  --Respiratory PCR panel negative  --Negative urine Strep and Legionella  --Blood cultures NGTD  --Duonebs  --Stable on room air     Atrial fibrillation  --On Eliquis, resume today following thoracentesis  -Continue home Bystolic     CKD stage III  --Baseline Cr ~1.3  --Stable, monitor with diuresis    GERD  --Family reports worsening reflux symptoms   --Continue PPI  --Family requesting GI consult for further management, possible endoscopy      HTN  HLD  --Continue  home meds     Diabetes mellitus type 2  --HbA1c 5.8%  --SSI    Expected Discharge Location and Transportation: home  Expected Discharge   Expected Discharge Date: 5/15/2024; Expected Discharge Time:      DVT prophylaxis:  Medical and mechanical DVT prophylaxis orders are present.         AM-PAC 6 Clicks Score (PT): 19 (05/13/24 0950)    CODE STATUS:   Code Status and Medical Interventions:   Ordered at: 05/12/24 2318     Level Of Support Discussed With:    Patient     Code Status (Patient has no pulse and is not breathing):    CPR (Attempt to Resuscitate)     Medical Interventions (Patient has pulse or is breathing):    Full Support       Love Schmidt MD  05/13/24        Electronically signed by Love Schmidt MD at 05/13/24 1532          Consult Notes (last 48 hours)        Prabha Owesn PA at 05/14/24 1029        Consult Orders    1. Inpatient Gastroenterology Consult [[id number]] ordered by Love Schmidt MD at 05/13/24 1530              Attestation signed by Brunner, Mark I, MD at 05/14/24 1319    I have reviewed this documentation and agree.  Patient seen and examined with Ms. Owens.                  [facility] Gastroenterology Consult    Referring Provider: Love Schmidt MD     PCP: Ev Ventura MD    Reason for Consultation: Uncontrolled GERD     Chief complaint: Trouble swallowing     History of present illness:    Ricki Trotter is a 88 y.o. male who is admitted with shortness of breath, fatigue and cough.    He is found to have left lower lobe consolidation with bilateral pleural effusions.   He is receiving antibiotics and underwent a thoracentesis yesterday with 900 mL removed.   He has history of a chronic masslike density in the left lower lobe followed outpatient by pulmonology.       GI is consulted for concerns of uncontrolled gastroesophageal reflux.   He describes a long standing history of this for which he has followed outpatient with gastroenterologist Dr. Taveras.   He had a remote EGD  in 2015 that showed a 3 cm hiatal hernia with caitlyn lesions.   He states he had a more recent EGD ~ 2-3 years ago at [facility] and had an esophageal dilation.    He notes worsening dysphagia to solid foods for the last year.   He takes Nexium 40 mg BID on an empty stomach but has ongoing symptoms despite this.   He sleeps with the head of the bed raised 30 degrees.   He has lost 10 lbs.       Allergies:  Penicillins and Sulfa antibiotics    Scheduled Meds:  apixaban, 5 mg, Oral, Q12H  cefTRIAXone, 1,000 mg, Intravenous, Q24H  cetirizine, 5 mg, Oral, Daily  doxycycline, 100 mg, Oral, Q12H  doxycycline, 100 mg, Intravenous, Q12H  esomeprazole, 40 mg, Oral, Daily  fluticasone, 2 spray, Nasal, Daily  furosemide, 40 mg, Oral, Daily  insulin lispro, 2-7 Units, Subcutaneous, 4x Daily AC & at Bedtime  ipratropium-albuterol, 3 mL, Nebulization, 4x Daily - RT  linagliptin, 5 mg, Oral, Daily  montelukast, 10 mg, Oral, Q PM  nebivolol, 10 mg, Oral, Daily  potassium chloride ER, 40 mEq, Oral, Q4H  simvastatin, 40 mg, Oral, Nightly  sodium chloride, 10 mL, Intravenous, Q12H  tamsulosin, 0.4 mg, Oral, Daily  traZODone, 50 mg, Oral, Nightly         Infusions:       PRN Meds:    acetaminophen **OR** acetaminophen **OR** acetaminophen    senna-docusate sodium **AND** polyethylene glycol **AND** bisacodyl **AND** bisacodyl    busPIRone    Calcium Replacement - Follow Nurse / BPA Driven Protocol    dextrose    dextrose    glucagon (human recombinant)    ipratropium-albuterol    Magnesium Standard Dose Replacement - Follow Nurse / BPA Driven Protocol    melatonin    ondansetron ODT **OR** ondansetron    Phosphorus Replacement - Follow Nurse / BPA Driven Protocol    Potassium Replacement - Follow Nurse / BPA Driven Protocol    sodium chloride    sodium chloride    sodium chloride    Home Meds:  Medications Prior to Admission   Medication Sig Dispense Refill Last Dose    apixaban (ELIQUIS) 5 MG tablet tablet Take 1  tablet by mouth Every 12 (Twelve) Hours as directed for Atrial Fibrillation 60 tablet 5     Baclofen (LIORESAL) 5 MG tablet Take 1 tablet by mouth Every 8 (Eight) Hours.       busPIRone (BUSPAR) 5 MG tablet Take 1 tablet by mouth Daily As Needed (anxiety). 30 tablet 1     Bystolic 10 MG tablet Take 1 tablet by mouth Daily. 90 tablet 1     fluticasone (FLONASE) 50 MCG/ACT nasal spray 2 sprays into the nostril(s) as directed by provider Daily. 16 g 11     furosemide (LASIX) 20 MG tablet Take 1 tablet by mouth Daily. 30 tablet 5     Insulin Glargine, 2 Unit Dial, (Toujeo Max SoloStar) 300 UNIT/ML solution pen-injector injection Inject 16 Units under the skin into the appropriate area as directed Daily. 6 mL 1     levocetirizine (XYZAL) 5 MG tablet Take 0.5 tablets by mouth Every Evening. 90 tablet 0     montelukast (SINGULAIR) 10 MG tablet Take 1 tablet by mouth Every Evening. 90 tablet 1     nexIUM 40 MG capsule Take 1 capsule by mouth 2 (Two) Times a Day. 180 capsule 0     potassium chloride (K-DUR,KLOR-CON) 10 MEQ CR tablet Take 1 tablet by mouth Daily. 90 tablet 1     simvastatin (ZOCOR) 40 MG tablet Take 1 tablet by mouth Every Evening. 90 tablet 1     SITagliptin (Januvia) 100 MG tablet Take 1 tablet by mouth Daily. 90 tablet 1     tamsulosin (FLOMAX) 0.4 MG capsule 24 hr capsule Take 1 capsule by mouth Daily. 30 capsule 11     traZODone (DESYREL) 50 MG tablet TAKE 1 TO 2 TABLETS BY MOUTH AT  BEDTIME AS NEEDED FOR INSOMNIA 180 tablet 1        ROS: Review of Systems   Constitutional:  Positive for fatigue and unexpected weight change.   HENT:  Positive for trouble swallowing.    Respiratory:  Positive for cough, choking and shortness of breath.    Gastrointestinal: Negative.    Genitourinary: Negative.    Musculoskeletal: Negative.    Skin: Negative.    Neurological:  Positive for weakness.   Hematological: Negative.    Psychiatric/Behavioral: Negative.         PAST MED HX:  Past Medical History:   Diagnosis Date  "   Allergic rhinitis     Amnesia     Arrhythmia     Atrial fibrillation     Basal cell carcinoma 2015    Benign prostatic hyperplasia     CHF (congestive heart failure) 3-2024    Chronic kidney disease, stage 3b     COPD (chronic obstructive pulmonary disease)     Diabetes mellitus N/A    Drug dependency     Heart murmur     Hyperlipidemia     Hypertension     Hyperthyroidism     Iron deficiency     Long term (current) use of insulin     Melanoma 2012    Nephrosclerosis        PAST SURG HX:  Past Surgical History:   Procedure Laterality Date    CATARACT EXTRACTION Bilateral     COLONOSCOPY      PROSTHODONTIC PROCEDURE         FAM HX:  Family History   Problem Relation Age of Onset    Diabetes Mother     Stroke Mother     Hypertension Mother     Breast cancer Sister        SOC HX:  Social History     Socioeconomic History    Marital status:    Tobacco Use    Smoking status: Former     Current packs/day: 0.00     Average packs/day: 0.5 packs/day for 9.0 years (4.5 ttl pk-yrs)     Types: Cigarettes     Start date: 1953     Quit date: 1962     Years since quittin.4     Passive exposure: Past    Smokeless tobacco: Never   Vaping Use    Vaping status: Never Used   Substance and Sexual Activity    Alcohol use: Yes     Alcohol/week: 2.0 standard drinks of alcohol     Types: 2 Cans of beer per week    Drug use: No    Sexual activity: Not Currently     Partners: Female     Birth control/protection: None       PHYSICAL EXAM  /60 (BP Location: Right arm, Patient Position: Lying)   Pulse 75   Temp 97.8 °F (36.6 °C) (Oral)   Resp 18   Ht 180.3 cm (71\")   Wt 63.2 kg (139 lb 5.3 oz)   SpO2 94%   BMI 19.43 kg/m²   Wt Readings from Last 3 Encounters:   24 63.2 kg (139 lb 5.3 oz)   24 68.9 kg (152 lb)   24 72.6 kg (160 lb)   ,body mass index is 19.43 kg/m².  Physical Exam  Constitutional:       General: He is not in acute distress.     Appearance: He is not toxic-appearing.      " Comments: Appears younger than stated age  Very pleasant to speak with    HENT:      Head: Normocephalic and atraumatic.   Cardiovascular:      Rate and Rhythm: Normal rate and regular rhythm.   Pulmonary:      Effort: Pulmonary effort is normal. No respiratory distress.   Abdominal:      General: Bowel sounds are normal. There is no distension.      Palpations: Abdomen is soft.      Tenderness: There is no abdominal tenderness.   Neurological:      Mental Status: He is alert and oriented to person, place, and time.   Psychiatric:         Behavior: Behavior normal.     \  Results Review:   I reviewed the patient's new clinical results.    Lab Results   Component Value Date    WBC 5.77 05/14/2024    HGB 9.3 (L) 05/14/2024    HGB 10.2 (L) 05/13/2024    HGB 11.1 (L) 05/12/2024    HCT 28.6 (L) 05/14/2024    MCV 87.7 05/14/2024     05/14/2024     Lab Results   Component Value Date    INR 1.55 (H) 05/13/2024     Lab Results   Component Value Date    GLUCOSE 66 05/14/2024    BUN 19 05/14/2024    CREATININE 1.37 (H) 05/14/2024    BCR 13.9 05/14/2024     05/14/2024    K 3.4 (L) 05/14/2024    CO2 28.0 05/14/2024    CALCIUM 8.5 (L) 05/14/2024    PROTENTOTREF 5.9 (L) 03/27/2024    ALBUMIN 4.1 05/12/2024    ALKPHOS 73 05/12/2024    BILITOT 0.7 05/12/2024    ALT 7 05/12/2024    AST 11 05/12/2024       ASSESSMENTS/PLANS    Esophageal dysphagia to solids   GERD, refractory   Pneumonia, history of chronic masslike density in the LLL   Pleural effusions s/p thoracentesis yesterday     >> Continue home BID Nexium at this time.   Can consider changing to Voquenza at discharge    >> Recommend diagnostic EGD tomorrow, possible esophageal dilation.    >> Hold Eliquis this evening and tomorrow morning   >> NPO at midnight     I discussed the patient's findings and my recommendations with patient    MAURICIO Rios  05/14/24  10:29 EDT      Electronically signed by Brunner, Mark I, MD at 05/14/24 4575

## 2024-05-14 NOTE — NURSING NOTE
Ricki Trotter  :  1935  DOS:  24    Active Hospital Problems    Diagnosis     **Acute on chronic heart failure with preserved ejection fraction (HFpEF)     Esophageal dysphagia     Type 2 diabetes mellitus without complication, with long-term current use of insulin     Pleural effusion, bilateral     HLD (hyperlipidemia)     CKD (chronic kidney disease) stage 3, GFR 30-59 ml/min     Atrial fibrillation     PVC's (premature ventricular contractions)     GERD (gastroesophageal reflux disease)     COPD (chronic obstructive pulmonary disease)     Recurrent pneumonia     Primary hypertension        Medical records have been reviewed.  Patient is a good candidate for the [facility].  Education provided.  Patient to be scheduled follow up 3-5 days post discharge.      Current Facility-Administered Medications:     acetaminophen (TYLENOL) tablet 650 mg, 650 mg, Oral, Q4H PRN **OR** acetaminophen (TYLENOL) 160 MG/5ML oral solution 650 mg, 650 mg, Oral, Q4H PRN **OR** acetaminophen (TYLENOL) suppository 650 mg, 650 mg, Rectal, Q4H PRN, Libertad, Rhina G, DO    sennosides-docusate (PERICOLACE) 8.6-50 MG per tablet 2 tablet, 2 tablet, Oral, BID PRN **AND** polyethylene glycol (MIRALAX) packet 17 g, 17 g, Oral, Daily PRN **AND** bisacodyl (DULCOLAX) EC tablet 5 mg, 5 mg, Oral, Daily PRN **AND** bisacodyl (DULCOLAX) suppository 10 mg, 10 mg, Rectal, Daily PRN, Libertad, Rhina G, DO    busPIRone (BUSPAR) tablet 5 mg, 5 mg, Oral, Daily PRN, Libertad, Rhina G, DO    Calcium Replacement - Follow Nurse / BPA Driven Protocol, , Does not apply, PRN, Libertad, Rhina G, DO    cefTRIAXone (ROCEPHIN) 1,000 mg in sodium chloride 0.9 % 100 mL MBP, 1,000 mg, Intravenous, Q24H, Libertad, Rhina G, DO, Last Rate: 200 mL/hr at 24, 1,000 mg at 24    cetirizine (zyrTEC) tablet 5 mg, 5 mg, Oral, Daily, Rhina Maurer DO, 5 mg at 24 0920    dextrose (D50W) (25 g/50 mL) IV  injection 25 g, 25 g, Intravenous, Q15 Min PRN, Libertad, Rhina G, DO    dextrose (GLUTOSE) oral gel 15 g, 15 g, Oral, Q15 Min PRN, Libertad, Rhina G, DO    doxycycline (MONODOX) capsule 100 mg, 100 mg, Oral, Q12H, Love Schmidt MD    esomeprazole (NexIUM) 40 mg caps **Patient Supplied**, 40 mg, Oral, Daily, Love Schmidt MD, 40 mg at 05/14/24 0635    fluticasone (FLONASE) 50 MCG/ACT nasal spray 2 spray, 2 spray, Nasal, Daily, Kelsey Maurere G, DO, 2 spray at 05/14/24 0921    furosemide (LASIX) tablet 40 mg, 40 mg, Oral, Daily, Love Schmidt MD, 40 mg at 05/14/24 0920    glucagon (GLUCAGEN) injection 1 mg, 1 mg, Intramuscular, Q15 Min PRN, Libertad, Rhina G, DO    Insulin Lispro (humaLOG) injection 2-7 Units, 2-7 Units, Subcutaneous, 4x Daily AC & at Bedtime, Libertad, Rhina G, DO    ipratropium-albuterol (DUO-NEB) nebulizer solution 3 mL, 3 mL, Nebulization, 4x Daily - RT, Libertad, Rhina G, DO, 3 mL at 05/14/24 1212    ipratropium-albuterol (DUO-NEB) nebulizer solution 3 mL, 3 mL, Nebulization, Q4H PRN, Libertad, Rhina G, DO    linagliptin (TRADJENTA) tablet 5 mg, 5 mg, Oral, Daily, Libertad, Rhina G, DO, 5 mg at 05/14/24 0921    Magnesium Standard Dose Replacement - Follow Nurse / BPA Driven Protocol, , Does not apply, PRN, Libertad, Rhina G, DO    melatonin tablet 5 mg, 5 mg, Oral, Nightly PRN, Libertad, Rhina G, DO, 5 mg at 05/13/24 2011    montelukast (SINGULAIR) tablet 10 mg, 10 mg, Oral, Q PM, Libertad, Rhina G, DO, 10 mg at 05/13/24 2010    nebivolol (BYSTOLIC) tablet 10 mg **Patient Supplied**, 10 mg, Oral, Daily, Love Schmidt MD, 10 mg at 05/13/24 1409    ondansetron ODT (ZOFRAN-ODT) disintegrating tablet 4 mg, 4 mg, Oral, Q6H PRN **OR** ondansetron (ZOFRAN) injection 4 mg, 4 mg, Intravenous, Q6H PRN, Rhina Maurer, DO    Phosphorus Replacement - Follow Nurse / BPA Driven Protocol, , Does not apply, PRN, Rhina Maurer, DO    Potassium Replacement - Follow Nurse / BPA Driven Protocol, , Does not apply, PRN,  Libertad, Rhina G, DO    simvastatin (ZOCOR) tablet 40 mg **patient supplied**, 40 mg, Oral, Nightly, Love Schmidt MD, 40 mg at 05/13/24 2203    sodium chloride 0.9 % flush 10 mL, 10 mL, Intravenous, PRN, Libertad, Rhina G, DO    sodium chloride 0.9 % flush 10 mL, 10 mL, Intravenous, Q12H, Libertad, Rhina G, DO, 10 mL at 05/14/24 0922    sodium chloride 0.9 % flush 10 mL, 10 mL, Intravenous, PRN, Libertad, Rhina G, DO    sodium chloride 0.9 % infusion 40 mL, 40 mL, Intravenous, PRN, Libertad, Rihna G, DO    tamsulosin (FLOMAX) 24 hr capsule 0.4 mg, 0.4 mg, Oral, Daily, Libertad, Rhina G, DO, 0.4 mg at 05/14/24 0920    traZODone (DESYREL) tablet 50 mg, 50 mg, Oral, Nightly, Libertad, Rhina G, DO, 50 mg at 05/13/24 2203     Echo Results:  Results for orders placed during the hospital encounter of 03/07/24    Adult Transthoracic Echo Complete W/ Cont if Necessary Per Protocol    Interpretation Summary    Left ventricular systolic function is normal. Calculated left ventricular EF = 69.1% Normal left ventricular cavity size noted. Left ventricular wall thickness is consistent with mild concentric hypertrophy. Elevated left atrial pressure.    The aortic valve exhibits sclerosis. There is moderate calcification of the aortic valve. There is thickening of the aortic valve. The aortic valve appears trileaflet. No aortic valve regurgitation is present. Mild aortic valve stenosis is present    The tricuspid valve is normal in structure. Moderate tricuspid valve regurgitation is present. Estimated right ventricular systolic pressure from tricuspid regurgitation is markedly elevated (>55 mmHg).       Heart Failure Education    [x]  Risk factors  []  Medications management and adherence  []  Low sodium diet  []  Fluid restriction:   []  Exercise/activity/cardiac rehab  []  Smoking cessation  [x]  Signs/symptoms  [x]  Daily weight management  [x]  Weight management  [x]  Importance of keeping follow up office visits  [x]  Role of [facility]  []  Other   []  Hyperkalemia  []  Other:    []  EF teaching    Unable to provide heart failure education today:  []  Patient/family refused   []  Not available  []  Not able to participate   []  Other:

## 2024-05-14 NOTE — MBS/VFSS/FEES
Acute Care - Speech Language Pathology   Swallow Initial Evaluation  [place]  Modified Barium Swallow Study (MBS)     Patient Name: Ricki Trotter  : 1935  MRN: [id number]  Today's Date: 2024               Admit Date: 2024         Visit Dx:     ICD-10-CM ICD-9-CM   1. Pneumonia due to infectious organism, unspecified laterality, unspecified part of lung  J18.9 486   2. Congestive heart failure, unspecified HF chronicity, unspecified heart failure type  I50.9 428.0   3. Esophageal dysphagia  R13.19 787.29     Patient Active Problem List   Diagnosis    Mass of lower lobe of left lung    COPD (chronic obstructive pulmonary disease)    Recurrent pneumonia    GERD (gastroesophageal reflux disease)    Primary hypertension    PVC's (premature ventricular contractions)    Acute on chronic diastolic congestive heart failure    HLD (hyperlipidemia)    Leukocytosis    Pleural effusion, bilateral    CKD (chronic kidney disease) stage 3, GFR 30-59 ml/min    Hyponatremia    Lesion of lung    Atrial fibrillation    Pulmonary hypertension    Valvular heart disease    CHF (congestive heart failure)    (HFpEF) heart failure with preserved ejection fraction    Type 2 diabetes mellitus without complication, with long-term current use of insulin    Acute on chronic heart failure with preserved ejection fraction (HFpEF)    Esophageal dysphagia     Past Medical History:   Diagnosis Date    Allergic rhinitis     Amnesia     Arrhythmia     Atrial fibrillation     Basal cell carcinoma     Benign prostatic hyperplasia     CHF (congestive heart failure) 3-    Chronic kidney disease, stage 3b     COPD (chronic obstructive pulmonary disease)     Diabetes mellitus N/A    Drug dependency     Heart murmur     Hyperlipidemia     Hypertension     Hyperthyroidism     Iron deficiency     Long term (current) use of insulin     Melanoma 2012    Nephrosclerosis      Past Surgical History:   Procedure Laterality Date     CATARACT EXTRACTION Bilateral     COLONOSCOPY      PROSTHODONTIC PROCEDURE         SLP Recommendation and Plan  SLP Swallowing Diagnosis: functional oral phase, moderate, pharyngeal dysphagia (05/14/24 1345)  SLP Diet Recommendation: soft to chew textures, chopped, thin liquids, other (see comments) (will f/u tomorrow for ? ability to advance to regular solids if able to manage cutting into small bites and well chew as well as review needed aspiration precautions.) (05/14/24 1345)  Recommended Precautions and Strategies: upright posture during/after eating, small bites of food and sips of liquid, general aspiration precautions, reflux precautions (throughout meal and a few times at end of intake, intermittent head turn + effortful swallow as well as cough/throat clear.) (05/14/24 1345)  SLP Rec. for Method of Medication Administration: meds whole, meds crushed, as tolerated (Or ice cream. Would benefit from cut/crush of large pills.) (05/14/24 1345)     Monitor for Signs of Aspiration: yes, notify SLP if any concerns (05/14/24 1345)     Swallow Criteria for Skilled Therapeutic Interventions Met: demonstrates skilled criteria (05/14/24 1345)  Anticipated Discharge Disposition (SLP): home with home health (05/14/24 1345)  Rehab Potential/Prognosis, Swallowing: good, to achieve stated therapy goals (05/14/24 1345)  Therapy Frequency (Swallow): 5 days per week (05/14/24 1345)  Predicted Duration Therapy Intervention (Days): until discharge (05/14/24 1345)  Oral Care Recommendations: Oral Care BID/PRN, Toothbrush (05/14/24 1345)                Plan of Care Reviewed With: patient      SWALLOW EVALUATION (Last 72 Hours)       SLP Adult Swallow Evaluation       Row Name 05/14/24 1345       Rehab Evaluation    Document Type evaluation  -SM    Subjective Information no complaints  -SM    Patient Observations alert;cooperative  -SM    Patient/Family/Caregiver Comments/Observations --    Patient Effort good  -SM    Symptoms  Noted During/After Treatment --       General Information    Patient Profile Reviewed --    Pertinent History Of Current Problem --    Current Method of Nutrition soft to chew textures;chopped;thin liquids  -SM    Precautions/Limitations, Vision --    Precautions/Limitations, Hearing --    Prior Level of Function-Communication --    Prior Level of Function-Swallowing --    Plans/Goals Discussed with patient;other (see comments);agreed upon  GI PA  -SM    Barriers to Rehab none identified  -SM    Patient's Goals for Discharge eat/drink without coughing/choking  -SM       Pain    Additional Documentation --       Pain Scale: Numbers Pre/Post-Treatment    Pretreatment Pain Rating 0/10 - no pain  -SM    Posttreatment Pain Rating 0/10 - no pain  -SM          MBS/VFSS    Utensils Used spoon;cup;straw  -SM    Consistencies Trialed thin liquids;pureed;regular textures  -SM       MBS/VFSS Interpretation    Oral Prep Phase WFL  -SM    Oral Transit Phase WFL  -SM    Oral Residue WFL  -SM       Initiation of Pharyngeal Swallow    Pharyngeal Phase impaired pharyngeal phase of swallowing  -SM    Anatomical abnormalities noted osteophyte/bone spur per radiology report  -SM    Penetration During the Swallow thin liquids;secondary to delayed swallow initiation or mistiming;secondary to reduced laryngeal elevation;secondary to reduced vestibular closure;other (see comments)  with liquid mix  -SM    Depth of Penetration deep  -SM    Response to Penetration No  -SM    No spontaneous response to penetration and effective laryngeal clearance with cue (see comments)  cough  -SM    Rosenbek's Scale thin:;5--->level 5  -SM    Pharyngeal Residue pudding/puree;regular textures;valleculae;secondary to reduced base of tongue retraction;secondary to reduced posterior pharyngeal wall stripping;thin liquids;pyriform sinuses;secondary to reduced laryngeal elevation;secondary to reduced hyolaryngeal excursion;other (see comments)  -SM    Response to  Residue unable to clear residue;cleared residue with compensatory maneuver (see comments)  -SM    Attempted Compensatory Maneuvers bolus size;bolus presentation style;head turn to left;head turn to right;chin tuck;multiple swallows;alternate liquids/solids;effortful (hard swallow);throat clear after swallow;combination of maneuvers (see comments)  head turn + chin tuck; head turn + effortful swallow  -SM    Response to Attempted Compensatory Maneuvers reduced residue;other (see comments)  -SM    Successful Compensatory Maneuver Competency patient able to;demonstrate compensations;with cues  -SM    Pharyngeal Phase, Comment No aspiration observed on today's study. Large osteophyte C4-5, which is leading to difficulty clearing puree/solids through pharynx (epiglottic inversion restricted by osteophyte) but also mild pharyngeal diffuse weakness contributing. Did present with penetration during the swallow with liquid mix as spilled over solid material extending into pharynx as well as solid pharyngeal residue in valleculae.Material did deepen towards vocal folds though did not pass into subglottic region. No sensation though cleared with cues to throat clear/cough. At times, severe pharyngeal residue with solids. Was most success at reducing residue in vallecuale with use of head turn (either shoulder) + effortful swallow.  -       Esophageal Phase    Esophageal Phase no impairments;see radiology report for further details  -    Esophageal Phase, Comment All barium media observed to empty into stomach. No stricture or significant retention observed per Radiology PA. Noted plan for EGD with possible esophageal dilation tomorrow. Spoke with GI PA, Maggie Owens, over the phone to relay results of MBS and esophageal scan.  -       SLP Evaluation Clinical Impression    SLP Swallowing Diagnosis functional oral phase;moderate;pharyngeal dysphagia  -    Functional Impact risk of aspiration/pneumonia  -    Rehab  Potential/Prognosis, Swallowing good, to achieve stated therapy goals  -    Swallow Criteria for Skilled Therapeutic Interventions Met demonstrates skilled criteria  -       Recommendations    Therapy Frequency (Swallow) 5 days per week  -    Predicted Duration Therapy Intervention (Days) until discharge  -    SLP Diet Recommendation soft to chew textures;chopped;thin liquids;other (see comments)  will f/u tomorrow for ? ability to advance to regular solids if able to manage cutting into small bites and well chew as well as review needed aspiration precautions.  -    Recommended Diagnostics --    Recommended Precautions and Strategies upright posture during/after eating;small bites of food and sips of liquid;general aspiration precautions;reflux precautions  throughout meal and a few times at end of intake, intermittent head turn + effortful swallow as well as cough/throat clear.  -    Oral Care Recommendations Oral Care BID/PRN;Toothbrush  -    SLP Rec. for Method of Medication Administration meds whole;meds crushed;as tolerated  Or ice cream. Would benefit from cut/crush of large pills.  -    Monitor for Signs of Aspiration yes;notify SLP if any concerns  -    Anticipated Discharge Disposition (SLP) home with home health  -              User Key  (r) = Recorded By, (t) = Taken By, (c) = Cosigned By      Initials Name Effective Dates     Elly Santamaria, MS CCC-SLP 02/03/23 -      Ciera Walker, MS CCC-SLP 05/12/23 -                     EDUCATION  The patient has been educated in the following areas:   Dysphagia (Swallowing Impairment) Oral Care/Hydration Modified Diet Instruction.        SLP GOALS       Row Name 05/14/24 1345             (LTG) Patient will demonstrate functional swallow for    Diet Texture (Demonstrate functional swallow) regular textures  -SM      Liquid viscosity (Demonstrate functional swallow) thin liquids  -SM      [place]y (Demonstrate functional swallow)  with use of compensatory strategies  -SM      Time Frame (Demonstrate functional swallow) by discharge  -SM         (STG) Patient will tolerate trials of    Consistencies Trialed (Tolerate trials) soft to chew (chopped) textures;thin liquids  -SM      Desired Outcome (Tolerate trials) without signs/symptoms of aspiration  -SM      Labette (Tolerate trials) independently (over 90% accuracy)  -SM      Time Frame (Tolerate trials) 1 week  -SM         (STG) Patient will tolerate therapeutic trials of    Consistencies Trialed (Tolerate therapeutic trials) regular textures  -SM      Desired Outcome (Tolerate therapeutic trials) with adequate oral prep/transit/clearance;with use of compensatory strategies (see comments)  small well-chewed bites  -SM      Labette (Tolerate therapeutic trials) independently (over 90% accuracy)  -SM      Time Frame (Tolerate therapeutic trials) 1 week  -SM         (STG) Pharyngeal Strengthening Exercise Goal 1 (SLP)    Activity (Pharyngeal Strengthening Goal 1, SLP) increase superior movement of the hyolaryngeal complex;increase anterior movement of the hyolaryngeal complex;increase closure at entrance to airway/closure of airway at glottis;increase squeeze/positive pressure generation  -SM      Increase Superior Movement of the Hyolaryngeal Complex Mendelsohn  -SM      Increase Anterior Movement of the Hyolaryngeal Complex chin tuck against resistance (CTAR)  -SM      Increase Closure at Entrance to Airway/Closure of Airway at Glottis supraglottic swallow  -SM      Increase Squeeze/Positive Pressure Generation hard effortful swallow  + tongue press  -SM      Labette/Accuracy (Pharyngeal Strengthening Goal 1, SLP) independently (over 90% accuracy)  -SM      Time Frame (Pharyngeal Strengthening Goal 1, SLP) 1 week  -SM         (STG) Swallow Management Recall Goal 1 (SLP)    Activity (Swallow Management Recall Goal 1, SLP) aspiration precautions;oral care recommendations;safe  diet level/texture;compensatory swallow strategies/techniques;rationale for use of strategies/techniques  -SM      Richardson/Accuracy (Swallow Management Recall Goal 1, SLP) independently (over 90% accuracy)  -SM      Time Frame (Swallow Management Recall Goal 1, SLP) 1 week  -SM         (STG) Swallow Compensatory Strategies Goal 1 (SLP)    Activity (Swallow Compensatory Strategies/Techniques Goal 1, SLP) aspiration precautions;compensatory strategies;small bites;small cup sips;small straw sips;other (see comments)  head turn (either shoulder) + effortful swallow as well as cough/throat clear periodically through and at end of intake or additonaly as needed if senses any sticking.  -SM      Richardson/Accuracy (Swallow Compensatory Strategies/Techniques Goal 1, SLP) independently (over 90% accuracy)  -SM      Time Frame (Swallow Compensatory Strategies/Techniques Goal 1, SLP) 1 week  -SM                User Key  (r) = Recorded By, (t) = Taken By, (c) = Cosigned By      Initials Name Provider Type    Elly Thompson MS CCC-SLP Speech and Language Pathologist                         Time Calculation:    Time Calculation- SLP       Row Name 05/14/24 1505             Time Calculation- SLP    SLP Received On 05/14/24  -SM         Untimed Charges    85455-IP Motion Fluoro Eval Swallow Minutes 90  -SM         Total Minutes    Untimed Charges Total Minutes 90  -SM       Total Minutes 90  -SM                User Key  (r) = Recorded By, (t) = Taken By, (c) = Cosigned By      Initials Name Provider Type    Elly Thompson MS CCC-SLP Speech and Language Pathologist                    Therapy Charges for Today       Code Description Service Date Service Provider Modifiers Qty    [id number]  ST MOTION FLUORO EVAL SWALLOW 6 5/14/2024 Elly Santamaria MS CCC-SLP GN 1                 Elly Santamaria MS CCC-SLP  5/14/2024

## 2024-05-14 NOTE — CONSULTS
[facility] – [place] [facility] Consult    Referring Provider: Love Schmidt MD     PCP: Ev Ventura MD    Reason for Consultation: Uncontrolled GERD     Chief complaint: Trouble swallowing     History of present illness:    Ricki Trotter is a 88 y.o. male who is admitted with shortness of breath, fatigue and cough.    He is found to have left lower lobe consolidation with bilateral pleural effusions.   He is receiving antibiotics and underwent a thoracentesis yesterday with 900 mL removed.   He has history of a chronic masslike density in the left lower lobe followed outpatient by pulmonology.       GI is consulted for concerns of uncontrolled gastroesophageal reflux.   He describes a long standing history of this for which he has followed outpatient with gastroenterologist Dr. Taveras.   He had a remote EGD in 2015 that showed a 3 cm hiatal hernia with caitlyn lesions.   He states he had a more recent EGD ~ 2-3 years ago at [facility] and had an esophageal dilation.    He notes worsening dysphagia to solid foods for the last year.   He takes Nexium 40 mg BID on an empty stomach but has ongoing symptoms despite this.   He sleeps with the head of the bed raised 30 degrees.   He has lost 10 lbs.       Allergies:  Penicillins and Sulfa antibiotics    Scheduled Meds:  apixaban, 5 mg, Oral, Q12H  cefTRIAXone, 1,000 mg, Intravenous, Q24H  cetirizine, 5 mg, Oral, Daily  doxycycline, 100 mg, Oral, Q12H  doxycycline, 100 mg, Intravenous, Q12H  esomeprazole, 40 mg, Oral, Daily  fluticasone, 2 spray, Nasal, Daily  furosemide, 40 mg, Oral, Daily  insulin lispro, 2-7 Units, Subcutaneous, 4x Daily AC & at Bedtime  ipratropium-albuterol, 3 mL, Nebulization, 4x Daily - RT  linagliptin, 5 mg, Oral, Daily  montelukast, 10 mg, Oral, Q PM  nebivolol, 10 mg, Oral, Daily  potassium chloride ER, 40 mEq, Oral, Q4H  simvastatin, 40 mg, Oral, Nightly  sodium chloride, 10 mL, Intravenous, Q12H  tamsulosin, 0.4 mg, Oral,  Daily  traZODone, 50 mg, Oral, Nightly         Infusions:       PRN Meds:    acetaminophen **OR** acetaminophen **OR** acetaminophen    senna-docusate sodium **AND** polyethylene glycol **AND** bisacodyl **AND** bisacodyl    busPIRone    Calcium Replacement - Follow Nurse / BPA Driven Protocol    dextrose    dextrose    glucagon (human recombinant)    ipratropium-albuterol    Magnesium Standard Dose Replacement - Follow Nurse / BPA Driven Protocol    melatonin    ondansetron ODT **OR** ondansetron    Phosphorus Replacement - Follow Nurse / BPA Driven Protocol    Potassium Replacement - Follow Nurse / BPA Driven Protocol    sodium chloride    sodium chloride    sodium chloride    Home Meds:  Medications Prior to Admission   Medication Sig Dispense Refill Last Dose    apixaban (ELIQUIS) 5 MG tablet tablet Take 1 tablet by mouth Every 12 (Twelve) Hours as directed for Atrial Fibrillation 60 tablet 5     Baclofen (LIORESAL) 5 MG tablet Take 1 tablet by mouth Every 8 (Eight) Hours.       busPIRone (BUSPAR) 5 MG tablet Take 1 tablet by mouth Daily As Needed (anxiety). 30 tablet 1     Bystolic 10 MG tablet Take 1 tablet by mouth Daily. 90 tablet 1     fluticasone (FLONASE) 50 MCG/ACT nasal spray 2 sprays into the nostril(s) as directed by provider Daily. 16 g 11     furosemide (LASIX) 20 MG tablet Take 1 tablet by mouth Daily. 30 tablet 5     Insulin Glargine, 2 Unit Dial, (Toujeo Max SoloStar) 300 UNIT/ML solution pen-injector injection Inject 16 Units under the skin into the appropriate area as directed Daily. 6 mL 1     levocetirizine (XYZAL) 5 MG tablet Take 0.5 tablets by mouth Every Evening. 90 tablet 0     montelukast (SINGULAIR) 10 MG tablet Take 1 tablet by mouth Every Evening. 90 tablet 1     nexIUM 40 MG capsule Take 1 capsule by mouth 2 (Two) Times a Day. 180 capsule 0     potassium chloride (K-DUR,KLOR-CON) 10 MEQ CR tablet Take 1 tablet by mouth Daily. 90 tablet 1     simvastatin (ZOCOR) 40 MG tablet Take 1  tablet by mouth Every Evening. 90 tablet 1     SITagliptin (Januvia) 100 MG tablet Take 1 tablet by mouth Daily. 90 tablet 1     tamsulosin (FLOMAX) 0.4 MG capsule 24 hr capsule Take 1 capsule by mouth Daily. 30 capsule 11     traZODone (DESYREL) 50 MG tablet TAKE 1 TO 2 TABLETS BY MOUTH AT  BEDTIME AS NEEDED FOR INSOMNIA 180 tablet 1        ROS: Review of Systems   Constitutional:  Positive for fatigue and unexpected weight change.   HENT:  Positive for trouble swallowing.    Respiratory:  Positive for cough, choking and shortness of breath.    Gastrointestinal: Negative.    Genitourinary: Negative.    Musculoskeletal: Negative.    Skin: Negative.    Neurological:  Positive for weakness.   Hematological: Negative.    Psychiatric/Behavioral: Negative.         PAST MED HX:  Past Medical History:   Diagnosis Date    Allergic rhinitis     Amnesia     Arrhythmia     Atrial fibrillation     Basal cell carcinoma     Benign prostatic hyperplasia     CHF (congestive heart failure) 3-2024    Chronic kidney disease, stage 3b     COPD (chronic obstructive pulmonary disease)     Diabetes mellitus N/A    Drug dependency     Heart murmur     Hyperlipidemia     Hypertension     Hyperthyroidism     Iron deficiency     Long term (current) use of insulin     Melanoma 2012    Nephrosclerosis        PAST SURG HX:  Past Surgical History:   Procedure Laterality Date    CATARACT EXTRACTION Bilateral     COLONOSCOPY      PROSTHODONTIC PROCEDURE         FAM HX:  Family History   Problem Relation Age of Onset    Diabetes Mother     Stroke Mother     Hypertension Mother     Breast cancer Sister        SOC HX:  Social History     Socioeconomic History    Marital status:    Tobacco Use    Smoking status: Former     Current packs/day: 0.00     Average packs/day: 0.5 packs/day for 9.0 years (4.5 ttl pk-yrs)     Types: Cigarettes     Start date: 1953     Quit date: 1962     Years since quittin.4     Passive exposure: Past  "   Smokeless tobacco: Never   Vaping Use    Vaping status: Never Used   Substance and Sexual Activity    Alcohol use: Yes     Alcohol/week: 2.0 standard drinks of alcohol     Types: 2 Cans of beer per week    Drug use: No    Sexual activity: Not Currently     Partners: Female     Birth control/protection: None       PHYSICAL EXAM  /60 (BP Location: Right arm, Patient Position: Lying)   Pulse 75   Temp 97.8 °F (36.6 °C) (Oral)   Resp 18   Ht 180.3 cm (71\")   Wt 63.2 kg (139 lb 5.3 oz)   SpO2 94%   BMI 19.43 kg/m²   Wt Readings from Last 3 Encounters:   05/14/24 63.2 kg (139 lb 5.3 oz)   05/06/24 68.9 kg (152 lb)   04/18/24 72.6 kg (160 lb)   ,body mass index is 19.43 kg/m².  Physical Exam  Constitutional:       General: He is not in acute distress.     Appearance: He is not toxic-appearing.      Comments: Appears younger than stated age  Very pleasant to speak with    HENT:      Head: Normocephalic and atraumatic.   Cardiovascular:      Rate and Rhythm: Normal rate and regular rhythm.   Pulmonary:      Effort: Pulmonary effort is normal. No respiratory distress.   Abdominal:      General: Bowel sounds are normal. There is no distension.      Palpations: Abdomen is soft.      Tenderness: There is no abdominal tenderness.   Neurological:      Mental Status: He is alert and oriented to person, place, and time.   Psychiatric:         Behavior: Behavior normal.     \  Results Review:   I reviewed the patient's new clinical results.    Lab Results   Component Value Date    WBC 5.77 05/14/2024    HGB 9.3 (L) 05/14/2024    HGB 10.2 (L) 05/13/2024    HGB 11.1 (L) 05/12/2024    HCT 28.6 (L) 05/14/2024    MCV 87.7 05/14/2024     05/14/2024     Lab Results   Component Value Date    INR 1.55 (H) 05/13/2024     Lab Results   Component Value Date    GLUCOSE 66 05/14/2024    BUN 19 05/14/2024    CREATININE 1.37 (H) 05/14/2024    BCR 13.9 05/14/2024     05/14/2024    K 3.4 (L) 05/14/2024    CO2 28.0 " 05/14/2024    CALCIUM 8.5 (L) 05/14/2024    PROTENTOTREF 5.9 (L) 03/27/2024    ALBUMIN 4.1 05/12/2024    ALKPHOS 73 05/12/2024    BILITOT 0.7 05/12/2024    ALT 7 05/12/2024    AST 11 05/12/2024       ASSESSMENTS/PLANS    Esophageal dysphagia to solids   GERD, refractory   Pneumonia, history of chronic masslike density in the LLL   Pleural effusions s/p thoracentesis yesterday     >> Continue home BID Nexium at this time.   Can consider changing to Voquenza at discharge    >> Recommend diagnostic EGD tomorrow, possible esophageal dilation.    >> Hold Eliquis this evening and tomorrow morning   >> NPO at midnight     I discussed the patient's findings and my recommendations with patient    MAURICIO Rios  05/14/24  10:29 EDT

## 2024-05-15 ENCOUNTER — READMISSION MANAGEMENT (OUTPATIENT)
Dept: CALL CENTER | Facility: HOSPITAL | Age: 89
End: 2024-05-15
Payer: MEDICARE

## 2024-05-15 VITALS
HEIGHT: 71 IN | BODY MASS INDEX: 20.09 KG/M2 | RESPIRATION RATE: 18 BRPM | OXYGEN SATURATION: 96 % | HEART RATE: 88 BPM | DIASTOLIC BLOOD PRESSURE: 67 MMHG | SYSTOLIC BLOOD PRESSURE: 144 MMHG | TEMPERATURE: 97.8 F | WEIGHT: 143.52 LBS

## 2024-05-15 PROBLEM — I50.33 ACUTE ON CHRONIC HEART FAILURE WITH PRESERVED EJECTION FRACTION: Status: RESOLVED | Noted: 2024-05-14 | Resolved: 2024-05-15

## 2024-05-15 PROBLEM — I50.33 ACUTE ON CHRONIC HEART FAILURE WITH PRESERVED EJECTION FRACTION (HFPEF): Status: RESOLVED | Noted: 2024-05-12 | Resolved: 2024-05-15

## 2024-05-15 PROBLEM — M25.78 CERVICAL OSTEOPHYTE: Status: ACTIVE | Noted: 2024-05-15

## 2024-05-15 PROBLEM — R13.19 ESOPHAGEAL DYSPHAGIA: Status: RESOLVED | Noted: 2024-05-12 | Resolved: 2024-05-15

## 2024-05-15 PROBLEM — R13.13 PHARYNGEAL DYSPHAGIA: Chronic | Status: ACTIVE | Noted: 2024-05-15

## 2024-05-15 LAB
ANION GAP SERPL CALCULATED.3IONS-SCNC: 11 MMOL/L (ref 5–15)
BUN SERPL-MCNC: 19 MG/DL (ref 8–23)
BUN/CREAT SERPL: 15.6 (ref 7–25)
CALCIUM SPEC-SCNC: 8.7 MG/DL (ref 8.6–10.5)
CHLORIDE SERPL-SCNC: 97 MMOL/L (ref 98–107)
CO2 SERPL-SCNC: 26 MMOL/L (ref 22–29)
CREAT SERPL-MCNC: 1.22 MG/DL (ref 0.76–1.27)
DEPRECATED RDW RBC AUTO: 46 FL (ref 37–54)
EGFRCR SERPLBLD CKD-EPI 2021: 57 ML/MIN/1.73
ERYTHROCYTE [DISTWIDTH] IN BLOOD BY AUTOMATED COUNT: 14.1 % (ref 12.3–15.4)
GLUCOSE BLDC GLUCOMTR-MCNC: 109 MG/DL (ref 70–130)
GLUCOSE BLDC GLUCOMTR-MCNC: 170 MG/DL (ref 70–130)
GLUCOSE SERPL-MCNC: 82 MG/DL (ref 65–99)
HCT VFR BLD AUTO: 31.4 % (ref 37.5–51)
HGB BLD-MCNC: 10 G/DL (ref 13–17.7)
MCH RBC QN AUTO: 28.7 PG (ref 26.6–33)
MCHC RBC AUTO-ENTMCNC: 31.8 G/DL (ref 31.5–35.7)
MCV RBC AUTO: 90.2 FL (ref 79–97)
PLATELET # BLD AUTO: 159 10*3/MM3 (ref 140–450)
PMV BLD AUTO: 10.8 FL (ref 6–12)
POTASSIUM SERPL-SCNC: 3.8 MMOL/L (ref 3.5–5.2)
RBC # BLD AUTO: 3.48 10*6/MM3 (ref 4.14–5.8)
SODIUM SERPL-SCNC: 134 MMOL/L (ref 136–145)
WBC NRBC COR # BLD AUTO: 8.11 10*3/MM3 (ref 3.4–10.8)

## 2024-05-15 PROCEDURE — 25010000002 CEFTRIAXONE PER 250 MG: Performed by: INTERNAL MEDICINE

## 2024-05-15 PROCEDURE — 92610 EVALUATE SWALLOWING FUNCTION: CPT

## 2024-05-15 PROCEDURE — 97116 GAIT TRAINING THERAPY: CPT

## 2024-05-15 PROCEDURE — 94799 UNLISTED PULMONARY SVC/PX: CPT

## 2024-05-15 PROCEDURE — 82948 REAGENT STRIP/BLOOD GLUCOSE: CPT

## 2024-05-15 PROCEDURE — 99238 HOSP IP/OBS DSCHRG MGMT 30/<: CPT | Performed by: HOSPITALIST

## 2024-05-15 PROCEDURE — 85027 COMPLETE CBC AUTOMATED: CPT | Performed by: HOSPITALIST

## 2024-05-15 PROCEDURE — 99232 SBSQ HOSP IP/OBS MODERATE 35: CPT | Performed by: PHYSICIAN ASSISTANT

## 2024-05-15 PROCEDURE — 80048 BASIC METABOLIC PNL TOTAL CA: CPT | Performed by: HOSPITALIST

## 2024-05-15 PROCEDURE — 97110 THERAPEUTIC EXERCISES: CPT

## 2024-05-15 PROCEDURE — 94664 DEMO&/EVAL PT USE INHALER: CPT

## 2024-05-15 PROCEDURE — 63710000001 INSULIN LISPRO (HUMAN) PER 5 UNITS: Performed by: INTERNAL MEDICINE

## 2024-05-15 RX ORDER — ESOMEPRAZOLE MAGNESIUM 40 MG/1
40 CAPSULE, DELAYED RELEASE ORAL
Qty: 60 CAPSULE | Refills: 3 | Status: SHIPPED | OUTPATIENT
Start: 2024-05-15

## 2024-05-15 RX ORDER — FUROSEMIDE 20 MG/1
40 TABLET ORAL DAILY
Start: 2024-05-15

## 2024-05-15 RX ADMIN — TAMSULOSIN HYDROCHLORIDE 0.4 MG: 0.4 CAPSULE ORAL at 08:27

## 2024-05-15 RX ADMIN — Medication 40 MG: at 00:18

## 2024-05-15 RX ADMIN — INSULIN LISPRO 2 UNITS: 100 INJECTION, SOLUTION INTRAVENOUS; SUBCUTANEOUS at 12:00

## 2024-05-15 RX ADMIN — DOXYCYCLINE 100 MG: 100 CAPSULE ORAL at 08:27

## 2024-05-15 RX ADMIN — IPRATROPIUM BROMIDE AND ALBUTEROL SULFATE 3 ML: 2.5; .5 SOLUTION RESPIRATORY (INHALATION) at 12:29

## 2024-05-15 RX ADMIN — APIXABAN 5 MG: 5 TABLET, FILM COATED ORAL at 12:00

## 2024-05-15 RX ADMIN — CETIRIZINE HYDROCHLORIDE 5 MG: 10 TABLET, FILM COATED ORAL at 08:27

## 2024-05-15 RX ADMIN — SODIUM CHLORIDE 1000 MG: 900 INJECTION INTRAVENOUS at 00:18

## 2024-05-15 RX ADMIN — IPRATROPIUM BROMIDE AND ALBUTEROL SULFATE 3 ML: 2.5; .5 SOLUTION RESPIRATORY (INHALATION) at 07:27

## 2024-05-15 RX ADMIN — Medication 10 ML: at 00:19

## 2024-05-15 RX ADMIN — Medication 10 ML: at 08:27

## 2024-05-15 RX ADMIN — LINAGLIPTIN 5 MG: 5 TABLET, FILM COATED ORAL at 08:27

## 2024-05-15 RX ADMIN — FLUTICASONE PROPIONATE 2 SPRAY: 50 SPRAY, METERED NASAL at 08:29

## 2024-05-15 RX ADMIN — ESOMEPRAZOLE MAGNESIUM 40 MG: 40 GRANULE, DELAYED RELEASE ORAL at 06:41

## 2024-05-15 RX ADMIN — FUROSEMIDE 40 MG: 40 TABLET ORAL at 08:24

## 2024-05-15 NOTE — PROGRESS NOTES
"GI Daily Progress Note  Subjective:    Chief Complaint:  Follow up dysphagia and GERD     Mr. Trotter is feeling much better today.    His daughter is at the bedside and supportive.  They voice he has only been receiving Nexium 40 mg once daily at home the last 2 months and subsequently had worsening reflux and heartburn.   He also voice struggle after losing his wife of 69 years 1.5 years ago.       Objective:    /63 (BP Location: Right arm, Patient Position: Lying)   Pulse 88   Temp 97.6 °F (36.4 °C) (Oral)   Resp 18   Ht 180.3 cm (71\")   Wt 65.1 kg (143 lb 8.3 oz)   SpO2 92%   BMI 20.02 kg/m²     Physical Exam  Constitutional:       General: He is not in acute distress.  Cardiovascular:      Rate and Rhythm: Normal rate and regular rhythm.   Pulmonary:      Effort: Pulmonary effort is normal. No respiratory distress.   Abdominal:      General: Bowel sounds are normal. There is no distension.      Palpations: Abdomen is soft.      Tenderness: There is no abdominal tenderness.   Neurological:      Mental Status: He is alert and oriented to person, place, and time.         Lab  Lab Results   Component Value Date    WBC 8.11 05/15/2024    HGB 10.0 (L) 05/15/2024    HGB 9.3 (L) 05/14/2024    HGB 10.2 (L) 05/13/2024    MCV 90.2 05/15/2024     05/15/2024    INR 1.55 (H) 05/13/2024       Lab Results   Component Value Date    GLUCOSE 82 05/15/2024    BUN 19 05/15/2024    CREATININE 1.22 05/15/2024    BCR 15.6 05/15/2024     (L) 05/15/2024    K 3.8 05/15/2024    CO2 26.0 05/15/2024    CALCIUM 8.7 05/15/2024    PROTENTOTREF 5.9 (L) 03/27/2024    ALBUMIN 4.1 05/12/2024    ALKPHOS 73 05/12/2024    BILITOT 0.7 05/12/2024    ALT 7 05/12/2024    AST 11 05/12/2024       Assessment:    GERD, uncontrolled  Pharyngeal dysphagia, secondary to large cervical osteophyte   Pneumonia, history of chronic masslike density in the LLL  Pleural effusions s/p thoracentesis on 5/13     Plan:    >> Increase home Nexium " from 40 mg daily to BID indefinitely.  Discussed importance of taking this on an empty stomach   >> Pepcid 20 mg qhs prn   >> Keep head of the bed raised 30 degrees   >> Continue speech therapy strategies     Patient to follow up with his primary gastroenterologist, Dr. Taveras in 4-6 weeks.       MAURICIO Rios  05/15/24  11:21 EDT

## 2024-05-15 NOTE — THERAPY TREATMENT NOTE
Patient Name: Ricki Trotter  : 1935    MRN: [id number]                              Today's Date: 5/15/2024       Admit Date: 2024    Visit Dx:     ICD-10-CM ICD-9-CM   1. Pneumonia due to infectious organism, unspecified laterality, unspecified part of lung  J18.9 486   2. Congestive heart failure, unspecified HF chronicity, unspecified heart failure type  I50.9 428.0   3. Esophageal dysphagia  R13.19 787.29   4. Acute on chronic heart failure with preserved ejection fraction  I50.33 428.23   5. Type 2 diabetes mellitus without complication, with long-term current use of insulin  E11.9 250.00    Z79.4 V58.67     Patient Active Problem List   Diagnosis    Mass of lower lobe of left lung    COPD (chronic obstructive pulmonary disease)    GERD (gastroesophageal reflux disease)    Primary hypertension    PVC's (premature ventricular contractions)    Acute on chronic diastolic congestive heart failure    HLD (hyperlipidemia)    Leukocytosis    Pleural effusion, bilateral    CKD (chronic kidney disease) stage 3, GFR 30-59 ml/min    Hyponatremia    Lesion of lung    Atrial fibrillation    Pulmonary hypertension    Valvular heart disease    CHF (congestive heart failure)    (HFpEF) heart failure with preserved ejection fraction    Type 2 diabetes mellitus without complication, with long-term current use of insulin    Cervical osteophyte    Pharyngeal dysphagia secondary to large cervical osteophyte     Past Medical History:   Diagnosis Date    Allergic rhinitis     Amnesia     Arrhythmia     Atrial fibrillation     Basal cell carcinoma     Benign prostatic hyperplasia     CHF (congestive heart failure) 3-    Chronic kidney disease, stage 3b     COPD (chronic obstructive pulmonary disease)     Diabetes mellitus N/A    Drug dependency     Heart murmur     Hyperlipidemia     Hypertension     Hyperthyroidism     Iron deficiency     Long term (current) use of insulin     Melanoma      Nephrosclerosis      Past Surgical History:   Procedure Laterality Date    CATARACT EXTRACTION Bilateral     COLONOSCOPY      PROSTHODONTIC PROCEDURE        General Information       Row Name 05/15/24 1003          Physical Therapy Time and Intention    Document Type therapy note (daily note)  -MB     Mode of Treatment physical therapy  -[facility] Name 05/15/24 1003          General Information    Patient Profile Reviewed yes  -MB     Existing Precautions/Restrictions fall  -MB     Barriers to Rehab medically complex;previous functional deficit  -MB       Row Name 05/15/24 1003          Cognition    Orientation Status (Cognition) oriented x 3  -[facility] Name 05/15/24 1003          Safety Issues, Functional Mobility    Safety Issues Affecting Function (Mobility) awareness of need for assistance;insight into deficits/self-awareness;positioning of assistive device;safety precaution awareness;safety precautions follow-through/compliance  -MB     Impairments Affecting Function (Mobility) balance;endurance/activity tolerance;strength;postural/trunk control  -MB               User Key  (r) = Recorded By, (t) = Taken By, (c) = Cosigned By      Initials Name Provider Type    MB Val Talbot, PT Physical Therapist                   Mobility       Row Name 05/15/24 1351          Bed Mobility    Supine-Sit [place] (Bed Mobility) modified independence  -MB     Assistive Device (Bed Mobility) bed rails;head of bed elevated  -MB     Comment, (Bed Mobility) Pt. advanced to EOB w/out physical assist; denied dizziness throughout.  -MB       Row Name 05/15/24 1351          Transfers    Comment, (Transfers) VCs for trunk extension in standing and to reach back for armrests for controlled sit.  -MB       Row Name 05/15/24 1351          Sit-Stand Transfer    Sit-Stand [place] (Transfers) modified independence  -MB     Assistive Device (Sit-Stand Transfers) walker, front-wheeled  -MB       Row Name 05/15/24 1351           Gait/Stairs (Locomotion)    [place] Level (Gait) standby assist;verbal cues  -MB     Assistive Device (Gait) walker, front-wheeled  -MB     Distance in Feet (Gait) 250  -MB     Deviations/Abnormal Patterns (Gait) malini decreased;gait speed decreased;stride length decreased  -MB     Bilateral Gait Deviations forward flexed posture;heel strike decreased  -MB     Comment, (Gait/Stairs) Pt. ambulated w/ step through gait pattern w/ slower pace and required 2 brief standing rests. VCs for upright posture/forward gaze.  -MB               User Key  (r) = Recorded By, (t) = Taken By, (c) = Cosigned By      Initials Name Provider Type    MB Val Talbot, PT Physical Therapist                   Obj/Interventions       Row Name 05/15/24 [facility]          Motor Skills    Therapeutic Exercise shoulder;hip;knee;ankle  -[facility] Name 05/15/24 [facility]          Shoulder (Therapeutic Exercise)    Shoulder (Therapeutic Exercise) AROM (active range of motion)  -MB     Shoulder AROM (Therapeutic Exercise) bilateral;flexion;aBduction;aDduction;10 repetitions  -MB       Row Name 05/15/24 [facility]          Hip (Therapeutic Exercise)    Hip (Therapeutic Exercise) strengthening exercise  -MB     Hip Strengthening (Therapeutic Exercise) bilateral;flexion;marching while seated;15 repititions  -MB       Row Name 05/15/24 [facility]          Knee (Therapeutic Exercise)    Knee (Therapeutic Exercise) strengthening exercise  -MB     Knee Strengthening (Therapeutic Exercise) bilateral;LAQ (long arc quad);15 repititions  -MB       Row Name 05/15/24 [facility]          Ankle (Therapeutic Exercise)    Ankle (Therapeutic Exercise) AROM (active range of motion)  -MB     Ankle AROM (Therapeutic Exercise) bilateral;dorsiflexion;plantarflexion;10 repetitions  -MB       Row Name 05/15/24 [facility]          Balance    Static Standing Balance standby assist  -MB     Dynamic Standing Balance standby assist  -MB               User Key  (r) = Recorded By, (t) =  Taken By, (c) = Cosigned By      Initials Name Provider Type    Val Holm, PT Physical Therapist                   Goals/Plan    No documentation.                  Clinical Impression       Row Name 05/15/24 1357          Pain    Pretreatment Pain Rating 0/10 - no pain  -MB     Posttreatment Pain Rating 0/10 - no pain  -MB       Row Name 05/15/24 1357          Plan of Care Review    Plan of Care Reviewed With patient;daughter  -MB     Progress improving  -MB     Outcome Evaluation Patient demonstrates improving safety and independence w/ mobility, but continues to be limited by generalized weakness, decreased activity tolerance, mildly unsteady gait, and remains below his baseline. He ambulated 250ft w/ RW, SBA, and 2 brief standing rests. HEP completed and pt. encouraged to continue on his own. PT recommends home w/ assist and HHPT at D/C.  -[facility] 05/15/24 1357          Vital Signs    Pre SpO2 (%) 95  -MB     O2 Delivery Pre Treatment room air  -MB     O2 Delivery Intra Treatment room air  -MB     Post SpO2 (%) 95  -MB     O2 Delivery Post Treatment room air  -MB     Pre Patient Position Supine  -MB     Intra Patient Position Standing  -MB     Post Patient Position Sitting  -MB       Row Name 05/15/24 1357          Positioning and Restraints    Pre-Treatment Position in bed  -MB     Post Treatment Position chair  -MB     In Chair notified nsg;reclined;call light within reach;encouraged to call for assist;exit alarm on;with family/caregiver;waffle cushion;legs elevated;heels elevated  -MB               User Key  (r) = Recorded By, (t) = Taken By, (c) = Cosigned By      Initials Name Provider Type    Val Holm, PT Physical Therapist                   Outcome Measures       Row Name 05/15/24 1401 05/15/24 0745       How much help from another person do you currently need...    Turning from your back to your side while in flat bed without using bedrails? 4  -MB 4  -CG    Moving  from lying on back to sitting on the side of a flat bed without bedrails? 4  -MB 4  -CG    Moving to and from a bed to a chair (including a wheelchair)? 3  -MB 3  -CG    Standing up from a chair using your arms (e.g., wheelchair, bedside chair)? 3  -MB 3  -CG    Climbing 3-5 steps with a railing? 3  -MB 3  -CG    To walk in hospital room? 3  -MB 4  -CG    AM-PAC 6 Clicks Score (PT) 20  -MB 21  -CG    Highest Level of Mobility Goal 6 --> Walk 10 steps or more  -MB 6 --> Walk 10 steps or more  -CG      Row Name 05/15/24 1401          Functional Assessment    Outcome Measure Options AM-PAC 6 Clicks Basic Mobility (PT)  -MB               User Key  (r) = Recorded By, (t) = Taken By, (c) = Cosigned By      Initials Name Provider Type    MB Val Talbot, PT Physical Therapist    CG Matthew Anaya, RN Registered Nurse                                 Physical Therapy Education       Title: PT OT SLP Therapies (Resolved)       Topic: Physical Therapy (Resolved)       Point: Mobility training (Resolved)       Learning Progress Summary             Patient Acceptance, E, VU by KG at 5/13/2024 0948                         Point: Home exercise program (Resolved)       Learning Progress Summary             Patient Acceptance, E, VU by KG at 5/13/2024 0948                         Point: Body mechanics (Resolved)       Learning Progress Summary             Patient Acceptance, E, VU by KG at 5/13/2024 0948                         Point: Precautions (Resolved)       Learner Progress:  Not documented in this visit.                              User Key       Initials Effective Dates Name Provider Type Discipline    KG 01/04/23 -  Devika Knight Physical Therapist PT                  PT Recommendation and Plan     Plan of Care Reviewed With: patient, daughter  Progress: improving  Outcome Evaluation: Patient demonstrates improving safety and independence w/ mobility, but continues to be limited by generalized weakness,  decreased activity tolerance, mildly unsteady gait, and remains below his baseline. He ambulated 250ft w/ RW, SBA, and 2 brief standing rests. HEP completed and pt. encouraged to continue on his own. PT recommends home w/ assist and HHPT at D/C.     Time Calculation:         PT Charges       Row Name 05/15/24 1402             Time Calculation    Start Time 1003  -MB      PT Received On 05/15/24  -MB      PT Goal Re-Cert Due Date 05/23/24  -MB         Timed Charges    13590 - PT Therapeutic Exercise Minutes 16  -MB      78068 - Gait Training Minutes  17  -MB         Total Minutes    Timed Charges Total Minutes 33  -MB       Total Minutes 33  -MB                User Key  (r) = Recorded By, (t) = Taken By, (c) = Cosigned By      Initials Name Provider Type    Val Holm, PT Physical Therapist                  Therapy Charges for Today       Code Description Service Date Service Provider Modifiers Qty    93917203771 HC PT THER PROC EA 15 MIN 5/15/2024 Val Talbot, PT GP 1    24364295153 HC GAIT TRAINING EA 15 MIN 5/15/2024 Val Talbot, PT GP 1            PT G-Codes  Outcome Measure Options: AM-PAC 6 Clicks Basic Mobility (PT)  AM-PAC 6 Clicks Score (PT): 20  AM-PAC 6 Clicks Score (OT): 20  PT Discharge Summary  Anticipated Discharge Disposition (PT): home with assist, home with home health    Val Talbot, DAVID  5/15/2024

## 2024-05-15 NOTE — DISCHARGE PLACEMENT REQUEST
Rose MarieRicki (88 y.o. Male)     To [facility]  From [facility](CM at [facility]) [phone]         [address]  [address] [facility] [address]  Phone:  [phone]  Fax:  [phone] Date: May 15, 2024      Ambulatory Referral to Home Health     Patient:  Ricki Trotter MRN:  [id number]   [address] [place] [address] :  1935  SSN:    Phone: [phone] Sex:  M      INSURANCE PAYOR PLAN GROUP # SUBSCRIBER ID   Primary:    [employer] MED[employer]ARE REPLACEMENT [id number]      Referring Provider Information:  ROX RUIZ Phone: [phone] Fax: [phone]       Referral Information:   # Visits:  999 Referral Type: Home Health [42]   Urgency:  Routine Referral Reason: Specialty Services Required   Start Date: May 15, 2024 End Date:  To be determined by Insurer   Diagnosis: Pneumonia due to infectious organism, unspecified laterality, unspecified part of lung (J18.9 [ICD-10-CM] 486 [ICD-9-CM])  Congestive heart failure, unspecified HF chronicity, unspecified heart failure type (I50.9 [ICD-10-CM] 428.0 [ICD-9-CM])  Esophageal dysphagia (R13.19 [ICD-10-CM] 787.29 [ICD-9-CM])  Acute on chronic heart failure with preserved ejection fraction (I50.33 [ICD-10-CM] 428.23 [ICD-9-CM])  Type 2 diabetes mellitus without complication, with long-term current use of insulin (E11.9,Z79.4 [ICD-10-CM] 250.00,V58.67 [ICD-9-CM])      Refer to Dept:   Refer to Provider:   Refer to Provider Phone:   Refer to Facility:       Face to Face Visit Date: 5/15/2024  Follow-up provider for Plan of Care? I treated the patient in an acute care facility and will not continue treatment after discharge.  Follow-up provider: JESSICA PETERS [[id number]]  Reason/Clinical Findings: Acute on chronic heart failure with preserved ejection fraction, Esophageal dysphagia, D2M, CKD  Describe mobility limitations that make leaving home difficult: Acute on chronic heart failure with  preserved ejection fraction, Esophageal dysphagia, D2M, CKD  Nursing/Therapeutic Services Requested: Skilled Nursing  Nursing/Therapeutic Services Requested: Physical Therapy  Nursing/Therapeutic Services Requested: Occupational Therapy  Nursing/Therapeutic Services Requested: Speech Therapy  Skilled nursing orders: Cardiopulmonary assessments  Skilled nursing orders: Neurovascular assessments  Skilled nursing orders: CHF management  PT orders: Strengthening  PT orders: Home safety assessment  PT orders: Therapeutic exercise  Occupational orders: Activities of daily living  Occupational orders: Home safety assessment  Occupational orders: Strengthening  SLP orders: Dysphagia therapy  Frequency: 1 Week 1     This document serves as a request of services and does not constitute Insurance authorization or approval of services.  To determine eligibility, please contact the members Insurance carrier to verify and review coverage.     If you have medical questions regarding this request for services. Please contact [address] at [phone] during normal business hours.        Verbal Order Mode: Telephone with readback   Authorizing Provider: Love Schmidt MD  Authorizing Provider's NPI: [id number]     Order Entered By: Beba Pandey RN 5/15/2024 11:25 AM              Date of Birth   1935    Social Security Number       Address   [address] [place] [address]  [place] [id number]    Home Phone   [phone]    [facility]   [id number]       Sikhism   Worship    Marital Status                               Admission Date   5/12/24    Admission Type   Emergency    Admitting Provider   Love Schmidt MD    Attending Provider   Love Schmidt MD    Department, Room/Bed   [facility] 6A, N615/1       Discharge Date       Discharge Disposition       Discharge Destination                                 Attending Provider: Love Schmidt MD    Allergies: Penicillins, Sulfa  "Antibiotics    Isolation: None   Infection: None   Code Status: CPR    Ht: 180.3 cm (71\")   Wt: 65.1 kg (143 lb 8.3 oz)    Admission Cmt: None   Principal Problem: Acute on chronic heart failure with preserved ejection fraction (HFpEF) [I50.33]                   Active Insurance as of 2024       Primary Coverage       Payor Plan Insurance Group Employer/Plan Group    ANTHEM MEDICARE REPLACEMENT AN[employer]M MEDICARE ADVANTAGE [id number]       Payor Plan Address Payor Plan Phone Number Payor Plan Fax Number Effective Dates    [address] [phone]  2024 - None Entered    [facility] [address]         Subscriber Name Subscriber Birth Date Member ID       RICKI VÁZQUEZ 1935 [id number]                     Emergency Contacts        (Rel.) Home Phone Work Phone Mobile Phone    Paty Tobar (Daughter) [phone] -- [phone]    Liane Shaw (Grandchild) [phone] -- [phone]    Diogo Vázquez (Son) -- -- [phone]                 History & Physical        Rhina Maurer DO at [facility]              [facility]  HISTORY AND PHYSICAL    Patient Name: Ricki Vázquez  : 1935  MRN: [id number]  Primary Care Physician: Ev Ventura MD  Date of admission: 2024      Subjective  Subjective     Chief Complaint:  Shortness of breath    HPI:  Ricki Vázquez is a 88 y.o. male with a PMH significant for atrial fibrillation on Eliquis, BPH, Paff, HTN, HLD, hypothyroidism, GERD, history of aspiration pneumonia, pulmonary hypertension, COPD, insulin-dependent diabetes mellitus who comes to the ED due to shortness of breath.  He has a history of GERD with aspiration pneumonia.  He has had increased symptoms of GERD over the past several days.  Patient says that he has not felt well for the past several days.  He complains of increased fatigue, chills, subjective fever, cough and shortness of breath.      Personal History "     Past Medical History:   Diagnosis Date    Allergic rhinitis     Amnesia     Arrhythmia     Atrial fibrillation     Basal cell carcinoma 2015    Benign prostatic hyperplasia     CHF (congestive heart failure) 3-2024    Chronic kidney disease, stage 3b     COPD (chronic obstructive pulmonary disease)     Diabetes mellitus N/A    Drug dependency     Heart murmur     Hyperlipidemia     Hypertension     Hyperthyroidism     Iron deficiency     Long term (current) use of insulin     Melanoma 2012    Nephrosclerosis            Past Surgical History:   Procedure Laterality Date    CATARACT EXTRACTION Bilateral     COLONOSCOPY      PROSTHODONTIC PROCEDURE         Family History: family history includes Breast cancer in his sister; Diabetes in his mother; Hypertension in his mother; Stroke in his mother.     Social History:  reports that he quit smoking about 62 years ago. His smoking use included cigarettes. He started smoking about 71 years ago. He has a 4.5 pack-year smoking history. He has been exposed to tobacco smoke. He has never used smokeless tobacco. He reports current alcohol use of about 2.0 standard drinks of alcohol per week. He reports that he does not use drugs.  Social History     Social History Narrative    caffeine use: 2-3 serving of coffee daily       Medications:  Available home medication information reviewed.  Baclofen, Insulin Glargine (2 Unit Dial), SITagliptin, apixaban, busPIRone, esomeprazole, fluticasone, furosemide, levocetirizine, montelukast, nebivolol, potassium chloride, simvastatin, tamsulosin, and traZODone    Allergies   Allergen Reactions    Penicillins Swelling     Patient has tolerated PO Cefdinir    Sulfa Antibiotics Swelling       Objective  Objective     Vital Signs:   Temp:  [98.8 °F (37.1 °C)] 98.8 °F (37.1 °C)  Heart Rate:  [71-85] 81  Resp:  [16-18] 16  BP: (123-153)/(64-84) 151/78       Physical Exam   Constitutional: Awake, alert  Eyes: PERRLA, sclerae anicteric, no  conjunctival injection  HENT: NCAT, mucous membranes moist  Neck: Supple, no thyromegaly, no lymphadenopathy, trachea midline  Respiratory: Decreased air movement on the right, inspiratory crackles on the left with scant scattered wheezes  Cardiovascular: RRR, no murmurs, rubs, or gallops, palpable pedal pulses bilaterally  Gastrointestinal: Positive bowel sounds, soft, nontender, nondistended  Musculoskeletal: No RLE edema, trace-1+ LLE pitting edema, no clubbing or cyanosis to extremities  Psychiatric: Appropriate affect, cooperative  Neurologic: Oriented x 3, strength symmetric in all extremities, Cranial Nerves grossly intact to confrontation, speech clear  Skin: No rashes      Result Review:  I have personally reviewed the results from the time of this admission to 5/13/2024 00:10 EDT and agree with these findings:  [x]  Laboratory list / accordion  [x]  Microbiology  [x]  Radiology  [x]  EKG/Telemetry   []  Cardiology/Vascular   []  Pathology  [x]  Old records      LAB RESULTS:      Lab 05/12/24 1930   WBC 13.38*   HEMOGLOBIN 11.1*   HEMATOCRIT 34.8*   PLATELETS 186   NEUTROS ABS 10.37*   IMMATURE GRANS (ABS) 0.12*   LYMPHS ABS 2.02   MONOS ABS 0.80   EOS ABS 0.04   MCV 90.6   PROCALCITONIN 0.06   LACTATE 1.7   D DIMER QUANT 2.25*         Lab 05/12/24 1930   SODIUM 136   POTASSIUM 4.4   CHLORIDE 98   CO2 27.0   ANION GAP 11.0   BUN 13   CREATININE 1.22   EGFR 57.0*   GLUCOSE 115*   CALCIUM 9.1         Lab 05/12/24 1930   TOTAL PROTEIN 6.4   ALBUMIN 4.1   GLOBULIN 2.3   ALT (SGPT) 7   AST (SGOT) 11   BILIRUBIN 0.7   ALK PHOS 73         Lab 05/12/24 1930   PROBNP 8,099.0*   HSTROP T 32*                 UA          3/8/2024    00:24 3/10/2024    22:52 4/10/2024    16:04   Urinalysis   Specific Gravity, UA 1.011  1.007     Ketones, UA Negative  Negative  Negative    Blood, UA Negative  Negative     Leukocytes, UA Negative  Negative  Moderate (2+)    Nitrite, UA Negative  Negative         Microbiology Results  (last 10 days)       Procedure Component Value - Date/Time    COVID PRE-OP / PRE-PROCEDURE SCREENING ORDER (NO ISOLATION) - Swab, Nasopharynx [[id number]]  (Normal) Collected: 05/12/24 2158    Lab Status: Final result Specimen: Swab from Nasopharynx Updated: 05/12/24 2305    Narrative:      The following orders were created for panel order COVID PRE-OP / PRE-PROCEDURE SCREENING ORDER (NO ISOLATION) - Swab, Nasopharynx.  Procedure                               Abnormality         Status                     ---------                               -----------         ------                     Respiratory Panel PCR w/...[[id number]]  Normal              Final result                 Please view results for these tests on the individual orders.    Respiratory Panel PCR w/COVID-19(SARS-CoV-2) SEBAS/KOKI/PRISCILLA/PAD/COR/AMY In-House, NP Swab in UTM/VTM, 2 HR TAT - Swab, Nasopharynx [[id number]]  (Normal) Collected: 05/12/24 2158    Lab Status: Final result Specimen: Swab from Nasopharynx Updated: 05/12/24 2305     ADENOVIRUS, PCR Not Detected     Coronavirus 229E Not Detected     Coronavirus HKU1 Not Detected     Coronavirus NL63 Not Detected     Coronavirus OC43 Not Detected     COVID19 Not Detected     Human Metapneumovirus Not Detected     Human Rhinovirus/Enterovirus Not Detected     Influenza A PCR Not Detected     Influenza B PCR Not Detected     Parainfluenza Virus 1 Not Detected     Parainfluenza Virus 2 Not Detected     Parainfluenza Virus 3 Not Detected     Parainfluenza Virus 4 Not Detected     RSV, PCR Not Detected     Bordetella pertussis pcr Not Detected     Bordetella parapertussis PCR Not Detected     Chlamydophila pneumoniae PCR Not Detected     Mycoplasma pneumo by PCR Not Detected    Narrative:      In the setting of a positive respiratory panel with a viral infection PLUS a negative procalcitonin without other underlying concern for bacterial infection, consider observing off antibiotics or discontinuation of  antibiotics and continue supportive care. If the respiratory panel is positive for atypical bacterial infection (Bordetella pertussis, Chlamydophila pneumoniae, or Mycoplasma pneumoniae), consider antibiotic de-escalation to target atypical bacterial infection.    COVID-19 and FLU A/B PCR, 1 HR TAT - Swab, Nasopharynx [[id number]]  (Normal) Collected: 05/12/24 1934    Lab Status: Final result Specimen: Swab from Nasopharynx Updated: 05/12/24 2125     COVID19 Not Detected     Influenza A PCR Not Detected     Influenza B PCR Not Detected    Narrative:      Fact sheet for providers: https://www.fda.gov/media/447722/download    Fact sheet for patients: https://www.fda.gov/media/958222/download    Test performed by PCR.            CT Chest Without Contrast Diagnostic    Result Date: 5/12/2024  CT CHEST WO CONTRAST DIAGNOSTIC Date of Exam: 5/12/2024 8:34 PM EDT Indication: acute dyspnea, cough, fatigue, eval XR findings of interstitial changes, effusion. Comparison: 5/12/2024. Technique: Axial CT images were obtained of the chest without contrast administration.  Reconstructed coronal and sagittal images were also obtained. Automated exposure control and iterative construction methods were used. Findings: Hilum and Mediastinum: No pathologically enlarged lymph nodes. The heart appears mildly enlarged. Atherosclerotic calcifications are present including within the coronary arteries.   No pericardial effusion.  Unremarkable thoracic aorta and pulmonary arteries. Lung Parenchyma and Pleura: Airspace consolidation present within the left lower lobe. Air bronchograms are present. Mild subsegmental atelectatic changes and scarring present within the right upper lobe as well as the left lower lobe. Moderate size right-sided pleural effusion present with small left-sided pleural effusion present. Granulomatous calcifications are present. Effusions. Upper Abdomen: No acute process. Soft tissues: Unremarkable. Osseous structures:  No aggressive focal lytic or sclerotic osseous lesions.     Impression: Impression: 1. Airspace consolidation within the left lower lobe consistent with pneumonia. Follow-up to resolution recommended to exclude underlying nodule or mass given the configuration. 2. Moderate size right-sided pleural effusion with small left-sided pleural effusion. Chronic interstitial changes and scarring present. 3. Ancillary findings as described above. Electronically Signed: Arlette Altamirano MD  5/12/2024 8:51 PM EDT  Workstation ID: [id number]    XR Chest 1 View    Result Date: 5/12/2024  XR CHEST 1 VW Date of Exam: 5/12/2024 6:55 PM EDT Indication: SOA triage protocol Comparison: 5/6/2024 Findings: Cardiomediastinal silhouette is unchanged. There is pulmonary vascular congestion with interstitial coarsening, similar to prior examination. Slight worsening of basilar airspace disease with persistent small to moderate left effusion. No airspace disease, pneumothorax, nor pleural effusion. No acute osseous abnormality identified.     Impression: Impression: Slight worsening of basilar airspace disease suggestive of ongoing pneumonia with persistent small left effusion. Electronically Signed: Nir Ibarra MD  5/12/2024 7:35 PM EDT  Workstation ID: [id number]     Results for orders placed during the hospital encounter of 03/07/24    Adult Transthoracic Echo Complete W/ Cont if Necessary Per Protocol    Interpretation Summary    Left ventricular systolic function is normal. Calculated left ventricular EF = 69.1% Normal left ventricular cavity size noted. Left ventricular wall thickness is consistent with mild concentric hypertrophy. Elevated left atrial pressure.    The aortic valve exhibits sclerosis. There is moderate calcification of the aortic valve. There is thickening of the aortic valve. The aortic valve appears trileaflet. No aortic valve regurgitation is present. Mild aortic valve stenosis is present    The tricuspid valve is normal  in structure. Moderate tricuspid valve regurgitation is present. Estimated right ventricular systolic pressure from tricuspid regurgitation is markedly elevated (>55 mmHg).      Assessment & Plan  Assessment & Plan       Acute on chronic heart failure with preserved ejection fraction (HFpEF)    COPD (chronic obstructive pulmonary disease)    Recurrent pneumonia    GERD (gastroesophageal reflux disease)    Primary hypertension    PVC's (premature ventricular contractions)    HLD (hyperlipidemia)    Pleural effusion, bilateral    CKD (chronic kidney disease) stage 3, GFR 30-59 ml/min    Atrial fibrillation    Type 2 diabetes mellitus without complication, with long-term current use of insulin    Ricki Trotter is a 88 y.o. male with a PMH significant for atrial fibrillation on Eliquis, BPH, Paff, HTN, HLD, hypothyroidism, GERD, history of aspiration pneumonia, pulmonary hypertension, COPD, insulin-dependent diabetes mellitus who comes to the ED due to shortness of breath.      Acute on chronic HFpEF exacerbation  Bilateral pleural effusions, right greater than left  History of pulmonary hypertension  --given 40 mg IV in ER. Continue diuresis with IV Lasix 40 mg Lasix twice daily  --stricts is and os, daily weights  --echo  -- Nasal cannula  -- Consider adding nitro drip  - Consult IR for right-sided thoracentesis  - N.p.o. after midnight  - TTE attained on 3/7/2024  - D-dimer elevated, V/Q for a.m.  - Hold Eliquis for thoracentesis in a.m., restart as soon as clinically appropriate    Pneumonia  COPD  - History of aspiration/recurrent pneumonia  - Rocephin, Flagyl, doxycycline  - Respiratory PCR negative  - Blood cultures, sputum cultures, urine antigens  - Darshan    Atrial fibrillation  - On Eliquis, hold for thoracentesis.  Restart as soon as clinically appropriate  - Continue home Bystolic    CKD stage III  - At baseline    HTN  HLD  - Continue home meds    Diabetes mellitus type 2  - Takes 16 units Toujeo  daily  - Start with SSI with Accu-Cheks  - Hemoglobin A1c for a.m.    DVT prophylaxis:  Mechanical DVT prophylaxis orders are present.          CODE STATUS:    Code Status and Medical Interventions:   Ordered at: 05/12/24 4036     Level Of Support Discussed With:    Patient     Code Status (Patient has no pulse and is not breathing):    CPR (Attempt to Resuscitate)     Medical Interventions (Patient has pulse or is breathing):    Full Support       Expected Discharge   Expected discharge date/ time has not been documented.     Rhina Maurer DO  05/13/24      Electronically signed by Rhina Maurer DO at 05/13/24 0010       Discharge Summary    No notes of this type exist for this encounter.

## 2024-05-15 NOTE — THERAPY RE-EVALUATION
Acute Care - Speech Language Pathology   Swallow Re-Evaluation [facility]  Clinical Swallow Evaluation     Patient Name: Ricki Trotter  : 1935  MRN: [id number]  Today's Date: 5/15/2024               Admit Date: 2024    Visit Dx:     ICD-10-CM ICD-9-CM   1. Pneumonia due to infectious organism, unspecified laterality, unspecified part of lung  J18.9 486   2. Congestive heart failure, unspecified HF chronicity, unspecified heart failure type  I50.9 428.0   3. Esophageal dysphagia  R13.19 787.29   4. Acute on chronic heart failure with preserved ejection fraction  I50.33 428.23   5. Type 2 diabetes mellitus without complication, with long-term current use of insulin  E11.9 250.00    Z79.4 V58.67     Patient Active Problem List   Diagnosis    Mass of lower lobe of left lung    COPD (chronic obstructive pulmonary disease)    Recurrent pneumonia    GERD (gastroesophageal reflux disease)    Primary hypertension    PVC's (premature ventricular contractions)    Acute on chronic diastolic congestive heart failure    HLD (hyperlipidemia)    Leukocytosis    Pleural effusion, bilateral    CKD (chronic kidney disease) stage 3, GFR 30-59 ml/min    Hyponatremia    Lesion of lung    Atrial fibrillation    Pulmonary hypertension    Valvular heart disease    CHF (congestive heart failure)    (HFpEF) heart failure with preserved ejection fraction    Type 2 diabetes mellitus without complication, with long-term current use of insulin    Acute on chronic heart failure with preserved ejection fraction (HFpEF)    Esophageal dysphagia    Acute on chronic heart failure with preserved ejection fraction     Past Medical History:   Diagnosis Date    Allergic rhinitis     Amnesia     Arrhythmia     Atrial fibrillation     Basal cell carcinoma     Benign prostatic hyperplasia     CHF (congestive heart failure) 3-    Chronic kidney disease, stage 3b     COPD (chronic obstructive pulmonary disease)     Diabetes mellitus  N/A    Drug dependency     Heart murmur     Hyperlipidemia     Hypertension     Hyperthyroidism     Iron deficiency     Long term (current) use of insulin     Melanoma 2012    Nephrosclerosis      Past Surgical History:   Procedure Laterality Date    CATARACT EXTRACTION Bilateral     COLONOSCOPY      PROSTHODONTIC PROCEDURE         SLP Recommendation and Plan  SLP Swallowing Diagnosis: functional oral phase, moderate, pharyngeal dysphagia, other (see comments) (per MBS completed 5/15) (05/15/24 0845)  SLP Diet Recommendation: soft to chew textures, chopped, thin liquids, no mixed consistencies (05/15/24 0845)  Recommended Precautions and Strategies: upright posture during/after eating, small bites of food and sips of liquid, general aspiration precautions, reflux precautions (throughout meal and a few times at end of intake, intermittent head turn + effortful swallow as well as cough/throat clear.) (05/15/24 0845)  SLP Rec. for Method of Medication Administration: meds whole, meds crushed, as tolerated (cut larger pills) (05/15/24 0845)     Monitor for Signs of Aspiration: yes, notify SLP if any concerns (05/15/24 0845)     Swallow Criteria for Skilled Therapeutic Interventions Met: demonstrates skilled criteria (05/15/24 0845)  Anticipated Discharge Disposition (SLP): home with home health (05/15/24 0845)  Rehab Potential/Prognosis, Swallowing: good, to achieve stated therapy goals (05/15/24 0845)  Therapy Frequency (Swallow): 5 days per week (05/15/24 0845)  Predicted Duration Therapy Intervention (Days): until discharge (05/15/24 0845)  Oral Care Recommendations: Oral Care BID/PRN, Toothbrush (05/15/24 0845)                                        Plan of Care Reviewed With: patient      SWALLOW EVALUATION (Last 72 Hours)       SLP Adult Swallow Evaluation       Row Name 05/15/24 0845 05/14/24 1345 05/13/24 1105             Rehab Evaluation    Document Type re-evaluation  -GA evaluation  -SM evaluation  -       Subjective Information no complaints  -GA no complaints  - no complaints  -      Patient Observations alert;cooperative;agree to therapy  -GA alert;cooperative  - alert;cooperative  -      Patient/Family/Caregiver Comments/Observations no family present.  -GA -- No family present  -      Patient Effort good  -GA good  -SM good  -      Comment Patient reports he is using strategies of head turn and intermittent thorat clearing. Feels he needs to slow down while eating and chew more. States he has been doing well on the current diet. He is interested in staying on the current soft and chopped diet while in the hospital.  -GA -- --      Symptoms Noted During/After Treatment none  -GA -- none  -         General Information    Patient Profile Reviewed yes  -GA -- yes  -      Pertinent History Of Current Problem see initial evaluation  -GA -- Adm with c/o shortness of breath. Hx: a-fib, HTN, HLD, GERD, hypothyroidism, COPD, PNA, diabetes. CXR: airspace consolidation in L lower lobe c/w PNA  -      Current Method of Nutrition soft to chew textures;chopped;thin liquids  -GA soft to chew textures;chopped;thin liquids  - NPO  -      Precautions/Limitations, Vision WFL;for purposes of eval  -GA -- WFL;for purposes of eval  -      Precautions/Limitations, Hearing WFL;for purposes of eval  -GA -- WFL;for purposes of eval  -      Prior Level of Function-Communication WFL  -GA -- unknown  -      Prior Level of Function-Swallowing esophageal concerns;other (see comments)  per chart and pt report  -GA -- esophageal concerns;other (see comments)  per chart/pt report  -      Plans/Goals Discussed with patient  -GA patient;other (see comments);agreed upon  GI PA  - patient;agreed upon  -      Barriers to Rehab none identified  -GA none identified  - none identified  -      Patient's Goals for Discharge -- eat/drink without coughing/choking  - patient did not state  -         Pain    Additional  Documentation Pain Scale: FACES Pre/Post-Treatment (Group)  -GA -- Pain Scale: FACES Pre/Post-Treatment (Group)  [facility]         Pain Scale: Numbers Pre/Post-Treatment    Pretreatment Pain Rating -- 0/10 - no pain  - --      Posttreatment Pain Rating -- 0/10 - no pain  - --         Pain Scale: FACES Pre/Post-Treatment    Pain: FACES Scale, Pretreatment 0-->no hurt  -GA -- 0-->no hurt  -      Posttreatment Pain Rating 0-->no hurt  -GA -- 0-->no hurt  -         Oral Motor Structure and Function    Dentition Assessment natural, present and adequate  -GA -- natural, present and adequate  -      Secretion Management WNL/WFL  -GA -- WNL/[facility]  -      Mucosal Quality moist, healthy  -GA -- moist, healthy  -         Oral Musculature and Cranial Nerve Assessment    Oral Motor General Assessment [facility]  -GA -- [facility]         General Eating/Swallowing Observations    Respiratory Support Currently in Use room air  -GA -- room air  -      Eating/Swallowing Skills self-fed  -GA -- fed by SLP;self-fed  -      Positioning During Eating upright 90 degree;upright in bed  -GA -- upright in chair  -      Utensils Used spoon;cup;straw  -GA -- spoon;cup;straw  -      Consistencies Trialed regular textures;soft to chew textures;chopped;mixed consistency;thin liquids  -GA -- ice chips;thin liquids;pureed;regular textures  -         Respiratory    Respiratory Status [facility];room air  -GA -- --         Clinical Swallow Eval    Oral Prep Phase [facility]  -GA -- --      Oral Residue [facility]  -GA -- --      Pharyngeal Phase suspected pharyngeal impairment  -GA -- no overt signs/symptoms of pharyngeal impairment  -      Esophageal Phase unremarkable  -GA -- suspected esophageal impairment  -      Clinical Swallow Evaluation Summary Patient with 1 instance of coughing during mixed consistencies. All other trials without overt s/sx of aspiration/penetraiton while implementing strateiges of intermittent throat clear and re-swallow as well as  head turn and effortful swallow. Patient compliant with strategies. No reports of globus sensation noted with complex and dry solids. Pt reviewed GI precautions as well as introduced to swallow exercises.  -GA -- No overt s/s of aspiration across trials of thin liquid, pureed, or regular solid consistencies; even when pushed for consecutive sips of thin. Adequate oral manipulation/mastication of regular solid trial and no significant residue. Pt states preference for softer solid diet w/ regular solid diet. Pt c/o worsening discomfort/globus sensation w/ dry, non-cohesive solid consistencies. Pt also observed belching t/o eval following liquid trials. Recommend soft/chopped solid diet w/ thin liquids, general aspiration/reflux precautions. Plan to MBS + ltd 5/14  -         Pharyngeal Phase Concerns    Pharyngeal Phase Concerns cough  -GA -- --      Cough mixed consistencies  1x  -GA -- --         Esophageal Phase Concerns    Esophageal Phase Concerns -- -- globus;belching  -      Globus -- -- regular consistencies  -      Belching -- -- thin  -MH         MBS/VFSS    Utensils Used -- spoon;cup;straw  -SM --      Consistencies Trialed -- thin liquids;pureed;regular textures  -SM --         MBS/VFSS Interpretation    Oral Prep Phase -- WFL  -SM --      Oral Transit Phase -- WFL  -SM --      Oral Residue -- WFL  -SM --         Initiation of Pharyngeal Swallow    Pharyngeal Phase -- impaired pharyngeal phase of swallowing  -SM --      Anatomical abnormalities noted -- osteophyte/bone spur per radiology report  -SM --      Penetration During the Swallow -- thin liquids;secondary to delayed swallow initiation or mistiming;secondary to reduced laryngeal elevation;secondary to reduced vestibular closure;other (see comments)  with liquid mix  -SM --      Depth of Penetration -- deep  -SM --      Response to Penetration -- No  -SM --      No spontaneous response to penetration and -- effective laryngeal clearance with  cue (see comments)  cough  -SM --      Rosenbek's Scale -- thin:;5--->level 5  -SM --      Pharyngeal Residue -- pudding/puree;regular textures;valleculae;secondary to reduced base of tongue retraction;secondary to reduced posterior pharyngeal wall stripping;thin liquids;pyriform sinuses;secondary to reduced laryngeal elevation;secondary to reduced hyolaryngeal excursion;other (see comments)  -SM --      Response to Residue -- unable to clear residue;cleared residue with compensatory maneuver (see comments)  -SM --      Attempted Compensatory Maneuvers -- bolus size;bolus presentation style;head turn to left;head turn to right;chin tuck;multiple swallows;alternate liquids/solids;effortful (hard swallow);throat clear after swallow;combination of maneuvers (see comments)  head turn + chin tuck; head turn + effortful swallow  -SM --      Response to Attempted Compensatory Maneuvers -- reduced residue;other (see comments)  -SM --      Successful Compensatory Maneuver Competency -- patient able to;demonstrate compensations;with cues  -SM --      Pharyngeal Phase, Comment -- No aspiration observed on today's study. Large osteophyte C4-5, which is leading to difficulty clearing puree/solids through pharynx (epiglottic inversion restricted by osteophyte) but also mild pharyngeal diffuse weakness contributing. Did present with penetration during the swallow with liquid mix as spilled over solid material extending into pharynx as well as solid pharyngeal residue in valleculae.Material did deepen towards vocal folds though did not pass into subglottic region. No sensation though cleared with cues to throat clear/cough. At times, severe pharyngeal residue with solids. Was most success at reducing residue in vallecuale with use of head turn (either shoulder) + effortful swallow.  -SM --         Esophageal Phase    Esophageal Phase -- no impairments;see radiology report for further details  -SM --      Esophageal Phase, Comment  -- All barium media observed to empty into stomach. No stricture or significant retention observed per Radiology PA. Noted plan for EGD with possible esophageal dilation tomorrow. Spoke with GI PA, Maggie Owens, over the phone to relay results of MBS and esophageal scan.  - --         SLP Evaluation Clinical Impression    SLP Swallowing Diagnosis functional oral phase;moderate;pharyngeal dysphagia;other (see comments)  per MBS completed 5/15  -GA functional oral phase;moderate;pharyngeal dysphagia  - R/O pharyngeal dysphagia;suspected esophageal dysphagia  -      Functional Impact risk of aspiration/pneumonia  -GA risk of aspiration/pneumonia  - risk of aspiration/pneumonia  -      Rehab Potential/Prognosis, Swallowing good, to achieve stated therapy goals  -GA good, to achieve stated therapy goals  - good, to achieve stated therapy goals  -      Swallow Criteria for Skilled Therapeutic Interventions Met demonstrates skilled criteria  -GA demonstrates skilled criteria  - demonstrates skilled criteria  -         Recommendations    Therapy Frequency (Swallow) 5 days per week  -GA 5 days per week  - --      Predicted Duration Therapy Intervention (Days) until discharge  -GA until discharge  - until discharge  -      SLP Diet Recommendation soft to chew textures;chopped;thin liquids;no mixed consistencies  -GA soft to chew textures;chopped;thin liquids;other (see comments)  will f/u tomorrow for ? ability to advance to regular solids if able to manage cutting into small bites and well chew as well as review needed aspiration precautions.  - soft to chew textures;chopped;thin liquids  -      Recommended Diagnostics -- -- VFSS ([facility]);with esophageal screen;other (see comments)  5/14  -      Recommended Precautions and Strategies upright posture during/after eating;small bites of food and sips of liquid;general aspiration precautions;reflux precautions  throughout meal and a few times at end of  intake, intermittent head turn + effortful swallow as well as cough/throat clear.  -GA upright posture during/after eating;small bites of food and sips of liquid;general aspiration precautions;reflux precautions  throughout meal and a few times at end of intake, intermittent head turn + effortful swallow as well as cough/throat clear.  - general aspiration precautions;reflux precautions;upright posture during/after eating  -      Oral Care Recommendations Oral Care BID/PRN;Toothbrush  -GA Oral Care BID/PRN;Toothbrush  - Oral Care BID/PRN;Toothbrush  -      SLP Rec. for Method of Medication Administration meds whole;meds crushed;as tolerated  cut larger pills  -GA meds whole;meds crushed;as tolerated  Or ice cream. Would benefit from cut/crush of large pills.  - meds whole;with thin liquids;meds crushed;with puree;as tolerated  -      Monitor for Signs of Aspiration yes;notify SLP if any concerns  -GA yes;notify SLP if any concerns  - yes;notify SLP if any concerns  -      Anticipated Discharge Disposition (SLP) home with home health  -GA home with home health  - unknown;home with home health  -                User Key  (r) = Recorded By, (t) = Taken By, (c) = Cosigned By      Initials Name Effective Dates    Elly Thompson, MS CCC-SLP 02/03/23 -      Ciera Walker, MS CCC-SLP 05/12/23 -     Mikayla Jones, MS CCC-SLP 09/14/23 -                     EDUCATION  The patient has been educated in the following areas:   Dysphagia (Swallowing Impairment) Oral Care/Hydration.        SLP GOALS       Row Name 05/15/24 0845 05/14/24 1345          (LTG) Patient will demonstrate functional swallow for    Diet Texture (Demonstrate functional swallow) regular textures  -GA regular textures  -SM     Liquid viscosity (Demonstrate functional swallow) thin liquids  -GA thin liquids  -SM     [place] (Demonstrate functional swallow) with use of compensatory strategies  -GA with use of  compensatory strategies  -SM     Time Frame (Demonstrate functional swallow) by discharge  -GA by discharge  -SM     Progress/Outcomes (Demonstrate functional swallow) good progress toward goal  -GA --     Comment (Demonstrate functional swallow) Patient preference for soft solid foods  -GA --        (STG) Patient will tolerate trials of    Consistencies Trialed (Tolerate trials) soft to chew (chopped) textures;thin liquids  -GA soft to chew (chopped) textures;thin liquids  -SM     Desired Outcome (Tolerate trials) without signs/symptoms of aspiration  -GA without signs/symptoms of aspiration  -SM     [place] (Tolerate trials) independently (over 90% accuracy)  -GA independently (over 90% accuracy)  -SM     Time Frame (Tolerate trials) 1 week  -GA 1 week  -SM     Progress/Outcomes (Tolerate trials) good progress toward goal  -GA --     Comment (Tolerate trials) patient implementing strategies during trials. Prefers soft solids at this time. only showing s/sx mixed solids. orders adjusted to reflect no mixed consistencies.  -GA --        (STG) Patient will tolerate therapeutic trials of    Consistencies Trialed (Tolerate therapeutic trials) regular textures  -GA regular textures  -SM     Desired Outcome (Tolerate therapeutic trials) with adequate oral prep/transit/clearance;with use of compensatory strategies (see comments)  -GA with adequate oral prep/transit/clearance;with use of compensatory strategies (see comments)  small well-chewed bites  -SM     [place] (Tolerate therapeutic trials) independently (over 90% accuracy)  -GA independently (over 90% accuracy)  -SM     Time Frame (Tolerate therapeutic trials) 1 week  -GA 1 week  -SM     Progress/Outcomes (Tolerate therapeutic trials) good progress toward goal  -GA --     Comment (Tolerate therapeutic trials) Tolerated regular solids without overt s/sx and implementation of strategies  -GA --        (STG) Pharyngeal Strengthening Exercise Goal 1 (SLP)     Activity (Pharyngeal Strengthening Goal 1, SLP) increase superior movement of the hyolaryngeal complex;increase anterior movement of the hyolaryngeal complex;increase closure at entrance to airway/closure of airway at glottis;increase squeeze/positive pressure generation  -GA increase superior movement of the hyolaryngeal complex;increase anterior movement of the hyolaryngeal complex;increase closure at entrance to airway/closure of airway at glottis;increase squeeze/positive pressure generation  -SM     Increase Superior Movement of the Hyolaryngeal Complex Mendelsohn  -GA Mendelsohn  -SM     Increase Anterior Movement of the Hyolaryngeal Complex chin tuck against resistance (CTAR)  -GA chin tuck against resistance (CTAR)  -SM     Increase Closure at Entrance to Airway/Closure of Airway at Glottis supraglottic swallow  -GA supraglottic swallow  -SM     Increase Squeeze/Positive Pressure Generation hard effortful swallow  -GA hard effortful swallow  + tongue press  -SM     [place]/Accuracy (Pharyngeal Strengthening Goal 1, SLP) independently (over 90% accuracy)  -GA independently (over 90% accuracy)  -SM     Time Frame (Pharyngeal Strengthening Goal 1, SLP) 1 week  -GA 1 week  -SM     Progress/Outcomes (Pharyngeal Strengthening Goal 1, SLP) good progress toward goal  -GA --     Comment (Pharyngeal Strengthening Goal 1, SLP) Patient requiring moderate cues d/t introduction to strategies. able to complete 30 CTAR, 5 mendelson, 5 effortful swallows and 5 supreglottic swallows  -GA --        ([facility]) Swallow Management Recall Goal 1 (SLP)    Activity (Swallow Management Recall Goal 1, SLP) aspiration precautions;oral care recommendations;safe diet level/texture;compensatory swallow strategies/techniques;rationale for use of strategies/techniques  -GA aspiration precautions;oral care recommendations;safe diet level/texture;compensatory swallow strategies/techniques;rationale for use of strategies/techniques  -SM      [place]/Accuracy (Swallow Management Recall Goal 1, SLP) independently (over 90% accuracy)  -GA independently (over 90% accuracy)  -SM     Time Frame (Swallow Management Recall Goal 1, SLP) 1 week  -GA 1 week  -SM     Progress/Outcomes (Swallow Management Recall Goal 1, SLP) good progress toward goal  -GA --     Comment (Swallow Management Recall Goal 1, SLP) good recall of strategies and need for oral care  -GA --        (STG) Swallow Compensatory Strategies Goal 1 (SLP)    Activity (Swallow Compensatory Strategies/Techniques Goal 1, SLP) aspiration precautions;compensatory strategies;small bites;small cup sips;small straw sips;other (see comments)  head turn (either shoulder) + effortful swallow as well as cough/throat clear periodically through and at end of intake or additonaly as needed if senses any sticking.  -GA aspiration precautions;compensatory strategies;small bites;small cup sips;small straw sips;other (see comments)  head turn (either shoulder) + effortful swallow as well as cough/throat clear periodically through and at end of intake or additonaly as needed if senses any sticking.  -SM     [place]/Accuracy (Swallow Compensatory Strategies/Techniques Goal 1, SLP) independently (over 90% accuracy)  -GA independently (over 90% accuracy)  -SM     Time Frame (Swallow Compensatory Strategies/Techniques Goal 1, SLP) 1 week  -GA 1 week  -SM     Progress/Outcomes (Swallow Compensatory Strategies/Techniques Goal 1, SLP) good progress toward goal  -GA --     Comment (Swallow Compensatory Strategies/Techniques Goal 1, SLP) able to implement strategies interrmittently with intermittent reminders  -GA --               User Key  (r) = Recorded By, (t) = Taken By, (c) = Cosigned By      Initials Name Provider Type    Elly Thompson, MS CCC-SLP Speech and Language Pathologist    Mikayla Jones MS CCC-SLP Speech and Language Pathologist                         Time Calculation:    Time  Calculation- SLP       Row Name 05/15/24 1141             Time Calculation- SLP    SLP Start Time 0845  -GA      SLP Received On 05/15/24  -GA         Untimed Charges    80136-HK Eval Oral Pharyng Swallow Minutes 60  -GA         Total Minutes    Untimed Charges Total Minutes 60  -GA       Total Minutes 60  -GA                User Key  (r) = Recorded By, (t) = Taken By, (c) = Cosigned By      Initials Name Provider Type    Mikayla Jones MS CCC-SLP Speech and Language Pathologist                    Therapy Charges for Today       Code Description Service Date Service Provider Modifiers Qty    [id number]  ST EVAL ORAL PHARYNG SWALLOW 4 5/15/2024 Mikayla Angel MS CCC-SLP GN 1                 Mikayla Angel MS CCC-SLP  5/15/2024

## 2024-05-15 NOTE — DISCHARGE INSTRUCTIONS
Follow up with PCP in 1 week and have your labs drawn again - need a BMP to monitor kidney function since we have increased your dose of Lasix.

## 2024-05-15 NOTE — OUTREACH NOTE
Prep Survey      Flowsheet Row Responses   [facility] patient discharged from? [place]   Is LACE score < 7 ? No   Eligibility [facility]   Date of Admission 05/12/24   Date of Discharge 05/15/24   Discharge Disposition Home-Health Care [facility]   Discharge diagnosis A/C CHF   Does the patient have one of the following disease processes/diagnoses(primary or secondary)? CHF   Does the patient have Home health ordered? Yes   What is the Home health agency?  [facility]    Is there a DME ordered? No   Prep survey completed? Yes            Jackie SALEEM - Registered Nurse

## 2024-05-15 NOTE — PAYOR COMM NOTE
"Ricki Vázquez (88 y.o. Male)     [id number]     Anya Escalante, TRE  Utilization Review  [phone]  [phone]        Date of Birth   1935    Social Security Number       Address   [address] [place] [address] [address] [place] [address]    Home Phone   [phone]    MRN   [id number]       Islam   Rastafarian    Marital Status                               Admission Date   5/12/24    Admission Type   Emergency    Admitting Provider   Love Schmidt MD    Attending Provider   Love Schmidt MD    Department, Room/Bed   [facility] 6A, N615/1       Discharge Date       Discharge Disposition   Home or Self Care    Discharge Destination                                 Attending Provider: Love Schmidt MD    Allergies: Penicillins, Sulfa Antibiotics    Isolation: None   Infection: None   Code Status: CPR    Ht: 180.3 cm (71\")   Wt: 65.1 kg (143 lb 8.3 oz)    Admission Cmt: None   Principal Problem: Acute on chronic heart failure with preserved ejection fraction (HFpEF) [I50.33]                   Active Insurance as of 5/12/2024       Primary Coverage       Payor Plan Insurance Group Employer/Plan Group    ANTHEM MEDICARE REPLACEMENT ANTHEM MEDICARE ADVANTAGE [id number]       Payor Plan Address Payor Plan Phone Number Payor Plan Fax Number Effective Dates    [address] [phone]  1/1/2024 - None Entered    [facility] [address]         Subscriber Name Subscriber Birth Date Member ID       RICKI VÁZQUEZ 1935 [id number]                     Emergency Contacts        (Rel.) Home Phone Work Phone Mobile Phone    Paty Tobar (Daughter) [phone] -- [phone]    Liane Shaw (Grandchild) [phone] -- [phone]    Diogo Vázquez (Son) -- -- [phone]                 Discharge Summary        Love Schmidt MD at 05/15/24 1205              [facility] Medicine Services  DISCHARGE SUMMARY    Patient Name: Ricki" Jamie Trotter  : 1935  MRN: [id number]    Date of Admission: 2024  6:53 PM  Date of Discharge:  05/15/24  Primary Care Physician: Ev Ventura MD    Consults       Date and Time Order Name Status Description    2024  3:30 PM Inpatient Gastroenterology Consult Completed             Hospital Course     Presenting Problem: SOA    Active Hospital Problems    Diagnosis  POA    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]  Yes    Pharyngeal dysphagia secondary to large cervical osteophyte [R13.13]  Yes    Type 2 diabetes mellitus without complication, with long-term current use of insulin [E11.9, Z79.4]  Not Applicable    Pleural effusion, bilateral [J90]  Yes    HLD (hyperlipidemia) [E78.5]  Yes    CKD (chronic kidney disease) stage 3, GFR 30-59 ml/min [N18.30]  Yes    Atrial fibrillation [I48.91]  Yes    PVC's (premature ventricular contractions) [I49.3]  Yes    GERD (gastroesophageal reflux disease) [K21.9]  Yes    COPD (chronic obstructive pulmonary disease) [J44.9]  Yes    Primary hypertension [I10]  Yes      Resolved Hospital Problems    Diagnosis Date Resolved POA    **Acute on chronic heart failure with preserved ejection fraction (HFpEF) [I50.33] 05/15/2024 Yes    Acute on chronic heart failure with preserved ejection fraction [I50.33] 05/15/2024 Yes    Esophageal dysphagia [R13.19] 05/15/2024 Unknown    Recurrent pneumonia [J18.9] 05/15/2024 Yes          Hospital Course:  Ricki Trotter is a 88 y.o. male with PMH significant for atrial fibrillation on Eliquis, BPH, HTN, HLD, hypothyroidism, GERD, history of aspiration pneumonia, pulmonary hypertension, COPD, and insulin-dependent diabetes mellitus who presents to the ED due to shortness of breath.       Acute on chronic HFpEF   Bilateral pleural effusions, right greater than left  Pulmonary hypertension  --proBNP elevated 8099  --CT chest with moderate right pleural effusion and small left pleural effusion; LLL consolidation concerning for  pneumonia  --Given 40 mg IV Lasix in ER  --s/p diuresis with IV Lasix 40 mg Lasix twice daily - switched to PO Lasix 40 mg daily (increased from home dose 20 mg daily) which we will continue at discharge, continue daily potassium 10 mEq  --Echo 3/8/24: EF 69%, mild AS, moderate TR, markedly elevated RVSP >55 mmHg  --IR performed right-sided thoracentesis 5/13/24, 900 mL removed; no fluid studies were sent however  --Defer V/Q scan at this time given symptoms improved with thoracentesis, PE is unlikely  --Stable on room air     Hypokalemia  --Replaced per protocol     Questionable LLL pneumonia vs chronic finding  COPD  --History of aspiration/recurrent pneumonia  --CT chest with moderate right pleural effusion and small left pleural effusion; LLL consolidation concerning for pneumonia however does not appear significantly changed from CT scan 3/7/24   --Of note patient follows with Pulmonology and was recently seen on 5/6/24: they mention a chronic masslike density in the LLL with associated bronchiectasis and pleural thickening - they have been following this area for many years and it has remained stable  --WBC 13K on admission, however procal and lactate are WNL  --I think his symptoms are more likely related to CHF/effusions rather than PNA, but given his history we treated with Rocephin and Doxycycline x 3 days, will discontinue ATBx at discharge  --Respiratory PCR panel negative  --Negative urine Strep and Legionella  --Blood cultures NGTD  --Duonebs  --Stable on room air     Atrial fibrillation  --Continue Eliquis, Bystolic     CKD stage III  --Baseline Cr ~1.3  --Cr currently 1.22, continue to monitor as outpatient with increased Lasix dose     GERD  Pharyngeal dysphagia secondary to large cervical osteophyte  --Family reports worsening reflux symptoms   --Family requested GI consult for further management  --SLP performed MBS 5/14/24 which revealed a large cervical osteophyte C4-5 causing his pharyngeal  dysphagia - treatment is supportive care/diet modification - recommend soft to chew textures, chopped meat, thin liquids, no mixed consistencies, general aspiration precautions  --GI recommends increase home Nexium to 40 mg BID and F/U with Dr. Taveras in 4-6 weeks; no indication for EGD at this time as his dysphagia is more pharyngeal, no esophageal stricture seen per MBS done this admission     HTN  HLD  --Continue home meds     Diabetes mellitus type 2  --HbA1c 5.8%      Discharge Follow Up Recommendations for outpatient labs/diagnostics:  F/U with PCP 1 week with repeat BMP  F/U with Cardiology/Dr. Singh in 1-2 months  F/U with GI Dr. Taveras 4-6 weeks    Day of Discharge     HPI:   Seen this morning. No complaints. Already saw speech therapy.     Review of Systems  Gen-no fevers, no chills  CV-no chest pain, no palpitations  Resp-no cough, no dyspnea  GI-no N/V/D, no abd pain      Vital Signs:   Temp:  [97.3 °F (36.3 °C)-98 °F (36.7 °C)] 97.8 °F (36.6 °C)  Heart Rate:  [] 87  Resp:  [16-18] 16  BP: ()/(57-77) 144/67      Physical Exam:  Gen-no acute distress  HENT-NCAT, mucous membranes moist  CV-RRR, S1 S2 normal, no m/r/g  Resp-CTAB, no wheezes or rales  Abd-soft, NT, ND, +BS  Ext-no edema  Neuro-A&Ox3, no focal deficits  Skin-no rashes  Psych-appropriate mood      Pertinent  and/or Most Recent Results     LAB RESULTS:      Lab 05/15/24  0348 05/14/24  0430 05/13/24  0757 05/12/24  1930   WBC 8.11 5.77 10.10 13.38*   HEMOGLOBIN 10.0* 9.3* 10.2* 11.1*   HEMATOCRIT 31.4* 28.6* 31.5* 34.8*   PLATELETS 159 144 166 186   NEUTROS ABS  --   --  6.75 10.37*   IMMATURE GRANS (ABS)  --   --  0.07* 0.12*   LYMPHS ABS  --   --  2.47 2.02   MONOS ABS  --   --  0.74 0.80   EOS ABS  --   --  0.05 0.04   MCV 90.2 87.7 89.0 90.6   PROCALCITONIN  --   --   --  0.06   LACTATE  --   --   --  1.7   PROTIME  --   --  18.7*  --    D DIMER QUANT  --   --   --  2.25*         Lab 05/15/24  0348 05/14/24  1528 05/14/24  0432  05/13/24 0757 05/12/24 1930   SODIUM 134*  --  136 135* 136   POTASSIUM 3.8 4.1 3.4* 3.9 4.4   CHLORIDE 97*  --  98 96* 98   CO2 26.0  --  28.0 28.0 27.0   ANION GAP 11.0  --  10.0 11.0 11.0   BUN 19  --  19 15 13   CREATININE 1.22  --  1.37* 1.16 1.22   EGFR 57.0*  --  49.6* 60.6 57.0*   GLUCOSE 82  --  66 64* 115*   CALCIUM 8.7  --  8.5* 8.5* 9.1   MAGNESIUM  --   --   --  1.7  --    PHOSPHORUS  --   --   --  3.6  --    HEMOGLOBIN A1C  --   --   --  5.80*  --          Lab 05/12/24 1930   TOTAL PROTEIN 6.4   ALBUMIN 4.1   GLOBULIN 2.3   ALT (SGPT) 7   AST (SGOT) 11   BILIRUBIN 0.7   ALK PHOS 73         Lab 05/13/24 0757 05/12/24 1930   PROBNP  --  8,099.0*   HSTROP T  --  32*   PROTIME 18.7*  --    INR 1.55*  --          Lab 05/13/24 0757   CHOLESTEROL 102   LDL CHOL 44   HDL CHOL 44   TRIGLYCERIDES 64             Brief Urine Lab Results  (Last result in the past 365 days)        Color   Clarity   Blood   Leuk Est   Nitrite   Protein   CREAT   Urine HCG        04/10/24 1604 Yellow   Clear   Negative   Moderate (2+)   Negative   Negative                 Microbiology Results (last 10 days)       Procedure Component Value - Date/Time    S. Pneumo Ag Urine or CSF - Urine, Urine, Clean Catch [[id number]]  (Normal) Collected: 05/12/24 2207    Lab Status: Final result Specimen: Urine, Clean Catch Updated: 05/13/24 0945     Strep Pneumo Ag Negative    Legionella Antigen, Urine - Urine, Urine, Clean Catch [[id number]]  (Normal) Collected: 05/12/24 2207    Lab Status: Final result Specimen: Urine, Clean Catch Updated: 05/13/24 0942     LEGIONELLA ANTIGEN, URINE Negative    COVID PRE-OP / PRE-PROCEDURE SCREENING ORDER (NO ISOLATION) - Swab, Nasopharynx [[id number]]  (Normal) Collected: 05/12/24 2158    Lab Status: Final result Specimen: Swab from Nasopharynx Updated: 05/12/24 5283    Narrative:      The following orders were created for panel order COVID PRE-OP / PRE-PROCEDURE SCREENING ORDER (NO ISOLATION) - Swab,  Nasopharynx.  Procedure                               Abnormality         Status                     ---------                               -----------         ------                     Respiratory Panel PCR w/...[[id number]]  Normal              Final result                 Please view results for these tests on the individual orders.    Respiratory Panel PCR w/COVID-19(SARS-CoV-2) [facility]/PAD/COR/AMY In-House, NP Swab in UTM/VTM, 2 HR TAT - Swab, Nasopharynx [[id number]]  (Normal) Collected: 05/12/24 2158    Lab Status: Final result Specimen: Swab from Nasopharynx Updated: 05/12/24 2305     ADENOVIRUS, PCR Not Detected     Coronavirus 229E Not Detected     Coronavirus HKU1 Not Detected     Coronavirus NL63 Not Detected     Coronavirus OC43 Not Detected     COVID19 Not Detected     Human Metapneumovirus Not Detected     Human Rhinovirus/Enterovirus Not Detected     Influenza A PCR Not Detected     Influenza B PCR Not Detected     Parainfluenza Virus 1 Not Detected     Parainfluenza Virus 2 Not Detected     Parainfluenza Virus 3 Not Detected     Parainfluenza Virus 4 Not Detected     RSV, PCR Not Detected     Bordetella pertussis pcr Not Detected     Bordetella parapertussis PCR Not Detected     Chlamydophila pneumoniae PCR Not Detected     Mycoplasma pneumo by PCR Not Detected    Narrative:      In the setting of a positive respiratory panel with a viral infection PLUS a negative procalcitonin without other underlying concern for bacterial infection, consider observing off antibiotics or discontinuation of antibiotics and continue supportive care. If the respiratory panel is positive for atypical bacterial infection (Bordetella pertussis, Chlamydophila pneumoniae, or Mycoplasma pneumoniae), consider antibiotic de-escalation to target atypical bacterial infection.    Blood Culture - Blood, Wrist, Left [[id number]]  (Normal) Collected: 05/12/24 2145    Lab Status: Preliminary result Specimen: Blood from Wrist,  Left Updated: 05/14/24 2200     Blood Culture No growth at 2 days    Narrative:      Less than seven (7) mL's of blood was collected.  Insufficient quantity may yield false negative results.    COVID-19 and FLU A/B PCR, 1 HR TAT - Swab, Nasopharynx [[id number]]  (Normal) Collected: 05/12/24 1934    Lab Status: Final result Specimen: Swab from Nasopharynx Updated: 05/12/24 2125     COVID19 Not Detected     Influenza A PCR Not Detected     Influenza B PCR Not Detected    Narrative:      Fact sheet for providers: https://www.fda.gov/media/812733/download    Fact sheet for patients: https://www.fda.gov/media/517496/download    Test performed by PCR.    Blood Culture - Blood, Arm, Left [[id number]]  (Normal) Collected: 05/12/24 1930    Lab Status: Preliminary result Specimen: Blood from Arm, Left Updated: 05/14/24 2200     Blood Culture No growth at 2 days    Narrative:      Less than seven (7) mL's of blood was collected.  Insufficient quantity may yield false negative results.            NM Lung Scan Perfusion Particulate    Result Date: 5/14/2024  DATE OF EXAM: 5/13/2024 12:35 PM EDT PROCEDURE: NM LUNG SCAN PERFUSION PARTICULATE INDICATIONS: eval for PE COMPARISON: Chest radiograph 5/13/2024 TECHNIQUE: 5.19 mCi of technetium 99m labeled MAA was administered intravenously for the perfusion portion of the pulmonary exam. Scintigraphic images of the lungs were obtained in multiple projections to assess perfusion. FINDINGS: Review of the images show areas of linear scarring in the right upper lobe. There is blunting of the left costophrenic angle with some scarring peripherally at the left lung base. These findings are evident on the patient's chest radiograph. 40.5% of counts localized to the left lung and 59.5% of counts localized to the right lung. Differential counts in the upper middle and lower lung fields were acquired and are available for review.     1. Chronic right upper lobe scarring. Left pleural effusion and  left basilar scarring. 2. Differential perfusion with 59.5% of counts on the right and 40.5% of counts on the left. Electronically Signed: Paresh Suarez MD  5/14/2024 9:46 AM EDT  Workstation ID: [id number]    US Thoracentesis    Result Date: 5/13/2024  US THORACENTESIS Date of Exam: 5/13/2024 1:01 PM EDT Indication: right. Pleural effusion Comparison: Chest CT 5/12/2024 Technique: The procedure, risks and options were discussed with the patient and informed written consent was obtained. The patient was placed in the seated position and ultrasound was performed of the right chest. Moderate to large right-sided pleural effusion was identified. A site was chosen and the skin was marked, prepped and draped. Using maximal sterile barrier technique and under local anesthesia a 5 Lao catheter was inserted by trocar technique. A total of 900 cc of straw-colored fluid was removed. Appropriate specimens were sent to the lab. The catheter was removed and hemostasis achieved. Post procedure chest radiograph is pending.     Impression: Technically successful right thoracentesis with removal of 900 cc of fluid. Electronically Signed: Paresh Suarez MD  5/13/2024 1:44 PM EDT  Workstation ID: [id number]    XR Chest 1 View    Result Date: 5/13/2024  XR CHEST 1 VW Date of Exam: 5/13/2024 1:13 PM EDT Indication: s/p thoracentesis (right) Comparison: Chest CT and chest radiograph 5/12/2024. Findings: Cardiomediastinal silhouette is unchanged. Essentially resolved right pleural effusion after thoracentesis. Unchanged small left pleural effusion and left basilar airspace disease that is suspicious for pneumonia. Scarring/subsegmental atelectasis in the  right mid/upper lung. No pneumothorax. Osseous structures are unchanged.     Impression: Essentially resolved right pleural effusion after thoracentesis without pneumothorax. Remainder of examination is unchanged compared to yesterday's imaging. Electronically Signed: Donato Crowe MD   5/13/2024 1:40 PM EDT  Workstation ID: [id number]    CT Chest Without Contrast Diagnostic    Result Date: 5/12/2024  CT CHEST WO CONTRAST DIAGNOSTIC Date of Exam: 5/12/2024 8:34 PM EDT Indication: acute dyspnea, cough, fatigue, eval XR findings of interstitial changes, effusion. Comparison: 5/12/2024. Technique: Axial CT images were obtained of the chest without contrast administration.  Reconstructed coronal and sagittal images were also obtained. Automated exposure control and iterative construction methods were used. Findings: Hilum and Mediastinum: No pathologically enlarged lymph nodes. The heart appears mildly enlarged. Atherosclerotic calcifications are present including within the coronary arteries.   No pericardial effusion.  Unremarkable thoracic aorta and pulmonary arteries. Lung Parenchyma and Pleura: Airspace consolidation present within the left lower lobe. Air bronchograms are present. Mild subsegmental atelectatic changes and scarring present within the right upper lobe as well as the left lower lobe. Moderate size right-sided pleural effusion present with small left-sided pleural effusion present. Granulomatous calcifications are present. Effusions. Upper Abdomen: No acute process. Soft tissues: Unremarkable. Osseous structures: No aggressive focal lytic or sclerotic osseous lesions.     Impression: 1. Airspace consolidation within the left lower lobe consistent with pneumonia. Follow-up to resolution recommended to exclude underlying nodule or mass given the configuration. 2. Moderate size right-sided pleural effusion with small left-sided pleural effusion. Chronic interstitial changes and scarring present. 3. Ancillary findings as described above. Electronically Signed: Arlette Altamirano MD  5/12/2024 8:51 PM EDT  Workstation ID: [id number]    XR Chest 1 View    Result Date: 5/12/2024  XR CHEST 1 VW Date of Exam: 5/12/2024 6:55 PM EDT Indication: SOA triage protocol Comparison: 5/6/2024 Findings:  Cardiomediastinal silhouette is unchanged. There is pulmonary vascular congestion with interstitial coarsening, similar to prior examination. Slight worsening of basilar airspace disease with persistent small to moderate left effusion. No airspace disease, pneumothorax, nor pleural effusion. No acute osseous abnormality identified.     Impression: Slight worsening of basilar airspace disease suggestive of ongoing pneumonia with persistent small left effusion. Electronically Signed: Nir Ibarra MD  5/12/2024 7:35 PM EDT  Workstation ID: [id number]    XR Chest PA & Lateral    Result Date: 5/6/2024  XR CHEST PA AND LATERAL Date of Exam: 5/6/2024 1:30 PM EDT Indication: pleural effusion/ recurrent pna Comparison: March 10, 2024 Findings: Chronic pulmonary changes are present. There is some scarring in the right upper lung. There is a left basilar opacity with small left pleural effusion. The heart and mediastinal contours appear normal. The pulmonary vasculature appears normal. The osseous structures appear intact.     Impression: Left basilar opacity with small left pleural effusion, which could reflect atelectasis or pneumonia. Electronically Signed: Clyde Murrieta MD  5/6/2024 6:08 PM EDT  Workstation ID: [id number]             Results for orders placed during the hospital encounter of 03/07/24    Adult Transthoracic Echo Complete W/ Cont if Necessary Per Protocol    Interpretation Summary    Left ventricular systolic function is normal. Calculated left ventricular EF = 69.1% Normal left ventricular cavity size noted. Left ventricular wall thickness is consistent with mild concentric hypertrophy. Elevated left atrial pressure.    The aortic valve exhibits sclerosis. There is moderate calcification of the aortic valve. There is thickening of the aortic valve. The aortic valve appears trileaflet. No aortic valve regurgitation is present. Mild aortic valve stenosis is present    The tricuspid valve is normal in  structure. Moderate tricuspid valve regurgitation is present. Estimated right ventricular systolic pressure from tricuspid regurgitation is markedly elevated (>55 mmHg).    Pending Labs       Order Current Status    Blood Culture - Blood, Arm, Left Preliminary result    Blood Culture - Blood, Wrist, Left Preliminary result          Discharge Details        Discharge Medications        Changes to Medications        Instructions Start Date   esomeprazole 40 MG capsule  Commonly known as: nexIUM  What changed: when to take this   40 mg, Oral, 2 Times Daily Before Meals      furosemide 20 MG tablet  Commonly known as: LASIX  What changed: how much to take   40 mg, Oral, Daily             Continue These Medications        Instructions Start Date   apixaban 5 MG tablet tablet  Commonly known as: ELIQUIS   Take 1 tablet by mouth Every 12 (Twelve) Hours as directed for Atrial Fibrillation      Baclofen 5 MG tablet  Commonly known as: LIORESAL   5 mg, Oral, Every 8 Hours Scheduled      busPIRone 5 MG tablet  Commonly known as: BUSPAR   5 mg, Oral, Daily PRN      Bystolic 10 MG tablet  Generic drug: nebivolol   10 mg, Oral, Daily      fluticasone 50 MCG/ACT nasal spray  Commonly known as: FLONASE   2 sprays, Nasal, Daily      levocetirizine 5 MG tablet  Commonly known as: XYZAL   2.5 mg, Oral, Every Evening      montelukast 10 MG tablet  Commonly known as: SINGULAIR   10 mg, Oral, Every Evening      potassium chloride 10 MEQ CR tablet  Commonly known as: KLOR-CON M10   10 mEq, Oral, Daily      simvastatin 40 MG tablet  Commonly known as: ZOCOR   40 mg, Oral, Every Evening      SITagliptin 100 MG tablet  Commonly known as: Januvia   100 mg, Oral, Daily      tamsulosin 0.4 MG capsule 24 hr capsule  Commonly known as: FLOMAX   0.4 mg, Oral, Daily      Toujeo Max SoloStar 300 UNIT/ML solution pen-injector injection  Generic drug: Insulin Glargine (2 Unit Dial)   16 Units, Subcutaneous, Daily      traZODone 50 MG tablet  Commonly  known as: DESYREL   TAKE 1 TO 2 TABLETS BY MOUTH AT  BEDTIME AS NEEDED FOR INSOMNIA               Allergies   Allergen Reactions    Penicillins Swelling     Patient has tolerated PO Cefdinir    Sulfa Antibiotics Swelling         Discharge Disposition:  Home or Self Care    Diet:  Hospital:  Diet Order   Procedures    Diet: Diabetic, Cardiac; Healthy Heart (2-3 Na+); Consistent Carbohydrate; No Mixed Consistencies; Texture: Soft to Chew (NDD 3); Soft to Chew: Chopped Meat; Fluid Consistency: Thin (IDDSI 0)       Diet Instructions       Diet: Cardiac Diets, Diabetic Diets; Healthy Heart (2-3 Na+); Soft to Chew (NDD 3); Chopped Meat; Thin (IDDSI 0); Consistent Carbohydrate      Discharge Diet:  Cardiac Diets  Diabetic Diets       Cardiac Diet: Healthy Heart (2-3 Na+)    Texture: Soft to Chew (NDD 3)    Soft to Chew: Chopped Meat    Fluid Consistency: Thin (IDDSI 0)    Diabetic Diet: Consistent Carbohydrate             Activity:  Activity Instructions       Activity as Tolerated                   CODE STATUS:    Code Status and Medical Interventions:   Ordered at: 05/12/24 7114     Level Of Support Discussed With:    Patient     Code Status (Patient has no pulse and is not breathing):    CPR (Attempt to Resuscitate)     Medical Interventions (Patient has pulse or is breathing):    Full Support       Future Appointments   Date Time Provider Department Center   10/10/2024  2:00 PM Dakota Moore MD [facility]   3/14/2025  2:30 PM Lukasz Singh MD [facility]       Additional Instructions for the Follow-ups that You Need to Schedule       Ambulatory Referral to Home Health   As directed      Face to Face Visit Date: 5/15/2024   Follow-up provider for Plan of Care?: I treated the patient in an acute care facility and will not continue treatment after discharge.   Follow-up provider: JESSICA PETERS [[id number]]   Reason/Clinical Findings: Acute on chronic heart failure with preserved ejection fraction, Esophageal  dysphagia, D2M, CKD   Describe mobility limitations that make leaving home difficult: Acute on chronic heart failure with preserved ejection fraction, Esophageal dysphagia, D2M, CKD   Nursing/Therapeutic Services Requested: Skilled Nursing Physical Therapy Occupational Therapy Speech Therapy   Skilled nursing orders: Cardiopulmonary assessments Neurovascular assessments CHF management   PT orders: Strengthening Home safety assessment Therapeutic exercise   Occupational orders: Activities of daily living Home safety assessment Strengthening   SLP orders: Dysphagia therapy   Frequency: 1 Week 1        Discharge Follow-up with PCP   As directed       Currently Documented PCP:    Ev Ventura MD    PCP Phone Number:    [phone]     Follow Up Details: 1 week        Discharge Follow-up with Specified Provider: CardiologyDr. Singh in 1-2 months - F/U admission for decompensated CHF   As directed      To: CardiologyDr. Singh in 1-2 months - F/U admission for decompensated CHF        Discharge Follow-up with Specified Provider: LIANE Taveras 4-6 weeks   As directed      To: LIANE Taveras 4-6 weeks                      Love Schmidt MD  05/15/24      Time Spent on Discharge:  I spent  20  minutes on this discharge activity which included: face-to-face encounter with the patient, reviewing the data in the system, coordination of the care with the nursing staff as well as consultants, documentation, and entering orders.            Electronically signed by Love Schmidt MD at 05/15/24 6021

## 2024-05-15 NOTE — DISCHARGE SUMMARY
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]  Yes    Pharyngeal dysphagia secondary to large cervical osteophyte [R13.13]  Yes    Type 2 diabetes mellitus without complication, with long-term current use of insulin [E11.9, Z79.4]  Not Applicable    Pleural effusion, bilateral [J90]  Yes    HLD (hyperlipidemia) [E78.5]  Yes    CKD (chronic kidney disease) stage 3, GFR 30-59 ml/min [N18.30]  Yes    Atrial fibrillation [I48.91]  Yes    PVC's (premature ventricular contractions) [I49.3]  Yes    GERD (gastroesophageal reflux disease) [K21.9]  Yes    COPD (chronic obstructive pulmonary disease) [J44.9]  Yes    Primary hypertension [I10]  Yes      Resolved Hospital Problems    Diagnosis Date Resolved POA    **Acute on chronic heart failure with preserved ejection fraction (HFpEF) [I50.33] 05/15/2024 Yes    Acute on chronic heart failure with preserved ejection fraction [I50.33] 05/15/2024 Yes    Esophageal dysphagia [R13.19] 05/15/2024 Unknown    Recurrent pneumonia [J18.9] 05/15/2024 Yes          Hospital Course:  Ricki Trotter is a 88 y.o. male with PMH significant for atrial fibrillation on Eliquis, BPH, HTN, HLD, hypothyroidism, GERD, history of aspiration pneumonia, pulmonary hypertension, COPD, and insulin-dependent diabetes mellitus who presents to the ED due to shortness of breath.       Acute on chronic HFpEF   Bilateral pleural effusions, right greater than left  Pulmonary hypertension  --proBNP elevated 8099  --CT chest with moderate right  pleural effusion and small left pleural effusion; LLL consolidation concerning for pneumonia  --Given 40 mg IV Lasix in ER  --s/p diuresis with IV Lasix 40 mg Lasix twice daily - switched to PO Lasix 40 mg daily (increased from home dose 20 mg daily) which we will continue at discharge, continue daily potassium 10 mEq  --Echo 3/8/24: EF 69%, mild AS, moderate TR, markedly elevated RVSP >55 mmHg  --IR performed right-sided thoracentesis 5/13/24, 900 mL removed; no fluid studies were sent however  --Defer V/Q scan at this time given symptoms improved with thoracentesis, PE is unlikely  --Stable on room air     Hypokalemia  --Replaced per protocol     Questionable LLL pneumonia vs chronic finding  COPD  --History of aspiration/recurrent pneumonia  --CT chest with moderate right pleural effusion and small left pleural effusion; LLL consolidation concerning for pneumonia however does not appear significantly changed from CT scan 3/7/24   --Of note patient follows with Pulmonology and was recently seen on 5/6/24: they mention a chronic masslike density in the LLL with associated bronchiectasis and pleural thickening - they have been following this area for many years and it has remained stable  --WBC 13K on admission, however procal and lactate are WNL  --I think his symptoms are more likely related to CHF/effusions rather than PNA, but given his history we treated with Rocephin and Doxycycline x 3 days, will discontinue ATBx at discharge  --Respiratory PCR panel negative  --Negative urine Strep and Legionella  --Blood cultures NGTD  --Duonebs  --Stable on room air     Atrial fibrillation  --Continue Eliquis, Bystolic     CKD stage III  --Baseline Cr ~1.3  --Cr currently 1.22, continue to monitor as outpatient with increased Lasix dose     GERD  Pharyngeal dysphagia secondary to large cervical osteophyte  --Family reports worsening reflux symptoms   --Family requested GI consult for further management  --SLP performed MBS  5/14/24 which revealed a large cervical osteophyte C4-5 causing his pharyngeal dysphagia - treatment is supportive care/diet modification - recommend soft to chew textures, chopped meat, thin liquids, no mixed consistencies, general aspiration precautions  --GI recommends increase home Nexium to 40 mg BID and F/U with Dr. Taveras in 4-6 weeks; no indication for EGD at this time as his dysphagia is more pharyngeal, no esophageal stricture seen per MBS done this admission     HTN  HLD  --Continue home meds     Diabetes mellitus type 2  --HbA1c 5.8%      Discharge Follow Up Recommendations for outpatient labs/diagnostics:  F/U with PCP 1 week with repeat BMP  F/U with Cardiology/Dr. Singh in 1-2 months  F/U with GI Dr. Taveras 4-6 weeks    Day of Discharge     HPI:   Seen this morning. No complaints. Already saw speech therapy.     Review of Systems  Gen-no fevers, no chills  CV-no chest pain, no palpitations  Resp-no cough, no dyspnea  GI-no N/V/D, no abd pain      Vital Signs:   Temp:  [97.3 °F (36.3 °C)-98 °F (36.7 °C)] 97.8 °F (36.6 °C)  Heart Rate:  [] 87  Resp:  [16-18] 16  BP: ()/(57-77) 144/67      Physical Exam:  Gen-no acute distress  HENT-NCAT, mucous membranes moist  CV-RRR, S1 S2 normal, no m/r/g  Resp-CTAB, no wheezes or rales  Abd-soft, NT, ND, +BS  Ext-no edema  Neuro-A&Ox3, no focal deficits  Skin-no rashes  Psych-appropriate mood      Pertinent  and/or Most Recent Results     LAB RESULTS:      Lab 05/15/24  0348 05/14/24  0430 05/13/24  0757 05/12/24  1930   WBC 8.11 5.77 10.10 13.38*   HEMOGLOBIN 10.0* 9.3* 10.2* 11.1*   HEMATOCRIT 31.4* 28.6* 31.5* 34.8*   PLATELETS 159 144 166 186   NEUTROS ABS  --   --  6.75 10.37*   IMMATURE GRANS (ABS)  --   --  0.07* 0.12*   LYMPHS ABS  --   --  2.47 2.02   MONOS ABS  --   --  0.74 0.80   EOS ABS  --   --  0.05 0.04   MCV 90.2 87.7 89.0 90.6   PROCALCITONIN  --   --   --  0.06   LACTATE  --   --   --  1.7   PROTIME  --   --  18.7*  --    D DIMER QUANT   --   --   --  2.25*         Lab 05/15/24  0348 05/14/24  1528 05/14/24  0430 05/13/24  0757 05/12/24 1930   SODIUM 134*  --  136 135* 136   POTASSIUM 3.8 4.1 3.4* 3.9 4.4   CHLORIDE 97*  --  98 96* 98   CO2 26.0  --  28.0 28.0 27.0   ANION GAP 11.0  --  10.0 11.0 11.0   BUN 19  --  19 15 13   CREATININE 1.22  --  1.37* 1.16 1.22   EGFR 57.0*  --  49.6* 60.6 57.0*   GLUCOSE 82  --  66 64* 115*   CALCIUM 8.7  --  8.5* 8.5* 9.1   MAGNESIUM  --   --   --  1.7  --    PHOSPHORUS  --   --   --  3.6  --    HEMOGLOBIN A1C  --   --   --  5.80*  --          Lab 05/12/24 1930   TOTAL PROTEIN 6.4   ALBUMIN 4.1   GLOBULIN 2.3   ALT (SGPT) 7   AST (SGOT) 11   BILIRUBIN 0.7   ALK PHOS 73         Lab 05/13/24  0757 05/12/24 1930   PROBNP  --  8,099.0*   HSTROP T  --  32*   PROTIME 18.7*  --    INR 1.55*  --          Lab 05/13/24 0757   CHOLESTEROL 102   LDL CHOL 44   HDL CHOL 44   TRIGLYCERIDES 64             Brief Urine Lab Results  (Last result in the past 365 days)        Color   Clarity   Blood   Leuk Est   Nitrite   Protein   CREAT   Urine HCG        04/10/24 1604 Yellow   Clear   Negative   Moderate (2+)   Negative   Negative                 Microbiology Results (last 10 days)       Procedure Component Value - Date/Time    S. Pneumo Ag Urine or CSF - Urine, Urine, Clean Catch [[id number]]  (Normal) Collected: 05/12/24 2207    Lab Status: Final result Specimen: Urine, Clean Catch Updated: 05/13/24 0945     Strep Pneumo Ag Negative    Legionella Antigen, Urine - Urine, Urine, Clean Catch [[id number]]  (Normal) Collected: 05/12/24 2207    Lab Status: Final result Specimen: Urine, Clean Catch Updated: 05/13/24 0942     LEGIONELLA ANTIGEN, URINE Negative    COVID PRE-OP / PRE-PROCEDURE SCREENING ORDER (NO ISOLATION) - Swab, Nasopharynx [[id number]]  (Normal) Collected: 05/12/24 2158    Lab Status: Final result Specimen: Swab from Nasopharynx Updated: 05/12/24 2305    Narrative:      The following orders were created for panel  order COVID PRE-OP / PRE-PROCEDURE SCREENING ORDER (NO ISOLATION) - Swab, Nasopharynx.  Procedure                               Abnormality         Status                     ---------                               -----------         ------                     Respiratory Panel PCR w/...[[id number]]  Normal              Final result                 Please view results for these tests on the individual orders.    Respiratory Panel PCR w/COVID-19(SARS-CoV-2) [facility]/AMY In-House, NP Swab in UTM/VTM, 2 HR TAT - Swab, Nasopharynx [[id number]]  (Normal) Collected: 05/12/24 5826    Lab Status: Final result Specimen: Swab from Nasopharynx Updated: 05/12/24 5200     ADENOVIRUS, PCR Not Detected     Coronavirus 229E Not Detected     Coronavirus HKU1 Not Detected     Coronavirus NL63 Not Detected     Coronavirus OC43 Not Detected     COVID19 Not Detected     Human Metapneumovirus Not Detected     Human Rhinovirus/Enterovirus Not Detected     Influenza A PCR Not Detected     Influenza B PCR Not Detected     Parainfluenza Virus 1 Not Detected     Parainfluenza Virus 2 Not Detected     Parainfluenza Virus 3 Not Detected     Parainfluenza Virus 4 Not Detected     RSV, PCR Not Detected     Bordetella pertussis pcr Not Detected     Bordetella parapertussis PCR Not Detected     Chlamydophila pneumoniae PCR Not Detected     Mycoplasma pneumo by PCR Not Detected    Narrative:      In the setting of a positive respiratory panel with a viral infection PLUS a negative procalcitonin without other underlying concern for bacterial infection, consider observing off antibiotics or discontinuation of antibiotics and continue supportive care. If the respiratory panel is positive for atypical bacterial infection (Bordetella pertussis, Chlamydophila pneumoniae, or Mycoplasma pneumoniae), consider antibiotic de-escalation to target atypical bacterial infection.    Blood Culture - Blood, Wrist, Left [[id number]]  (Normal) Collected:  05/12/24 2145    Lab Status: Preliminary result Specimen: Blood from Wrist, Left Updated: 05/14/24 2200     Blood Culture No growth at 2 days    Narrative:      Less than seven (7) mL's of blood was collected.  Insufficient quantity may yield false negative results.    COVID-19 and FLU A/B PCR, 1 HR TAT - Swab, Nasopharynx [[id number]]  (Normal) Collected: 05/12/24 1934    Lab Status: Final result Specimen: Swab from Nasopharynx Updated: 05/12/24 2125     COVID19 Not Detected     Influenza A PCR Not Detected     Influenza B PCR Not Detected    Narrative:      Fact sheet for providers: https://www.fda.gov/media/258850/download    Fact sheet for patients: https://www.fda.gov/media/029762/download    Test performed by PCR.    Blood Culture - Blood, Arm, Left [[id number]]  (Normal) Collected: 05/12/24 1930    Lab Status: Preliminary result Specimen: Blood from Arm, Left Updated: 05/14/24 2200     Blood Culture No growth at 2 days    Narrative:      Less than seven (7) mL's of blood was collected.  Insufficient quantity may yield false negative results.            NM Lung Scan Perfusion Particulate    Result Date: 5/14/2024  DATE OF EXAM: 5/13/2024 12:35 PM EDT PROCEDURE: NM LUNG SCAN PERFUSION PARTICULATE INDICATIONS: eval for PE COMPARISON: Chest radiograph 5/13/2024 TECHNIQUE: 5.19 mCi of technetium 99m labeled MAA was administered intravenously for the perfusion portion of the pulmonary exam. Scintigraphic images of the lungs were obtained in multiple projections to assess perfusion. FINDINGS: Review of the images show areas of linear scarring in the right upper lobe. There is blunting of the left costophrenic angle with some scarring peripherally at the left lung base. These findings are evident on the patient's chest radiograph. 40.5% of counts localized to the left lung and 59.5% of counts localized to the right lung. Differential counts in the upper middle and lower lung fields were acquired and are available for  review.     1. Chronic right upper lobe scarring. Left pleural effusion and left basilar scarring. 2. Differential perfusion with 59.5% of counts on the right and 40.5% of counts on the left. Electronically Signed: Paresh Suarez MD  5/14/2024 9:46 AM EDT  Workstation ID: [id number]    US Thoracentesis    Result Date: 5/13/2024  US THORACENTESIS Date of Exam: 5/13/2024 1:01 PM EDT Indication: right. Pleural effusion Comparison: Chest CT 5/12/2024 Technique: The procedure, risks and options were discussed with the patient and informed written consent was obtained. The patient was placed in the seated position and ultrasound was performed of the right chest. Moderate to large right-sided pleural effusion was identified. A site was chosen and the skin was marked, prepped and draped. Using maximal sterile barrier technique and under local anesthesia a 5 Sinhala catheter was inserted by trocar technique. A total of 900 cc of straw-colored fluid was removed. Appropriate specimens were sent to the lab. The catheter was removed and hemostasis achieved. Post procedure chest radiograph is pending.     Impression: Technically successful right thoracentesis with removal of 900 cc of fluid. Electronically Signed: Paersh Suarez MD  5/13/2024 1:44 PM EDT  Workstation ID: [id number]    XR Chest 1 View    Result Date: 5/13/2024  XR CHEST 1 VW Date of Exam: 5/13/2024 1:13 PM EDT Indication: s/p thoracentesis (right) Comparison: Chest CT and chest radiograph 5/12/2024. Findings: Cardiomediastinal silhouette is unchanged. Essentially resolved right pleural effusion after thoracentesis. Unchanged small left pleural effusion and left basilar airspace disease that is suspicious for pneumonia. Scarring/subsegmental atelectasis in the  right mid/upper lung. No pneumothorax. Osseous structures are unchanged.     Impression: Essentially resolved right pleural effusion after thoracentesis without pneumothorax. Remainder of examination is unchanged  compared to yesterday's imaging. Electronically Signed: Donato Crowe MD  5/13/2024 1:40 PM EDT  Workstation ID: [id number]    CT Chest Without Contrast Diagnostic    Result Date: 5/12/2024  CT CHEST WO CONTRAST DIAGNOSTIC Date of Exam: 5/12/2024 8:34 PM EDT Indication: acute dyspnea, cough, fatigue, eval XR findings of interstitial changes, effusion. Comparison: 5/12/2024. Technique: Axial CT images were obtained of the chest without contrast administration.  Reconstructed coronal and sagittal images were also obtained. Automated exposure control and iterative construction methods were used. Findings: Hilum and Mediastinum: No pathologically enlarged lymph nodes. The heart appears mildly enlarged. Atherosclerotic calcifications are present including within the coronary arteries.   No pericardial effusion.  Unremarkable thoracic aorta and pulmonary arteries. Lung Parenchyma and Pleura: Airspace consolidation present within the left lower lobe. Air bronchograms are present. Mild subsegmental atelectatic changes and scarring present within the right upper lobe as well as the left lower lobe. Moderate size right-sided pleural effusion present with small left-sided pleural effusion present. Granulomatous calcifications are present. Effusions. Upper Abdomen: No acute process. Soft tissues: Unremarkable. Osseous structures: No aggressive focal lytic or sclerotic osseous lesions.     Impression: 1. Airspace consolidation within the left lower lobe consistent with pneumonia. Follow-up to resolution recommended to exclude underlying nodule or mass given the configuration. 2. Moderate size right-sided pleural effusion with small left-sided pleural effusion. Chronic interstitial changes and scarring present. 3. Ancillary findings as described above. Electronically Signed: Arlette Altamirano MD  5/12/2024 8:51 PM EDT  Workstation ID: [id number]    XR Chest 1 View    Result Date: 5/12/2024  XR CHEST 1 VW Date of Exam: 5/12/2024 6:55 PM  EDT Indication: SOA triage protocol Comparison: 5/6/2024 Findings: Cardiomediastinal silhouette is unchanged. There is pulmonary vascular congestion with interstitial coarsening, similar to prior examination. Slight worsening of basilar airspace disease with persistent small to moderate left effusion. No airspace disease, pneumothorax, nor pleural effusion. No acute osseous abnormality identified.     Impression: Slight worsening of basilar airspace disease suggestive of ongoing pneumonia with persistent small left effusion. Electronically Signed: Nir Ibarra MD  5/12/2024 7:35 PM EDT  Workstation ID: [id number]    XR Chest PA & Lateral    Result Date: 5/6/2024  XR CHEST PA AND LATERAL Date of Exam: 5/6/2024 1:30 PM EDT Indication: pleural effusion/ recurrent pna Comparison: March 10, 2024 Findings: Chronic pulmonary changes are present. There is some scarring in the right upper lung. There is a left basilar opacity with small left pleural effusion. The heart and mediastinal contours appear normal. The pulmonary vasculature appears normal. The osseous structures appear intact.     Impression: Left basilar opacity with small left pleural effusion, which could reflect atelectasis or pneumonia. Electronically Signed: Clyde Murrieta MD  5/6/2024 6:08 PM EDT  Workstation ID: [id number]             Results for orders placed during the hospital encounter of 03/07/24    Adult Transthoracic Echo Complete W/ Cont if Necessary Per Protocol    Interpretation Summary    Left ventricular systolic function is normal. Calculated left ventricular EF = 69.1% Normal left ventricular cavity size noted. Left ventricular wall thickness is consistent with mild concentric hypertrophy. Elevated left atrial pressure.    The aortic valve exhibits sclerosis. There is moderate calcification of the aortic valve. There is thickening of the aortic valve. The aortic valve appears trileaflet. No aortic valve regurgitation is present. Mild aortic  valve stenosis is present    The tricuspid valve is normal in structure. Moderate tricuspid valve regurgitation is present. Estimated right ventricular systolic pressure from tricuspid regurgitation is markedly elevated (>55 mmHg).    Pending Labs       Order Current Status    Blood Culture - Blood, Arm, Left Preliminary result    Blood Culture - Blood, Wrist, Left Preliminary result          Discharge Details        Discharge Medications        Changes to Medications        Instructions Start Date   esomeprazole 40 MG capsule  Commonly known as: nexIUM  What changed: when to take this   40 mg, Oral, 2 Times Daily Before Meals      furosemide 20 MG tablet  Commonly known as: LASIX  What changed: how much to take   40 mg, Oral, Daily             Continue These Medications        Instructions Start Date   apixaban 5 MG tablet tablet  Commonly known as: ELIQUIS   Take 1 tablet by mouth Every 12 (Twelve) Hours as directed for Atrial Fibrillation      Baclofen 5 MG tablet  Commonly known as: LIORESAL   5 mg, Oral, Every 8 Hours Scheduled      busPIRone 5 MG tablet  Commonly known as: BUSPAR   5 mg, Oral, Daily PRN      Bystolic 10 MG tablet  Generic drug: nebivolol   10 mg, Oral, Daily      fluticasone 50 MCG/ACT nasal spray  Commonly known as: FLONASE   2 sprays, Nasal, Daily      levocetirizine 5 MG tablet  Commonly known as: XYZAL   2.5 mg, Oral, Every Evening      montelukast 10 MG tablet  Commonly known as: SINGULAIR   10 mg, Oral, Every Evening      potassium chloride 10 MEQ CR tablet  Commonly known as: KLOR-CON M10   10 mEq, Oral, Daily      simvastatin 40 MG tablet  Commonly known as: ZOCOR   40 mg, Oral, Every Evening      SITagliptin 100 MG tablet  Commonly known as: Januvia   100 mg, Oral, Daily      tamsulosin 0.4 MG capsule 24 hr capsule  Commonly known as: FLOMAX   0.4 mg, Oral, Daily      Toujeo Max SoloStar 300 UNIT/ML solution pen-injector injection  Generic drug: Insulin Glargine (2 Unit Dial)   16  Units, Subcutaneous, Daily      traZODone 50 MG tablet  Commonly known as: DESYREL   TAKE 1 TO 2 TABLETS BY MOUTH AT  BEDTIME AS NEEDED FOR INSOMNIA               Allergies   Allergen Reactions    Penicillins Swelling     Patient has tolerated PO Cefdinir    Sulfa Antibiotics Swelling         Discharge Disposition:  Home or Self Care    Diet:  Hospital:  Diet Order   Procedures    Diet: Diabetic, Cardiac; Healthy Heart (2-3 Na+); Consistent Carbohydrate; No Mixed Consistencies; Texture: Soft to Chew (NDD 3); Soft to Chew: Chopped Meat; Fluid Consistency: Thin (IDDSI 0)       Diet Instructions       Diet: Cardiac Diets, Diabetic Diets; Healthy Heart (2-3 Na+); Soft to Chew (NDD 3); Chopped Meat; Thin (IDDSI 0); Consistent Carbohydrate      Discharge Diet:  Cardiac Diets  Diabetic Diets       Cardiac Diet: Healthy Heart (2-3 Na+)    Texture: Soft to Chew (NDD 3)    Soft to Chew: Chopped Meat    Fluid Consistency: Thin (IDDSI 0)    Diabetic Diet: Consistent Carbohydrate             Activity:  Activity Instructions       Activity as Tolerated                   CODE STATUS:    Code Status and Medical Interventions:   Ordered at: 05/12/24 6086     Level Of Support Discussed With:    Patient     Code Status (Patient has no pulse and is not breathing):    CPR (Attempt to Resuscitate)     Medical Interventions (Patient has pulse or is breathing):    Full Support       Future Appointments   Date Time Provider Department Center   10/10/2024  2:00 PM Dakota Moore MD [facility]   3/14/2025  2:30 PM Lukasz Singh MD [facility]       Additional Instructions for the Follow-ups that You Need to Schedule       Ambulatory Referral to Home Health   As directed      Face to Face Visit Date: 5/15/2024   Follow-up provider for Plan of Care?: I treated the patient in an acute care facility and will not continue treatment after discharge.   Follow-up provider: JESSICA PETERS [[id number]]   Reason/Clinical Findings: Acute on chronic  heart failure with preserved ejection fraction, Esophageal dysphagia, D2M, CKD   Describe mobility limitations that make leaving home difficult: Acute on chronic heart failure with preserved ejection fraction, Esophageal dysphagia, D2M, CKD   Nursing/Therapeutic Services Requested: Skilled Nursing Physical Therapy Occupational Therapy Speech Therapy   Skilled nursing orders: Cardiopulmonary assessments Neurovascular assessments CHF management   PT orders: Strengthening Home safety assessment Therapeutic exercise   Occupational orders: Activities of daily living Home safety assessment Strengthening   SLP orders: Dysphagia therapy   Frequency: 1 Week 1        Discharge Follow-up with PCP   As directed       Currently Documented PCP:    Ev Ventura MD    PCP Phone Number:    [phone]     Follow Up Details: 1 week        Discharge Follow-up with Specified Provider: CardiologyDr. Singh in 1-2 months - F/U admission for decompensated CHF   As directed      To: CardiologyDr. Singh in 1-2 months - F/U admission for decompensated CHF        Discharge Follow-up with Specified Provider: LIANE Taveras 4-6 weeks   As directed      To: LIANE Taveras 4-6 weeks                      Love Schmidt MD  05/15/24      Time Spent on Discharge:  I spent  20  minutes on this discharge activity which included: face-to-face encounter with the patient, reviewing the data in the system, coordination of the care with the nursing staff as well as consultants, documentation, and entering orders.

## 2024-05-15 NOTE — CASE MANAGEMENT/SOCIAL WORK
Case Management Discharge Note      Final Note: I met with pt and pt's daughter, in room regarding discharge plan. Pt is wanting to go home with HH arranged and has no preference to a particular HH agency. A referrals was made to [facility] and spoke with Ana M. [facility] accepted referral for skilled nursing, PT, OT and speech. Ana M is aware pt is being discharged today. [facility] will be in contact with pt to arrange HH visits. Pt's daughter will transport pt home and states she will stay with pt if needed. Pt agreeable to discharge plan         Selected Continued Care - Discharged on 5/15/2024 Admission date: 5/12/2024 - Discharge disposition: Home or Self Care      Destination    No services have been selected for the patient.                Durable Medical Equipment    No services have been selected for the patient.                Dialysis/Infusion    No services have been selected for the patient.                Home Medical Care       Service Provider Selected Services Address Phone Fax Patient Preferred    [facility] [address], [place] [id number] [phone] --              Therapy    No services have been selected for the patient.                Community Resources    No services have been selected for the patient.                Community & DME    No services have been selected for the patient.                         Final Discharge Disposition Code: 06 - home with home health care

## 2024-05-16 ENCOUNTER — TRANSITIONAL CARE MANAGEMENT TELEPHONE ENCOUNTER (OUTPATIENT)
Dept: CALL CENTER | Facility: HOSPITAL | Age: 89
End: 2024-05-16
Payer: MEDICARE

## 2024-05-16 NOTE — OUTREACH NOTE
Call Center TCM Note      Flowsheet Row Responses   [facility] patient discharged from? [place]   Does the patient have one of the following disease processes/diagnoses(primary or secondary)? CHF   TCM attempt successful? Yes   Call start time 1036   Call end time 1038   Discharge diagnosis A/C CHF   Is patient permission given to speak with other caregiver? Yes   List who call center can speak with daughter- Paty   Person spoke with today (if not patient) and relationship daughter   Does the patient have all medications ordered at discharge? Yes   Is the patient taking all medications as directed (includes completed medication regime)? Yes   Comments Hospital follow up appt with PCP for 5/23   Does the patient have an appointment with their PCP within 7-14 days of discharge? Yes   What is the Home health agency?  [facility]    Has home health visited the patient within 72 hours of discharge? Call prior to 72 hours   Psychosocial issues? No   Did the patient receive a copy of their discharge instructions? Yes   Nursing interventions Reviewed instructions with patient   What is the patient's perception of their health status since discharge? Improving   Nursing interventions Nurse provided patient education   Is the patient able to teach back signs and symptoms of worsening condition? (i.e. weight gain, shortness of air, etc.) Yes   Is the patient/caregiver able to teach back the hierarchy of who to call/visit for symptoms/problems? PCP, Specialist, Home health nurse, Urgent Care, ED, 911 Yes   CHF Zone this Call Green Zone   Green Zone Patient reports doing well, No new or worsening shortness of breath, Physical activity level is normal for you, No new swelling -  feet, ankles and legs look normal for you, Weight check stable, No chest pain   Green Zone Interventions Daily weight check, Meds as directed, Follow up visits planned, Low sodium diet   TCM call completed? Yes   Wrap up additional comments Per  daughter Paty, patient is doing well, no questions, states patient normally checks his weight daily, confirmed upcoming follow up appts including his hospital follow up appt with PCP on 5/23   Call end time 1038   Would this patient benefit from a Referral to [facility] Social Work? No   Is the patient interested in additional calls from an ambulatory ? No             Mary Noguera RN    5/16/2024, 10:38 EDT

## 2024-05-17 LAB
BACTERIA SPEC AEROBE CULT: NORMAL
BACTERIA SPEC AEROBE CULT: NORMAL

## 2024-05-21 ENCOUNTER — OFFICE VISIT (OUTPATIENT)
Dept: CARDIOLOGY | Facility: HOSPITAL | Age: 89
End: 2024-05-21
Payer: MEDICARE

## 2024-05-21 VITALS
DIASTOLIC BLOOD PRESSURE: 64 MMHG | HEART RATE: 50 BPM | RESPIRATION RATE: 15 BRPM | BODY MASS INDEX: 21.43 KG/M2 | OXYGEN SATURATION: 100 % | SYSTOLIC BLOOD PRESSURE: 139 MMHG | HEIGHT: 71 IN | WEIGHT: 153.06 LBS

## 2024-05-21 DIAGNOSIS — I50.32 CHRONIC DIASTOLIC CONGESTIVE HEART FAILURE: Primary | ICD-10-CM

## 2024-05-21 DIAGNOSIS — E78.2 MIXED HYPERLIPIDEMIA: ICD-10-CM

## 2024-05-21 DIAGNOSIS — I10 PRIMARY HYPERTENSION: ICD-10-CM

## 2024-05-21 DIAGNOSIS — I48.11 LONGSTANDING PERSISTENT ATRIAL FIBRILLATION: ICD-10-CM

## 2024-05-21 PROCEDURE — 1159F MED LIST DOCD IN RCRD: CPT | Performed by: NURSE PRACTITIONER

## 2024-05-21 PROCEDURE — 80048 BASIC METABOLIC PNL TOTAL CA: CPT | Performed by: NURSE PRACTITIONER

## 2024-05-21 PROCEDURE — 1160F RVW MEDS BY RX/DR IN RCRD: CPT | Performed by: NURSE PRACTITIONER

## 2024-05-21 PROCEDURE — 99214 OFFICE O/P EST MOD 30 MIN: CPT | Performed by: NURSE PRACTITIONER

## 2024-05-22 LAB
ANION GAP SERPL CALCULATED.3IONS-SCNC: 10.8 MMOL/L (ref 5–15)
BUN SERPL-MCNC: 15 MG/DL (ref 8–23)
BUN/CREAT SERPL: 12.3 (ref 7–25)
CALCIUM SPEC-SCNC: 8.9 MG/DL (ref 8.6–10.5)
CHLORIDE SERPL-SCNC: 99 MMOL/L (ref 98–107)
CO2 SERPL-SCNC: 24.2 MMOL/L (ref 22–29)
CREAT SERPL-MCNC: 1.22 MG/DL (ref 0.76–1.27)
EGFRCR SERPLBLD CKD-EPI 2021: 57 ML/MIN/1.73
GLUCOSE SERPL-MCNC: 99 MG/DL (ref 65–99)
POTASSIUM SERPL-SCNC: 4.3 MMOL/L (ref 3.5–5.2)
SODIUM SERPL-SCNC: 134 MMOL/L (ref 136–145)

## 2024-05-23 ENCOUNTER — OFFICE VISIT (OUTPATIENT)
Dept: FAMILY MEDICINE CLINIC | Facility: CLINIC | Age: 89
End: 2024-05-23
Payer: MEDICARE

## 2024-05-23 VITALS
WEIGHT: 154 LBS | DIASTOLIC BLOOD PRESSURE: 70 MMHG | HEART RATE: 63 BPM | BODY MASS INDEX: 21.56 KG/M2 | SYSTOLIC BLOOD PRESSURE: 112 MMHG | OXYGEN SATURATION: 97 % | HEIGHT: 71 IN

## 2024-05-23 DIAGNOSIS — R42 DIZZINESS: ICD-10-CM

## 2024-05-23 DIAGNOSIS — F41.1 GENERALIZED ANXIETY DISORDER: Chronic | ICD-10-CM

## 2024-05-23 DIAGNOSIS — J30.89 ENVIRONMENTAL AND SEASONAL ALLERGIES: Chronic | ICD-10-CM

## 2024-05-23 DIAGNOSIS — Z09 HOSPITAL DISCHARGE FOLLOW-UP: Primary | ICD-10-CM

## 2024-05-23 PROCEDURE — 1126F AMNT PAIN NOTED NONE PRSNT: CPT | Performed by: NURSE PRACTITIONER

## 2024-05-23 PROCEDURE — 99214 OFFICE O/P EST MOD 30 MIN: CPT | Performed by: NURSE PRACTITIONER

## 2024-05-23 RX ORDER — LORATADINE 10 MG/1
10 TABLET ORAL DAILY
Qty: 90 TABLET | Refills: 0 | Status: ON HOLD | OUTPATIENT
Start: 2024-05-23

## 2024-05-23 RX ORDER — BUSPIRONE HYDROCHLORIDE 5 MG/1
5 TABLET ORAL DAILY PRN
Qty: 30 TABLET | Refills: 0 | Status: SHIPPED | OUTPATIENT
Start: 2024-05-23 | End: 2024-05-23 | Stop reason: SDUPTHER

## 2024-05-23 RX ORDER — BUSPIRONE HYDROCHLORIDE 5 MG/1
5 TABLET ORAL DAILY PRN
Qty: 90 TABLET | Refills: 1 | Status: ON HOLD | OUTPATIENT
Start: 2024-05-23

## 2024-05-27 ENCOUNTER — APPOINTMENT (OUTPATIENT)
Dept: GENERAL RADIOLOGY | Facility: HOSPITAL | Age: 89
End: 2024-05-27
Payer: MEDICARE

## 2024-05-27 ENCOUNTER — HOSPITAL ENCOUNTER (EMERGENCY)
Facility: HOSPITAL | Age: 89
Discharge: HOME | End: 2024-05-27
Attending: EMERGENCY MEDICINE | Admitting: EMERGENCY MEDICINE
Payer: MEDICARE

## 2024-05-27 VITALS
BODY MASS INDEX: 20.72 KG/M2 | HEIGHT: 72 IN | RESPIRATION RATE: 18 BRPM | HEART RATE: 64 BPM | TEMPERATURE: 97.8 F | OXYGEN SATURATION: 95 % | DIASTOLIC BLOOD PRESSURE: 55 MMHG | WEIGHT: 153 LBS | SYSTOLIC BLOOD PRESSURE: 129 MMHG

## 2024-05-27 DIAGNOSIS — N18.31 STAGE 3A CHRONIC KIDNEY DISEASE: ICD-10-CM

## 2024-05-27 DIAGNOSIS — Z87.09 HISTORY OF COPD: ICD-10-CM

## 2024-05-27 DIAGNOSIS — I35.8 AORTIC HEART MURMUR: ICD-10-CM

## 2024-05-27 DIAGNOSIS — D64.9 CHRONIC ANEMIA: ICD-10-CM

## 2024-05-27 DIAGNOSIS — I50.9 ACUTE ON CHRONIC CONGESTIVE HEART FAILURE, UNSPECIFIED HEART FAILURE TYPE: ICD-10-CM

## 2024-05-27 DIAGNOSIS — R06.02 SHORTNESS OF BREATH: Primary | ICD-10-CM

## 2024-05-27 LAB
ALBUMIN SERPL-MCNC: 3.9 G/DL (ref 3.5–5.2)
ALBUMIN/GLOB SERPL: 1.6 G/DL
ALP SERPL-CCNC: 72 U/L (ref 39–117)
ALT SERPL W P-5'-P-CCNC: 8 U/L (ref 1–41)
ANION GAP SERPL CALCULATED.3IONS-SCNC: 9 MMOL/L (ref 5–15)
AST SERPL-CCNC: 13 U/L (ref 1–40)
BACTERIA UR QL AUTO: ABNORMAL /HPF
BASOPHILS # BLD AUTO: 0.03 10*3/MM3 (ref 0–0.2)
BASOPHILS NFR BLD AUTO: 0.3 % (ref 0–1.5)
BILIRUB SERPL-MCNC: 0.4 MG/DL (ref 0–1.2)
BILIRUB UR QL STRIP: NEGATIVE
BUN SERPL-MCNC: 14 MG/DL (ref 8–23)
BUN/CREAT SERPL: 10.5 (ref 7–25)
CALCIUM SPEC-SCNC: 8.5 MG/DL (ref 8.6–10.5)
CHLORIDE SERPL-SCNC: 101 MMOL/L (ref 98–107)
CLARITY UR: CLEAR
CO2 SERPL-SCNC: 27 MMOL/L (ref 22–29)
COLOR UR: YELLOW
CREAT SERPL-MCNC: 1.33 MG/DL (ref 0.76–1.27)
D-LACTATE SERPL-SCNC: 1 MMOL/L (ref 0.5–2)
DEPRECATED RDW RBC AUTO: 48.4 FL (ref 37–54)
EGFRCR SERPLBLD CKD-EPI 2021: 51.4 ML/MIN/1.73
EOSINOPHIL # BLD AUTO: 0.12 10*3/MM3 (ref 0–0.4)
EOSINOPHIL NFR BLD AUTO: 1.3 % (ref 0.3–6.2)
ERYTHROCYTE [DISTWIDTH] IN BLOOD BY AUTOMATED COUNT: 14.5 % (ref 12.3–15.4)
GLOBULIN UR ELPH-MCNC: 2.4 GM/DL
GLUCOSE SERPL-MCNC: 138 MG/DL (ref 65–99)
GLUCOSE UR STRIP-MCNC: NEGATIVE MG/DL
HCT VFR BLD AUTO: 33.2 % (ref 37.5–51)
HGB BLD-MCNC: 10.4 G/DL (ref 13–17.7)
HGB UR QL STRIP.AUTO: NEGATIVE
HOLD SPECIMEN: NORMAL
HYALINE CASTS UR QL AUTO: ABNORMAL /LPF
IMM GRANULOCYTES # BLD AUTO: 0.06 10*3/MM3 (ref 0–0.05)
IMM GRANULOCYTES NFR BLD AUTO: 0.7 % (ref 0–0.5)
KETONES UR QL STRIP: NEGATIVE
LEUKOCYTE ESTERASE UR QL STRIP.AUTO: ABNORMAL
LIPASE SERPL-CCNC: 23 U/L (ref 13–60)
LYMPHOCYTES # BLD AUTO: 1.74 10*3/MM3 (ref 0.7–3.1)
LYMPHOCYTES NFR BLD AUTO: 19.3 % (ref 19.6–45.3)
MCH RBC QN AUTO: 28.5 PG (ref 26.6–33)
MCHC RBC AUTO-ENTMCNC: 31.3 G/DL (ref 31.5–35.7)
MCV RBC AUTO: 91 FL (ref 79–97)
MONOCYTES # BLD AUTO: 0.48 10*3/MM3 (ref 0.1–0.9)
MONOCYTES NFR BLD AUTO: 5.3 % (ref 5–12)
NEUTROPHILS NFR BLD AUTO: 6.57 10*3/MM3 (ref 1.7–7)
NEUTROPHILS NFR BLD AUTO: 73.1 % (ref 42.7–76)
NITRITE UR QL STRIP: NEGATIVE
NRBC BLD AUTO-RTO: 0 /100 WBC (ref 0–0.2)
NT-PROBNP SERPL-MCNC: 6707 PG/ML (ref 0–1800)
PH UR STRIP.AUTO: 5.5 [PH] (ref 5–8)
PLATELET # BLD AUTO: 183 10*3/MM3 (ref 140–450)
PMV BLD AUTO: 10.3 FL (ref 6–12)
POTASSIUM SERPL-SCNC: 4.5 MMOL/L (ref 3.5–5.2)
PROCALCITONIN SERPL-MCNC: 0.05 NG/ML (ref 0–0.25)
PROT SERPL-MCNC: 6.3 G/DL (ref 6–8.5)
PROT UR QL STRIP: NEGATIVE
RBC # BLD AUTO: 3.65 10*6/MM3 (ref 4.14–5.8)
RBC # UR STRIP: ABNORMAL /HPF
REF LAB TEST METHOD: ABNORMAL
SODIUM SERPL-SCNC: 137 MMOL/L (ref 136–145)
SP GR UR STRIP: 1.01 (ref 1–1.03)
SQUAMOUS #/AREA URNS HPF: ABNORMAL /HPF
TROPONIN T SERPL HS-MCNC: 26 NG/L
TROPONIN T SERPL HS-MCNC: 32 NG/L
UROBILINOGEN UR QL STRIP: ABNORMAL
WBC # UR STRIP: ABNORMAL /HPF
WBC NRBC COR # BLD AUTO: 9 10*3/MM3 (ref 3.4–10.8)
WHOLE BLOOD HOLD COAG: NORMAL
WHOLE BLOOD HOLD SPECIMEN: NORMAL

## 2024-05-27 PROCEDURE — 71045 X-RAY EXAM CHEST 1 VIEW: CPT

## 2024-05-27 PROCEDURE — 83690 ASSAY OF LIPASE: CPT | Performed by: EMERGENCY MEDICINE

## 2024-05-27 PROCEDURE — 84145 PROCALCITONIN (PCT): CPT | Performed by: EMERGENCY MEDICINE

## 2024-05-27 PROCEDURE — 83880 ASSAY OF NATRIURETIC PEPTIDE: CPT | Performed by: EMERGENCY MEDICINE

## 2024-05-27 PROCEDURE — 83605 ASSAY OF LACTIC ACID: CPT | Performed by: EMERGENCY MEDICINE

## 2024-05-27 PROCEDURE — 94640 AIRWAY INHALATION TREATMENT: CPT

## 2024-05-27 PROCEDURE — 36415 COLL VENOUS BLD VENIPUNCTURE: CPT

## 2024-05-27 PROCEDURE — 80053 COMPREHEN METABOLIC PANEL: CPT | Performed by: EMERGENCY MEDICINE

## 2024-05-27 PROCEDURE — 85025 COMPLETE CBC W/AUTO DIFF WBC: CPT | Performed by: EMERGENCY MEDICINE

## 2024-05-27 PROCEDURE — 81001 URINALYSIS AUTO W/SCOPE: CPT | Performed by: EMERGENCY MEDICINE

## 2024-05-27 PROCEDURE — 93005 ELECTROCARDIOGRAM TRACING: CPT

## 2024-05-27 PROCEDURE — 99284 EMERGENCY DEPT VISIT MOD MDM: CPT

## 2024-05-27 PROCEDURE — 84484 ASSAY OF TROPONIN QUANT: CPT | Performed by: EMERGENCY MEDICINE

## 2024-05-27 PROCEDURE — 93005 ELECTROCARDIOGRAM TRACING: CPT | Performed by: EMERGENCY MEDICINE

## 2024-05-27 RX ORDER — IPRATROPIUM BROMIDE AND ALBUTEROL SULFATE 2.5; .5 MG/3ML; MG/3ML
3 SOLUTION RESPIRATORY (INHALATION) ONCE
Status: COMPLETED | OUTPATIENT
Start: 2024-05-27 | End: 2024-05-27

## 2024-05-27 RX ORDER — SODIUM CHLORIDE 0.9 % (FLUSH) 0.9 %
10 SYRINGE (ML) INJECTION AS NEEDED
Status: DISCONTINUED | OUTPATIENT
Start: 2024-05-27 | End: 2024-05-27 | Stop reason: HOSPADM

## 2024-05-27 RX ADMIN — NITROGLYCERIN 1 INCH: 20 OINTMENT TOPICAL at 17:21

## 2024-05-27 RX ADMIN — IPRATROPIUM BROMIDE AND ALBUTEROL SULFATE 3 ML: 2.5; .5 SOLUTION RESPIRATORY (INHALATION) at 17:24

## 2024-05-27 NOTE — ASSESSMENT & PLAN NOTE
Patient reports he lived in [place] for many years, moved to [place] a few years ago and has since been struggling with seasonal allergies.  He does not take any daily antihistamines, however, he and his daughter both feel this would likely be a good thing for him to try.  We discussed risks, benefits, and possible side effects.  Will initiate Claritin 10 mg daily and monitor for effectiveness.

## 2024-05-27 NOTE — PROGRESS NOTES
"    Office Note     Name: Ricki Trotter    : 1935     MRN: [id number]     Chief Complaint  Hospital Follow Up Visit (Pt is here for a follow up visit from hospital. Pt states was feeling better until 2 days ago he started to noticed his balance was off ), Balance Issues (Pt states he's been having balancing issues, Pt states he notices it the most when hes walking ), and Allergies (Pt has been having rough allergies for months now)    Subjective     History of Present Illness:  Ricki Trotter is a 88 y.o. male who presents today for hospital follow-up.      Objective     Past Medical History:   Diagnosis Date    Allergic rhinitis     Amnesia     Arrhythmia     Atrial fibrillation     Basal cell carcinoma     Benign prostatic hyperplasia     CHF (congestive heart failure) 3-    Chronic kidney disease, stage 3b     COPD (chronic obstructive pulmonary disease)     Diabetes mellitus N/A    Drug dependency     Heart murmur     Hyperlipidemia     Hypertension     Hyperthyroidism     Iron deficiency     Long term (current) use of insulin     Melanoma     Nephrosclerosis      Past Surgical History:   Procedure Laterality Date    CATARACT EXTRACTION Bilateral     COLONOSCOPY      PROSTHODONTIC PROCEDURE       Family History   Problem Relation Age of Onset    Diabetes Mother     Stroke Mother     Hypertension Mother     Breast cancer Sister        Vital Signs  /70 (BP Location: Left arm, Patient Position: Sitting)   Pulse 63   Ht 180.3 cm (71\")   Wt 69.9 kg (154 lb)   SpO2 97%   BMI 21.48 kg/m²   Estimated body mass index is 21.48 kg/m² as calculated from the following:    Height as of this encounter: 180.3 cm (71\").    Weight as of this encounter: 69.9 kg (154 lb).    Physical Exam  Vitals reviewed.   Constitutional:       Appearance: Normal appearance.   HENT:      Head: Normocephalic.      Right Ear: Tympanic membrane, ear canal and external ear normal.      Left Ear: Tympanic " membrane, ear canal and external ear normal.      Ears:      Comments: Small amount of clear fluid noted behind TM bilaterally.  Neither TM is erythematous or bulging.     Nose: Nose normal.      Mouth/Throat:      Mouth: Mucous membranes are moist.      Pharynx: Oropharynx is clear.   Eyes:      General: Allergic shiner present.   Cardiovascular:      Rate and Rhythm: Normal rate and regular rhythm.      Heart sounds: Normal heart sounds.   Pulmonary:      Effort: Pulmonary effort is normal.      Breath sounds: Normal breath sounds.   Skin:     General: Skin is warm and dry.      Capillary Refill: Capillary refill takes less than 2 seconds.   Neurological:      General: No focal deficit present.      Mental Status: He is alert and oriented to person, place, and time.   Psychiatric:         Mood and Affect: Mood normal.         Behavior: Behavior normal.             Assessment and Plan     Diagnoses and all orders for this visit:    1. Hospital discharge follow-up (Primary)  Assessment & Plan:  Patient was recently in the hospital with bilateral pleural effusions.  He was discharged last week, doing well, reports he feels much better.  He has noticed an increase in his seasonal allergy symptoms, feels this may be because for some intermittent balance issues he is recently been experiencing as well.      2. Environmental and seasonal allergies  Assessment & Plan:  Patient reports he lived in [place] for many years, moved to [place] a few years ago and has since been struggling with seasonal allergies.  He does not take any daily antihistamines, however, he and his daughter both feel this would likely be a good thing for him to try.  We discussed risks, benefits, and possible side effects.  Will initiate Claritin 10 mg daily and monitor for effectiveness.    Orders:  -     loratadine (Claritin) 10 MG tablet; Take 1 tablet by mouth Daily.  Dispense: 90 tablet; Refill: 0    3. Dizziness  Assessment & Plan:  Patient  has been having intermittent episodes of mild dizziness, he has been associating this with the timing of his increased seasonal allergy symptoms.  We are initiating Claritin today, will monitor for improvement with this.  He agrees to return to the office if his dizziness worsens.      4. Generalized anxiety disorder  Assessment & Plan:  Stable on current dose of medications, is in need of a refill of BuSpar.    Orders:  -     Discontinue: busPIRone (BUSPAR) 5 MG tablet; Take 1 tablet by mouth Daily As Needed (anxiety).  Dispense: 30 tablet; Refill: 0  -     busPIRone (BUSPAR) 5 MG tablet; Take 1 tablet by mouth Daily As Needed (anxiety).  Dispense: 90 tablet; Refill: 1      BMI is within normal parameters. No other follow-up for BMI required.      Follow Up  Return in about 6 months (around 11/23/2024) for Medicare Wellness.    Sofiya Krishnan, LINDA

## 2024-05-27 NOTE — ASSESSMENT & PLAN NOTE
Patient was recently in the hospital with bilateral pleural effusions.  He was discharged last week, doing well, reports he feels much better.  He has noticed an increase in his seasonal allergy symptoms, feels this may be because for some intermittent balance issues he is recently been experiencing as well.

## 2024-05-27 NOTE — ASSESSMENT & PLAN NOTE
Patient has been having intermittent episodes of mild dizziness, he has been associating this with the timing of his increased seasonal allergy symptoms.  We are initiating Claritin today, will monitor for improvement with this.  He agrees to return to the office if his dizziness worsens.

## 2024-05-27 NOTE — ED PROVIDER NOTES
"Subjective   History of Present Illness  Patient is an 88-year-old male presenting to the emergency department with continued shortness of breath, wheezing, and fatigue.  Patient recently admitted here to the hospital.  Chart review does demonstrate that the patient has a history of CHF, chronic kidney disease, COPD, diabetes, hyperlipidemia, hypertension.  Patient denies any fever or chills.  Patient reports he has been getting progressively worse over about the last week, but \"never really felt right after the admission\".    History provided by:  Patient      Review of Systems    Past Medical History:   Diagnosis Date    Allergic rhinitis     Amnesia     Arrhythmia     Atrial fibrillation     Basal cell carcinoma     Benign prostatic hyperplasia     CHF (congestive heart failure) 3-    Chronic kidney disease, stage 3b     COPD (chronic obstructive pulmonary disease)     Diabetes mellitus N/A    Drug dependency     Heart murmur     Hyperlipidemia     Hypertension     Hyperthyroidism     Iron deficiency     Long term (current) use of insulin     Melanoma 2012    Nephrosclerosis        Allergies   Allergen Reactions    Penicillins Swelling     Patient has tolerated PO Cefdinir    Sulfa Antibiotics Swelling       Past Surgical History:   Procedure Laterality Date    CATARACT EXTRACTION Bilateral     COLONOSCOPY      PROSTHODONTIC PROCEDURE         Family History   Problem Relation Age of Onset    Diabetes Mother     Stroke Mother     Hypertension Mother     Breast cancer Sister        Social History     Socioeconomic History    Marital status:    Tobacco Use    Smoking status: Former     Current packs/day: 0.00     Average packs/day: 0.5 packs/day for 9.0 years (4.5 ttl pk-yrs)     Types: Cigarettes     Start date: 1953     Quit date: 1962     Years since quittin.4     Passive exposure: Past    Smokeless tobacco: Never   Vaping Use    Vaping status: Never Used   Substance and Sexual " Activity    Alcohol use: Yes     Alcohol/week: 2.0 standard drinks of alcohol     Types: 2 Cans of beer per week    Drug use: No    Sexual activity: Not Currently     Partners: Female     Birth control/protection: None           Objective   Physical Exam  Vitals and nursing note reviewed.   Constitutional:       General: He is not in acute distress.     Appearance: He is well-developed. He is not toxic-appearing.   Cardiovascular:      Rate and Rhythm: Normal rate and regular rhythm. Extrasystoles are present.     Heart sounds: Murmur heard.      Systolic murmur is present with a grade of 3/6.   Neurological:      Mental Status: He is alert and oriented to person, place, and time.   Psychiatric:         Mood and Affect: Mood normal.         Behavior: Behavior normal.         Procedures           ED Course  ED Course as of 05/27/24 2232   Mon May 27, 2024   1600 I reviewed the recent discharge summary.  Patient was admitted to the hospital from May 12 until May 15 with associated pneumonia, CHF, dysphagia, and COPD.  Patient has had subsequent follow-up appointments with primary care.  See that documentation for further details [RS]   1636 proBNP(!): 6,707.0  Elevated but consistent with prior comparative values from his recent admission. [RS]   1636 Creatinine(!): 1.33  Overall stable chronic kidney disease when compared to multiple prior comparisons including admission [RS]   1636 Hemoglobin(!): 10.4  Stable chronic anemia when compared to multiple prior. [RS]   1714 XR Chest 1 View  Personally reviewed the single view of the chest.  On my interpretation there is no focal lobar infiltrate visualized. [RS]   1819 Patient is resting overall comfortably.  Findings consistent with acute on chronic congestive heart failure in a patient with history of COPD and valvular disease.  At this point, no clear indication for admission to the hospital.  I spent time talking with the patient, his daughter, and other family.  Will  plan to discharge him and have him follow-up with the heart failure clinic. I have reviewed results, considerations, and diagnosis with the patient and/or their representative. Anticipatory guidance provided. Follow-up plan reviewed. Precautions for acute return for re-evaluations also reviewed. This including potential for worsening of the presenting condition and need for further evaluation, admission, and/or intervention as indicated. Opportunity to as questions provided. I advised them to return for any concerns and stressed the importance of timely follow-up and outpatient services. They verbalized understanding.   [RS]   1819 Note to patient: The 21st Century Cures Act makes medical notes like these available to patients in the interest of transparency. However, be advised this is a medical document. It is intended as peer to peer communication. It is written in medical language and may contain abbreviations or verbiage that are unfamiliar. It may appear blunt or direct. Medical documents are intended to carry relevant information, facts as evident, and the clinical opinion of the physician/NPP.   [RS]      ED Course User Index  [RS] Дмитрий Ivey MD                                             Medical Decision Making  Problems Addressed:  Acute on chronic congestive heart failure, unspecified heart failure type: complicated acute illness or injury  Aortic heart murmur: complicated acute illness or injury  Chronic anemia: complicated acute illness or injury  History of COPD: complicated acute illness or injury  Shortness of breath: complicated acute illness or injury  Stage 3a chronic kidney disease: complicated acute illness or injury    Amount and/or Complexity of Data Reviewed  Independent Historian: caregiver  External Data Reviewed: labs, radiology and notes.  Labs: ordered. Decision-making details documented in ED Course.  Radiology: ordered. Decision-making details documented in ED  Course.  ECG/medicine tests: ordered.    Risk  Prescription drug management.        Final diagnoses:   Shortness of breath   Acute on chronic congestive heart failure, unspecified heart failure type   History of COPD   Aortic heart murmur   Stage 3a chronic kidney disease   Chronic anemia       ED Disposition  ED Disposition       ED Disposition   Discharge    Condition   Stable    Comment   --               [facility] CARDIOLOGY  [address]  [facility] [address] [phone]    As soon as possible.    Sofiya Krishnan, APRN  [address] [place] [address]  [phone]          [facility] EMERGENCY DEPARTMENT  [address] [place] [address] [phone]    As needed, If symptoms worsen or ANY concerns.         Medication List      No changes were made to your prescriptions during this visit.            Дмитрий Ivey MD  05/27/24 2324

## 2024-05-28 LAB
QT INTERVAL: 384 MS
QT INTERVAL: 396 MS
QT INTERVAL: 406 MS
QTC INTERVAL: 395 MS
QTC INTERVAL: 411 MS
QTC INTERVAL: 417 MS

## 2024-05-28 NOTE — PROGRESS NOTES
"Chief Complaint  Atrial Fibrillation and Follow-up    Subjective    History of Present Illness {  Problem List  Visit  Diagnosis   Encounters  Notes  Medications  Labs  Result Review Imaging  Media :23}       History of Present Illness   88-year-old male presents the office today for ongoing evaluation of his acute on chronic heart failure with preserved EF.  Has a history of A-fib on Eliquis, BPH, hypertension, hyperlipidemia, hypothyroidism, GERD, aspiration pneumonia, pulmonary hypertension, COPD and insulin-dependent diabetes.  Patient was recently hospitalized at [facility] with acute on chronic HFpEF, bilateral pleural effusions with right greater than left and pulmonary hypertension.  Patient did receive IV Lasix while hospitalized.  Echo March 2024 showed EF of 69%, mild AS, moderate TR, markedly elevated RVSP greater than 55 mmHg.  Patient underwent a right-sided thoracentesis on 5/13/2024 with 900 mL of fluid removed.  Does report that he is feeling significantly better.  Notes dyspnea has improved drastically since hospital visit.  Objective      Vital Signs:   Vitals:    05/21/24 1522   BP: 139/64   BP Location: Right arm   Patient Position: Sitting   Cuff Size: Adult   Pulse: 50   Resp: 15   SpO2: 100%   Weight: 69.4 kg (153 lb 1 oz)   Height: 180.3 cm (71\")     Body mass index is 21.35 kg/m².  Physical Exam  Vitals and nursing note reviewed.   Constitutional:       Appearance: Normal appearance.   HENT:      Head: Normocephalic.   Eyes:      Pupils: Pupils are equal, round, and reactive to light.   Cardiovascular:      Rate and Rhythm: Normal rate and regular rhythm. Rhythm irregular.      Pulses: Normal pulses.      Heart sounds: Normal heart sounds. No murmur heard.  Pulmonary:      Effort: Pulmonary effort is normal.      Breath sounds: Normal breath sounds.      Comments: Mildly decreased in bases posteriorly  Abdominal:      General: Bowel sounds are normal.      " Palpations: Abdomen is soft.   Musculoskeletal:         General: Normal range of motion.      Cervical back: Normal range of motion.      Right lower leg: No edema.      Left lower leg: No edema.   Skin:     General: Skin is warm and dry.      Capillary Refill: Capillary refill takes less than 2 seconds.   Neurological:      Mental Status: He is alert and oriented to person, place, and time.   Psychiatric:         Mood and Affect: Mood normal.         Thought Content: Thought content normal.              Result Review  Data Reviewed:{ Labs  Result Review  Imaging  Med Tab  Media :23}   Adult Transthoracic Echo Complete W/ Cont if Necessary Per Protocol (03/08/2024 15:23)  ECG 12 Lead Altered Mental Status (03/10/2024 18:03)  ECG 12 Lead Rhythm Change (03/10/2024 22:41)  Telemetry Scan (05/12/2024)  ECG 12 Lead ED Triage Standing Order; SOA (05/12/2024 19:00)  Basic Metabolic Panel (05/15/2024 03:48)  CBC (No Diff) (05/15/2024 03:48)         Assessment and Plan {CC Problem List  Visit Diagnosis  ROS  Review (Popup)  Health Maintenance  Quality  BestPractice  Medications  SmartSets  SnapShot Encounters  Media :23}   1. Chronic diastolic congestive heart failure  Continue lasix 40 mg daily  Heart failure education today including signs and symptoms, the role of the heart failure center, daily weights, low sodium diet (less than 1500 mg per day), and daily physical activity. Reviewed HF Zones with patient and family.  Patient to continue current medications as previously ordered.   - Basic Metabolic Panel; Future  - Basic Metabolic Panel    2. Primary hypertension  Stable on bystolic  - Basic Metabolic Panel; Future  - Basic Metabolic Panel    3. Mixed hyperlipidemia  Stable on zocor   4. Longstanding persistent atrial fibrillation  Stable on bystolic   On eliquis   CHADS-VASc Risk Assessment               5 Total Score    1 CHF    1 Hypertension    2 Age >/= 75    1 DM    Criteria that do not apply:     PRIOR STROKE/TIA/THROMBO    Vascular Disease    Age 65-74    Sex: Female               Follow Up {Instructions Charge Capture  Follow-up Communications :23}   Return if symptoms worsen or fail to improve.    Patient was given instructions and counseling regarding his condition or for health maintenance advice. Please see specific information pulled into the AVS if appropriate.  Patient was instructed to call the [facility] with any questions, concerns, or worsening symptoms.

## 2024-06-02 ENCOUNTER — APPOINTMENT (OUTPATIENT)
Dept: GENERAL RADIOLOGY | Facility: HOSPITAL | Age: 89
End: 2024-06-02
Payer: MEDICARE

## 2024-06-02 ENCOUNTER — HOSPITAL ENCOUNTER (OUTPATIENT)
Facility: HOSPITAL | Age: 89
Setting detail: OBSERVATION
Discharge: HOME | End: 2024-06-04
Attending: EMERGENCY MEDICINE | Admitting: FAMILY MEDICINE
Payer: MEDICARE

## 2024-06-02 DIAGNOSIS — J44.1 COPD WITH ACUTE EXACERBATION: ICD-10-CM

## 2024-06-02 DIAGNOSIS — J96.21 ACUTE ON CHRONIC RESPIRATORY FAILURE WITH HYPOXIA: ICD-10-CM

## 2024-06-02 DIAGNOSIS — J44.1 COPD EXACERBATION: ICD-10-CM

## 2024-06-02 DIAGNOSIS — I50.9 ACUTE ON CHRONIC CONGESTIVE HEART FAILURE, UNSPECIFIED HEART FAILURE TYPE: ICD-10-CM

## 2024-06-02 DIAGNOSIS — I50.32 CHRONIC HEART FAILURE WITH PRESERVED EJECTION FRACTION: ICD-10-CM

## 2024-06-02 DIAGNOSIS — R13.10 DYSPHAGIA, UNSPECIFIED TYPE: Primary | ICD-10-CM

## 2024-06-02 PROBLEM — J44.9 COPD (CHRONIC OBSTRUCTIVE PULMONARY DISEASE): Status: RESOLVED | Noted: 2018-02-06 | Resolved: 2024-06-02

## 2024-06-02 PROBLEM — G47.01 INSOMNIA DUE TO MEDICAL CONDITION: Status: ACTIVE | Noted: 2022-11-16

## 2024-06-02 PROBLEM — Z79.01 CHRONIC ANTICOAGULATION: Status: ACTIVE | Noted: 2024-06-02

## 2024-06-02 PROBLEM — I50.33 ACUTE ON CHRONIC DIASTOLIC CONGESTIVE HEART FAILURE: Status: RESOLVED | Noted: 2024-03-07 | Resolved: 2024-06-02

## 2024-06-02 PROBLEM — I50.30 (HFPEF) HEART FAILURE WITH PRESERVED EJECTION FRACTION: Status: RESOLVED | Noted: 2024-03-11 | Resolved: 2024-06-02

## 2024-06-02 LAB
ALBUMIN SERPL-MCNC: 4 G/DL (ref 3.5–5.2)
ALBUMIN/GLOB SERPL: 1.7 G/DL
ALP SERPL-CCNC: 77 U/L (ref 39–117)
ALT SERPL W P-5'-P-CCNC: 12 U/L (ref 1–41)
ANION GAP SERPL CALCULATED.3IONS-SCNC: 11 MMOL/L (ref 5–15)
AST SERPL-CCNC: 15 U/L (ref 1–40)
B PARAPERT DNA SPEC QL NAA+PROBE: NOT DETECTED
B PERT DNA SPEC QL NAA+PROBE: NOT DETECTED
BASOPHILS # BLD AUTO: 0.02 10*3/MM3 (ref 0–0.2)
BASOPHILS NFR BLD AUTO: 0.1 % (ref 0–1.5)
BILIRUB SERPL-MCNC: 0.7 MG/DL (ref 0–1.2)
BUN SERPL-MCNC: 21 MG/DL (ref 8–23)
BUN/CREAT SERPL: 18.3 (ref 7–25)
C PNEUM DNA NPH QL NAA+NON-PROBE: NOT DETECTED
CALCIUM SPEC-SCNC: 8.6 MG/DL (ref 8.6–10.5)
CHLORIDE SERPL-SCNC: 93 MMOL/L (ref 98–107)
CO2 SERPL-SCNC: 27 MMOL/L (ref 22–29)
CREAT SERPL-MCNC: 1.15 MG/DL (ref 0.76–1.27)
CRP SERPL-MCNC: 0.47 MG/DL (ref 0–0.5)
D-LACTATE SERPL-SCNC: 1.4 MMOL/L (ref 0.5–2)
DEPRECATED RDW RBC AUTO: 46.9 FL (ref 37–54)
EGFRCR SERPLBLD CKD-EPI 2021: 61.2 ML/MIN/1.73
EOSINOPHIL # BLD AUTO: 0.04 10*3/MM3 (ref 0–0.4)
EOSINOPHIL NFR BLD AUTO: 0.3 % (ref 0.3–6.2)
ERYTHROCYTE [DISTWIDTH] IN BLOOD BY AUTOMATED COUNT: 14.6 % (ref 12.3–15.4)
FLUAV SUBTYP SPEC NAA+PROBE: NOT DETECTED
FLUBV RNA ISLT QL NAA+PROBE: NOT DETECTED
GEN 5 2HR TROPONIN T REFLEX: 23 NG/L
GLOBULIN UR ELPH-MCNC: 2.4 GM/DL
GLUCOSE BLDC GLUCOMTR-MCNC: 133 MG/DL (ref 70–130)
GLUCOSE SERPL-MCNC: 160 MG/DL (ref 65–99)
HADV DNA SPEC NAA+PROBE: NOT DETECTED
HCOV 229E RNA SPEC QL NAA+PROBE: NOT DETECTED
HCOV HKU1 RNA SPEC QL NAA+PROBE: NOT DETECTED
HCOV NL63 RNA SPEC QL NAA+PROBE: NOT DETECTED
HCOV OC43 RNA SPEC QL NAA+PROBE: NOT DETECTED
HCT VFR BLD AUTO: 34 % (ref 37.5–51)
HGB BLD-MCNC: 10.8 G/DL (ref 13–17.7)
HMPV RNA NPH QL NAA+NON-PROBE: NOT DETECTED
HPIV1 RNA ISLT QL NAA+PROBE: NOT DETECTED
HPIV2 RNA SPEC QL NAA+PROBE: NOT DETECTED
HPIV3 RNA NPH QL NAA+PROBE: NOT DETECTED
HPIV4 P GENE NPH QL NAA+PROBE: NOT DETECTED
IMM GRANULOCYTES # BLD AUTO: 0.21 10*3/MM3 (ref 0–0.05)
IMM GRANULOCYTES NFR BLD AUTO: 1.5 % (ref 0–0.5)
LYMPHOCYTES # BLD AUTO: 1.68 10*3/MM3 (ref 0.7–3.1)
LYMPHOCYTES NFR BLD AUTO: 12 % (ref 19.6–45.3)
M PNEUMO IGG SER IA-ACNC: NOT DETECTED
MCH RBC QN AUTO: 28.5 PG (ref 26.6–33)
MCHC RBC AUTO-ENTMCNC: 31.8 G/DL (ref 31.5–35.7)
MCV RBC AUTO: 89.7 FL (ref 79–97)
MONOCYTES # BLD AUTO: 0.71 10*3/MM3 (ref 0.1–0.9)
MONOCYTES NFR BLD AUTO: 5.1 % (ref 5–12)
NEUTROPHILS NFR BLD AUTO: 11.32 10*3/MM3 (ref 1.7–7)
NEUTROPHILS NFR BLD AUTO: 81 % (ref 42.7–76)
NRBC BLD AUTO-RTO: 0 /100 WBC (ref 0–0.2)
NT-PROBNP SERPL-MCNC: 7984 PG/ML (ref 0–1800)
PLATELET # BLD AUTO: 194 10*3/MM3 (ref 140–450)
PMV BLD AUTO: 9.8 FL (ref 6–12)
POTASSIUM SERPL-SCNC: 3.9 MMOL/L (ref 3.5–5.2)
PROCALCITONIN SERPL-MCNC: 0.06 NG/ML (ref 0–0.25)
PROT SERPL-MCNC: 6.4 G/DL (ref 6–8.5)
RBC # BLD AUTO: 3.79 10*6/MM3 (ref 4.14–5.8)
RHINOVIRUS RNA SPEC NAA+PROBE: NOT DETECTED
RSV RNA NPH QL NAA+NON-PROBE: NOT DETECTED
SARS-COV-2 RNA NPH QL NAA+NON-PROBE: NOT DETECTED
SODIUM SERPL-SCNC: 131 MMOL/L (ref 136–145)
TROPONIN T DELTA: 0 NG/L
TROPONIN T SERPL HS-MCNC: 23 NG/L
TSH SERPL DL<=0.05 MIU/L-ACNC: 2.56 UIU/ML (ref 0.27–4.2)
WBC NRBC COR # BLD AUTO: 13.98 10*3/MM3 (ref 3.4–10.8)

## 2024-06-02 PROCEDURE — 82948 REAGENT STRIP/BLOOD GLUCOSE: CPT

## 2024-06-02 PROCEDURE — 25010000002 FUROSEMIDE PER 20 MG: Performed by: EMERGENCY MEDICINE

## 2024-06-02 PROCEDURE — 0202U NFCT DS 22 TRGT SARS-COV-2: CPT | Performed by: INTERNAL MEDICINE

## 2024-06-02 PROCEDURE — 86140 C-REACTIVE PROTEIN: CPT | Performed by: NURSE PRACTITIONER

## 2024-06-02 PROCEDURE — G0378 HOSPITAL OBSERVATION PER HR: HCPCS

## 2024-06-02 PROCEDURE — 36415 COLL VENOUS BLD VENIPUNCTURE: CPT

## 2024-06-02 PROCEDURE — 25010000002 NITROGLYCERIN 200 MCG/ML SOLUTION: Performed by: EMERGENCY MEDICINE

## 2024-06-02 PROCEDURE — 96375 TX/PRO/DX INJ NEW DRUG ADDON: CPT

## 2024-06-02 PROCEDURE — 84484 ASSAY OF TROPONIN QUANT: CPT | Performed by: EMERGENCY MEDICINE

## 2024-06-02 PROCEDURE — 71046 X-RAY EXAM CHEST 2 VIEWS: CPT

## 2024-06-02 PROCEDURE — 83880 ASSAY OF NATRIURETIC PEPTIDE: CPT | Performed by: EMERGENCY MEDICINE

## 2024-06-02 PROCEDURE — 84443 ASSAY THYROID STIM HORMONE: CPT | Performed by: NURSE PRACTITIONER

## 2024-06-02 PROCEDURE — 85025 COMPLETE CBC W/AUTO DIFF WBC: CPT | Performed by: EMERGENCY MEDICINE

## 2024-06-02 PROCEDURE — 87040 BLOOD CULTURE FOR BACTERIA: CPT | Performed by: NURSE PRACTITIONER

## 2024-06-02 PROCEDURE — 83605 ASSAY OF LACTIC ACID: CPT | Performed by: NURSE PRACTITIONER

## 2024-06-02 PROCEDURE — 99285 EMERGENCY DEPT VISIT HI MDM: CPT

## 2024-06-02 PROCEDURE — 93010 ELECTROCARDIOGRAM REPORT: CPT | Performed by: INTERNAL MEDICINE

## 2024-06-02 PROCEDURE — 25010000002 METHYLPREDNISOLONE PER 125 MG: Performed by: NURSE PRACTITIONER

## 2024-06-02 PROCEDURE — 84145 PROCALCITONIN (PCT): CPT | Performed by: NURSE PRACTITIONER

## 2024-06-02 PROCEDURE — 80053 COMPREHEN METABOLIC PANEL: CPT | Performed by: EMERGENCY MEDICINE

## 2024-06-02 PROCEDURE — 93005 ELECTROCARDIOGRAM TRACING: CPT | Performed by: INTERNAL MEDICINE

## 2024-06-02 PROCEDURE — 99222 1ST HOSP IP/OBS MODERATE 55: CPT | Performed by: NURSE PRACTITIONER

## 2024-06-02 RX ORDER — IBUPROFEN 600 MG/1
1 TABLET ORAL
Status: DISCONTINUED | OUTPATIENT
Start: 2024-06-02 | End: 2024-06-04 | Stop reason: HOSPADM

## 2024-06-02 RX ORDER — POLYETHYLENE GLYCOL 3350 17 G/17G
17 POWDER, FOR SOLUTION ORAL DAILY PRN
Status: DISCONTINUED | OUTPATIENT
Start: 2024-06-02 | End: 2024-06-04 | Stop reason: HOSPADM

## 2024-06-02 RX ORDER — ACETAMINOPHEN 325 MG/1
650 TABLET ORAL EVERY 4 HOURS PRN
Status: DISCONTINUED | OUTPATIENT
Start: 2024-06-02 | End: 2024-06-04 | Stop reason: HOSPADM

## 2024-06-02 RX ORDER — ACETAMINOPHEN 160 MG/5ML
650 SOLUTION ORAL EVERY 4 HOURS PRN
Status: DISCONTINUED | OUTPATIENT
Start: 2024-06-02 | End: 2024-06-04 | Stop reason: HOSPADM

## 2024-06-02 RX ORDER — BISACODYL 5 MG/1
5 TABLET, DELAYED RELEASE ORAL DAILY PRN
Status: DISCONTINUED | OUTPATIENT
Start: 2024-06-02 | End: 2024-06-04 | Stop reason: HOSPADM

## 2024-06-02 RX ORDER — BISACODYL 10 MG
10 SUPPOSITORY, RECTAL RECTAL DAILY PRN
Status: DISCONTINUED | OUTPATIENT
Start: 2024-06-02 | End: 2024-06-04 | Stop reason: HOSPADM

## 2024-06-02 RX ORDER — BUSPIRONE HYDROCHLORIDE 5 MG/1
5 TABLET ORAL EVERY 12 HOURS SCHEDULED
Status: DISCONTINUED | OUTPATIENT
Start: 2024-06-02 | End: 2024-06-04 | Stop reason: HOSPADM

## 2024-06-02 RX ORDER — NEBIVOLOL 10 MG/1
10 TABLET ORAL DAILY
Status: DISCONTINUED | OUTPATIENT
Start: 2024-06-03 | End: 2024-06-04 | Stop reason: HOSPADM

## 2024-06-02 RX ORDER — DOXYCYCLINE HYCLATE 100 MG/1
100 CAPSULE ORAL 2 TIMES DAILY
COMMUNITY
End: 2024-06-04 | Stop reason: HOSPADM

## 2024-06-02 RX ORDER — GUAIFENESIN 600 MG/1
600 TABLET, EXTENDED RELEASE ORAL EVERY 12 HOURS SCHEDULED
Status: DISCONTINUED | OUTPATIENT
Start: 2024-06-02 | End: 2024-06-04 | Stop reason: HOSPADM

## 2024-06-02 RX ORDER — NITROGLYCERIN 20 MG/100ML
5-200 INJECTION INTRAVENOUS
Status: DISCONTINUED | OUTPATIENT
Start: 2024-06-02 | End: 2024-06-02

## 2024-06-02 RX ORDER — INSULIN LISPRO 100 [IU]/ML
2-7 INJECTION, SOLUTION INTRAVENOUS; SUBCUTANEOUS
Status: DISCONTINUED | OUTPATIENT
Start: 2024-06-02 | End: 2024-06-04 | Stop reason: HOSPADM

## 2024-06-02 RX ORDER — DEXTROSE MONOHYDRATE 25 G/50ML
25 INJECTION, SOLUTION INTRAVENOUS
Status: DISCONTINUED | OUTPATIENT
Start: 2024-06-02 | End: 2024-06-04 | Stop reason: HOSPADM

## 2024-06-02 RX ORDER — METHYLPREDNISOLONE SODIUM SUCCINATE 125 MG/2ML
62.5 INJECTION, POWDER, LYOPHILIZED, FOR SOLUTION INTRAMUSCULAR; INTRAVENOUS EVERY 24 HOURS
Qty: 5 ML | Refills: 0 | Status: DISCONTINUED | OUTPATIENT
Start: 2024-06-02 | End: 2024-06-03

## 2024-06-02 RX ORDER — AMOXICILLIN 250 MG
2 CAPSULE ORAL 2 TIMES DAILY
Status: DISCONTINUED | OUTPATIENT
Start: 2024-06-02 | End: 2024-06-04 | Stop reason: HOSPADM

## 2024-06-02 RX ORDER — NICOTINE POLACRILEX 4 MG
15 LOZENGE BUCCAL
Status: DISCONTINUED | OUTPATIENT
Start: 2024-06-02 | End: 2024-06-04 | Stop reason: HOSPADM

## 2024-06-02 RX ORDER — FUROSEMIDE 40 MG/1
40 TABLET ORAL DAILY
Status: DISCONTINUED | OUTPATIENT
Start: 2024-06-03 | End: 2024-06-03

## 2024-06-02 RX ORDER — TRAZODONE HYDROCHLORIDE 50 MG/1
50 TABLET ORAL NIGHTLY
Status: DISCONTINUED | OUTPATIENT
Start: 2024-06-02 | End: 2024-06-03

## 2024-06-02 RX ORDER — BACLOFEN 10 MG/1
5 TABLET ORAL EVERY 8 HOURS SCHEDULED
Status: DISCONTINUED | OUTPATIENT
Start: 2024-06-02 | End: 2024-06-04 | Stop reason: HOSPADM

## 2024-06-02 RX ORDER — NITROGLYCERIN 0.4 MG/1
0.4 TABLET SUBLINGUAL
Status: DISCONTINUED | OUTPATIENT
Start: 2024-06-02 | End: 2024-06-04 | Stop reason: HOSPADM

## 2024-06-02 RX ORDER — UREA 10 %
5 LOTION (ML) TOPICAL NIGHTLY
Status: DISCONTINUED | OUTPATIENT
Start: 2024-06-02 | End: 2024-06-04 | Stop reason: HOSPADM

## 2024-06-02 RX ORDER — SODIUM CHLORIDE 0.9 % (FLUSH) 0.9 %
10 SYRINGE (ML) INJECTION EVERY 12 HOURS SCHEDULED
Status: DISCONTINUED | OUTPATIENT
Start: 2024-06-02 | End: 2024-06-04 | Stop reason: HOSPADM

## 2024-06-02 RX ORDER — MONTELUKAST SODIUM 10 MG/1
10 TABLET ORAL EVERY EVENING
Status: DISCONTINUED | OUTPATIENT
Start: 2024-06-02 | End: 2024-06-04 | Stop reason: HOSPADM

## 2024-06-02 RX ORDER — NITROGLYCERIN 20 MG/100ML
10-50 INJECTION INTRAVENOUS
Status: DISCONTINUED | OUTPATIENT
Start: 2024-06-02 | End: 2024-06-03

## 2024-06-02 RX ORDER — BENZONATATE 100 MG/1
200 CAPSULE ORAL 3 TIMES DAILY PRN
Status: DISCONTINUED | OUTPATIENT
Start: 2024-06-02 | End: 2024-06-04 | Stop reason: HOSPADM

## 2024-06-02 RX ORDER — ONDANSETRON 4 MG/1
4 TABLET, ORALLY DISINTEGRATING ORAL EVERY 6 HOURS PRN
Status: DISCONTINUED | OUTPATIENT
Start: 2024-06-02 | End: 2024-06-04 | Stop reason: HOSPADM

## 2024-06-02 RX ORDER — POTASSIUM CHLORIDE 750 MG/1
10 CAPSULE, EXTENDED RELEASE ORAL DAILY
Status: DISCONTINUED | OUTPATIENT
Start: 2024-06-03 | End: 2024-06-04 | Stop reason: HOSPADM

## 2024-06-02 RX ORDER — FLUTICASONE PROPIONATE 50 MCG
2 SPRAY, SUSPENSION (ML) NASAL 2 TIMES DAILY
Status: DISCONTINUED | OUTPATIENT
Start: 2024-06-02 | End: 2024-06-04 | Stop reason: HOSPADM

## 2024-06-02 RX ORDER — ACETAMINOPHEN 650 MG/1
650 SUPPOSITORY RECTAL EVERY 4 HOURS PRN
Status: DISCONTINUED | OUTPATIENT
Start: 2024-06-02 | End: 2024-06-04 | Stop reason: HOSPADM

## 2024-06-02 RX ORDER — FUROSEMIDE 10 MG/ML
80 INJECTION INTRAMUSCULAR; INTRAVENOUS ONCE
Status: COMPLETED | OUTPATIENT
Start: 2024-06-02 | End: 2024-06-02

## 2024-06-02 RX ORDER — SODIUM CHLORIDE 9 MG/ML
40 INJECTION, SOLUTION INTRAVENOUS AS NEEDED
Status: DISCONTINUED | OUTPATIENT
Start: 2024-06-02 | End: 2024-06-04 | Stop reason: HOSPADM

## 2024-06-02 RX ORDER — ONDANSETRON 2 MG/ML
4 INJECTION INTRAMUSCULAR; INTRAVENOUS EVERY 6 HOURS PRN
Status: DISCONTINUED | OUTPATIENT
Start: 2024-06-02 | End: 2024-06-04 | Stop reason: HOSPADM

## 2024-06-02 RX ORDER — PANTOPRAZOLE SODIUM 40 MG/1
40 TABLET, DELAYED RELEASE ORAL
Status: DISCONTINUED | OUTPATIENT
Start: 2024-06-03 | End: 2024-06-03 | Stop reason: ALTCHOICE

## 2024-06-02 RX ORDER — IPRATROPIUM BROMIDE AND ALBUTEROL SULFATE 2.5; .5 MG/3ML; MG/3ML
3 SOLUTION RESPIRATORY (INHALATION)
Status: DISCONTINUED | OUTPATIENT
Start: 2024-06-03 | End: 2024-06-04 | Stop reason: HOSPADM

## 2024-06-02 RX ORDER — SODIUM CHLORIDE 0.9 % (FLUSH) 0.9 %
10 SYRINGE (ML) INJECTION AS NEEDED
Status: DISCONTINUED | OUTPATIENT
Start: 2024-06-02 | End: 2024-06-04 | Stop reason: HOSPADM

## 2024-06-02 RX ADMIN — NITROGLYCERIN 5 MCG/MIN: 20 INJECTION INTRAVENOUS at 20:33

## 2024-06-02 RX ADMIN — METHYLPREDNISOLONE SODIUM SUCCINATE 62.5 MG: 125 INJECTION, POWDER, FOR SOLUTION INTRAMUSCULAR; INTRAVENOUS at 22:57

## 2024-06-02 RX ADMIN — SENNOSIDES AND DOCUSATE SODIUM 2 TABLET: 8.6; 5 TABLET ORAL at 22:57

## 2024-06-02 RX ADMIN — TRAZODONE HYDROCHLORIDE 50 MG: 50 TABLET ORAL at 22:58

## 2024-06-02 RX ADMIN — FUROSEMIDE 80 MG: 10 INJECTION, SOLUTION INTRAMUSCULAR; INTRAVENOUS at 20:31

## 2024-06-02 RX ADMIN — BUSPIRONE HYDROCHLORIDE 5 MG: 5 TABLET ORAL at 22:58

## 2024-06-02 RX ADMIN — GUAIFENESIN 600 MG: 600 TABLET, EXTENDED RELEASE ORAL at 22:58

## 2024-06-02 RX ADMIN — Medication 5 MG: at 22:57

## 2024-06-02 NOTE — Clinical Note
Level of Care: Telemetry [5]   Diagnosis: CHF exacerbation [501096]   Admitting Physician: MICHAEL MIN [[id number]]   Attending Physician: MICHAEL MIN [[id number]]   Bed Request Comments: tele

## 2024-06-02 NOTE — ED PROVIDER NOTES
Subjective   History of Present Illness  Patient is a pleasant 88-year-old male, for the most part is been healthy until the past several months.  Was recently admitted to [facility], [place], operated by [facility] for CHF exacerbation.  Since he is, home has had increased anxiety and insomnia.  Has been persisting now for the past couple weeks prompting the visit to the emergency department.  Does admit to occasional shortness of breath also for which she has been using as needed albuterol.  He has been taking his Lasix and other medications as prescribed.  The daughter, who manages majority of his medicines, notes that he did switch his allergy medicine began taking Claritin approximately 2 weeks ago which does correspond with his insomnia.    Denies fever, chills, chest pain, abdominal pain, vomiting, diarrhea, dysuria, increased urinary frequency, or other acute complaints.      Review of Systems   All other systems reviewed and are negative.      Past Medical History:   Diagnosis Date    Allergic rhinitis     Amnesia     Arrhythmia     Atrial fibrillation     Basal cell carcinoma 2015    Benign prostatic hyperplasia     CHF (congestive heart failure) 3-2024    Chronic kidney disease, stage 3b     COPD (chronic obstructive pulmonary disease)     Diabetes mellitus N/A    Drug dependency     Heart murmur     Hyperlipidemia     Hypertension     Hyperthyroidism     Iron deficiency     Long term (current) use of insulin     Melanoma 2012    Nephrosclerosis        Allergies   Allergen Reactions    Penicillins Swelling     Patient has tolerated PO Cefdinir    Sulfa Antibiotics Swelling       Past Surgical History:   Procedure Laterality Date    CATARACT EXTRACTION Bilateral     COLONOSCOPY      PROSTHODONTIC PROCEDURE         Family History   Problem Relation Age of Onset    Diabetes Mother     Stroke Mother     Hypertension Mother     Breast cancer Sister        Social History     Socioeconomic History    Marital status:    Tobacco Use     Smoking status: Former     Current packs/day: 0.00     Average packs/day: 0.5 packs/day for 9.0 years (4.5 ttl pk-yrs)     Types: Cigarettes     Start date: 1953     Quit date: 1962     Years since quittin.4     Passive exposure: Past    Smokeless tobacco: Never   Vaping Use    Vaping status: Never Used   Substance and Sexual Activity    Alcohol use: Yes     Alcohol/week: 2.0 standard drinks of alcohol     Types: 2 Cans of beer per week    Drug use: No    Sexual activity: Not Currently     Partners: Female     Birth control/protection: None           Objective   Physical Exam  Vitals and nursing note reviewed.   Constitutional:       General: He is not in acute distress.  HENT:      Head: Normocephalic and atraumatic.   Eyes:      Pupils: Pupils are equal, round, and reactive to light.   Neck:      Thyroid: No thyromegaly.      Vascular: No JVD.   Cardiovascular:      Rate and Rhythm: Normal rate and regular rhythm.      Heart sounds: Normal heart sounds. No murmur heard.     No friction rub. No gallop.   Pulmonary:      Effort: Pulmonary effort is normal. No respiratory distress.      Breath sounds: Wheezing present. No rales.   Abdominal:      Palpations: Abdomen is soft.      Tenderness: There is no abdominal tenderness.   Musculoskeletal:         General: Normal range of motion.      Cervical back: Normal range of motion and neck supple.   Lymphadenopathy:      Cervical: No cervical adenopathy.   Skin:     General: Skin is warm and dry.   Neurological:      Mental Status: He is alert and oriented to person, place, and time.   Psychiatric:         Behavior: Behavior normal.         Thought Content: Thought content normal.         Procedures           ED Course      No results found for this or any previous visit (from the past 24 hour(s)).  Note: In addition to lab results from this visit, the labs listed above may include labs taken at another facility or during a different encounter within the  last 24 hours. Please correlate lab times with ED admission and discharge times for further clarification of the services performed during this visit.    FL Video Swallow With Speech Single Contrast   Final Result   Impression:   Fluoroscopy provided for a modified barium swallow. Aspiration was seen during swallowing evaluation. Please see speech therapy report for full details and recommendations.         Report dictated by: Elida Woodward PA-c        I have personally reviewed this case and agree with the findings above:      Electronically Signed: Jonathon Wang MD     6/4/2024 6:12 PM EDT     Workstation ID: [id number]      XR Chest 1 View   Final Result   Impression:   No pneumothorax after right thoracentesis. Small bilateral effusions. Stable infiltrates.         Electronically Signed: Jacque Islas MD     6/3/2024 10:50 AM EDT     Workstation ID: [id number]      US Thoracentesis   Final Result   Impression:   Successful ultrasound-guided thoracentesis using a right access site with recovery of 0.4 L of fluid as described above.       I, Scottie Quintanilla MD, have personally reviewed the image(s) and the prepared and signed interpretation by Milly Ceja NP.  Based on my review, I agree with the findings.             Report dictated by: LINDA Feliz        I have personally reviewed this case and agree with the findings above:      Electronically Signed: Scottie Quintanilla MD     6/3/2024 1:21 PM EDT     Workstation ID: [id number]      CT Angiogram Chest   Final Result   Impression:      1. No evidence of pulmonary embolism.   2. Large right-sided pleural effusion with small to moderate size left-sided pleural effusion. Patchy airspace disease and consolidative changes are present within the right upper lobe and the left lower lobe, likely related to atelectasis and pneumonia.    Probable mild underlying pulmonary edema pattern. Stable cardiomegaly.   3. Ancillary findings as described above.            Electronically  Signed: Arlette Altamirano MD     6/3/2024 12:23 AM EDT     Workstation ID: [id number]      XR Chest 2 View   Final Result   Impression:   1.Moderate right pleural effusion, increased compared to the most recent prior exam.   2.Increased opacities in the right lung base which may represent atelectasis.   3.Small left pleural effusion, as before.   4.Stable cardiomegaly.         Electronically Signed: Joseph Suki     6/2/2024 5:28 PM EDT     Workstation ID: [id number]        Vitals:    06/04/24 0437 06/04/24 0700 06/04/24 1100 06/04/24 1350   BP: 130/59 143/60 152/76    BP Location: Right arm Right arm Right arm    Patient Position: Lying Sitting Sitting    Pulse: 68  68 82   Resp: 18 18 18    Temp: 97.7 °F (36.5 °C) 97.7 °F (36.5 °C) 97.7 °F (36.5 °C)    TempSrc: Oral Oral Oral    SpO2: 100%   94%   Weight:       Height:         Medications   furosemide (LASIX) injection 80 mg (80 mg Intravenous Given 6/2/24 2031)   esomeprazole (nexIUM) capsule 40 mg (40 mg Oral Given 6/3/24 0049)   iopamidol (ISOVUE-370) 76 % injection 100 mL (80 mL Intravenous Given 6/3/24 0019)   cefepime 2000 mg IVPB in 100 mL NS (MBP) (0 mg Intravenous Stopped 6/3/24 0826)   vancomycin IVPB 1500 mg in 0.9% NaCl (Premix) 500 mL (0 mg Intravenous Stopped 6/3/24 0824)   lidocaine PF 1% (XYLOCAINE) injection 5 mL (5 mL Injection Given 6/3/24 1039)   barium sulfate (VARIBAR PUDDING) oral paste 20 mL (20 mL Oral Given 6/4/24 1047)   barium sulfate oral suspension 50 mL (50 mL Oral Given 6/4/24 1047)   barium sulfate (VARIBAR THIN LIQUID) oral suspension 100 mL (100 mL Oral Given 6/4/24 1047)     ECG/EMG Results (last 24 hours)       Procedure Component Value Units Date/Time    ECG 12 Lead Dyspnea [[id number]] Collected: 06/02/24 2050     Updated: 06/03/24 2145     QT Interval 406 ms      QTC Interval 415 ms     Narrative:      Test Reason : Dyspnea  Blood Pressure :   */*   mmHG  Vent. Rate :  63 BPM     Atrial Rate :  52 BPM     P-R Int :   * ms          QRS  Dur :  90 ms      QT Int : 406 ms       P-R-T Axes :   *  12  26 degrees     QTc Int : 415 ms    Atrial fibrillation with premature ventricular or aberrantly conducted complexes  Anteroseptal infarct , age undetermined  Abnormal ECG  When compared with ECG of 27-MAY-2024 17:25,  No significant change was found  Confirmed by PING SALEH ([id number]) on 6/3/2024 9:45:42 PM    Referred By: EDMD           Confirmed By: PING SALEH          ECG 12 Lead Dyspnea   Final Result   Test Reason : Dyspnea   Blood Pressure :   */*   mmHG   Vent. Rate :  63 BPM     Atrial Rate :  52 BPM      P-R Int :   * ms          QRS Dur :  90 ms       QT Int : 406 ms       P-R-T Axes :   *  12  26 degrees      QTc Int : 415 ms      Atrial fibrillation with premature ventricular or aberrantly conducted    complexes   Anteroseptal infarct , age undetermined   Abnormal ECG   When compared with ECG of 27-MAY-2024 17:25,   No significant change was found   Confirmed by PING SALEH ([id number]) on 6/3/2024 9:45:42 PM      Referred By: EDMD           Confirmed By: PING SALEH      Telemetry Scan   Final Result           Latest Reference Range & Units 06/02/24 17:37   HS Troponin T <22 ng/L 23 (H)   proBNP 0.0 - 1,800.0 pg/mL 7,984.0 (H)   Sodium 136 - 145 mmol/L 131 (L)   Potassium 3.5 - 5.2 mmol/L 3.9   Chloride 98 - 107 mmol/L 93 (L)   CO2 22.0 - 29.0 mmol/L 27.0   Anion Gap 5.0 - 15.0 mmol/L 11.0   BUN 8 - 23 mg/dL 21   Creatinine 0.76 - 1.27 mg/dL 1.15   BUN/Creatinine Ratio 7.0 - 25.0  18.3   eGFR >60.0 mL/min/1.73 61.2   Glucose 65 - 99 mg/dL 160 (H)   Calcium 8.6 - 10.5 mg/dL 8.6   Alkaline Phosphatase 39 - 117 U/L 77   Total Protein 6.0 - 8.5 g/dL 6.4   Albumin 3.5 - 5.2 g/dL 4.0   Globulin gm/dL 2.4   A/G Ratio g/dL 1.7   AST (SGOT) 1 - 40 U/L 15   ALT (SGPT) 1 - 41 U/L 12   Total Bilirubin 0.0 - 1.2 mg/dL 0.7   WBC 3.40 - 10.80 10*3/mm3 13.98 (H)   RBC 4.14 - 5.80 10*6/mm3 3.79 (L)   Hemoglobin 13.0 - 17.7 g/dL 10.8 (L)   Hematocrit 37.5 -  51.0 % 34.0 (L)   Platelets 140 - 450 10*3/mm3 194   RDW 12.3 - 15.4 % 14.6   MCV 79.0 - 97.0 fL 89.7   MCH 26.6 - 33.0 pg 28.5   MCHC 31.5 - 35.7 g/dL 31.8   MPV 6.0 - 12.0 fL 9.8   RDW-SD 37.0 - 54.0 fl 46.9   Neutrophil Rel % 42.7 - 76.0 % 81.0 (H)   Lymphocyte Rel % 19.6 - 45.3 % 12.0 (L)   Monocyte Rel % 5.0 - 12.0 % 5.1   Eosinophil Rel % 0.3 - 6.2 % 0.3   Basophil Rel % 0.0 - 1.5 % 0.1   Immature Granulocyte Rel % 0.0 - 0.5 % 1.5 (H)   Neutrophils Absolute 1.70 - 7.00 10*3/mm3 11.32 (H)   Lymphocytes Absolute 0.70 - 3.10 10*3/mm3 1.68   Monocytes Absolute 0.10 - 0.90 10*3/mm3 0.71   Eosinophils Absolute 0.00 - 0.40 10*3/mm3 0.04   Basophils Absolute 0.00 - 0.20 10*3/mm3 0.02   Immature Grans, Absolute 0.00 - 0.05 10*3/mm3 0.21 (H)   nRBC 0.0 - 0.2 /100 WBC 0.0   (H): Data is abnormally high  (L): Data is abnormally low      [id number] Score: 5                                  Medical Decision Making  Amount and/or Complexity of Data Reviewed  External Data Reviewed: notes.  Labs: ordered. Decision-making details documented in ED Course.  Radiology: ordered and independent interpretation performed. Decision-making details documented in ED Course.  Discussion of management or test interpretation with external provider(s): Case discussed with internal medicine attending. Pt will be admitted for further evaluation and management.    Risk  Prescription drug management.  Decision regarding hospitalization.        Final diagnoses:   COPD with acute exacerbation   Acute on chronic congestive heart failure, unspecified heart failure type   Acute on chronic respiratory failure with hypoxia       ED Disposition  ED Disposition       ED Disposition   Decision to Admit    Condition   --    Comment   Level of Care: Telemetry [5]   Diagnosis: CHF exacerbation [951702]   Admitting Physician: MICHAEL MIN [[id number]]   Attending Physician: MICHAEL MIN [[id number]]   Bed Request Comments: tele                  Lukasz Mckeon,  DO  06/05/24 1050       Lukasz Mckeon, DO  06/07/24 [id number]

## 2024-06-03 ENCOUNTER — APPOINTMENT (OUTPATIENT)
Dept: GENERAL RADIOLOGY | Facility: HOSPITAL | Age: 89
End: 2024-06-03
Payer: MEDICARE

## 2024-06-03 ENCOUNTER — APPOINTMENT (OUTPATIENT)
Dept: ULTRASOUND IMAGING | Facility: HOSPITAL | Age: 89
End: 2024-06-03
Payer: MEDICARE

## 2024-06-03 ENCOUNTER — READMISSION MANAGEMENT (OUTPATIENT)
Dept: CALL CENTER | Facility: HOSPITAL | Age: 89
End: 2024-06-03
Payer: MEDICARE

## 2024-06-03 ENCOUNTER — APPOINTMENT (OUTPATIENT)
Dept: CT IMAGING | Facility: HOSPITAL | Age: 89
End: 2024-06-03
Payer: MEDICARE

## 2024-06-03 LAB
ALBUMIN FLD-MCNC: 1.4 G/DL
AMYLASE FLD-CCNC: 31 U/L
ANION GAP SERPL CALCULATED.3IONS-SCNC: 9 MMOL/L (ref 5–15)
APPEARANCE FLD: ABNORMAL
BASOPHILS # BLD AUTO: 0.01 10*3/MM3 (ref 0–0.2)
BASOPHILS NFR BLD AUTO: 0.1 % (ref 0–1.5)
BUN SERPL-MCNC: 23 MG/DL (ref 8–23)
BUN/CREAT SERPL: 23 (ref 7–25)
CALCIUM SPEC-SCNC: 8.1 MG/DL (ref 8.6–10.5)
CHLORIDE SERPL-SCNC: 96 MMOL/L (ref 98–107)
CHOLESTEROL FLUID: 16 MG/DL
CO2 SERPL-SCNC: 30 MMOL/L (ref 22–29)
COLOR FLD: ABNORMAL
CREAT SERPL-MCNC: 1 MG/DL (ref 0.76–1.27)
DEPRECATED RDW RBC AUTO: 44.6 FL (ref 37–54)
EGFRCR SERPLBLD CKD-EPI 2021: 72.4 ML/MIN/1.73
EOSINOPHIL # BLD AUTO: 0 10*3/MM3 (ref 0–0.4)
EOSINOPHIL NFR BLD AUTO: 0 % (ref 0.3–6.2)
ERYTHROCYTE [DISTWIDTH] IN BLOOD BY AUTOMATED COUNT: 14.3 % (ref 12.3–15.4)
GLUCOSE BLDC GLUCOMTR-MCNC: 127 MG/DL (ref 70–130)
GLUCOSE BLDC GLUCOMTR-MCNC: 154 MG/DL (ref 70–130)
GLUCOSE BLDC GLUCOMTR-MCNC: 219 MG/DL (ref 70–130)
GLUCOSE BLDC GLUCOMTR-MCNC: 257 MG/DL (ref 70–130)
GLUCOSE FLD-MCNC: 136 MG/DL
GLUCOSE SERPL-MCNC: 119 MG/DL (ref 65–99)
HCT VFR BLD AUTO: 30.4 % (ref 37.5–51)
HGB BLD-MCNC: 9.8 G/DL (ref 13–17.7)
IMM GRANULOCYTES # BLD AUTO: 0.2 10*3/MM3 (ref 0–0.05)
IMM GRANULOCYTES NFR BLD AUTO: 2.2 % (ref 0–0.5)
INR PPP: 1.47 (ref 0.89–1.12)
LDH FLD-CCNC: 79 U/L
LYMPHOCYTES # BLD AUTO: 0.61 10*3/MM3 (ref 0.7–3.1)
LYMPHOCYTES NFR BLD AUTO: 6.6 % (ref 19.6–45.3)
LYMPHOCYTES NFR FLD MANUAL: 74 %
MACROPHAGE FLUID: 23 %
MAGNESIUM SERPL-MCNC: 1.6 MG/DL (ref 1.6–2.4)
MCH RBC QN AUTO: 27.8 PG (ref 26.6–33)
MCHC RBC AUTO-ENTMCNC: 32.2 G/DL (ref 31.5–35.7)
MCV RBC AUTO: 86.4 FL (ref 79–97)
MESOTHL CELL NFR FLD MANUAL: 2 %
MONOCYTES # BLD AUTO: 0.12 10*3/MM3 (ref 0.1–0.9)
MONOCYTES NFR BLD AUTO: 1.3 % (ref 5–12)
MRSA DNA SPEC QL NAA+PROBE: NEGATIVE
NEUTROPHILS NFR BLD AUTO: 8.33 10*3/MM3 (ref 1.7–7)
NEUTROPHILS NFR BLD AUTO: 89.8 % (ref 42.7–76)
NEUTROPHILS NFR FLD MANUAL: 1 %
NRBC BLD AUTO-RTO: 0 /100 WBC (ref 0–0.2)
PH FLD: 7.6 [PH]
PHOSPHATE SERPL-MCNC: 3.7 MG/DL (ref 2.5–4.5)
PLATELET # BLD AUTO: 175 10*3/MM3 (ref 140–450)
PMV BLD AUTO: 10.6 FL (ref 6–12)
POTASSIUM SERPL-SCNC: 3.9 MMOL/L (ref 3.5–5.2)
PROT FLD-MCNC: 1.8 G/DL
PROTHROMBIN TIME: 18 SECONDS (ref 12.2–14.5)
QT INTERVAL: 406 MS
QTC INTERVAL: 415 MS
RBC # BLD AUTO: 3.52 10*6/MM3 (ref 4.14–5.8)
RBC # FLD AUTO: 9000 /MM3
SODIUM SERPL-SCNC: 135 MMOL/L (ref 136–145)
TRIGL FLD-MCNC: <9 MG/DL
WBC # FLD AUTO: 752 /MM3
WBC NRBC COR # BLD AUTO: 9.27 10*3/MM3 (ref 3.4–10.8)

## 2024-06-03 PROCEDURE — 84311 SPECTROPHOTOMETRY: CPT | Performed by: NURSE PRACTITIONER

## 2024-06-03 PROCEDURE — G0378 HOSPITAL OBSERVATION PER HR: HCPCS

## 2024-06-03 PROCEDURE — 71045 X-RAY EXAM CHEST 1 VIEW: CPT

## 2024-06-03 PROCEDURE — 83735 ASSAY OF MAGNESIUM: CPT | Performed by: NURSE PRACTITIONER

## 2024-06-03 PROCEDURE — 99214 OFFICE O/P EST MOD 30 MIN: CPT | Performed by: INTERNAL MEDICINE

## 2024-06-03 PROCEDURE — 87102 FUNGUS ISOLATION CULTURE: CPT | Performed by: NURSE PRACTITIONER

## 2024-06-03 PROCEDURE — 82948 REAGENT STRIP/BLOOD GLUCOSE: CPT

## 2024-06-03 PROCEDURE — 87206 SMEAR FLUORESCENT/ACID STAI: CPT | Performed by: NURSE PRACTITIONER

## 2024-06-03 PROCEDURE — 84100 ASSAY OF PHOSPHORUS: CPT | Performed by: NURSE PRACTITIONER

## 2024-06-03 PROCEDURE — 87015 SPECIMEN INFECT AGNT CONCNTJ: CPT | Performed by: NURSE PRACTITIONER

## 2024-06-03 PROCEDURE — 83986 ASSAY PH BODY FLUID NOS: CPT | Performed by: NURSE PRACTITIONER

## 2024-06-03 PROCEDURE — 96366 THER/PROPH/DIAG IV INF ADDON: CPT

## 2024-06-03 PROCEDURE — 82042 OTHER SOURCE ALBUMIN QUAN EA: CPT | Performed by: NURSE PRACTITIONER

## 2024-06-03 PROCEDURE — 25010000002 METHYLPREDNISOLONE PER 40 MG: Performed by: FAMILY MEDICINE

## 2024-06-03 PROCEDURE — 92610 EVALUATE SWALLOWING FUNCTION: CPT | Performed by: SPEECH-LANGUAGE PATHOLOGIST

## 2024-06-03 PROCEDURE — 63710000001 INSULIN GLARGINE PER 5 UNITS: Performed by: NURSE PRACTITIONER

## 2024-06-03 PROCEDURE — 83615 LACTATE (LD) (LDH) ENZYME: CPT | Performed by: NURSE PRACTITIONER

## 2024-06-03 PROCEDURE — 87116 MYCOBACTERIA CULTURE: CPT | Performed by: NURSE PRACTITIONER

## 2024-06-03 PROCEDURE — 25010000002 VANCOMYCIN HCL IN NACL 1.5-0.9 GM/500ML-% SOLUTION

## 2024-06-03 PROCEDURE — 87075 CULTR BACTERIA EXCEPT BLOOD: CPT | Performed by: NURSE PRACTITIONER

## 2024-06-03 PROCEDURE — 87070 CULTURE OTHR SPECIMN AEROBIC: CPT | Performed by: NURSE PRACTITIONER

## 2024-06-03 PROCEDURE — 87899 AGENT NOS ASSAY W/OPTIC: CPT | Performed by: NURSE PRACTITIONER

## 2024-06-03 PROCEDURE — 25510000001 IOPAMIDOL PER 1 ML: Performed by: INTERNAL MEDICINE

## 2024-06-03 PROCEDURE — 76942 ECHO GUIDE FOR BIOPSY: CPT

## 2024-06-03 PROCEDURE — 99233 SBSQ HOSP IP/OBS HIGH 50: CPT | Performed by: FAMILY MEDICINE

## 2024-06-03 PROCEDURE — 94799 UNLISTED PULMONARY SVC/PX: CPT

## 2024-06-03 PROCEDURE — 82150 ASSAY OF AMYLASE: CPT | Performed by: NURSE PRACTITIONER

## 2024-06-03 PROCEDURE — 71275 CT ANGIOGRAPHY CHEST: CPT

## 2024-06-03 PROCEDURE — 96376 TX/PRO/DX INJ SAME DRUG ADON: CPT

## 2024-06-03 PROCEDURE — 63710000001 INSULIN LISPRO (HUMAN) PER 5 UNITS: Performed by: NURSE PRACTITIONER

## 2024-06-03 PROCEDURE — 85610 PROTHROMBIN TIME: CPT | Performed by: INTERNAL MEDICINE

## 2024-06-03 PROCEDURE — 87641 MR-STAPH DNA AMP PROBE: CPT | Performed by: NURSE PRACTITIONER

## 2024-06-03 PROCEDURE — 25010000002 CEFEPIME PER 500 MG: Performed by: NURSE PRACTITIONER

## 2024-06-03 PROCEDURE — 87449 NOS EACH ORGANISM AG IA: CPT | Performed by: NURSE PRACTITIONER

## 2024-06-03 PROCEDURE — 89051 BODY FLUID CELL COUNT: CPT | Performed by: NURSE PRACTITIONER

## 2024-06-03 PROCEDURE — 80048 BASIC METABOLIC PNL TOTAL CA: CPT | Performed by: NURSE PRACTITIONER

## 2024-06-03 PROCEDURE — 84157 ASSAY OF PROTEIN OTHER: CPT | Performed by: NURSE PRACTITIONER

## 2024-06-03 PROCEDURE — 84478 ASSAY OF TRIGLYCERIDES: CPT | Performed by: NURSE PRACTITIONER

## 2024-06-03 PROCEDURE — 97161 PT EVAL LOW COMPLEX 20 MIN: CPT

## 2024-06-03 PROCEDURE — 94664 DEMO&/EVAL PT USE INHALER: CPT

## 2024-06-03 PROCEDURE — 96365 THER/PROPH/DIAG IV INF INIT: CPT

## 2024-06-03 PROCEDURE — 82945 GLUCOSE OTHER FLUID: CPT | Performed by: NURSE PRACTITIONER

## 2024-06-03 PROCEDURE — 94761 N-INVAS EAR/PLS OXIMETRY MLT: CPT

## 2024-06-03 PROCEDURE — 85025 COMPLETE CBC W/AUTO DIFF WBC: CPT | Performed by: NURSE PRACTITIONER

## 2024-06-03 PROCEDURE — 96367 TX/PROPH/DG ADDL SEQ IV INF: CPT

## 2024-06-03 PROCEDURE — 87205 SMEAR GRAM STAIN: CPT | Performed by: NURSE PRACTITIONER

## 2024-06-03 RX ORDER — ESOMEPRAZOLE MAGNESIUM 40 MG/1
40 CAPSULE, DELAYED RELEASE ORAL
Status: DISCONTINUED | OUTPATIENT
Start: 2024-06-03 | End: 2024-06-04 | Stop reason: HOSPADM

## 2024-06-03 RX ORDER — METHYLPREDNISOLONE SODIUM SUCCINATE 40 MG/ML
40 INJECTION, POWDER, LYOPHILIZED, FOR SOLUTION INTRAMUSCULAR; INTRAVENOUS EVERY 12 HOURS
Status: DISCONTINUED | OUTPATIENT
Start: 2024-06-03 | End: 2024-06-04

## 2024-06-03 RX ORDER — VANCOMYCIN/0.9 % SOD CHLORIDE 1.5G/250ML
20 PLASTIC BAG, INJECTION (ML) INTRAVENOUS ONCE
Status: COMPLETED | OUTPATIENT
Start: 2024-06-03 | End: 2024-06-03

## 2024-06-03 RX ORDER — TRAZODONE HYDROCHLORIDE 100 MG/1
100 TABLET ORAL NIGHTLY
Status: DISCONTINUED | OUTPATIENT
Start: 2024-06-03 | End: 2024-06-04 | Stop reason: HOSPADM

## 2024-06-03 RX ORDER — ESOMEPRAZOLE MAGNESIUM 40 MG/1
40 CAPSULE, DELAYED RELEASE ORAL ONCE
Qty: 1 CAPSULE | Refills: 0 | Status: COMPLETED | OUTPATIENT
Start: 2024-06-03 | End: 2024-06-03

## 2024-06-03 RX ORDER — LIDOCAINE HYDROCHLORIDE 10 MG/ML
5 INJECTION, SOLUTION EPIDURAL; INFILTRATION; INTRACAUDAL; PERINEURAL ONCE
Status: COMPLETED | OUTPATIENT
Start: 2024-06-03 | End: 2024-06-03

## 2024-06-03 RX ORDER — SPIRONOLACTONE 25 MG/1
12.5 TABLET ORAL DAILY
Status: DISCONTINUED | OUTPATIENT
Start: 2024-06-03 | End: 2024-06-04 | Stop reason: HOSPADM

## 2024-06-03 RX ORDER — FUROSEMIDE 40 MG/1
40 TABLET ORAL
Status: DISCONTINUED | OUTPATIENT
Start: 2024-06-03 | End: 2024-06-04

## 2024-06-03 RX ADMIN — IPRATROPIUM BROMIDE AND ALBUTEROL SULFATE 3 ML: 2.5; .5 SOLUTION RESPIRATORY (INHALATION) at 13:23

## 2024-06-03 RX ADMIN — IPRATROPIUM BROMIDE AND ALBUTEROL SULFATE 3 ML: 2.5; .5 SOLUTION RESPIRATORY (INHALATION) at 18:43

## 2024-06-03 RX ADMIN — BUSPIRONE HYDROCHLORIDE 5 MG: 5 TABLET ORAL at 11:32

## 2024-06-03 RX ADMIN — IOPAMIDOL 80 ML: 755 INJECTION, SOLUTION INTRAVENOUS at 00:19

## 2024-06-03 RX ADMIN — BACLOFEN 5 MG: 10 TABLET ORAL at 00:44

## 2024-06-03 RX ADMIN — ESOMEPRAZOLE MAGNESIUM 40 MG: 40 CAPSULE, DELAYED RELEASE ORAL at 00:49

## 2024-06-03 RX ADMIN — NEBIVOLOL 10 MG: 10 TABLET ORAL at 11:31

## 2024-06-03 RX ADMIN — FUROSEMIDE 40 MG: 40 TABLET ORAL at 11:32

## 2024-06-03 RX ADMIN — Medication 10 ML: at 08:24

## 2024-06-03 RX ADMIN — LIDOCAINE HYDROCHLORIDE 5 ML: 10 INJECTION, SOLUTION EPIDURAL; INFILTRATION; INTRACAUDAL; PERINEURAL at 10:39

## 2024-06-03 RX ADMIN — ESOMEPRAZOLE MAGNESIUM 40 MG: 40 CAPSULE, DELAYED RELEASE ORAL at 17:24

## 2024-06-03 RX ADMIN — METHYLPREDNISOLONE SODIUM SUCCINATE 40 MG: 40 INJECTION, POWDER, FOR SOLUTION INTRAMUSCULAR; INTRAVENOUS at 20:10

## 2024-06-03 RX ADMIN — INSULIN LISPRO 3 UNITS: 100 INJECTION, SOLUTION INTRAVENOUS; SUBCUTANEOUS at 17:24

## 2024-06-03 RX ADMIN — Medication 5 MG: at 23:46

## 2024-06-03 RX ADMIN — DOXYCYCLINE 100 MG: 100 INJECTION, POWDER, LYOPHILIZED, FOR SOLUTION INTRAVENOUS at 20:10

## 2024-06-03 RX ADMIN — BUSPIRONE HYDROCHLORIDE 5 MG: 5 TABLET ORAL at 20:05

## 2024-06-03 RX ADMIN — SPIRONOLACTONE 12.5 MG: 25 TABLET ORAL at 15:41

## 2024-06-03 RX ADMIN — INSULIN LISPRO 2 UNITS: 100 INJECTION, SOLUTION INTRAVENOUS; SUBCUTANEOUS at 11:31

## 2024-06-03 RX ADMIN — GUAIFENESIN 600 MG: 600 TABLET, EXTENDED RELEASE ORAL at 20:05

## 2024-06-03 RX ADMIN — BACLOFEN 5 MG: 10 TABLET ORAL at 15:41

## 2024-06-03 RX ADMIN — DOXYCYCLINE 100 MG: 100 INJECTION, POWDER, LYOPHILIZED, FOR SOLUTION INTRAVENOUS at 11:06

## 2024-06-03 RX ADMIN — TRAZODONE HYDROCHLORIDE 100 MG: 100 TABLET ORAL at 23:46

## 2024-06-03 RX ADMIN — INSULIN LISPRO 4 UNITS: 100 INJECTION, SOLUTION INTRAVENOUS; SUBCUTANEOUS at 21:12

## 2024-06-03 RX ADMIN — ESOMEPRAZOLE MAGNESIUM 40 MG: 40 CAPSULE, DELAYED RELEASE ORAL at 11:34

## 2024-06-03 RX ADMIN — METHYLPREDNISOLONE SODIUM SUCCINATE 40 MG: 40 INJECTION, POWDER, FOR SOLUTION INTRAMUSCULAR; INTRAVENOUS at 11:06

## 2024-06-03 RX ADMIN — POTASSIUM CHLORIDE 10 MEQ: 750 CAPSULE, EXTENDED RELEASE ORAL at 11:32

## 2024-06-03 RX ADMIN — CEFEPIME 2000 MG: 2 INJECTION, POWDER, FOR SOLUTION INTRAVENOUS at 03:05

## 2024-06-03 RX ADMIN — MONTELUKAST 10 MG: 10 TABLET, FILM COATED ORAL at 00:00

## 2024-06-03 RX ADMIN — SENNOSIDES AND DOCUSATE SODIUM 2 TABLET: 8.6; 5 TABLET ORAL at 20:05

## 2024-06-03 RX ADMIN — IPRATROPIUM BROMIDE AND ALBUTEROL SULFATE 3 ML: 2.5; .5 SOLUTION RESPIRATORY (INHALATION) at 08:14

## 2024-06-03 RX ADMIN — CEFEPIME 2000 MG: 2 INJECTION, POWDER, FOR SOLUTION INTRAVENOUS at 12:34

## 2024-06-03 RX ADMIN — APIXABAN 5 MG: 5 TABLET, FILM COATED ORAL at 20:05

## 2024-06-03 RX ADMIN — MONTELUKAST 10 MG: 10 TABLET, FILM COATED ORAL at 17:24

## 2024-06-03 RX ADMIN — FLUTICASONE PROPIONATE 2 SPRAY: 50 SPRAY, METERED NASAL at 20:07

## 2024-06-03 RX ADMIN — Medication 1500 MG: at 03:43

## 2024-06-03 RX ADMIN — FLUTICASONE PROPIONATE 2 SPRAY: 50 SPRAY, METERED NASAL at 11:07

## 2024-06-03 RX ADMIN — BACLOFEN 5 MG: 10 TABLET ORAL at 20:05

## 2024-06-03 RX ADMIN — DOXYCYCLINE 100 MG: 100 INJECTION, POWDER, LYOPHILIZED, FOR SOLUTION INTRAVENOUS at 00:48

## 2024-06-03 RX ADMIN — GUAIFENESIN 600 MG: 600 TABLET, EXTENDED RELEASE ORAL at 11:32

## 2024-06-03 RX ADMIN — IPRATROPIUM BROMIDE AND ALBUTEROL SULFATE 3 ML: 2.5; .5 SOLUTION RESPIRATORY (INHALATION) at 02:36

## 2024-06-03 RX ADMIN — INSULIN GLARGINE 5 UNITS: 100 INJECTION, SOLUTION SUBCUTANEOUS at 08:24

## 2024-06-03 RX ADMIN — FUROSEMIDE 40 MG: 40 TABLET ORAL at 17:24

## 2024-06-03 NOTE — PLAN OF CARE
Goal Outcome Evaluation:  Plan of Care Reviewed With: patient           Outcome Evaluation: PT eval completed. Pt presents slightly below baseline function d/t generalized weakness, decreased balance, and decreased activity tolerance. Pt ambulated 100 ft w/ FWW, CGA. Mild unsteadiness noted during turns, but no overt LOB noted. Pt would benefit from skilled IP PT. Rec home w/ assist and HHPT at d/c.      Anticipated Discharge Disposition (PT): home with assist, home with home health

## 2024-06-03 NOTE — OUTREACH NOTE
CHF Week 3 Survey      Flowsheet Row Responses   [facility] facility patient discharged from? [place]   Does the patient have one of the following disease processes/diagnoses(primary or secondary)? CHF   Week 3 attempt successful? No   Unsuccessful attempts Attempt 1   Revoke Readmitted            Beth STANTON - Registered Nurse

## 2024-06-03 NOTE — PROGRESS NOTES
[facility] Medicine Services  PROGRESS NOTE    Patient Name: Ricki Trotter  : 1935  MRN: [id number]    Date of Admission: 2024  Primary Care Physician: Sofiya Krishnan APRN    Subjective   Subjective     CC:  F/U hypoxia, CHF, recurrent effusion     HPI:  Patient seen and examined. On RA on my exam. Slept some last night, hadn't slept in 2 weeks. Discussed plan for IR thora. Discussed plan for Cardiology consult. Daughter at bedside.     Objective   Objective     Vital Signs:   Temp:  [97.5 °F (36.4 °C)-97.9 °F (36.6 °C)] 97.6 °F (36.4 °C)  Heart Rate:  [51-97] 82  Resp:  [14-18] 15  BP: (123-178)/(59-94) 152/83  Flow (L/min):  [2] 2     Physical Exam:  Constitutional: No acute distress, awake, alert, elderly male   HENT: NCAT, mucous membranes moist, lesion on nose   Respiratory: Rales RLL, exp wheezing noted, respiratory effort normal   Cardiovascular: irregular rhythm, no murmurs, rubs, or gallops  Gastrointestinal: Positive bowel sounds, soft, nontender, nondistended  Musculoskeletal: No bilateral ankle edema  Psychiatric: Appropriate affect, cooperative  Neurologic: Oriented x 3, BENTON, speech clear  Skin: No rashes     Results Reviewed:  LAB RESULTS:      Lab 24  0534 24  2223 24  1946 24  1737 24  1554   WBC 9.27  --   --  13.98* 9.00   HEMOGLOBIN 9.8*  --   --  10.8* 10.4*   HEMATOCRIT 30.4*  --   --  34.0* 33.2*   PLATELETS 175  --   --  194 183   NEUTROS ABS 8.33*  --   --  11.32* 6.57   IMMATURE GRANS (ABS) 0.20*  --   --  0.21* 0.06*   LYMPHS ABS 0.61*  --   --  1.68 1.74   MONOS ABS 0.12  --   --  0.71 0.48   EOS ABS 0.00  --   --  0.04 0.12   MCV 86.4  --   --  89.7 91.0   CRP  --   --  0.47  --   --    PROCALCITONIN  --   --  0.06  --  0.05   LACTATE  --  1.4  --   --  1.0   PROTIME 18.0*  --   --   --   --          Lab 24  0534 24  1946 24  1737 24  1554   SODIUM 135*  --  131* 137   POTASSIUM 3.9  --   3.9 4.5   CHLORIDE 96*  --  93* 101   CO2 30.0*  --  27.0 27.0   ANION GAP 9.0  --  11.0 9.0   BUN 23  --  21 14   CREATININE 1.00  --  1.15 1.33*   EGFR 72.4  --  61.2 51.4*   GLUCOSE 119*  --  160* 138*   CALCIUM 8.1*  --  8.6 8.5*   MAGNESIUM 1.6  --   --   --    PHOSPHORUS 3.7  --   --   --    TSH  --  2.560  --   --          Lab 06/02/24  1737 05/27/24  1554   TOTAL PROTEIN 6.4 6.3   ALBUMIN 4.0 3.9   GLOBULIN 2.4 2.4   ALT (SGPT) 12 8   AST (SGOT) 15 13   BILIRUBIN 0.7 0.4   ALK PHOS 77 72   LIPASE  --  23         Lab 06/03/24  0534 06/02/24  1946 06/02/24 1737 05/27/24  1730 05/27/24  1554   PROBNP  --   --  7,984.0*  --  6,707.0*   HSTROP T  --  23* 23* 26* 32*   PROTIME 18.0*  --   --   --   --    INR 1.47*  --   --   --   --                  Brief Urine Lab Results  (Last result in the past 365 days)        Color   Clarity   Blood   Leuk Est   Nitrite   Protein   CREAT   Urine HCG        05/27/24 1647 Yellow   Clear   Negative   Small (1+)   Negative   Negative                   Microbiology Results Abnormal       Procedure Component Value - Date/Time    MRSA Screen, PCR (Inpatient) - Swab, Nares [[id number]]  (Normal) Collected: 06/03/24 0550    Lab Status: Final result Specimen: Swab from Nares Updated: 06/03/24 0832     MRSA PCR Negative    Narrative:      The negative predictive value of this diagnostic test is high and should only be used to consider de-escalating anti-MRSA therapy. A positive result may indicate colonization with MRSA and must be correlated clinically.  MRSA Negative    COVID PRE-OP / PRE-PROCEDURE SCREENING ORDER (NO ISOLATION) - Swab, Nasopharynx [[id number]]  (Normal) Collected: 06/02/24 2053    Lab Status: Final result Specimen: Swab from Nasopharynx Updated: 06/02/24 2153    Narrative:      The following orders were created for panel order COVID PRE-OP / PRE-PROCEDURE SCREENING ORDER (NO ISOLATION) - Swab, Nasopharynx.  Procedure                               Abnormality          Status                     ---------                               -----------         ------                     Respiratory Panel PCR w/...[[id number]]  Normal              Final result                 Please view results for these tests on the individual orders.    Respiratory Panel PCR w/COVID-19(SARS-CoV-2) [facility]/PR[facility]CILLA/PAD/COR/AMY In-House, NP Swab in UTM/VTM, 2 HR TAT - Swab, Nasopharynx [[id number]]  (Normal) Collected: 06/02/24 2053    Lab Status: Final result Specimen: Swab from Nasopharynx Updated: 06/02/24 2153     ADENOVIRUS, PCR Not Detected     Coronavirus 229E Not Detected     Coronavirus HKU1 Not Detected     Coronavirus NL63 Not Detected     Coronavirus OC43 Not Detected     COVID19 Not Detected     Human Metapneumovirus Not Detected     Human Rhinovirus/Enterovirus Not Detected     Influenza A PCR Not Detected     Influenza B PCR Not Detected     Parainfluenza Virus 1 Not Detected     Parainfluenza Virus 2 Not Detected     Parainfluenza Virus 3 Not Detected     Parainfluenza Virus 4 Not Detected     RSV, PCR Not Detected     Bordetella pertussis pcr Not Detected     Bordetella parapertussis PCR Not Detected     Chlamydophila pneumoniae PCR Not Detected     Mycoplasma pneumo by PCR Not Detected    Narrative:      In the setting of a positive respiratory panel with a viral infection PLUS a negative procalcitonin without other underlying concern for bacterial infection, consider observing off antibiotics or discontinuation of antibiotics and continue supportive care. If the respiratory panel is positive for atypical bacterial infection (Bordetella pertussis, Chlamydophila pneumoniae, or Mycoplasma pneumoniae), consider antibiotic de-escalation to target atypical bacterial infection.            XR Chest 1 View    Result Date: 6/3/2024  XR CHEST 1 VW Date of Exam: 6/3/2024 10:36 AM EDT Indication: Post Right Thoracentesis Comparison: 6/2/2024 Findings: There is no pneumothorax after right thoracentesis. A  small right pleural effusion remains. Small left effusion is similar. There is stable linear infiltrate of the right upper lobe. There is mild atelectasis of the right lower lobe. There is linear atelectasis of the left lower lobe. There is stable mild cardiomegaly.     Impression: Impression: No pneumothorax after right thoracentesis. Small bilateral effusions. Stable infiltrates. Electronically Signed: Jacque Islas MD  6/3/2024 10:50 AM EDT  Workstation ID: [id number]    CT Angiogram Chest    Result Date: 6/3/2024  CT ANGIOGRAM CHEST Date of Exam: 6/3/2024 12:14 AM EDT Indication: eval pneumo; elev d dimer r/o PE. Comparison: 6/2/2024. Technique: CTA of the chest was performed after the uneventful intravenous administration of intravenous contrast. Reconstructed coronal and sagittal images were also obtained. In addition, a 3-D volume rendered image was created for interpretation. Automated exposure control and iterative reconstruction methods were used. Findings: Pulmonary arteries: Adequate opacification of the pulmonary arteries. No evidence of acute pulmonary embolism. Lungs and Pleura: Granulomatous calcifications are present. Large right-sided pleural effusion present. Small to moderate size left-sided pleural effusion present. Patchy airspace disease is present within the right upper lobe and the left lower lobe. Atelectatic changes and scarring present. Granulomatous calcifications are present. Intralobular septal thickening is present. Mediastinum/Elvira: No mediastinal or hilar lymphadenopathy. Lymph nodes: No axillary or supraclavicular adenopathy. Cardiovascular: The heart appears enlarged.. The pericardium is normal. The aorta and its arch branch vessels are unremarkable.   Upper Abdomen: The upper abdominal contents are unremarkable.      Bones and Soft Tissue: No suspicious osseous lesion.     Impression: Impression: 1. No evidence of pulmonary embolism. 2. Large right-sided pleural effusion with  small to moderate size left-sided pleural effusion. Patchy airspace disease and consolidative changes are present within the right upper lobe and the left lower lobe, likely related to atelectasis and pneumonia.  Probable mild underlying pulmonary edema pattern. Stable cardiomegaly. 3. Ancillary findings as described above. Electronically Signed: Arlette Altamirano MD  6/3/2024 12:23 AM EDT  Workstation ID: [id number]    XR Chest 2 View    Result Date: 6/2/2024  XR CHEST 2 VW Date of Exam: 6/2/2024 5:02 PM EDT Indication: soa, history of chf Comparison: May 27, 2024, CT chest May 12, 2024 Findings: There appears to be a moderate right pleural effusion, increased compared to the prior chest radiograph. There is a probable subpulmonic component at the anterior right base. There also appears to be a small left pleural effusion, similar to the prior exam. There are increased opacities in the right base. Linear opacity in the right upper lobe appears grossly unchanged. Interstitial opacities appear similar to the prior exam. The heart appears enlarged, as before.     Impression: Impression: 1.Moderate right pleural effusion, increased compared to the most recent prior exam. 2.Increased opacities in the right lung base which may represent atelectasis. 3.Small left pleural effusion, as before. 4.Stable cardiomegaly. Electronically Signed: Joseph Cohn  6/2/2024 5:28 PM EDT  Workstation ID: [id number]     Results for orders placed during the hospital encounter of 03/07/24    Adult Transthoracic Echo Complete W/ Cont if Necessary Per Protocol    Interpretation Summary    Left ventricular systolic function is normal. Calculated left ventricular EF = 69.1% Normal left ventricular cavity size noted. Left ventricular wall thickness is consistent with mild concentric hypertrophy. Elevated left atrial pressure.    The aortic valve exhibits sclerosis. There is moderate calcification of the aortic valve. There is thickening of the aortic  valve. The aortic valve appears trileaflet. No aortic valve regurgitation is present. Mild aortic valve stenosis is present    The tricuspid valve is normal in structure. Moderate tricuspid valve regurgitation is present. Estimated right ventricular systolic pressure from tricuspid regurgitation is markedly elevated (>55 mmHg).      Current medications:  Scheduled Meds:[Held by provider] apixaban, 5 mg, Oral, Q12H  Baclofen, 5 mg, Oral, Q8H  busPIRone, 5 mg, Oral, Q12H  cefepime, 2,000 mg, Intravenous, Q12H  doxycycline, 100 mg, Intravenous, Q12H  esomeprazole, 40 mg, Oral, BID AC  fluticasone, 2 spray, Nasal, BID  furosemide, 40 mg, Oral, Daily  guaiFENesin, 600 mg, Oral, Q12H  insulin glargine, 5 Units, Subcutaneous, Daily  insulin lispro, 2-7 Units, Subcutaneous, 4x Daily AC & at Bedtime  ipratropium-albuterol, 3 mL, Nebulization, Q6H - RT  melatonin, 5 mg, Oral, Nightly  methylPREDNISolone sodium succinate, 40 mg, Intravenous, Q12H  montelukast, 10 mg, Oral, Q PM  nebivolol, 10 mg, Oral, Daily  pharmacy consult - MTM, , Does not apply, Daily  potassium chloride, 10 mEq, Oral, Daily  senna-docusate sodium, 2 tablet, Oral, BID  sodium chloride, 10 mL, Intravenous, Q12H  traZODone, 100 mg, Oral, Nightly      Continuous Infusions:hold, 1 each      PRN Meds:.  acetaminophen **OR** acetaminophen **OR** acetaminophen    benzonatate    senna-docusate sodium **AND** polyethylene glycol **AND** bisacodyl **AND** bisacodyl    dextrose    dextrose    glucagon (human recombinant)    hold    nitroglycerin    ondansetron ODT **OR** ondansetron    sodium chloride    sodium chloride    Assessment & Plan   Assessment & Plan     Active Hospital Problems    Diagnosis  POA    **COPD exacerbation [J44.1]  Yes    Chronic heart failure with preserved ejection fraction [I50.32]  Yes    Chronic anticoagulation [Z79.01]  Not Applicable    Generalized anxiety disorder [F41.1]  Yes    Pharyngeal dysphagia secondary to large cervical  "osteophyte [R13.13]  Yes    Type 2 diabetes mellitus without complication, with long-term current use of insulin [E11.9, Z79.4]  Not Applicable    Valvular heart disease [I38]  Yes    Atrial fibrillation [I48.91]  Yes    Pleural effusion, bilateral [J90]  Yes    CKD (chronic kidney disease) stage 3, GFR 30-59 ml/min [N18.30]  Yes    Leukocytosis [D72.829]  Yes    Insomnia due to medical condition [G47.01]  Yes    GERD (gastroesophageal reflux disease) [K21.9]  Yes    Mass of lower lobe of left lung [R91.8]  Yes      Resolved Hospital Problems    Diagnosis Date Resolved POA    COPD (chronic obstructive pulmonary disease) [J44.9] 06/02/2024 Yes        Brief Hospital Course to date:  Ricki Trotter is a 88 y.o. male with PMHx of A-fib on Eliquis, BPH, HTN, HLD, hypothyroidism, GERD, COPD, pulmonary hypertension, HFpEF (ECHO 3/8/24 EF 69%, mild AS, mod TR, markedly elevated RSVP > 55 mmHg), T2DM - insulin dependent, pharyngeal dysphagia (r/t large cervical osteophyte s/p SLP and GI eval 5/12 admission), hx of aspiration / recurrent pneumonia (w/ chronic \"masslike density LLL w/ associated bronchiectasis and pleural thickening\" that is followed outpatient by pulmonary), prior right sided thoracentesis w/ 900 ml removed 5/13/2024 who presents to the ED tonight for evaluation of congestion and not being able to sleep x 2 weeks.     This patient's problems and plans were partially entered by my partner and updated as appropriate by me 06/03/24.   All problems are new to me today    COPD exacerbation  RUL & LLL Pneumonia  -Continued wheezing on exam this AM. Increase Solu-Medrol to 40mg IV q12  -Continue Cefepime, Doxycycline  -DC Vanc, MRSA PCR negative  -Strep pneumo, legionella antigen pending  -Resp PCR negative  -Blood Cx pending  -on RA on my exam  -SLP follows, Hx dysphagia      HFpEF  Bilateral pleural effusion, recurrent   - recurrent despite increased home lasix dose last hospitlization  - s/p right " thoracentesis w/ 900 ml removed on 5/13/24  - s/p IR thoracentesis today with 400mL today, Cx pending  - Resume Eliquis tonight   - Cardiology to evaluate to adjust diuretic regimen  - [facility]      Afib - chronic anticoagulation  Valvular heart disease  - continue bystolic  - Resume Eliquis tonight   - TSH ok     Chronic LLL lung mass  - f/w pulmonary outpatient     Anxiety / Insomnia  - Increase Trazodone to 100mg nightly  - Continue Melatonin   - Continue Buspar     Pharyngeal dysphagia r/t large cervical osteophyte  GERD  - follow SLP recommendations: soft to chew textures, chopped meat, thin liquids, no mixed consistencies, general aspiration precautions  - BID PPI     T2DM - insulin dependent  - Basal + Bolus regimen   - A1c 5.80 on 5/13/24     CKD 3  - stable    Expected Discharge Location and Transportation: Home   Expected Discharge   Expected Discharge Date: 6/4/2024; Expected Discharge Time:      DVT prophylaxis:  Medical DVT prophylaxis orders are present.         AM-PAC 6 Clicks Score (PT): 20 (06/03/24 0800)    CODE STATUS:   Code Status and Medical Interventions:   Ordered at: 06/02/24 2310     Code Status (Patient has no pulse and is not breathing):    CPR (Attempt to Resuscitate)     Medical Interventions (Patient has pulse or is breathing):    Full Support       Olga Saucedo,   06/03/24

## 2024-06-03 NOTE — INTERVAL H&P NOTE
H&P reviewed. The patient was examined and there are no changes to the H&P.      Right thoracentesis for pleural effusion. CBC & INR reviewed.

## 2024-06-03 NOTE — PROGRESS NOTES
"Pharmacy Consult-Vancomycin Dosing  Ricki Trotter is a  88 y.o. male receiving vancomycin therapy.     Indication: pneumonia  Consulting Provider: hospitalist  ID Consult: No    Goal Trough: 15-20    Current Antimicrobial Therapy  Anti-Infectives (From admission, onward)      Ordered     Dose/Rate Route Frequency Start Stop    06/03/24 0048  vancomycin (dosing per levels)        Ordering Provider: Escobar Krishna PharmD   Placed in \"And\" Linked Group     Does not apply Daily 06/04/24 0900 06/10/24 0859    06/03/24 0035  cefepime 2000 mg IVPB in 100 mL NS (MBP)        Ordering Provider: Rita Barrera APRN    2,000 mg  over 4 Hours Intravenous Every 12 Hours 06/03/24 1200 06/10/24 1159    06/03/24 0048  vancomycin IVPB 1500 mg in 0.9% NaCl (Premix) 500 mL        Ordering Provider: Escobar Krishna PharmD    20 mg/kg × 69.4 kg  333.3 mL/hr over 90 Minutes Intravenous Once 06/03/24 0145      06/03/24 0035  cefepime 2000 mg IVPB in 100 mL NS (MBP)        Ordering Provider: Rita Barrera APRN    2,000 mg  over 30 Minutes Intravenous Once 06/03/24 0130      06/03/24 0035  Pharmacy to dose vancomycin        Ordering Provider: Rita Barrera APRN     Does not apply Continuous PRN 06/03/24 0033 06/10/24 0032    06/02/24 2258  doxycycline (VIBRAMYCIN) 100 mg in sodium chloride 0.9 % 100 mL MBP        Ordering Provider: Rita Barrera APRN    100 mg  over 60 Minutes Intravenous Every 12 Hours Scheduled 06/02/24 2345 06/09/24 2159            Allergies  Allergies as of 06/02/2024 - Reviewed 06/02/2024   Allergen Reaction Noted    Penicillins Swelling 03/29/2017    Sulfa antibiotics Swelling 03/29/2017       Labs  Results from last 7 days   Lab Units 06/02/24  1737 05/27/24  1554   BUN mg/dL 21 14   CREATININE mg/dL 1.15 1.33*     Results from last 7 days   Lab Units 06/02/24  1737 05/27/24  1554   WBC 10*3/mm3 13.98* 9.00       Evaluation of Dosing  Last Dose Received in the ED/Outside Facility:        N/A  Is " "Patient on Dialysis or Renal Replacement:        No    Ht - 182.9 cm (72.01\")  Wt - 69.4 kg (153 lb)    Estimated Creatinine Clearance: 43.6 mL/min (by C-G formula based on SCr of 1.15 mg/dL).  Intake & Output (last 3 days)         05/31 0701  06/01 0700 06/01 0701  06/02 0700 06/02 0701  06/03 0700    Urine (mL/kg/hr)   1500    Total Output   1500    Net   -1500                   Microbiology and Radiology  Microbiology Results (last 10 days)       Procedure Component Value - Date/Time    COVID PRE-OP / PRE-PROCEDURE SCREENING ORDER (NO ISOLATION) - Swab, Nasopharynx [[id number]]  (Normal) Collected: 06/02/24 2053    Lab Status: Final result Specimen: Swab from Nasopharynx Updated: 06/02/24 2153    Narrative:      The following orders were created for panel order COVID PRE-OP / PRE-PROCEDURE SCREENING ORDER (NO ISOLATION) - Swab, Nasopharynx.  Procedure                               Abnormality         Status                     ---------                               -----------         ------                     Respiratory Panel PCR w/...[[id number]]  Normal              Final result                 Please view results for these tests on the individual orders.    Respiratory Panel PCR w/COVID-19(SARS-CoV-2) [facility]LL[facility]/PAD/[facility] In-House, NP Swab in UTM/VTM, 2 HR TAT - Swab, Nasopharynx [[id number]]  (Normal) Collected: 06/02/24 2053    Lab Status: Final result Specimen: Swab from Nasopharynx Updated: 06/02/24 2153     ADENOVIRUS, PCR Not Detected     Coronavirus 229E Not Detected     Coronavirus HKU1 Not Detected     Coronavirus NL63 Not Detected     Coronavirus OC43 Not Detected     COVID19 Not Detected     Human Metapneumovirus Not Detected     Human Rhinovirus/Enterovirus Not Detected     Influenza A PCR Not Detected     Influenza B PCR Not Detected     Parainfluenza Virus 1 Not Detected     Parainfluenza Virus 2 Not Detected     Parainfluenza Virus 3 Not Detected     Parainfluenza Virus 4 Not Detected     " RSV, PCR Not Detected     Bordetella pertussis pcr Not Detected     Bordetella parapertussis PCR Not Detected     Chlamydophila pneumoniae PCR Not Detected     Mycoplasma pneumo by PCR Not Detected    Narrative:      In the setting of a positive respiratory panel with a viral infection PLUS a negative procalcitonin without other underlying concern for bacterial infection, consider observing off antibiotics or discontinuation of antibiotics and continue supportive care. If the respiratory panel is positive for atypical bacterial infection (Bordetella pertussis, Chlamydophila pneumoniae, or Mycoplasma pneumoniae), consider antibiotic de-escalation to target atypical bacterial infection.            Vancomycin Levels:                Assessment/Plan:  Will initiate dose at 1500 mg IV once       followed by  Vancomycin Random level 6/4 with morning labs    Pharmacy will order additional vancomycin doses based on vancomycin random level result.    Escobar Krishna, PharmD, BCPS  6/3/2024  00:49 EDT

## 2024-06-03 NOTE — THERAPY EVALUATION
Patient Name: Ricki Trotter  : 1935    MRN: [id number]                              Today's Date: 6/3/2024       Admit Date: 2024    Visit Dx:     ICD-10-CM ICD-9-CM   1. Dysphagia, unspecified type  R13.10 787.20     Patient Active Problem List   Diagnosis    Mass of lower lobe of left lung    GERD (gastroesophageal reflux disease)    Primary hypertension    Insomnia due to medical condition    PVC's (premature ventricular contractions)    HLD (hyperlipidemia)    Leukocytosis    Pleural effusion, bilateral    CKD (chronic kidney disease) stage 3, GFR 30-59 ml/min    Hyponatremia    Lesion of lung    Atrial fibrillation    Pulmonary hypertension    Valvular heart disease    CHF (congestive heart failure)    Type 2 diabetes mellitus without complication, with long-term current use of insulin    Cervical osteophyte    Pharyngeal dysphagia secondary to large cervical osteophyte    Hospital discharge follow-up    Environmental and seasonal allergies    Dizziness    Generalized anxiety disorder    Chronic heart failure with preserved ejection fraction    COPD exacerbation    Chronic anticoagulation     Past Medical History:   Diagnosis Date    Allergic rhinitis     Amnesia     Arrhythmia     Atrial fibrillation     Basal cell carcinoma     Benign prostatic hyperplasia     CHF (congestive heart failure) 3-2024    Chronic kidney disease, stage 3b     COPD (chronic obstructive pulmonary disease)     Diabetes mellitus N/A    Drug dependency     Heart murmur     Hyperlipidemia     Hypertension     Hyperthyroidism     Iron deficiency     Long term (current) use of insulin     Melanoma 2012    Nephrosclerosis      Past Surgical History:   Procedure Laterality Date    CATARACT EXTRACTION Bilateral     COLONOSCOPY      PROSTHODONTIC PROCEDURE        General Information       Row Name 24 1518          Physical Therapy Time and Intention    Document Type evaluation  -     Mode of Treatment physical  therapy  -[facility] Name 06/03/24 1518          General Information    Patient Profile Reviewed yes  -     Prior Level of Function independent:;all household mobility;gait;community mobility;transfer;bed mobility;ADL's  Uses rollator prn. Denies falls.  -     Existing Precautions/Restrictions fall  -     Barriers to Rehab medically complex;previous functional deficit  -[facility] Name 06/03/24 1518          Living Environment    People in Home alone;other (see comments)  daughter will be staying w/ pt at d/c  -[facility] Name 06/03/24 1518          Home Main Entrance    Number of Stairs, Main Entrance one  -     Stair Railings, Main Entrance none  -[facility] Name 06/03/24 1518          Stairs Within Home, Primary    Number of Stairs, Within Home, Primary none  -[facility] Name 06/03/24 1518          Cognition    Orientation Status (Cognition) oriented x 3  -[facility] Name 06/03/24 1518          Safety Issues, Functional Mobility    Safety Issues Affecting Function (Mobility) awareness of need for assistance;insight into deficits/self-awareness;safety precaution awareness;safety precautions follow-through/compliance;sequencing abilities  -     Impairments Affecting Function (Mobility) balance;endurance/activity tolerance;shortness of breath;strength  -               User Key  (r) = Recorded By, (t) = Taken By, (c) = Cosigned By      Initials Name Provider Type     Linette Gramajo PT Physical Therapist                   Mobility       [facility] Name 06/03/24 1520          Bed Mobility    Bed Mobility supine-sit;sit-supine  -     Supine-Sit [place] (Bed Mobility) modified independence  -     Sit-Supine [place] (Bed Mobility) modified independence  -     Assistive Device (Bed Mobility) head of bed elevated  -     Comment, (Bed Mobility) No cues or physical assist needed  -[facility] Name 06/03/24 1520          Transfers    Comment, (Transfers) VCs for sequencing. Denied  dizziness in standing  -[facility] Name 06/03/24 1520          Sit-Stand Transfer    Sit-Stand [place] (Transfers) standby assist;verbal cues  -     Assistive Device (Sit-Stand Transfers) walker, front-wheeled  -     Comment, (Sit-Stand Transfer) STS from EOB  -[facility] Name 06/03/24 1520          Gait/Stairs (Locomotion)    [place] Level (Gait) contact guard;verbal cues  -     Assistive Device (Gait) walker, front-wheeled  -     Patient was able to Ambulate yes  -     Distance in Feet (Gait) 100  -     Deviations/Abnormal Patterns (Gait) bilateral deviations;malini decreased;gait speed decreased;stride length decreased  -     Bilateral Gait Deviations forward flexed posture  -     Comment, (Gait/Stairs) Pt amb laps in room and demo step through gait pattern w/ decreased speed and increased forward flexed posture. VCs for upright posture and forward gaze. Mild unsteadiness noted during turns, but no overt LOB noted. Distance limited by fatigue  -               User Key  (r) = Recorded By, (t) = Taken By, (c) = Cosigned By      Initials Name Provider Type     Linette Gramajo PT Physical Therapist                   Obj/Interventions       [facility] Name 06/03/24 1526          Range of Motion Comprehensive    General Range of Motion bilateral lower extremity ROM WFL  -[facility] Name 06/03/24 1526          Strength Comprehensive (MMT)    General Manual Muscle Testing (MMT) Assessment lower extremity strength deficits identified  -     Comment, General Manual Muscle Testing (MMT) Assessment BLEs grossly 4+/5  -[facility] Name 06/03/24 1526          Balance    Balance Assessment sitting static balance;sitting dynamic balance;sit to stand dynamic balance;standing static balance;standing dynamic balance  -     Static Sitting Balance supervision  -     Dynamic Sitting Balance standby assist  -     Position, Sitting Balance unsupported;sitting edge of bed  -     Sit to Stand  Dynamic Balance standby assist;verbal cues  -     Static Standing Balance standby assist  -     Dynamic Standing Balance contact guard  -     Position/Device Used, Standing Balance supported;walker, front-wheeled  -     Balance Interventions sitting;standing;sit to stand;supported;static;dynamic  -     Comment, Balance CGA for safety  -[facility] Name 06/03/24 1526          Sensory Assessment (Somatosensory)    Sensory Assessment (Somatosensory) LE sensation intact  -               User Key  (r) = Recorded By, (t) = Taken By, (c) = Cosigned By      Initials Name Provider Type     Linette Gramajo, PT Physical Therapist                   Goals/Plan       Row Name 06/03/24 1530          Transfer Goal 1 (PT)    Activity/Assistive Device (Transfer Goal 1, PT) sit-to-stand/stand-to-sit;bed-to-chair/chair-to-bed  -     [place] Level/Cues Needed (Transfer Goal 1, PT) modified independence  -     Time Frame (Transfer Goal 1, PT) long term goal (LTG);3 days  -LH       Row Name 06/03/24 1530          Gait Training Goal 1 (PT)    Activity/Assistive Device (Gait Training Goal 1, PT) gait (walking locomotion);assistive device use  -     [place] Level (Gait Training Goal 1, PT) standby assist  -     Distance (Gait Training Goal 1, PT) 200 ft  -     Time Frame (Gait Training Goal 1, PT) short term goal (STG);3 days  -LH       Row Name 06/03/24 1530          Stairs Goal 1 (PT)    Activity/Assistive Device (Stairs Goal 1, PT) ascending stairs;descending stairs  -     [place] Level/Cues Needed (Stairs Goal 1, PT) contact guard required  -     Number of Stairs (Stairs Goal 1, PT) 1  -     Time Frame (Stairs Goal 1, PT) long term goal (LTG);3 days  -LH       Row Name 06/03/24 1530          Therapy Assessment/Plan (PT)    Planned Therapy Interventions (PT) balance training;bed mobility training;gait training;home exercise program;neuromuscular re-education;patient/family education;postural  re-education;ROM (range of motion);stair training;strengthening;stretching;transfer training  [facility]               User Key  (r) = Recorded By, (t) = Taken By, (c) = Cosigned By      Initials Name Provider Type     Linette Gramajo, PT Physical Therapist                   Clinical Impression       Row Name 06/03/24 1527          Pain    Pretreatment Pain Rating 0/10 - no pain  -     Posttreatment Pain Rating 0/10 - no pain  -LH       Row Name 06/03/24 1527          Plan of Care Review    Plan of Care Reviewed With patient  -     Outcome Evaluation PT eval completed. Pt presents slightly below baseline function d/t generalized weakness, decreased balance, and decreased activity tolerance. Pt ambulated 100 ft w/ FWW, CGA. Mild unsteadiness noted during turns, but no overt LOB noted. Pt would benefit from skilled IP PT. Rec home w/ assist and HHPT at d/c.  -[facility] Name 06/03/24 1527          Therapy Assessment/Plan (PT)    Patient/Family Therapy Goals Statement (PT) to go home  -     Rehab Potential (PT) good, to achieve stated therapy goals  [facility]     Criteria for Skilled Interventions Met (PT) yes;meets criteria;skilled treatment is necessary  [facility]     Therapy Frequency (PT) daily  -     Predicted Duration of Therapy Intervention (PT) 3 days  -LH       Row Name 06/03/24 1527          Vital Signs    Pre Systolic BP Rehab 135  -LH     Pre Treatment Diastolic BP 93  -LH     Post Systolic BP Rehab 138  -LH     Post Treatment Diastolic BP 93  -LH     Pretreatment Heart Rate (beats/min) 78  -LH     Posttreatment Heart Rate (beats/min) 72  -LH     Pre SpO2 (%) 93  -LH     O2 Delivery Pre Treatment room air  -     O2 Delivery Intra Treatment room air  -     Post SpO2 (%) 97  -LH     O2 Delivery Post Treatment room air  -     Pre Patient Position Supine  -LH     Intra Patient Position Standing  -LH     Post Patient Position Supine  -LH       Row Name 06/03/24 1527          Positioning and Restraints     Pre-Treatment Position in bed  -     Post Treatment Position bed  -     In Bed notified nsg;supine;fowlers;call light within reach;encouraged to call for assist;side rails up x2  -               User Key  (r) = Recorded By, (t) = Taken By, (c) = Cosigned By      Initials Name Provider Type     Linette Gramajo, DAVID Physical Therapist                   Outcome Measures       Row Name 06/03/24 1532 06/03/24 0800       How much help from another person do you currently need...    Turning from your back to your side while in flat bed without using bedrails? 4  - 4  -CB    Moving from lying on back to sitting on the side of a flat bed without bedrails? 4  - 4  -CB    Moving to and from a bed to a chair (including a wheelchair)? 3  - 3  -CB    Standing up from a chair using your arms (e.g., wheelchair, bedside chair)? 3  - 3  -CB    Climbing 3-5 steps with a railing? 3  - 3  -CB    To walk in hospital room? 3  - 3  -CB    AM-[facility] 6 Clicks Score (PT) 20  - 20  -CB    Highest Level of Mobility Goal 6 --> Walk 10 steps or more  - 6 --> Walk 10 steps or more  -CB      Row Name 06/03/24 1532          Functional Assessment    Outcome Measure Options AM-PAC 6 Clicks Basic Mobility (PT)  -               User Key  (r) = Recorded By, (t) = Taken By, (c) = Cosigned By      Initials Name Provider Type    Rei Bishop, TRE Registered Nurse     Linette Gramajo, PT Physical Therapist                                 Physical Therapy Education       Title: PT OT SLP Therapies (In Progress)       Topic: Physical Therapy (In Progress)       Point: Mobility training (Done)       Learning Progress Summary             Patient Acceptance, E, VU,NR by  at 6/3/2024 1532                         Point: Home exercise program (Not Started)       Learner Progress:  Not documented in this visit.              Point: Body mechanics (Done)       Learning Progress Summary             Patient Acceptance, E, VU,NR by  at 6/3/2024  1532                         Point: Precautions (Done)       Learning Progress Summary             Patient Acceptance, E, VU,NR by  at 6/3/2024 1532                                         User Key       Initials Effective Dates Name Provider Type Discipline     09/21/23 -  Linette Gramajo, DAVID Physical Therapist PT                  PT Recommendation and Plan  Planned Therapy Interventions (PT): balance training, bed mobility training, gait training, home exercise program, neuromuscular re-education, patient/family education, postural re-education, ROM (range of motion), stair training, strengthening, stretching, transfer training  Plan of Care Reviewed With: patient  Outcome Evaluation: PT eval completed. Pt presents slightly below baseline function d/t generalized weakness, decreased balance, and decreased activity tolerance. Pt ambulated 100 ft w/ FWW, CGA. Mild unsteadiness noted during turns, but no overt LOB noted. Pt would benefit from skilled IP PT. Rec home w/ assist and HHPT at d/c.     Time Calculation:   PT Evaluation Complexity  History, PT Evaluation Complexity: 3 or more personal factors and/or comorbidities  Examination of Body Systems (PT Eval Complexity): total of 3 or more elements  Clinical Presentation (PT Evaluation Complexity): stable  Clinical Decision Making (PT Evaluation Complexity): low complexity  Overall Complexity (PT Evaluation Complexity): low complexity     PT Charges       Row Name 06/03/24 1533             Time Calculation    Start Time 1502  -      PT Received On 06/03/24  -      PT Goal Re-Cert Due Date 06/13/24  -         Untimed Charges    PT Eval/Re-eval Minutes 36  -         Total Minutes    Untimed Charges Total Minutes 36  -       Total Minutes 36  -                User Key  (r) = Recorded By, (t) = Taken By, (c) = Cosigned By      Initials Name Provider Type     Linette Gramajo, PT Physical Therapist                  Therapy Charges for Today       Code  Description Service Date Service Provider Modifiers Qty    [id number] HC PT EVAL LOW COMPLEXITY 3 6/3/2024 Linette Gramajo, PT GP 1            PT G-Codes  Outcome Measure Options: AM-PAC 6 Clicks Basic Mobility (PT)  AM-PAC 6 Clicks Score (PT): 20  PT Discharge Summary  Anticipated Discharge Disposition (PT): home with assist, home with home health    Linette Gramajo, PT  6/3/2024

## 2024-06-03 NOTE — PROCEDURES
Vascular Interventional Radiology  Procedure Note    Date: 06/03/24       Time: 10:07 EDT     Pre-op Diagnosis: Right Pleural Effusion     Post-op Diagnosis: Right Pleural Effusion    Procedure: US guided right Thoracentesis. Via posterior ICS.    Volume removed: 400 ml.    Sedation: None    Estimated Blood Loss (EBL): 0 cc    IVF: NA    Findings: Above    Specimens: Sent to lab.     Complications: None immediate. CXR shows no pneumothorax.    Disposition: Return to pt room.    LINDA Feliz  Vascular Interventional Radiology

## 2024-06-03 NOTE — CASE MANAGEMENT/SOCIAL WORK
Discharge Planning Assessment  [facility]     Patient Name: Ricki Trotter  MRN: [id number]  Today's Date: 6/3/2024    Admit Date: 6/2/2024    Plan: IDP   Discharge Needs Assessment       Row Name 06/03/24 1313       Living Environment    People in Home alone    Current Living Arrangements home    Potentially Unsafe Housing Conditions unable to assess    In the past 12 months has the electric, gas, oil, or water company threatened to shut off services in your home? No    Primary Care Provided by self    Provides Primary Care For no one    Family Caregiver if Needed none    Quality of Family Relationships supportive;involved;helpful    Able to Return to Prior Arrangements yes       Resource/Environmental Concerns    Resource/Environmental Concerns none    Transportation Concerns none       Transportation Needs    In the past 12 months, has lack of transportation kept you from medical appointments or from getting medications? no    In the past 12 months, has lack of transportation kept you from meetings, work, or from getting things needed for daily living? No       Food Insecurity    Within the past 12 months, you worried that your food would run out before you got the money to buy more. Never true    Within the past 12 months, the food you bought just didn't last and you didn't have money to get more. Never true       Transition Planning    Patient/Family Anticipates Transition to home with family    Patient/Family Anticipated Services at Transition none    Transportation Anticipated family or friend will provide       Discharge Needs Assessment    Equipment Currently Used at Home wheelchair;rollator;shower chair    Concerns to be Addressed no discharge needs identified;denies needs/concerns at this time    Anticipated Changes Related to Illness none    Equipment Needed After Discharge none                   Discharge Plan       Row Name 06/03/24 1314       Plan    Plan IDP    Patient/Family in Agreement with  Plan yes    Plan Comments Spoke with daughter at bedside r/t pt off floor for thorcentesis. Pt lives alone in [facility]. [facility] Recent admit on 5/12. Has BP cuff, shower chair, glucometer. Uses [facility] for SN, PT, OT. PCP Sofiya Krishnan. Insurance verifed as [place] [employer]placement with scripts filledf at [facility]. Plan is home with daughter to transport and will stay will be staying with him for a short time. CM will cont to follow.    Final Discharge Disposition Code 01 - home or self-care                  Continued Care and Services - Admitted Since 6/2/2024    No active coordination exists for this encounter.       Selected Continued Care - Prior Encounters Includes continued care and service providers with selected services from prior encounters from 3/4/2024 to 6/3/2024      Discharged on 5/15/2024 Admission date: 5/12/2024 - Discharge disposition: Home-Health Care [facility]      Home Medical Care       Service Provider Selected Services Address Phone Fax Patient Preferred    [facility] [address] [place] [id number] [phone] --                          Expected Discharge Date and Time       Expected Discharge Date Expected Discharge Time    Jun 4, 2024            Demographic Summary       Row Name 06/03/24 1312       General Information    Admission Type observation    Arrived From emergency department    Referral Source admission list    Reason for Consult discharge planning    Preferred Language English                   Functional Status       Row Name 06/03/24 1312       Functional Status    Usual Activity Tolerance good    Current Activity Tolerance good       Physical Activity    On average, how many days per week do you engage in moderate to strenuous exercise (like a brisk walk)? 0 days    On average, how many minutes do you engage in exercise at this level? 0 min    Number of minutes of exercise per week 0       Functional  Status, IADL    Medications independent    Meal Preparation independent    Housekeeping independent    Laundry independent    Shopping independent                   Psychosocial    No documentation.                  Abuse/Neglect    No documentation.                  Legal    No documentation.                  Substance Abuse    No documentation.                  Patient Forms    No documentation.                     Tiffany Colindres RN

## 2024-06-03 NOTE — H&P
"    [facility] Medicine Services  HISTORY AND PHYSICAL    Patient Name: Ricki Trotter  : 1935  MRN: [id number]  Primary Care Physician: Sofiya Krishnan APRN  Date of admission: 2024    Subjective   Subjective     Chief Complaint:  Congestion, not able to sleep x 2 weeks    HPI:  Ricki Trotter is a 88 y.o. male with PMHx of A-fib on Eliquis, BPH, HTN, HLD, hypothyroidism, GERD, COPD, pulmonary hypertension, HFpEF (ECHO 3/8/24 EF 69%, mild AS, mod TR, markedly elevated RSVP > 55 mmHg), T2DM - insulin dependent, pharyngeal dysphagia (r/t large cervical osteophyte s/p SLP and GI eval  admission), hx of aspiration / recurrent pneumonia (w/ chronic \"masslike density LLL w/ associated bronchiectasis and pleural thickening\" that is followed outpatient by pulmonary), prior right sided thoracentesis w/ 900 ml removed 2024 who presents to the ED tonight for evaluation of congestion and not being able to sleep x 2 weeks.     Daughter is at bedside and patient asks her to assist with HPI. On Friday, pt was seen at [email or web address] for congestion, cough, wheezing; was diagnosed with sinus infection, bronchitis and right ear effusion - given a steroid shot and doxycyline. Dtr reports congestion is somewhat better, pt agrees. He still reports shortness of breath, dry cough and inability to sleep. Cough seems wore at night, sleeping with head elevated. Denies fever or chills. No chest pain. Has been more fatigued and less active. Dtr reports after d/c'd from hospital last time, s/p thoracentesis, pt had more energy than he has had in a long time, but this has slowly declined. He denies increased LE edema. He does have orthopnea. He has not had weight gain. He overall has a poor appetite.    He is chronically on trazodone but this is not helping his sleep, he has also taken melatonin but separately from the trazodone and it did not help him either. He did switch from xyzal to claritin " about the same time his sleep became an issue so daughter is thinking this could possibly be the culprit of the sleep. Dtr reports overall pt has been more anxious, he has been hospitalized multiple times since March and has had several new medical issues since that time.    ED w/u tonight w/ chest XR - moderate R pleural effusion; increased opacities R lung base; small L pleural effusion; stable cardiomegaly; Pertinent labs: HS trop 23 w/ delta 0; proBNP 7984.0; Na 131, glu 160; WBC 13.98 w/ neutrophilia (absolute neutrophil count 11.32 and immature grans 0.210: H/H 10.8/34.0; urinalysis w/ 1+ leuks / 3-5 wbc's.Respiratory PCR negative.    Multiple recent hospitalizations:  5/12-5/15: A/C HFpEF given 40 mg IV lasix in ED, then BID and eventually switched to 40 mg daily (increased from 20 mg home dose); s/p R thoracentesis 5/13 w/ 900 ml removed (no fluid studies sent); VQ scan deferred since symptoms improved w/ thoracentesis. Initially treated for LLL pneumonia w/ rocephin / doxy but stopped at d/c s/p 3 days of tx d/t likely chronic LLL consolidation. WBC count 13K but procal / lactate normal. Also s/p SLP / GI evaluation for GERD/pharyngeal dysphagia d/t large cervical osteophyte (SLP recommendations: soft to chew textures, chopped meat, thin liquids, no mixed consistencies, general aspiration precautions; GI increased PPI to BID and f/u outpatient w/ GI).    3/7-3/11: had recent [place] hospitalization treated for pneumonia one day prior to this admission; Acute CHF exacerbation, new onset A-fib; started on Eliquis, rate controlled on bystolic; had IV diuresis and d/c'd home on lasix. No abx during admission, had recent abx for pneumonia at [facility].      Review of Systems   Constitutional:  Positive for activity change, appetite change and fatigue. Negative for chills, fever and unexpected weight change.   HENT:  Positive for congestion, ear pain, postnasal drip, rhinorrhea and sinus pressure. Negative  for ear discharge, mouth sores and nosebleeds.    Respiratory:  Positive for cough, shortness of breath and wheezing.    Psychiatric/Behavioral:  Positive for sleep disturbance. The patient is nervous/anxious.    All other systems reviewed and are negative.           Personal History     Past Medical History:   Diagnosis Date    Allergic rhinitis     Amnesia     Arrhythmia     Atrial fibrillation     Basal cell carcinoma 2015    Benign prostatic hyperplasia     CHF (congestive heart failure) 3-2024    Chronic kidney disease, stage 3b     COPD (chronic obstructive pulmonary disease)     Diabetes mellitus N/A    Drug dependency     Heart murmur     Hyperlipidemia     Hypertension     Hyperthyroidism     Iron deficiency     Long term (current) use of insulin     Melanoma 2012    Nephrosclerosis              Past Surgical History:   Procedure Laterality Date    CATARACT EXTRACTION Bilateral     COLONOSCOPY      PROSTHODONTIC PROCEDURE         Family History:  family history includes Breast cancer in his sister; Diabetes in his mother; Hypertension in his mother; Stroke in his mother.     Social History:  reports that he quit smoking about 62 years ago. His smoking use included cigarettes. He started smoking about 71 years ago. He has a 4.5 pack-year smoking history. He has been exposed to tobacco smoke. He has never used smokeless tobacco. He reports current alcohol use of about 2.0 standard drinks of alcohol per week. He reports that he does not use drugs.  Social History     Social History Narrative    caffeine use: 2-3 serving of coffee daily       Medications:  Baclofen, Insulin Glargine (2 Unit Dial), SITagliptin, apixaban, busPIRone, doxycycline, esomeprazole, fluticasone, furosemide, levocetirizine, loratadine, montelukast, nebivolol, potassium chloride, simvastatin, tamsulosin, and traZODone    Allergies   Allergen Reactions    Penicillins Swelling     Patient has tolerated PO Cefdinir    Sulfa Antibiotics  Swelling       Objective   Objective     Vital Signs:   Temp:  [97.8 °F (36.6 °C)-97.9 °F (36.6 °C)] 97.9 °F (36.6 °C)  Heart Rate:  [58-79] 73  Resp:  [14-16] 16  BP: (135-178)/(63-94) 157/79    Physical Exam  Constitutional:       General: He is not in acute distress.     Appearance: He is well-developed. He is ill-appearing. He is not toxic-appearing.   HENT:      Head: Normocephalic and atraumatic.      Nose: Nose normal.      Mouth/Throat:      Mouth: Mucous membranes are dry.      Pharynx: Oropharynx is clear.   Eyes:      Extraocular Movements: Extraocular movements intact.      Conjunctiva/sclera: Conjunctivae normal.      Pupils: Pupils are equal, round, and reactive to light.   Cardiovascular:      Rate and Rhythm: Normal rate. Rhythm irregular.      Pulses: Normal pulses.      Heart sounds: Normal heart sounds. No murmur heard.  Pulmonary:      Effort: Pulmonary effort is normal.      Breath sounds: Wheezing present.   Abdominal:      General: Bowel sounds are normal. There is no distension.      Palpations: Abdomen is soft.      Tenderness: There is no abdominal tenderness.   Musculoskeletal:         General: No swelling. Normal range of motion.      Cervical back: Normal range of motion and neck supple.   Skin:     General: Skin is warm and dry.      Capillary Refill: Capillary refill takes less than 2 seconds.      Findings: No rash.   Neurological:      Mental Status: He is alert and oriented to person, place, and time.      Cranial Nerves: No cranial nerve deficit.   Psychiatric:         Mood and Affect: Mood normal.         Behavior: Behavior normal.         Result Review:  I have personally reviewed the results from the time of this admission to 6/2/2024 23:11 EDT and agree with these findings:  [x]  Laboratory list / accordion  [x]  Microbiology  [x]  Radiology  [x]  EKG/Telemetry   []  Cardiology/Vascular   []  Pathology  []  Old records  []  Other:  Most notable findings include:     LAB  RESULTS:      Lab 06/02/24 2223 06/02/24 1946 06/02/24 1737 05/27/24  1554   WBC  --   --  13.98* 9.00   HEMOGLOBIN  --   --  10.8* 10.4*   HEMATOCRIT  --   --  34.0* 33.2*   PLATELETS  --   --  194 183   NEUTROS ABS  --   --  11.32* 6.57   IMMATURE GRANS (ABS)  --   --  0.21* 0.06*   LYMPHS ABS  --   --  1.68 1.74   MONOS ABS  --   --  0.71 0.48   EOS ABS  --   --  0.04 0.12   MCV  --   --  89.7 91.0   PROCALCITONIN  --  0.06  --  0.05   LACTATE 1.4  --   --  1.0         Lab 06/02/24 1946 06/02/24 1737 05/27/24  1554   SODIUM  --  131* 137   POTASSIUM  --  3.9 4.5   CHLORIDE  --  93* 101   CO2  --  27.0 27.0   ANION GAP  --  11.0 9.0   BUN  --  21 14   CREATININE  --  1.15 1.33*   EGFR  --  61.2 51.4*   GLUCOSE  --  160* 138*   CALCIUM  --  8.6 8.5*   TSH 2.560  --   --          Lab 06/02/24 1737 05/27/24  1554   TOTAL PROTEIN 6.4 6.3   ALBUMIN 4.0 3.9   GLOBULIN 2.4 2.4   ALT (SGPT) 12 8   AST (SGOT) 15 13   BILIRUBIN 0.7 0.4   ALK PHOS 77 72   LIPASE  --  23         Lab 06/02/24 1946 06/02/24 1737 05/27/24 1730 05/27/24  1554   PROBNP  --  7,984.0*  --  6,707.0*   HSTROP T 23* 23* 26* 32*                 Brief Urine Lab Results  (Last result in the past 365 days)        Color   Clarity   Blood   Leuk Est   Nitrite   Protein   CREAT   Urine HCG        05/27/24 1647 Yellow   Clear   Negative   Small (1+)   Negative   Negative                 Microbiology Results (last 10 days)       Procedure Component Value - Date/Time    COVID PRE-OP / PRE-PROCEDURE SCREENING ORDER (NO ISOLATION) - Swab, Nasopharynx [[id number]]  (Normal) Collected: 06/02/24 2053    Lab Status: Final result Specimen: Swab from Nasopharynx Updated: 06/02/24 2153    Narrative:      The following orders were created for panel order COVID PRE-OP / PRE-PROCEDURE SCREENING ORDER (NO ISOLATION) - Swab, Nasopharynx.  Procedure                               Abnormality         Status                     ---------                                -----------         ------                     Respiratory Panel PCR w/...[[id number]]  Normal              Final result                 Please view results for these tests on the individual orders.    Respiratory Panel PCR w/COVID-19(SARS-CoV-2) [facility] In-House, NP Swab in UTM/VTM, 2 HR TAT - Swab, Nasopharynx [[id number]]  (Normal) Collected: 06/02/24 2053    Lab Status: Final result Specimen: Swab from Nasopharynx Updated: 06/02/24 2153     ADENOVIRUS, PCR Not Detected     Coronavirus 229E Not Detected     Coronavirus HKU1 Not Detected     Coronavirus NL63 Not Detected     Coronavirus OC43 Not Detected     COVID19 Not Detected     Human Metapneumovirus Not Detected     Human Rhinovirus/Enterovirus Not Detected     Influenza A PCR Not Detected     Influenza B PCR Not Detected     Parainfluenza Virus 1 Not Detected     Parainfluenza Virus 2 Not Detected     Parainfluenza Virus 3 Not Detected     Parainfluenza Virus 4 Not Detected     RSV, PCR Not Detected     Bordetella pertussis pcr Not Detected     Bordetella parapertussis PCR Not Detected     Chlamydophila pneumoniae PCR Not Detected     Mycoplasma pneumo by PCR Not Detected    Narrative:      In the setting of a positive respiratory panel with a viral infection PLUS a negative procalcitonin without other underlying concern for bacterial infection, consider observing off antibiotics or discontinuation of antibiotics and continue supportive care. If the respiratory panel is positive for atypical bacterial infection (Bordetella pertussis, Chlamydophila pneumoniae, or Mycoplasma pneumoniae), consider antibiotic de-escalation to target atypical bacterial infection.            XR Chest 2 View    Result Date: 6/2/2024  XR CHEST 2 VW Date of Exam: 6/2/2024 5:02 PM EDT Indication: soa, history of chf Comparison: May 27, 2024, CT chest May 12, 2024 Findings: There appears to be a moderate right pleural effusion, increased compared to the prior chest  radiograph. There is a probable subpulmonic component at the anterior right base. There also appears to be a small left pleural effusion, similar to the prior exam. There are increased opacities in the right base. Linear opacity in the right upper lobe appears grossly unchanged. Interstitial opacities appear similar to the prior exam. The heart appears enlarged, as before.     Impression: Impression: 1.Moderate right pleural effusion, increased compared to the most recent prior exam. 2.Increased opacities in the right lung base which may represent atelectasis. 3.Small left pleural effusion, as before. 4.Stable cardiomegaly. Electronically Signed: Joseph Cohn  6/2/2024 5:28 PM EDT  Workstation ID: [id number]     Results for orders placed during the hospital encounter of 03/07/24    Adult Transthoracic Echo Complete W/ Cont if Necessary Per Protocol    Interpretation Summary    Left ventricular systolic function is normal. Calculated left ventricular EF = 69.1% Normal left ventricular cavity size noted. Left ventricular wall thickness is consistent with mild concentric hypertrophy. Elevated left atrial pressure.    The aortic valve exhibits sclerosis. There is moderate calcification of the aortic valve. There is thickening of the aortic valve. The aortic valve appears trileaflet. No aortic valve regurgitation is present. Mild aortic valve stenosis is present    The tricuspid valve is normal in structure. Moderate tricuspid valve regurgitation is present. Estimated right ventricular systolic pressure from tricuspid regurgitation is markedly elevated (>55 mmHg).      Assessment & Plan   Assessment & Plan       COPD exacerbation    Mass of lower lobe of left lung    GERD (gastroesophageal reflux disease)    Insomnia due to medical condition    Leukocytosis    Pleural effusion, bilateral    CKD (chronic kidney disease) stage 3, GFR 30-59 ml/min    Atrial fibrillation    Valvular heart disease    Type 2 diabetes mellitus  without complication, with long-term current use of insulin    Pharyngeal dysphagia secondary to large cervical osteophyte    Generalized anxiety disorder    Chronic heart failure with preserved ejection fraction    Chronic anticoagulation    COPD exacerbation  Leukocytosis  - recent tx for bronchitis / sinus infx / ear effusion w/ steroid injection and oral doxy 5/31/24 by [facility]  - switch to IV doxycyline  - methylprednisolone 62.5 mg IV daily x 5 days  - add procal, lactate  -- both normal  - obtain CTA chest to further evaluate for pneumonia, r/o PE and evaluate pleural effusions  - respiratory pcr negative  - add duo nebs  - pulmonary toilet / IS  - add mucinex scheduled, tessalon prn cough    HFpEF  - proBNP 7984.0  - s/p lasix 80 mg IV in ED  - hold further IV diuresis for now, resume home lasix 40 mg daily  - f/w Dr. Singh, cardiology, consider cardiology consult    Bilateral pleural effusion  - recurrent despite increased home lasix dose last hospitlization  - s/p right thoracentesis w/ 900 ml removed on 5/13/24  - IR evaluation for thoracentesis in am  - hold Eliquis for possible procedure    Afib - chronic anticoagulation  Valvular heart disease  - continue bystolic  - Eliquis on hold for thoracentesis  - check tsh    Chronic LLL lung mass  - f/w pulmonary outpatient    Anxiety / Insomnia  - will schedule trazodone and melatonin (pt already taking these separately PRN but have been ineffective) to see if they help together rather than introducing new medications  - also, schedule buspar BID, taking daily PRN now  - integrative care consult for anxiety management    Pharyngeal dysphagia r/t large cervical osteophyte  GERD  - follow SLP recommendations: soft to chew textures, chopped meat, thin liquids, no mixed consistencies, general aspiration precautions  - BID PPI    T2DM - insulin dependent  - decrease long acting insulin to 5 units daily, titrate up as needed  - fsbg achs w/ low dose ssi  - A1c 5.80 on  5/13/24    CKD 3  - eGFR 61.2 today, creatinine 1.15  - monitor closely w/ diuretic use    DVT prophylaxis:  Eliquis    CODE STATUS:    Code Status (Patient has no pulse and is not breathing): CPR (Attempt to Resuscitate)  Medical Interventions (Patient has pulse or is breathing): Full Support      Expected Discharge     Expected Discharge Date: 6/4/2024; Expected Discharge Time:     Signature: Electronically signed by LINDA Mohan, 06/02/24, 11:11 PM EDT    Total time spent: 70 minutes  Time spent includes time reviewing chart, face-to-face time, counseling patient/family/caregiver, ordering medications/tests/procedures, communicating with other health care professionals, documenting clinical information in the electronic health record, and coordination of care.

## 2024-06-03 NOTE — CONSULTS
[facility] Cardiology, Inpatient Consult  Date of Hospital Visit: 24  Encounter Provider: Demetria Rubin PA-C    Place of Service: [facility]  Patient Name: Ricki Trotter  :1935  MRN: [id number]     Primary Care Provider: Sofiya Krishnan APRN    Chief complaint: Heart failure / recurrent pleural effusion     PROBLEM LIST  Valvular heart disease  Echo, 2023: EF 51-55%, mild AS, max PG 28, mean PG 14, moderate MR, moderate-severe TR, RVSP 60 mmHg  3/2024 echo EF>60% mild AV stenosis(peak 22). Mod TR, RVSP 58mmHg  PVC / AFib  9d Holter 2020: avg 66,  bpm, VE 8.3%, SVE 4.6%, 5 runs of NSVT, 20 runs of SVT, no A-fib  Nuclear stress 2020: Normal MPS with no evidence of ischemia, frequent PVCs noted at rest actually decreased with increased heart rate, EF 58%, lower study  PVCs adequately controlled following initiation of beta-blocker  3/24 EKG afib   Shortness of Breath ()  Echo 2020: EF 50-55%, mild concentric LVH, grade 2 diastolic dysfunction, severely increased LA volume, aortic sclerosis without significant stenosis, mild to moderate MR, mild to moderate TR, RVSP 52 mmHg  Normal stress 2020 as noted above  Hypertension  Dyslipidemia  3/2023 , trig 78, HDL 43, LDL 79 on Zocor  Type 2 diabetes, controlled  3/23 A1c 6.0  COPD with recurrent pneumonia  Former smoker, asbestos exposure and other chemicals at [employer]  Some entrapped lung d/t scarring and bronchiectasis  Normocytic Anemia  BPH    History of Present Illness:  Patient is an 88-year-old history of chronic HFpEF, PAF, hypertension, hyperlipidemia presented to the hospital on 2024 with shortness of breath.  Patient has a history of previous bilateral pleural effusions that his right was greater than his left during admission on 2024.  Patient underwent thoracentesis with 900 mL of serosanguineous fluid drained during that admission.  Patient followed up with  heart valve clinic on 5/21/2024 and seem to be stable on Lasix 40 mg daily however patient presented on 5/27/2024 with increased shortness of breath to the emergency department.  At that time it was not felt the patient met criteria for admission.  Patient presented back yesterday with increased shortness of breath and on CT of chest was noted to have a moderate right pleural effusion and a small left pleural effusion.  We have been consulted for acute on chronic heart failure.    This morning he is underwent right-sided thoracentesis with removal of 400 mL of fluid.  He reports that his breathing already feels better.  Denies any other peripheral or abdominal edema associated with worsening dyspnea.  He does note that he has had more trouble sleeping recently.    Current Outpatient Medications   Medication Instructions    apixaban (ELIQUIS) 5 MG tablet tablet Take 1 tablet by mouth Every 12 (Twelve) Hours as directed for Atrial Fibrillation    Baclofen (LIORESAL) 5 mg, Oral, Every 8 Hours Scheduled    busPIRone (BUSPAR) 5 mg, Oral, Daily PRN    Bystolic 10 mg, Oral, Daily    doxycycline (VIBRAMYCIN) 100 mg, Oral, 2 Times Daily, Twice a day for 7 days. Started 5-31    esomeprazole (NEXIUM) 40 mg, Oral, 2 Times Daily Before Meals    fluticasone (FLONASE) 50 MCG/ACT nasal spray 2 sprays, Nasal, Daily    furosemide (LASIX) 40 mg, Oral, Daily    levocetirizine (XYZAL) 2.5 mg, Oral, Every Evening    loratadine (CLARITIN) 10 mg, Oral, Daily    montelukast (SINGULAIR) 10 mg, Oral, Every Evening    potassium chloride (K-DUR,KLOR-CON) 10 MEQ CR tablet 10 mEq, Oral, Daily    simvastatin (ZOCOR) 40 mg, Oral, Every Evening    SITagliptin (JANUVIA) 100 mg, Oral, Daily    tamsulosin (FLOMAX) 0.4 mg, Oral, Daily    Toujeo Max SoloStar 16 Units, Subcutaneous, Daily    traZODone (DESYREL) 50 MG tablet TAKE 1 TO 2 TABLETS BY MOUTH AT  BEDTIME AS NEEDED FOR INSOMNIA      Scheduled Meds:[Held by provider] apixaban, 5 mg, Oral,  "Q12H  Baclofen, 5 mg, Oral, Q8H  busPIRone, 5 mg, Oral, Q12H  cefepime, 2,000 mg, Intravenous, Q12H  doxycycline, 100 mg, Intravenous, Q12H  esomeprazole, 40 mg, Oral, BID AC  fluticasone, 2 spray, Nasal, BID  furosemide, 40 mg, Oral, Daily  guaiFENesin, 600 mg, Oral, Q12H  insulin glargine, 5 Units, Subcutaneous, Daily  insulin lispro, 2-7 Units, Subcutaneous, 4x Daily AC & at Bedtime  ipratropium-albuterol, 3 mL, Nebulization, Q6H - RT  melatonin, 5 mg, Oral, Nightly  methylPREDNISolone sodium succinate, 40 mg, Intravenous, Q12H  montelukast, 10 mg, Oral, Q PM  nebivolol, 10 mg, Oral, Daily  pharmacy consult - MTM, , Does not apply, Daily  potassium chloride, 10 mEq, Oral, Daily  senna-docusate sodium, 2 tablet, Oral, BID  sodium chloride, 10 mL, Intravenous, Q12H  traZODone, 100 mg, Oral, Nightly      Continuous Infusions:hold, 1 each      Review of Systems   Constitutional:  Positive for fatigue.   Respiratory:  Positive for shortness of breath.    Cardiovascular:  Negative for chest pain, palpitations and leg swelling.   Psychiatric/Behavioral:  Positive for sleep disturbance.                Objective:     Vitals:    06/03/24 0825 06/03/24 0836 06/03/24 0842 06/03/24 0944   BP: 124/73 155/80  132/64   BP Location:       Patient Position:       Pulse: 72 75  76   Resp:       Temp:       TempSrc:       SpO2:   (!) 89%    Weight:       Height:           Body mass index is 20.75 kg/m².  Flowsheet Rows      Flowsheet Row First Filed Value   Admission Height 182.9 cm (72.01\") Documented at 06/02/2024 1600   Admission Weight 69.4 kg (153 lb) Documented at 06/02/2024 1600            Intake/Output Summary (Last 24 hours) at 6/3/2024 0953  Last data filed at 6/3/2024 0836  Gross per 24 hour   Intake --   Output 2300 ml   Net -2300 ml       Physical Exam  Gen: well developed elderly WM, lying in bed, comfortable appearing  HEENT: MMM, sclera anicteric, conjunctiva normal  CV: regular rate, regular rhythm, no murmurs or " rubs, normal S1, S2. 2+ radial and DP pulses  Pulm: RA, normal work of breathing, decreased air movement in bases, mild expiratory wheezes bilaterally   Abd: soft, non-tender, non-distended  Ext: normal bulk for age, normal tone, no dependent edema  Neuro: alert, oriented, face symmetrical, moving all extremities well  Psych: normal mood, appropriate affect   Lab Review:                CBC:      Lab 06/03/24 0534 06/02/24 1737 05/27/24  1554   WBC 9.27 13.98* 9.00   HEMOGLOBIN 9.8* 10.8* 10.4*   HEMATOCRIT 30.4* 34.0* 33.2*   PLATELETS 175 194 183   NEUTROS ABS 8.33* 11.32* 6.57   IMMATURE GRANS (ABS) 0.20* 0.21* 0.06*   LYMPHS ABS 0.61* 1.68 1.74   MONOS ABS 0.12 0.71 0.48   EOS ABS 0.00 0.04 0.12   MCV 86.4 89.7 91.0     CMP:        Lab 06/03/24 0534 06/02/24 1737 05/27/24  1554   SODIUM 135* 131* 137   POTASSIUM 3.9 3.9 4.5   CHLORIDE 96* 93* 101   CO2 30.0* 27.0 27.0   ANION GAP 9.0 11.0 9.0   BUN 23 21 14   CREATININE 1.00 1.15 1.33*   EGFR 72.4 61.2 51.4*   GLUCOSE 119* 160* 138*   CALCIUM 8.1* 8.6 8.5*   MAGNESIUM 1.6  --   --    PHOSPHORUS 3.7  --   --    TOTAL PROTEIN  --  6.4 6.3   ALBUMIN  --  4.0 3.9   GLOBULIN  --  2.4 2.4   ALT (SGPT)  --  12 8   AST (SGOT)  --  15 13   BILIRUBIN  --  0.7 0.4   ALK PHOS  --  77 72   LIPASE  --   --  23     Results from last 7 days   Lab Units 06/03/24  0534   MAGNESIUM mg/dL 1.6     Lab Results   Component Value Date    HGBA1C 5.80 (H) 05/13/2024     Estimated Creatinine Clearance: 50.1 mL/min (by C-G formula based on SCr of 1 mg/dL).        Lab 06/03/24  0534 06/02/24 1946 06/02/24 1737 05/27/24 1730 05/27/24  1554   PROBNP  --   --  7,984.0*  --  6,707.0*   HSTROP T  --  23* 23* 26* 32*   PROTIME 18.0*  --   --   --   --    INR 1.47*  --   --   --   --        Lab Results   Component Value Date    CHOL 102 05/13/2024    CHLPL 145 11/10/2023    TRIG 64 05/13/2024    HDL 44 05/13/2024    LDL 44 05/13/2024     CT Angiogram Chest    Result Date:  6/3/2024  Impression: 1. No evidence of pulmonary embolism. 2. Large right-sided pleural effusion with small to moderate size left-sided pleural effusion. Patchy airspace disease and consolidative changes are present within the right upper lobe and the left lower lobe, likely related to atelectasis and pneumonia.  Probable mild underlying pulmonary edema pattern. Stable cardiomegaly. 3. Ancillary findings as described above. Electronically Signed: Arlette Altamirano MD  6/3/2024 12:23 AM EDT  Workstation ID: [id number]    XR Chest 2 View    Result Date: 2024  Impression: 1.Moderate right pleural effusion, increased compared to the most recent prior exam. 2.Increased opacities in the right lung base which may represent atelectasis. 3.Small left pleural effusion, as before. 4.Stable cardiomegaly. Electronically Signed: Joseph Cohn  2024 5:28 PM EDT  Workstation ID: [id number]      Results for orders placed during the hospital encounter of 24    Adult Transthoracic Echo Complete W/ Cont if Necessary Per Protocol    Interpretation Summary    Left ventricular systolic function is normal. Calculated left ventricular EF = 69.1% Normal left ventricular cavity size noted. Left ventricular wall thickness is consistent with mild concentric hypertrophy. Elevated left atrial pressure.    The aortic valve exhibits sclerosis. There is moderate calcification of the aortic valve. There is thickening of the aortic valve. The aortic valve appears trileaflet. No aortic valve regurgitation is present. Mild aortic valve stenosis is present    The tricuspid valve is normal in structure. Moderate tricuspid valve regurgitation is present. Estimated right ventricular systolic pressure from tricuspid regurgitation is markedly elevated (>55 mmHg).     [id number] atrial fibrillation with PVCs, heart rate 63 bpm    Result Review:  I have personally reviewed the results from the time of admission and agree with these findings:  [x]   Laboratory  [x]  Radiology  [x]  EKG/Telemetry   []  Pathology  []  Old records  []  Other:             Assessment:   Acute on chronic HFpEF  Recurrent pleural effusions, R>L  IR thoracentesis today with removal of 400mL, studies pending  PAF, freq PVCs  Mild aortic stenosis by echo 3/2024      Plan:   Thoracentesis studies pending  Increase scheduled diuretics for now and monitor renal function / electrolytes  Add spironolactone 12.5mg an monitor BMP  Some previous SE with SGLT2 inhibitor, likely worth a re-trial    RYAN Yates MD  06/03/24 13:42 EDT

## 2024-06-03 NOTE — THERAPY EVALUATION
Acute Care - Speech Language Pathology   Swallow Initial Evaluation [facility]  Clinical Swallow Evaluation       Patient Name: Ricki Trotter  : 1935  MRN: [id number]  Today's Date: 6/3/2024               Admit Date: 2024    Visit Dx:     ICD-10-CM ICD-9-CM   1. Dysphagia, unspecified type  R13.10 787.20     Patient Active Problem List   Diagnosis    Mass of lower lobe of left lung    GERD (gastroesophageal reflux disease)    Primary hypertension    Insomnia due to medical condition    PVC's (premature ventricular contractions)    HLD (hyperlipidemia)    Leukocytosis    Pleural effusion, bilateral    CKD (chronic kidney disease) stage 3, GFR 30-59 ml/min    Hyponatremia    Lesion of lung    Atrial fibrillation    Pulmonary hypertension    Valvular heart disease    CHF (congestive heart failure)    Type 2 diabetes mellitus without complication, with long-term current use of insulin    Cervical osteophyte    Pharyngeal dysphagia secondary to large cervical osteophyte    Hospital discharge follow-up    Environmental and seasonal allergies    Dizziness    Generalized anxiety disorder    Chronic heart failure with preserved ejection fraction    COPD exacerbation    Chronic anticoagulation     Past Medical History:   Diagnosis Date    Allergic rhinitis     Amnesia     Arrhythmia     Atrial fibrillation     Basal cell carcinoma     Benign prostatic hyperplasia     CHF (congestive heart failure) 3-2024    Chronic kidney disease, stage 3b     COPD (chronic obstructive pulmonary disease)     Diabetes mellitus N/A    Drug dependency     Heart murmur     Hyperlipidemia     Hypertension     Hyperthyroidism     Iron deficiency     Long term (current) use of insulin     Melanoma 2012    Nephrosclerosis      Past Surgical History:   Procedure Laterality Date    CATARACT EXTRACTION Bilateral     COLONOSCOPY      PROSTHODONTIC PROCEDURE         SLP Recommendation and Plan  SLP Swallowing Diagnosis: suspected  pharyngeal dysphagia (06/03/24 0915)  SLP Diet Recommendation: soft to chew textures, chopped, thin liquids, no mixed consistencies (06/03/24 0915)  Recommended Precautions and Strategies: upright posture during/after eating, small bites of food and sips of liquid, multiple swallows per bite of food, multiple swallows per sip of liquid, general aspiration precautions, turn head to right, turn head to left, volitional throat clear, hard swallow with each bite or sip (06/03/24 0915)  SLP Rec. for Method of Medication Administration: meds crushed, meds whole, with puree, as tolerated (06/03/24 0915)     Monitor for Signs of Aspiration: yes, notify SLP if any concerns (06/03/24 0915)  Recommended Diagnostics: reassess via VFSS (MBS) (when cleared) (06/03/24 0915)  Swallow Criteria for Skilled Therapeutic Interventions Met: demonstrates skilled criteria (06/03/24 0915)  Anticipated Discharge Disposition (SLP): home with home health (06/03/24 0915)  Rehab Potential/Prognosis, Swallowing: good, to achieve stated therapy goals (06/03/24 0915)     Predicted Duration Therapy Intervention (Days): 2 weeks (06/03/24 0915)  Oral Care Recommendations: Oral Care BID/PRN, Toothbrush (06/03/24 0915)                                               SWALLOW EVALUATION (Last 72 Hours)       SLP Adult Swallow Evaluation       Row Name 06/03/24 0915       Rehab Evaluation    Document Type evaluation  -CJ    Subjective Information no complaints  -CJ    Patient Observations alert;cooperative  -CJ    Patient/Family/Caregiver Comments/Observations daughter present  -CJ    Patient Effort good  -CJ    Symptoms Noted During/After Treatment none  -CJ       General Information    Patient Profile Reviewed yes  -CJ    Pertinent History Of Current Problem Pt adm w/ COPD exacerbation, CHF; sig h/o LLL mass, GERD, insomnia, KCD, afib, DM2, cervical osteophytes. Most recent MBS on 5/14/24 w/ recs for soft chopped w/ thins, head turn (either side)+effortful  swallow  -CJ    Precautions/Limitations, Vision WFL;for purposes of eval  -CJ    Precautions/Limitations, Hearing WFL;for purposes of eval  -CJ    Prior Level of Function-Swallowing other (see comments)  prev dysphagia  -    Plans/Goals Discussed with patient;family  -    Barriers to Rehab medically complex  -    Patient's Goals for Discharge patient did not state  -CJ    Family Goals for Discharge family did not state  -       Pain    Additional Documentation Pain Scale: FACES Pre/Post-Treatment (Group)  -CJ       Pain Scale: FACES Pre/Post-Treatment    Pain: FACES Scale, Pretreatment 0-->no hurt  -CJ    Posttreatment Pain Rating 0-->no hurt  -CJ       Oral Motor Structure and Function    Dentition Assessment natural, present and adequate  -    Secretion Management WNL/WFL  -    Mucosal Quality moist, healthy  -       Oral Musculature and Cranial Nerve Assessment    Oral Motor General Assessment WFL  -       General Eating/Swallowing Observations    Respiratory Support Currently in Use nasal cannula  -    O2 Liters 2L  -CJ    Eating/Swallowing Skills self-fed  -    Positioning During Eating upright in bed  -    Utensils Used spoon;cup;straw  -    Consistencies Trialed thin liquids  clears  -       Clinical Swallow Eval    Pharyngeal Phase --  r/o worsening pharyngeal impairment  -    Clinical Swallow Evaluation Summary Pt familiar to SLP department w/ recent MBS 5/14/24. No new concerns voiced per pt or his daughter. Reports has been doing compensations throughout mealtimes. Only assessed clears this date 2/2 concern for possible procedure and other consults. No glaring overt s/s of aspiration w/ trials of thins. Recommend resume previous diet of soft chopped w/ thins, no mixed consistencies w/ head turn, volitional cough/throat clear and effortful swallow until able to repeat study pending other consults.  -       SLP Evaluation Clinical Impression    SLP Swallowing Diagnosis  suspected pharyngeal dysphagia  -    Functional Impact risk of aspiration/pneumonia  -    Rehab Potential/Prognosis, Swallowing good, to achieve stated therapy goals  -    Swallow Criteria for Skilled Therapeutic Interventions Met demonstrates skilled criteria  -       Recommendations    Predicted Duration Therapy Intervention (Days) 2 weeks  -    SLP Diet Recommendation soft to chew textures;chopped;thin liquids;no mixed consistencies  -    Recommended Diagnostics reassess via VFSS (MBS)  when cleared  -    Recommended Precautions and Strategies upright posture during/after eating;small bites of food and sips of liquid;multiple swallows per bite of food;multiple swallows per sip of liquid;general aspiration precautions;turn head to right;turn head to left;volitional throat clear;hard swallow with each bite or sip  -    Oral Care Recommendations Oral Care BID/PRN;Toothbrush  -    SLP Rec. for Method of Medication Administration meds crushed;meds whole;with puree;as tolerated  -    Monitor for Signs of Aspiration yes;notify SLP if any concerns  -    Anticipated Discharge Disposition (SLP) home with home health  -              User Key  (r) = Recorded By, (t) = Taken By, (c) = Cosigned By      Initials Name Effective Dates     Lolis Noguera, MS CCC-SLP 06/03/24 -                     EDUCATION  The patient has been educated in the following areas:   Dysphagia (Swallowing Impairment) Oral Care/Hydration Modified Diet Instruction.                Time Calculation:    Time Calculation- SLP       Row Name 06/03/24 1337             Time Calculation- SLP    SLP Start Time 0915  -      SLP Received On 06/03/24  -         Untimed Charges    25667-[facility] Eval Oral Pharyng Swallow Minutes 40  -         Total Minutes    Untimed Charges Total Minutes 40  -CJ       Total Minutes 40  -CJ                User Key  (r) = Recorded By, (t) = Taken By, (c) = Cosigned By      Initials Name Provider Type      Lolis Noguera, MS CCC-SLP Speech and Language Pathologist                    Therapy Charges for Today       Code Description Service Date Service Provider Modifiers Qty    [id number]  ST EVAL ORAL PHARYNG SWALLOW 3 6/3/2024 Lolis Noguera, MS CCC-SLP GN 1                 Lolis Noguera MS CCC-SLP  6/3/2024

## 2024-06-04 ENCOUNTER — READMISSION MANAGEMENT (OUTPATIENT)
Dept: CALL CENTER | Facility: HOSPITAL | Age: 89
End: 2024-06-04
Payer: MEDICARE

## 2024-06-04 ENCOUNTER — APPOINTMENT (OUTPATIENT)
Dept: GENERAL RADIOLOGY | Facility: HOSPITAL | Age: 89
End: 2024-06-04
Payer: MEDICARE

## 2024-06-04 VITALS
BODY MASS INDEX: 20.72 KG/M2 | WEIGHT: 153 LBS | SYSTOLIC BLOOD PRESSURE: 152 MMHG | OXYGEN SATURATION: 94 % | RESPIRATION RATE: 18 BRPM | HEIGHT: 72 IN | HEART RATE: 82 BPM | TEMPERATURE: 97.7 F | DIASTOLIC BLOOD PRESSURE: 76 MMHG

## 2024-06-04 LAB
ANION GAP SERPL CALCULATED.3IONS-SCNC: 14 MMOL/L (ref 5–15)
BUN SERPL-MCNC: 28 MG/DL (ref 8–23)
BUN/CREAT SERPL: 21.5 (ref 7–25)
CALCIUM SPEC-SCNC: 8.5 MG/DL (ref 8.6–10.5)
CHLORIDE SERPL-SCNC: 98 MMOL/L (ref 98–107)
CO2 SERPL-SCNC: 27 MMOL/L (ref 22–29)
CREAT SERPL-MCNC: 1.3 MG/DL (ref 0.76–1.27)
DEPRECATED RDW RBC AUTO: 46.8 FL (ref 37–54)
EGFRCR SERPLBLD CKD-EPI 2021: 52.8 ML/MIN/1.73
ERYTHROCYTE [DISTWIDTH] IN BLOOD BY AUTOMATED COUNT: 14.6 % (ref 12.3–15.4)
GLUCOSE BLDC GLUCOMTR-MCNC: 122 MG/DL (ref 70–130)
GLUCOSE BLDC GLUCOMTR-MCNC: 189 MG/DL (ref 70–130)
GLUCOSE SERPL-MCNC: 112 MG/DL (ref 65–99)
HCT VFR BLD AUTO: 31.7 % (ref 37.5–51)
HGB BLD-MCNC: 10.3 G/DL (ref 13–17.7)
L PNEUMO1 AG UR QL IA: NEGATIVE
MCH RBC QN AUTO: 28.7 PG (ref 26.6–33)
MCHC RBC AUTO-ENTMCNC: 32.5 G/DL (ref 31.5–35.7)
MCV RBC AUTO: 88.3 FL (ref 79–97)
PLATELET # BLD AUTO: 198 10*3/MM3 (ref 140–450)
PMV BLD AUTO: 10.6 FL (ref 6–12)
POTASSIUM SERPL-SCNC: 3.6 MMOL/L (ref 3.5–5.2)
RBC # BLD AUTO: 3.59 10*6/MM3 (ref 4.14–5.8)
S PNEUM AG SPEC QL LA: NEGATIVE
SODIUM SERPL-SCNC: 139 MMOL/L (ref 136–145)
WBC NRBC COR # BLD AUTO: 11.69 10*3/MM3 (ref 3.4–10.8)

## 2024-06-04 PROCEDURE — 99232 SBSQ HOSP IP/OBS MODERATE 35: CPT | Performed by: INTERNAL MEDICINE

## 2024-06-04 PROCEDURE — 80048 BASIC METABOLIC PNL TOTAL CA: CPT | Performed by: FAMILY MEDICINE

## 2024-06-04 PROCEDURE — 92526 ORAL FUNCTION THERAPY: CPT

## 2024-06-04 PROCEDURE — 85027 COMPLETE CBC AUTOMATED: CPT | Performed by: FAMILY MEDICINE

## 2024-06-04 PROCEDURE — G0378 HOSPITAL OBSERVATION PER HR: HCPCS

## 2024-06-04 PROCEDURE — 25010000002 CEFEPIME PER 500 MG: Performed by: NURSE PRACTITIONER

## 2024-06-04 PROCEDURE — 82948 REAGENT STRIP/BLOOD GLUCOSE: CPT

## 2024-06-04 PROCEDURE — 92611 MOTION FLUOROSCOPY/SWALLOW: CPT

## 2024-06-04 PROCEDURE — 94799 UNLISTED PULMONARY SVC/PX: CPT

## 2024-06-04 PROCEDURE — 74230 X-RAY XM SWLNG FUNCJ C+: CPT

## 2024-06-04 PROCEDURE — 96376 TX/PRO/DX INJ SAME DRUG ADON: CPT

## 2024-06-04 PROCEDURE — 25010000002 METHYLPREDNISOLONE PER 40 MG: Performed by: FAMILY MEDICINE

## 2024-06-04 PROCEDURE — 63710000001 INSULIN LISPRO (HUMAN) PER 5 UNITS: Performed by: NURSE PRACTITIONER

## 2024-06-04 PROCEDURE — 63710000001 INSULIN GLARGINE PER 5 UNITS: Performed by: NURSE PRACTITIONER

## 2024-06-04 PROCEDURE — 99239 HOSP IP/OBS DSCHRG MGMT >30: CPT | Performed by: FAMILY MEDICINE

## 2024-06-04 RX ORDER — FUROSEMIDE 20 MG/1
20 TABLET ORAL
Status: DISCONTINUED | OUTPATIENT
Start: 2024-06-04 | End: 2024-06-04 | Stop reason: HOSPADM

## 2024-06-04 RX ORDER — GUAIFENESIN 600 MG/1
600 TABLET, EXTENDED RELEASE ORAL EVERY 12 HOURS SCHEDULED
Qty: 60 TABLET | Refills: 0 | Status: SHIPPED | OUTPATIENT
Start: 2024-06-04

## 2024-06-04 RX ORDER — PREDNISONE 10 MG/1
TABLET ORAL
Qty: 30 TABLET | Refills: 0 | Status: SHIPPED | OUTPATIENT
Start: 2024-06-04 | End: 2024-06-16

## 2024-06-04 RX ORDER — DOXYCYCLINE HYCLATE 100 MG/1
100 CAPSULE ORAL 2 TIMES DAILY
Qty: 10 CAPSULE | Refills: 0 | Status: SHIPPED | OUTPATIENT
Start: 2024-06-04 | End: 2024-06-09

## 2024-06-04 RX ORDER — FUROSEMIDE 20 MG/1
20 TABLET ORAL
Qty: 60 TABLET | Refills: 0 | Status: SHIPPED | OUTPATIENT
Start: 2024-06-04

## 2024-06-04 RX ORDER — FUROSEMIDE 40 MG/1
40 TABLET ORAL
Qty: 60 TABLET | Refills: 0 | Status: SHIPPED | OUTPATIENT
Start: 2024-06-04 | End: 2024-06-04 | Stop reason: HOSPADM

## 2024-06-04 RX ORDER — SPIRONOLACTONE 25 MG/1
12.5 TABLET ORAL DAILY
Qty: 15 TABLET | Refills: 0 | Status: SHIPPED | OUTPATIENT
Start: 2024-06-05 | End: 2024-07-05

## 2024-06-04 RX ORDER — TRAZODONE HYDROCHLORIDE 100 MG/1
100 TABLET ORAL NIGHTLY
Qty: 30 TABLET | Refills: 0 | Status: SHIPPED | OUTPATIENT
Start: 2024-06-04

## 2024-06-04 RX ORDER — UREA 10 %
5 LOTION (ML) TOPICAL NIGHTLY
Qty: 30 TABLET | Refills: 0 | Status: SHIPPED | OUTPATIENT
Start: 2024-06-04

## 2024-06-04 RX ORDER — CEFDINIR 300 MG/1
300 CAPSULE ORAL 2 TIMES DAILY
Qty: 10 CAPSULE | Refills: 0 | Status: SHIPPED | OUTPATIENT
Start: 2024-06-04 | End: 2024-06-09

## 2024-06-04 RX ORDER — METHYLPREDNISOLONE SODIUM SUCCINATE 40 MG/ML
40 INJECTION, POWDER, LYOPHILIZED, FOR SOLUTION INTRAMUSCULAR; INTRAVENOUS EVERY 24 HOURS
Status: DISCONTINUED | OUTPATIENT
Start: 2024-06-05 | End: 2024-06-04 | Stop reason: HOSPADM

## 2024-06-04 RX ADMIN — BACLOFEN 5 MG: 10 TABLET ORAL at 09:06

## 2024-06-04 RX ADMIN — NEBIVOLOL 10 MG: 10 TABLET ORAL at 09:07

## 2024-06-04 RX ADMIN — CEFEPIME 2000 MG: 2 INJECTION, POWDER, FOR SOLUTION INTRAVENOUS at 01:01

## 2024-06-04 RX ADMIN — GUAIFENESIN 600 MG: 600 TABLET, EXTENDED RELEASE ORAL at 09:06

## 2024-06-04 RX ADMIN — METHYLPREDNISOLONE SODIUM SUCCINATE 40 MG: 40 INJECTION, POWDER, FOR SOLUTION INTRAMUSCULAR; INTRAVENOUS at 09:06

## 2024-06-04 RX ADMIN — APIXABAN 5 MG: 5 TABLET, FILM COATED ORAL at 09:07

## 2024-06-04 RX ADMIN — IPRATROPIUM BROMIDE AND ALBUTEROL SULFATE 3 ML: 2.5; .5 SOLUTION RESPIRATORY (INHALATION) at 13:50

## 2024-06-04 RX ADMIN — DOXYCYCLINE 100 MG: 100 INJECTION, POWDER, LYOPHILIZED, FOR SOLUTION INTRAVENOUS at 09:08

## 2024-06-04 RX ADMIN — BARIUM SULFATE 20 ML: 400 PASTE ORAL at 10:47

## 2024-06-04 RX ADMIN — CEFEPIME 2000 MG: 2 INJECTION, POWDER, FOR SOLUTION INTRAVENOUS at 11:57

## 2024-06-04 RX ADMIN — BACLOFEN 5 MG: 10 TABLET ORAL at 14:31

## 2024-06-04 RX ADMIN — FUROSEMIDE 40 MG: 40 TABLET ORAL at 09:07

## 2024-06-04 RX ADMIN — ESOMEPRAZOLE MAGNESIUM 40 MG: 40 CAPSULE, DELAYED RELEASE ORAL at 09:07

## 2024-06-04 RX ADMIN — FLUTICASONE PROPIONATE 2 SPRAY: 50 SPRAY, METERED NASAL at 09:09

## 2024-06-04 RX ADMIN — BARIUM SULFATE 100 ML: 0.81 POWDER, FOR SUSPENSION ORAL at 10:47

## 2024-06-04 RX ADMIN — INSULIN GLARGINE 5 UNITS: 100 INJECTION, SOLUTION SUBCUTANEOUS at 09:06

## 2024-06-04 RX ADMIN — SPIRONOLACTONE 12.5 MG: 25 TABLET ORAL at 09:07

## 2024-06-04 RX ADMIN — INSULIN LISPRO 2 UNITS: 100 INJECTION, SOLUTION INTRAVENOUS; SUBCUTANEOUS at 11:57

## 2024-06-04 RX ADMIN — BARIUM SULFATE 50 ML: 400 SUSPENSION ORAL at 10:47

## 2024-06-04 RX ADMIN — IPRATROPIUM BROMIDE AND ALBUTEROL SULFATE 3 ML: 2.5; .5 SOLUTION RESPIRATORY (INHALATION) at 00:57

## 2024-06-04 RX ADMIN — POTASSIUM CHLORIDE 10 MEQ: 750 CAPSULE, EXTENDED RELEASE ORAL at 09:06

## 2024-06-04 RX ADMIN — BUSPIRONE HYDROCHLORIDE 5 MG: 5 TABLET ORAL at 09:07

## 2024-06-04 RX ADMIN — Medication 10 ML: at 09:06

## 2024-06-04 NOTE — OUTREACH NOTE
Prep Survey      Flowsheet Row Responses   [facility] patient discharged from? [place]   Is LACE score < 7 ? No   Eligibility [facility]   Date of Admission 06/02/24   Date of Discharge 06/04/24   Discharge Disposition Home or Self Care   Discharge diagnosis COPD exacerbation   Does the patient have one of the following disease processes/diagnoses(primary or secondary)? COPD   Does the patient have Home health ordered? No   Is there a DME ordered? No   Comments regarding appointments Keep appt with [facility] on Thursday,   Medication alerts for this patient see AVS   Prep survey completed? Yes            Eve GUARDADO - Registered Nurse

## 2024-06-04 NOTE — DISCHARGE SUMMARY
"    [facility] Medicine Services  DISCHARGE SUMMARY    Patient Name: Ricki Trotter  : 1935  MRN: [id number]    Date of Admission: 2024  4:03 PM  Date of Discharge:  2024  Primary Care Physician: Sofiya Krishnan APRN    Consults       Date and Time Order Name Status Description    6/3/2024  9:12 AM Inpatient Cardiology Consult Completed     2024  3:30 PM Inpatient Gastroenterology Consult Completed             Hospital Course       Active Hospital Problems    Diagnosis  POA    **COPD exacerbation [J44.1]  Yes    Chronic heart failure with preserved ejection fraction [I50.32]  Yes    Chronic anticoagulation [Z79.01]  Not Applicable    Generalized anxiety disorder [F41.1]  Yes    Pharyngeal dysphagia secondary to large cervical osteophyte [R13.13]  Yes    Type 2 diabetes mellitus without complication, with long-term current use of insulin [E11.9, Z79.4]  Not Applicable    Valvular heart disease [I38]  Yes    Atrial fibrillation [I48.91]  Yes    Pleural effusion, bilateral [J90]  Yes    CKD (chronic kidney disease) stage 3, GFR 30-59 ml/min [N18.30]  Yes    Leukocytosis [D72.829]  Yes    Insomnia due to medical condition [G47.01]  Yes    GERD (gastroesophageal reflux disease) [K21.9]  Yes    Mass of lower lobe of left lung [R91.8]  Yes      Resolved Hospital Problems    Diagnosis Date Resolved POA    COPD (chronic obstructive pulmonary disease) [J44.9] 2024 Yes          Hospital Course:  Ricki Trotter is a 88 y.o. male with PMHx of A-fib on Eliquis, BPH, HTN, HLD, hypothyroidism, GERD, COPD, pulmonary hypertension, HFpEF (ECHO 3/8/24 EF 69%, mild AS, mod TR, markedly elevated RSVP > 55 mmHg), T2DM - insulin dependent, pharyngeal dysphagia (r/t large cervical osteophyte s/p SLP and GI eval  admission), hx of aspiration / recurrent pneumonia (w/ chronic \"masslike density LLL w/ associated bronchiectasis and pleural thickening\" that is followed outpatient " by pulmonary), prior right sided thoracentesis w/ 900 ml removed 5/13/2024 who presents to the ED tonight for evaluation of congestion and not being able to sleep x 2 weeks.      COPD exacerbation  RUL & LLL Pneumonia  -Transition IV ABX to PO: Cefdinir + Doxycycline for 5 more days  -Prednisone taper at DC  -DC'd Vanc, MRSA PCR negative  -Strep pneumo, legionella antigen negative  -Resp PCR negative  -Blood Cx NGTD  -on RA   -SLP followed, Hx dysphagia, tolerating diet      HFpEF  Bilateral pleural effusion, recurrent   - recurrent despite increased home lasix dose last hospitlization  - s/p right thoracentesis w/ 900 ml removed on 5/13/24  - s/p IR thoracentesis today with 400mL today, Cx with no growth  - Cardiology has evaluated, diuretics adjusted: Lasix increased to 20mg BID and Aldactone 12.5mg daily added   - [facility], has appt on Thursday      Afib - chronic anticoagulation  Valvular heart disease  - continue bystolic  - Continue Eliquis   - TSH ok     Chronic LLL lung mass  - f/w pulmonary outpatient     Anxiety / Insomnia  - Continue increased dose of Trazodone, 100mg nightly  - Continue Melatonin 5mg nightly   - Continue Buspar     Pharyngeal dysphagia r/t large cervical osteophyte  GERD  - follow SLP recommendations  - BID PPI     T2DM - insulin dependent  - A1c 5.80 on 5/13/24  - Continue home meds      CKD 3  - stable  - Baseline ~1.2-1.3, monitor with diuretic adjustments, repeat BMP in 1 week    Discharge Follow Up Recommendations for outpatient labs/diagnostics:  -PCP 1 week. BMP at visit to monitor Cr  -[facility] 6/6 as scheduled   -Dr Singh as scheduled 7/12/2024    Day of Discharge     HPI:   Patient seen and examined. Feels much better. Eager to go home. He slept well for the first time in weeks.     Review of Systems  Gen- No fevers, chills  CV- No chest pain, palpitations  Resp- No cough, dyspnea  GI- No N/V/D, abd pain    Vital Signs:   Temp:   [97.7 °F (36.5 °C)-98.1 °F (36.7 °C)] 97.7 °F (36.5 °C)  Heart Rate:  [68-88] 68  Resp:  [16-18] 18  BP: (130-143)/(47-93) 143/60  Flow (L/min):  [2] 2      Physical Exam:  Constitutional: No acute distress, awake, alert  HENT: NCAT, mucous membranes moist  Respiratory: Clear to auscultation bilaterally, respiratory effort normal on RA  Cardiovascular: RRR, no murmurs, rubs, or gallops  Gastrointestinal: Positive bowel sounds, soft, nontender, nondistended  Musculoskeletal: No bilateral ankle edema  Psychiatric: Appropriate affect, cooperative  Neurologic: Oriented x 3, B[place], speech clear  Skin: No rashes     Pertinent  and/or Most Recent Results     LAB RESULTS:      Lab 06/04/24  0649 06/03/24  0534 06/02/24 2223 06/02/24 1946 06/02/24  1737   WBC 11.69* 9.27  --   --  13.98*   HEMOGLOBIN 10.3* 9.8*  --   --  10.8*   HEMATOCRIT 31.7* 30.4*  --   --  34.0*   PLATELETS 198 175  --   --  194   NEUTROS ABS  --  8.33*  --   --  11.32*   IMMATURE GRANS (ABS)  --  0.20*  --   --  0.21*   LYMPHS ABS  --  0.61*  --   --  1.68   MONOS ABS  --  0.12  --   --  0.71   EOS ABS  --  0.00  --   --  0.04   MCV 88.3 86.4  --   --  89.7   CRP  --   --   --  0.47  --    PROCALCITONIN  --   --   --  0.06  --    LACTATE  --   --  1.4  --   --    PROTIME  --  18.0*  --   --   --          Lab 06/04/24  0649 06/03/24  0534 06/02/24 1946 06/02/24  1737   SODIUM 139 135*  --  131*   POTASSIUM 3.6 3.9  --  3.9   CHLORIDE 98 96*  --  93*   CO2 27.0 30.0*  --  27.0   ANION GAP 14.0 9.0  --  11.0   BUN 28* 23  --  21   CREATININE 1.30* 1.00  --  1.15   EGFR 52.8* 72.4  --  61.2   GLUCOSE 112* 119*  --  160*   CALCIUM 8.5* 8.1*  --  8.6   MAGNESIUM  --  1.6  --   --    PHOSPHORUS  --  3.7  --   --    TSH  --   --  2.560  --          Lab 06/02/24  1737   TOTAL PROTEIN 6.4   ALBUMIN 4.0   GLOBULIN 2.4   ALT (SGPT) 12   AST (SGOT) 15   BILIRUBIN 0.7   ALK PHOS 77         Lab 06/03/24  0534 06/02/24  1946 06/02/24  1737   PROBNP  --   --  7,984.0*    HSTROP T  --  23* 23*   PROTIME 18.0*  --   --    INR 1.47*  --   --                  Brief Urine Lab Results  (Last result in the past 365 days)        Color   Clarity   Blood   Leuk Est   Nitrite   Protein   CREAT   Urine HCG        05/27/24 1647 Yellow   Clear   Negative   Small (1+)   Negative   Negative                 Microbiology Results (last 10 days)       Procedure Component Value - Date/Time    Legionella Antigen, Urine - Urine, Urine, Clean Catch [[id number]]  (Normal) Collected: 06/03/24 2048    Lab Status: Final result Specimen: Urine, Clean Catch Updated: 06/04/24 0732     LEGIONELLA ANTIGEN, URINE Negative    S. Pneumo Ag Urine or CSF - Urine, Urine, Clean Catch [[id number]]  (Normal) Collected: 06/03/24 2048    Lab Status: Final result Specimen: Urine, Clean Catch Updated: 06/04/24 0732     Strep Pneumo Ag Negative    AFB Culture - Body Fluid, Pleural Cavity [[id number]] Collected: 06/03/24 1040    Lab Status: Preliminary result Specimen: Body Fluid from Pleural Cavity Updated: 06/04/24 1136     AFB Stain No acid fast bacilli seen on concentrated smear    Body Fluid Culture - Body Fluid, Pleural Cavity [[id number]] Collected: 06/03/24 1040    Lab Status: Preliminary result Specimen: Body Fluid from Pleural Cavity Updated: 06/04/24 0712     Body Fluid Culture No growth     Gram Stain Many (4+) WBCs seen      No organisms seen    MRSA Screen, PCR (Inpatient) - Swab, Nares [[id number]]  (Normal) Collected: 06/03/24 0550    Lab Status: Final result Specimen: Swab from Nares Updated: 06/03/24 0832     MRSA PCR Negative    Narrative:      The negative predictive value of this diagnostic test is high and should only be used to consider de-escalating anti-MRSA therapy. A positive result may indicate colonization with MRSA and must be correlated clinically.  MRSA Negative    Blood Culture - Blood, Arm, Left [[id number]]  (Normal) Collected: 06/02/24 2223    Lab Status: Preliminary result Specimen: Blood from  Arm, Left Updated: 06/03/24 2246     Blood Culture No growth at 24 hours    Blood Culture - Blood, Hand, Left [[id number]]  (Normal) Collected: 06/02/24 2223    Lab Status: Preliminary result Specimen: Blood from Hand, Left Updated: 06/03/24 2246     Blood Culture No growth at 24 hours    COVID PRE-OP / PRE-PROCEDURE SCREENING ORDER (NO ISOLATION) - Swab, Nasopharynx [[id number]]  (Normal) Collected: 06/02/24 2053    Lab Status: Final result Specimen: Swab from Nasopharynx Updated: 06/02/24 2153    Narrative:      The following orders were created for panel order COVID PRE-OP / PRE-PROCEDURE SCREENING ORDER (NO ISOLATION) - Swab, Nasopharynx.  Procedure                               Abnormality         Status                     ---------                               -----------         ------                     Respiratory Panel PCR w/...[[id number]]  Normal              Final result                 Please view results for these tests on the individual orders.    Respiratory Panel PCR w/COVID-19(SARS-CoV-2) [facility] In-House, NP Swab in UTM/VTM, 2 HR TAT - Swab, Nasopharynx [[id number]]  (Normal) Collected: 06/02/24 2053    Lab Status: Final result Specimen: Swab from Nasopharynx Updated: 06/02/24 2153     ADENOVIRUS, PCR Not Detected     Coronavirus 229E Not Detected     Coronavirus HKU1 Not Detected     Coronavirus NL63 Not Detected     Coronavirus OC43 Not Detected     COVID19 Not Detected     Human Metapneumovirus Not Detected     Human Rhinovirus/Enterovirus Not Detected     Influenza A PCR Not Detected     Influenza B PCR Not Detected     Parainfluenza Virus 1 Not Detected     Parainfluenza Virus 2 Not Detected     Parainfluenza Virus 3 Not Detected     Parainfluenza Virus 4 Not Detected     RSV, PCR Not Detected     Bordetella pertussis pcr Not Detected     Bordetella parapertussis PCR Not Detected     Chlamydophila pneumoniae PCR Not Detected     Mycoplasma pneumo by PCR Not Detected     Narrative:      In the setting of a positive respiratory panel with a viral infection PLUS a negative procalcitonin without other underlying concern for bacterial infection, consider observing off antibiotics or discontinuation of antibiotics and continue supportive care. If the respiratory panel is positive for atypical bacterial infection (Bordetella pertussis, Chlamydophila pneumoniae, or Mycoplasma pneumoniae), consider antibiotic de-escalation to target atypical bacterial infection.            US Thoracentesis    Result Date: 6/3/2024  US THORACENTESIS  Date of Exam: 6/3/2024 9:59 AM EDT  Indication: pleural effusion, recurrent.  Comparison: None available.  Technique: A thorough discussion of the procedure, including the risks, benefits, and alternatives of the procedure was carried out with the patient. An informed written consent was obtained. A preprocedural timeout was performed. The interventional radiology nurse  monitored the patient at all times. The patient was placed in sitting position on the side of the bed and a limited ultrasound was performed on the right back. A large pleural effusion was noted. The right back was then marked and prepped and draped in the usual sterile fashion. The skin and subcutaneous tissues was anesthetized with 1% lidocaine. Next, a 5 Tristanian centesis needle was advanced into the pleural space. A total of 0.4 L of straw colored fluid was removed. The needle was then removed, hemostasis obtained, and a sterile dressing applied. The patient tolerated the procedure well without immediate complication. CXR shows no pneumothorax.   Findings: See above      Impression: Successful ultrasound-guided thoracentesis using a right access site with recovery of 0.4 L of fluid as described above.  I, Scottie Quintanilla MD, have personally reviewed the image(s) and the prepared and signed interpretation by Milly Ceja NP.  Based on my review, I agree with the findings.  Report dictated by:  LINDA Feliz have personally reviewed this case and agree with the findings above: Electronically Signed: Scottie Quintanilla MD  6/3/2024 1:21 PM EDT  Workstation ID: [id number]    XR Chest 1 View    Result Date: 6/3/2024  XR CHEST 1 VW Date of Exam: 6/3/2024 10:36 AM EDT Indication: Post Right Thoracentesis Comparison: 6/2/2024 Findings: There is no pneumothorax after right thoracentesis. A small right pleural effusion remains. Small left effusion is similar. There is stable linear infiltrate of the right upper lobe. There is mild atelectasis of the right lower lobe. There is linear atelectasis of the left lower lobe. There is stable mild cardiomegaly.     Impression: No pneumothorax after right thoracentesis. Small bilateral effusions. Stable infiltrates. Electronically Signed: Jacque Islas MD  6/3/2024 10:50 AM EDT  Workstation ID: [id number]    CT Angiogram Chest    Result Date: 6/3/2024  CT ANGIOGRAM CHEST Date of Exam: 6/3/2024 12:14 AM EDT Indication: eval pneumo; elev d dimer r/o PE. Comparison: 6/2/2024. Technique: CTA of the chest was performed after the uneventful intravenous administration of intravenous contrast. Reconstructed coronal and sagittal images were also obtained. In addition, a 3-D volume rendered image was created for interpretation. Automated exposure control and iterative reconstruction methods were used. Findings: Pulmonary arteries: Adequate opacification of the pulmonary arteries. No evidence of acute pulmonary embolism. Lungs and Pleura: Granulomatous calcifications are present. Large right-sided pleural effusion present. Small to moderate size left-sided pleural effusion present. Patchy airspace disease is present within the right upper lobe and the left lower lobe. Atelectatic changes and scarring present. Granulomatous calcifications are present. Intralobular septal thickening is present. Mediastinum/Elvira: No mediastinal or hilar lymphadenopathy. Lymph nodes: No axillary or  supraclavicular adenopathy. Cardiovascular: The heart appears enlarged.. The pericardium is normal. The aorta and its arch branch vessels are unremarkable.   Upper Abdomen: The upper abdominal contents are unremarkable.      Bones and Soft Tissue: No suspicious osseous lesion.     Impression: 1. No evidence of pulmonary embolism. 2. Large right-sided pleural effusion with small to moderate size left-sided pleural effusion. Patchy airspace disease and consolidative changes are present within the right upper lobe and the left lower lobe, likely related to atelectasis and pneumonia.  Probable mild underlying pulmonary edema pattern. Stable cardiomegaly. 3. Ancillary findings as described above. Electronically Signed: Arlette Altamirano MD  6/3/2024 12:23 AM EDT  Workstation ID: [id number]    XR Chest 2 View    Result Date: 6/2/2024  XR CHEST 2 VW Date of Exam: 6/2/2024 5:02 PM EDT Indication: soa, history of chf Comparison: May 27, 2024, CT chest May 12, 2024 Findings: There appears to be a moderate right pleural effusion, increased compared to the prior chest radiograph. There is a probable subpulmonic component at the anterior right base. There also appears to be a small left pleural effusion, similar to the prior exam. There are increased opacities in the right base. Linear opacity in the right upper lobe appears grossly unchanged. Interstitial opacities appear similar to the prior exam. The heart appears enlarged, as before.     Impression: 1.Moderate right pleural effusion, increased compared to the most recent prior exam. 2.Increased opacities in the right lung base which may represent atelectasis. 3.Small left pleural effusion, as before. 4.Stable cardiomegaly. Electronically Signed: Joseph Cohn  6/2/2024 5:28 PM EDT  Workstation ID: [id number]    XR Chest 1 View    Result Date: 5/27/2024  XR CHEST 1 VW Date of Exam: 5/27/2024 4:26 PM EDT Indication: SOA triage protocol Comparison: None available. Findings: Trace left  effusion again noted. Stable platelike right upper lobe atelectatic changes. No pneumothorax. The clavicles are intact. Trace right pleural fluid new compared with the prior study. Mild vascular enlargement.     Mild vascular enlargement. Trace bilateral pleural fluid. Electronically Signed: Lukasz Ramirez MD  5/27/2024 4:33 PM EDT  Workstation ID: [id number]             Results for orders placed during the hospital encounter of 03/07/24    Adult Transthoracic Echo Complete W/ Cont if Necessary Per Protocol    Interpretation Summary    Left ventricular systolic function is normal. Calculated left ventricular EF = 69.1% Normal left ventricular cavity size noted. Left ventricular wall thickness is consistent with mild concentric hypertrophy. Elevated left atrial pressure.    The aortic valve exhibits sclerosis. There is moderate calcification of the aortic valve. There is thickening of the aortic valve. The aortic valve appears trileaflet. No aortic valve regurgitation is present. Mild aortic valve stenosis is present    The tricuspid valve is normal in structure. Moderate tricuspid valve regurgitation is present. Estimated right ventricular systolic pressure from tricuspid regurgitation is markedly elevated (>55 mmHg).      Plan for Follow-up of Pending Labs/Results: INBOX   Pending Labs       Order Current Status    Anaerobic Culture - Pleural Fluid, Pleural Cavity In process    Fungus Culture - Body Fluid, Pleural Cavity In process    Fungus Smear - Body Fluid, Pleural Cavity In process    NON-GYN CYTOLOGY, [facility] ([facility]) In process    AFB Culture - Body Fluid, Pleural Cavity Preliminary result    Blood Culture - Blood, Arm, Left Preliminary result    Blood Culture - Blood, Hand, Left Preliminary result    Body Fluid Culture - Body Fluid, Pleural Cavity Preliminary result          Discharge Details        Discharge Medications        New Medications        Instructions Start Date   cefdinir 300 MG  capsule  Commonly known as: OMNICEF   300 mg, Oral, 2 Times Daily      guaiFENesin 600 MG 12 hr tablet  Commonly known as: MUCINEX   600 mg, Oral, Every 12 Hours Scheduled      melatonin 5 MG tablet tablet   5 mg, Oral, Nightly      predniSONE 10 MG tablet  Commonly known as: DELTASONE   Take 4 tablets by mouth Daily for 3 days, THEN 3 tablets Daily for 3 days, THEN 2 tablets Daily for 3 days, THEN 1 tablet Daily for 3 days.   Start Date: June 4, 2024     spironolactone 25 MG tablet  Commonly known as: ALDACTONE   12.5 mg, Oral, Daily   Start Date: June 5, 2024            Changes to Medications        Instructions Start Date   doxycycline 100 MG capsule  Commonly known as: VIBRAMYCIN  What changed: additional instructions   100 mg, Oral, 2 Times Daily      furosemide 20 MG tablet  Commonly known as: LASIX  What changed:   how much to take  when to take this   20 mg, Oral, 2 Times Daily Before Meals      traZODone 100 MG tablet  Commonly known as: DESYREL  What changed:   medication strength  how much to take  how to take this  when to take this  additional instructions   100 mg, Oral, Nightly             Continue These Medications        Instructions Start Date   apixaban 5 MG tablet tablet  Commonly known as: ELIQUIS   Take 1 tablet by mouth Every 12 (Twelve) Hours as directed for Atrial Fibrillation      Baclofen 5 MG tablet  Commonly known as: LIORESAL   5 mg, Oral, Every 8 Hours Scheduled      busPIRone 5 MG tablet  Commonly known as: BUSPAR   5 mg, Oral, Daily PRN      Bystolic 10 MG tablet  Generic drug: nebivolol   10 mg, Oral, Daily      esomeprazole 40 MG capsule  Commonly known as: nexIUM   40 mg, Oral, 2 Times Daily Before Meals      fluticasone 50 MCG/ACT nasal spray  Commonly known as: FLONASE   2 sprays, Nasal, Daily      levocetirizine 5 MG tablet  Commonly known as: XYZAL   2.5 mg, Oral, Every Evening      loratadine 10 MG tablet  Commonly known as: Claritin   10 mg, Oral, Daily      montelukast 10  MG tablet  Commonly known as: SINGULAIR   10 mg, Oral, Every Evening      simvastatin 40 MG tablet  Commonly known as: ZOCOR   40 mg, Oral, Every Evening      SITagliptin 100 MG tablet  Commonly known as: Januvia   100 mg, Oral, Daily      tamsulosin 0.4 MG capsule 24 hr capsule  Commonly known as: FLOMAX   0.4 mg, Oral, Daily      Toujeo Max SoloStar 300 UNIT/ML solution pen-injector injection  Generic drug: Insulin Glargine (2 Unit Dial)   16 Units, Subcutaneous, Daily             Stop These Medications      potassium chloride 10 MEQ CR tablet  Commonly known as: KLOR-CON M10              Allergies   Allergen Reactions    Penicillins Swelling     Patient has tolerated PO Cefdinir    Sulfa Antibiotics Swelling         Discharge Disposition:  Home or Self Care    Diet:  Hospital:  Diet Order   Procedures    Diet: Cardiac, Diabetic; Healthy Heart (2-3 Na+); Consistent Carbohydrate; No Mixed Consistencies; Texture: Regular (IDDSI 7); Fluid Consistency: Nectar Thick            Activity:      Restrictions or Other Recommendations:       CODE STATUS:    Code Status and Medical Interventions:   Ordered at: 06/02/24 2310     Code Status (Patient has no pulse and is not breathing):    CPR (Attempt to Resuscitate)     Medical Interventions (Patient has pulse or is breathing):    Full Support       Future Appointments   Date Time Provider Department Center   6/6/2024  3:00 PM Marni Silva APRN [facility]   6/11/2024  2:00 PM Sofiya Krishnan APRN [facility]   7/12/2024 11:15 AM Lukasz Singh MD [facility]   10/10/2024  2:00 PM Dakota Moore MD [facility]   11/25/2024  3:15 PM Sofiya Krishnan APRN [facility]   3/14/2025  2:30 PM Lukasz Singh MD [facility]       Additional Instructions for the Follow-ups that You Need to Schedule       Discharge Follow-up with PCP   As directed       Currently Documented PCP:    Sofiya Krishnan APRN    PCP Phone Number:    [phone]      Follow Up Details: 1 week        Discharge Follow-up with Specified Provider: Keep appt with Dr Singh on 7/12/24   As directed      To: Keep appt with Dr Singh on 7/12/24        Discharge Follow-up with Specified Provider: [facility] on Thursday   As directed      To: [facility] appt with [facility] on Thursday                      Olga Saucedo DO  06/04/24      Time Spent on Discharge:  I spent  55  minutes on this discharge activity which included: face-to-face encounter with the patient, reviewing the data in the system, coordination of the care with the nursing staff as well as consultants, documentation, and entering orders.

## 2024-06-04 NOTE — PLAN OF CARE
Goal Outcome Evaluation:  Plan of Care Reviewed With: patient        Progress: improving  Outcome Evaluation: Pt discharging after antibiotic administration completed. Pt and daughter educated on new medications, side effects, and possible need for O2 when pt is sleeping. Esducation provided on O2 administration based off of O2 saturations while asleep. Family comfortable with current POC. No other needs at this time.

## 2024-06-04 NOTE — PLAN OF CARE
Goal Outcome Evaluation:  Plan of Care Reviewed With: patient, daughter         MBS completed. Aspiration of thin liquids. Suspect acute-on-chronic dysphagia.      SLP Swallowing Diagnosis: moderate, pharyngeal dysphagia (06/04/24 1130)     Treatment Assessment Comments (SLP): Noted pt to d/c home. SLP returned following study and provided complete d/c education prep and materials to pt/dtr. Provided all information in written form, samples for nectar-thick liquids, and taught pt's dtr how to thicken liquids.  has also arranged for  SLP services. Pt/dtr have out number if any further ?s once home. (06/04/24 1130)       Anticipated Discharge Disposition (SLP): home with home health

## 2024-06-04 NOTE — MBS/VFSS/FEES
Acute Care - Speech Language Pathology   Swallow Re-Evaluation and Treatment Note  [place]  Modified Barium Swallow Study (MBS)     Patient Name: Ricki Trotter  : 1935  MRN: [id number]  Today's Date: 2024               Admit Date: 2024    Visit Dx:     ICD-10-CM ICD-9-CM   1. Dysphagia, unspecified type  R13.10 787.20   2. COPD exacerbation  J44.1 491.21   3. Chronic heart failure with preserved ejection fraction  I50.32 428.9     Patient Active Problem List   Diagnosis    Mass of lower lobe of left lung    GERD (gastroesophageal reflux disease)    Primary hypertension    Insomnia due to medical condition    PVC's (premature ventricular contractions)    HLD (hyperlipidemia)    Leukocytosis    Pleural effusion, bilateral    CKD (chronic kidney disease) stage 3, GFR 30-59 ml/min    Hyponatremia    Lesion of lung    Atrial fibrillation    Pulmonary hypertension    Valvular heart disease    CHF (congestive heart failure)    Type 2 diabetes mellitus without complication, with long-term current use of insulin    Cervical osteophyte    Pharyngeal dysphagia secondary to large cervical osteophyte    Hospital discharge follow-up    Environmental and seasonal allergies    Dizziness    Generalized anxiety disorder    Chronic heart failure with preserved ejection fraction    COPD exacerbation    Chronic anticoagulation     Past Medical History:   Diagnosis Date    Allergic rhinitis     Amnesia     Arrhythmia     Atrial fibrillation     Basal cell carcinoma     Benign prostatic hyperplasia     CHF (congestive heart failure) 3-    Chronic kidney disease, stage 3b     COPD (chronic obstructive pulmonary disease)     Diabetes mellitus N/A    Drug dependency     Heart murmur     Hyperlipidemia     Hypertension     Hyperthyroidism     Iron deficiency     Long term (current) use of insulin     Melanoma 2012    Nephrosclerosis      Past Surgical History:   Procedure Laterality Date    CATARACT EXTRACTION  Bilateral     COLONOSCOPY      PROSTHODONTIC PROCEDURE         SLP Recommendation and Plan  SLP Swallowing Diagnosis: moderate, pharyngeal dysphagia (06/04/24 1130)  SLP Diet Recommendation: regular textures, no mixed consistencies, nectar thick liquids, water between meals after oral care, with supervision (06/04/24 1130)  Recommended Precautions and Strategies: upright posture during/after eating, small bites of food and sips of liquid, general aspiration precautions (06/04/24 1130)  SLP Rec. for Method of Medication Administration: meds whole, with thick liquids, with puree (pt does not like applesauce. Ok other puree or ice cream.) (06/04/24 1130)     Monitor for Signs of Aspiration: notify SLP if any concerns (06/04/24 1130)     Swallow Criteria for Skilled Therapeutic Interventions Met: demonstrates skilled criteria (06/04/24 1130)  Anticipated Discharge Disposition (SLP): home with home health, other (see comments) (recommend main focus for strengthening be on hyolaryngeal excursion though could also use improved laryngeal elevation and base of tongue strengthening. Repeat MBS via OP once general overall strength improves.) (06/04/24 1130)  Rehab Potential/Prognosis, Swallowing: good, to achieve stated therapy goals (06/04/24 1130)  Therapy Frequency (Swallow): 5 days per week (06/04/24 1130)  Predicted Duration Therapy Intervention (Days): 2 weeks (06/04/24 1130)  Oral Care Recommendations: Oral Care BID/PRN, Toothbrush (06/04/24 1130)                           Treatment Assessment Comments (SLP): Noted pt to d/c home. SLP returned following study and provided complete d/c education prep and materials to pt/dtr. Provided all information in written form, samples for nectar-thick liquids, and taught pt's dtr how to thicken liquids.  has also arranged for  SLP services. Pt/dtr have out number if any further ?s once home. (06/04/24 1130)            Plan of Care Reviewed With: patient, daughter      SWALLOW  EVALUATION (Last 72 Hours)       SLP Adult Swallow Evaluation       Row Name 06/04/24 1130 06/03/24 0915                Rehab Evaluation    Document Type re-evaluation  - evaluation  -CJ       Subjective Information no complaints  -SM no complaints  -CJ       Patient Observations alert;cooperative  -SM alert;cooperative  -CJ       Patient/Family/Caregiver Comments/Observations -- daughter present  -       Patient Effort good  -SM good  -CJ       Symptoms Noted During/After Treatment -- none  -CJ          General Information    Patient Profile Reviewed -- yes  -CJ       Pertinent History Of Current Problem -- Pt adm w/ COPD exacerbation, CHF; sig h/o LLL mass, GERD, insomnia, KCD, afib, DM2, cervical osteophytes. Most recent MBS on 5/14/24 w/ recs for soft chopped w/ thins, head turn (either side)+effortful swallow  -CJ       Precautions/Limitations, Vision -- WFL;for purposes of eval  -CJ       Precautions/Limitations, Hearing -- WFL;for purposes of eval  -CJ       Prior Level of Function-Swallowing -- other (see comments)  prev dysphagia  -CJ       Plans/Goals Discussed with -- patient;family  -CJ       Barriers to Rehab -- medically complex  -       Patient's Goals for Discharge -- patient did not state  -CJ       Family Goals for Discharge -- family did not state  -          Pain    Additional Documentation -- Pain Scale: FACES Pre/Post-Treatment (Group)  -          Pain Scale: Numbers Pre/Post-Treatment    Pretreatment Pain Rating 0/10 - no pain  -SM --       Posttreatment Pain Rating 0/10 - no pain  -SM --          Pain Scale: FACES Pre/Post-Treatment    Pain: FACES Scale, Pretreatment -- 0-->no hurt  -CJ       Posttreatment Pain Rating -- 0-->no hurt  -CJ          Oral Motor Structure and Function    Dentition Assessment -- natural, present and adequate  -       Secretion Management -- WNL/WFL  -       Mucosal Quality -- moist, healthy  -          Oral Musculature and Cranial Nerve Assessment     Oral Motor General Assessment -- WFL  -          General Eating/Swallowing Observations    Respiratory Support Currently in Use -- nasal cannula  -       O2 Liters -- 2L  -       Eating/Swallowing Skills -- self-fed  -       Positioning During Eating -- upright in bed  -       Utensils Used -- spoon;cup;straw  -       Consistencies Trialed -- thin liquids  clears  -          Clinical Swallow Eval    Pharyngeal Phase -- --  r/o worsening pharyngeal impairment  -       Clinical Swallow Evaluation Summary -- Pt familiar to SLP department w/ recent MBS 5/14/24. No new concerns voiced per pt or his daughter. Reports has been doing compensations throughout mealtimes. Only assessed clears this date 2/2 concern for possible procedure and other consults. No glaring overt s/s of aspiration w/ trials of thins. Recommend resume previous diet of soft chopped w/ thins, no mixed consistencies w/ head turn, volitional cough/throat clear and effortful swallow until able to repeat study pending other consults.  -          MBS/VFSS    Utensils Used spoon;cup;straw  -SM --       Consistencies Trialed thin liquids;pureed;regular textures  -SM --          MBS/VFSS Interpretation    Oral Prep Phase WFL  -SM --       Oral Transit Phase WFL  -SM --       Oral Residue WFL  -SM --          Initiation of Pharyngeal Swallow    Pharyngeal Phase impaired pharyngeal phase of swallowing  -SM --       Penetration During the Swallow thin liquids;secondary to reduced laryngeal elevation;secondary to reduced vestibular closure;other (see comments)  though mainly 2' epiglottis bumping into cervical osteophyte during retroflexion attempts  -SM --       Aspiration After the Swallow thin liquids;secondary to residue;in pyriform sinuses;other (see comments)  squeezed into airway during subsequent swallows  -SM --       Depth of Penetration shallow;other (see comments)  though deepened as progresed after  -SM --       Response to  Penetration No  -SM --       No spontaneous response to penetration and other (see comments)  partially cleared with cues  -SM --       Response to Aspiration No  -SM --       No spontaneous response to aspiration and non-effective subglottic clearance with cue (see comments)  -SM --       Rosenbek's Scale thin:;8--->level 8  -SM --       Pharyngeal Residue all consistencies tested;diffuse within pharynx;secondary to reduced base of tongue retraction;secondary to reduced posterior pharyngeal wall stripping;secondary to reduced laryngeal elevation;secondary to reduced hyolaryngeal excursion;other (see comments)  -SM --       Response to Residue partial residue clearance;other (see comments)  with subsequent swallow. Amount of residue was not observed to build up in amount over time  -SM --       Attempted Compensatory Maneuvers bolus size;bolus presentation style;additional subsequent swallow;multiple swallows;effortful (hard swallow);throat clear after swallow;head turn to left;head turn to right  -SM --       Response to Attempted Compensatory Maneuvers did not prevent penetration;did not prevent aspiration;did not reduce residue  -SM --       Pharyngeal Phase, Comment Swallow function has declined since last study. Pt reports increased weakness in relation to poor sleep over the last month, which is hopefully the culprit of swallow function decline. Main decline r/t overall diffuse weakness with greatest impact being reduced hyolaryngeal excursion. This leads to pyriform sinuses residue with thin liquids that spills into airway during subsequent swallow attempts. Pt did not sense (though has observed coughing at meals) and cannot effective clear subglottic residue. No aspiration with nectar-thick liquids (suspect weighted material better clearing from pyriforms). Also no aspiration with isolated sips of tsp-size thin liquids. Does continue to penetrate into laryngeal vestibule. Not safe during meal intake as solid  pharyngeal residue would contribute to aspiration of thin liquids. Cervical osteophyte continues to impact and cuase significant pharyngeal residue.  -SM --          Swallowing Quality of Life Assessment    Education and counseling provided Signs of aspiration;Silent aspiration;Risks of aspiration;Safest diet options;Oral care recommendations and rationale;Aspiration precautions;Compensatory strategy recommendations and rationale  - --          SLP Evaluation Clinical Impression    SLP Swallowing Diagnosis moderate;pharyngeal dysphagia  - suspected pharyngeal dysphagia  -       Functional Impact risk of aspiration/pneumonia  - risk of aspiration/pneumonia  -       Rehab Potential/Prognosis, Swallowing good, to achieve stated therapy goals  - good, to achieve stated therapy goals  -       Swallow Criteria for Skilled Therapeutic Interventions Met demonstrates skilled criteria  - demonstrates skilled criteria  -          SLP Treatment Clinical Impressions    Treatment Assessment Comments (SLP) Noted pt to d/c home. SLP returned following study and provided complete d/c education prep and materials to pt/dtr. Provided all information in written form, samples for nectar-thick liquids, and taught pt's dtr how to thicken liquids.  has also arranged for HH SLP services. Also demonstrated single dysphagia exercise for pt to utilize until HH services begin (chin tuck against resistance). Pt/dtr have out number if any further ?s once home.  -SM --          Recommendations    Therapy Frequency (Swallow) 5 days per week  - --       Predicted Duration Therapy Intervention (Days) 2 weeks  - 2 weeks  -       SLP Diet Recommendation regular textures;no mixed consistencies;nectar thick liquids;water between meals after oral care, with supervision  - soft to chew textures;chopped;thin liquids;no mixed consistencies  -       Recommended Diagnostics -- reassess via VFSS (MBS)  when cleared  -        Recommended Precautions and Strategies upright posture during/after eating;small bites of food and sips of liquid;general aspiration precautions  - upright posture during/after eating;small bites of food and sips of liquid;multiple swallows per bite of food;multiple swallows per sip of liquid;general aspiration precautions;turn head to right;turn head to left;volitional throat clear;hard swallow with each bite or sip  -       Oral Care Recommendations Oral Care BID/PRN;Toothbrush  - Oral Care BID/PRN;Toothbrush  -       SLP Rec. for Method of Medication Administration meds whole;with thick liquids;with puree  pt does not like applesauce. Ok other puree or ice cream.  - meds crushed;meds whole;with puree;as tolerated  -       Monitor for Signs of Aspiration notify SLP if any concerns  - yes;notify SLP if any concerns  -       Anticipated Discharge Disposition (SLP) home with home health;other (see comments)  recommend main focus for strengthening be on hyolaryngeal excursion though could also use improved laryngeal elevation and base of tongue strengthening. Repeat MBS via OP once general overall strength improves.  - home with home health  -                 User Key  (r) = Recorded By, (t) = Taken By, (c) = Cosigned By      Initials Name Effective Dates    Elly Thompson, MS CCC-SLP 02/03/23 -     Lolis Dueñas, MS CCC-SLP 06/03/24 -                     EDUCATION  The patient has been educated in the following areas:   Home Exercise Program (HEP) Dysphagia (Swallowing Impairment) Oral Care/Hydration Modified Diet Instruction.                Time Calculation:    Time Calculation- SLP       Row Name 06/04/24 1450             Time Calculation- SLP    SLP Start Time 1130  -      SLP Received On 06/04/24  -         Untimed Charges    99059-TY Motion Fluoro Eval Swallow Minutes 59  -SM      31959-ZO Treatment Swallow Minutes 40  -SM         Total Minutes    Untimed Charges Total  Minutes 99  -SM       Total Minutes 99  -SM                User Key  (r) = Recorded By, (t) = Taken By, (c) = Cosigned By      Initials Name Provider Type    Elly Thompson MS CCC-SLP Speech and Language Pathologist                    Therapy Charges for Today       Code Description Service Date Service Provider Modifiers Qty    92892940211 HC ST MOTION FLUORO EVAL SWALLOW 4 6/4/2024 Elly Santamaria MS CCC-SLP GN 1    73553406375 HC ST TREATMENT SWALLOW 3 6/4/2024 Elly Santamaria MS TROY-SLP GN 1                 MS FATMATA Olson  6/4/2024

## 2024-06-04 NOTE — PROGRESS NOTES
"[facility] Cardiology  Hospital Progress Note     LOS: 0 days   Patient Care Team:  Sofiya Krishnan APRN as PCP - General (Family Medicine)  Delgado Larios MD as Consulting Physician (Medical Oncology)  Demetria Dasilva APRN as Nurse Practitioner (Pulmonary Disease)  Lukasz Singh MD as Consulting Physician (Cardiology)  Gregorio Herbert MD as Consulting Physician (Pulmonary Disease)  PCP:  Sofiya Krishnan APRN    Chief Complaint: Shortness of breath    SUBJECTIVE:  Feeling much better after sleeping all night. No distress. On room air      OBJECTIVE:         Vital Sign Min/Max for last 24 hours  Temp  Min: 97.7 °F (36.5 °C)  Max: 98.1 °F (36.7 °C)   BP  Min: 130/59  Max: 143/60   Pulse  Min: 68  Max: 88   Resp  Min: 16  Max: 18   SpO2  Min: 90 %  Max: 100 %   No data recorded   No data recorded     Flowsheet Rows      Flowsheet Row First Filed Value   Admission Height 182.9 cm (72.01\") Documented at 06/02/2024 1600   Admission Weight 69.4 kg (153 lb) Documented at 06/02/2024 1600            Telemetry: Afib with PVCs, VR 50s      Intake/Output Summary (Last 24 hours) at 6/4/2024 1219  Last data filed at 6/4/2024 0700  Gross per 24 hour   Intake --   Output 975 ml   Net -975 ml     Intake & Output (last 3 days)         06/01 0701  06/02 0700 06/02 0701  06/03 0700 06/03 0701  06/04 0700 06/04 0701  06/05 0700    Urine (mL/kg/hr)  2100 1175 (0.7)     Stool   0     Total Output  2100 1175     Net  -2100 -1175             Urine Unmeasured Occurrence   2 x     Stool Unmeasured Occurrence   1 x              Physical Exam:  Vitals reviewed.   Constitutional:       Appearance: Normal appearance. Not in distress.   Pulmonary:      Effort: Pulmonary effort is normal.      Breath sounds: Normal breath sounds.   Cardiovascular:      Bradycardia present. Frequent ectopic beats. Irregularly irregular rhythm. Normal S1. Normal S2.       Murmurs: There is a systolic murmur.   Edema:     " Peripheral edema present.  Skin:     General: Skin is warm and dry.   Neurological:      Mental Status: Alert.          LABS/DIAGNOSTIC DATA:  Results from last 7 days   Lab Units 06/04/24  0649 06/03/24  0534 06/02/24  1737   WBC 10*3/mm3 11.69* 9.27 13.98*   HEMOGLOBIN g/dL 10.3* 9.8* 10.8*   HEMATOCRIT % 31.7* 30.4* 34.0*   PLATELETS 10*3/mm3 198 175 194     Lab Results   Lab Value Date/Time    TROPONINT 23 (H) 06/02/2024 1946    TROPONINT 23 (H) 06/02/2024 1737    TROPONINT 26 (H) 05/27/2024 1730    TROPONINT 32 (H) 05/27/2024 1554     Results from last 7 days   Lab Units 06/03/24  0534   INR  1.47*     Results from last 7 days   Lab Units 06/04/24  0649 06/03/24  0534 06/02/24  1737   SODIUM mmol/L 139 135* 131*   POTASSIUM mmol/L 3.6 3.9 3.9   CHLORIDE mmol/L 98 96* 93*   CO2 mmol/L 27.0 30.0* 27.0   BUN mg/dL 28* 23 21   CREATININE mg/dL 1.30* 1.00 1.15   CALCIUM mg/dL 8.5* 8.1* 8.6   BILIRUBIN mg/dL  --   --  0.7   ALK PHOS U/L  --   --  77   ALT (SGPT) U/L  --   --  12   AST (SGOT) U/L  --   --  15   GLUCOSE mg/dL 112* 119* 160*             Results from last 7 days   Lab Units 06/02/24  1946   TSH uIU/mL 2.560           Medication Review:   apixaban, 5 mg, Oral, Q12H  Baclofen, 5 mg, Oral, Q8H  busPIRone, 5 mg, Oral, Q12H  cefepime, 2,000 mg, Intravenous, Q12H  doxycycline, 100 mg, Intravenous, Q12H  esomeprazole, 40 mg, Oral, BID AC  fluticasone, 2 spray, Nasal, BID  furosemide, 40 mg, Oral, BID AC  guaiFENesin, 600 mg, Oral, Q12H  insulin glargine, 5 Units, Subcutaneous, Daily  insulin lispro, 2-7 Units, Subcutaneous, 4x Daily AC & at Bedtime  ipratropium-albuterol, 3 mL, Nebulization, Q6H - RT  melatonin, 5 mg, Oral, Nightly  [START ON 6/5/2024] methylPREDNISolone sodium succinate, 40 mg, Intravenous, Q24H  montelukast, 10 mg, Oral, Q PM  nebivolol, 10 mg, Oral, Daily  pharmacy consult - MTM, , Does not apply, Daily  potassium chloride, 10 mEq, Oral, Daily  senna-docusate sodium, 2 tablet, Oral,  BID  sodium chloride, 10 mL, Intravenous, Q12H  spironolactone, 12.5 mg, Oral, Daily  traZODone, 100 mg, Oral, Nightly           ASSESSMENT/PLAN:    COPD exacerbation    Mass of lower lobe of left lung    GERD (gastroesophageal reflux disease)    Insomnia due to medical condition    Leukocytosis    Pleural effusion, bilateral    CKD (chronic kidney disease) stage 3, GFR 30-59 ml/min    Atrial fibrillation    Valvular heart disease    Type 2 diabetes mellitus without complication, with long-term current use of insulin    Pharyngeal dysphagia secondary to large cervical osteophyte    Generalized anxiety disorder    Chronic heart failure with preserved ejection fraction    Chronic anticoagulation    Acute on chronic HFpEF  Recurrent pleural effusions, R>L, proBNP 7,984 on admission  IR thoracentesis 6/3 with removal of 400mL, studies pending  ~1200 UOP with IV diuretic 6/3, on room air  Back on po furosemide   Spironolactone started 6/3  Continue to trend Cr, up to 1.3 from 1.0  He will keep scheduled follow up with H&V on Thursday. Discussed utilization of HF clinic for regular FU and as needed for IV diuresis.     PAF, freq PVCs, VR 50s - BP stable - continue Eliquis and nebivolol  Mild aortic stenosis by echo 3/2024    Scribed for Lukasz Singh MD by Emelia Perez, LINDA. 6/4/2024  12:19 EDT

## 2024-06-05 ENCOUNTER — TRANSITIONAL CARE MANAGEMENT TELEPHONE ENCOUNTER (OUTPATIENT)
Dept: CALL CENTER | Facility: HOSPITAL | Age: 89
End: 2024-06-05
Payer: MEDICARE

## 2024-06-05 LAB — GIE STN SPEC: NORMAL

## 2024-06-05 NOTE — OUTREACH NOTE
Call Center TCM Note      Flowsheet Row Responses   [facility] patient discharged from? [place]   Does the patient have one of the following disease processes/diagnoses(primary or secondary)? COPD   TCM attempt successful? Yes   Call start time 1203   Call end time 1206   Discharge diagnosis COPD exacerbation   Is patient permission given to speak with other caregiver? Yes   List who call center can speak with daughter- Paty   Person spoke with today (if not patient) and relationship daughter   Meds reviewed with patient/caregiver? Yes   Prescription comments daughter is going to  patient medications today, discussed importance of finishing antibiotic and prednisone, daughter verbalized understanding.   Comments Hospital follow up appt with PCP on 6/11   Does the patient have an appointment with their PCP within 7-14 days of discharge? Yes   What is the Home health agency?  [facility]    Has home health visited the patient within 72 hours of discharge? Yes   Psychosocial issues? No   Did the patient receive a copy of their discharge instructions? Yes   Nursing interventions Reviewed instructions with patient   What is the patient's perception of their health status since discharge? Improving   Nursing Interventions Nurse provided patient education   Is the patient/caregiver able to teach back the hierarchy of who to call/visit for symptoms/problems? PCP, Specialist, Home health nurse, Urgent Care, ED, 911 Yes   Patient reports what zone on this call? Green Zone   Green Zone Reports doing well, Breathing without shortness of breath   Green Zone interventions: Take daily medications   TCM call completed? Yes   Wrap up additional comments Per nikia Newman, patient is doing well, no questions, confirmed upcoming follow up appts including his hospital follow up appt with PCP on 6/11.   Call end time 1206   Would this patient benefit from a Referral to Amb Social Work? No   Is the patient interested in  additional calls from an ambulatory ? No            Mary Noguera RN    6/5/2024, 12:06 EDT

## 2024-06-06 ENCOUNTER — OFFICE VISIT (OUTPATIENT)
Dept: CARDIOLOGY | Facility: HOSPITAL | Age: 89
End: 2024-06-06
Payer: MEDICARE

## 2024-06-06 VITALS
OXYGEN SATURATION: 93 % | BODY MASS INDEX: 20.32 KG/M2 | SYSTOLIC BLOOD PRESSURE: 146 MMHG | HEIGHT: 72 IN | HEART RATE: 65 BPM | DIASTOLIC BLOOD PRESSURE: 67 MMHG | WEIGHT: 150 LBS

## 2024-06-06 DIAGNOSIS — I50.32 CHRONIC HEART FAILURE WITH PRESERVED EJECTION FRACTION: Primary | ICD-10-CM

## 2024-06-06 DIAGNOSIS — I10 PRIMARY HYPERTENSION: ICD-10-CM

## 2024-06-06 DIAGNOSIS — I48.11 LONGSTANDING PERSISTENT ATRIAL FIBRILLATION: ICD-10-CM

## 2024-06-06 DIAGNOSIS — E78.49 OTHER HYPERLIPIDEMIA: ICD-10-CM

## 2024-06-06 LAB
BACTERIA FLD CULT: NORMAL
GRAM STN SPEC: NORMAL
GRAM STN SPEC: NORMAL
REF LAB TEST METHOD: NORMAL

## 2024-06-06 RX ORDER — DAPAGLIFLOZIN 10 MG/1
10 TABLET, FILM COATED ORAL DAILY
Qty: 30 TABLET | Refills: 3 | Status: SHIPPED | OUTPATIENT
Start: 2024-06-06

## 2024-06-06 NOTE — PROGRESS NOTES
Chief Complaint  Congestive Heart Failure    Subjective      History of Present Illness {  Problem List  Visit  Diagnosis   Encounters  Notes  Medications  Labs  Result Review Imaging  Media :23}     Ricki Trotter, 88 y.o. male with past medical history significant for COPD, GERD, hypertension, PVCs, acute on chronic diastolic heart failure, hyperlipidemia, CKD, atrial fibrillation, pulmonary hypertension, and type 2 diabetes, who presents to [facility] for Congestive Heart Failure  Patient was recently hospitalized on 6/2/2024 through 6/4/2024 with chief complaint of COPD exacerbation.  Patient was followed closely by cardiology during recent admission with adjustment in diuretics.  Patient also went ultrasound-guided right-sided thoracentesis on 6/3 with removal of 400 mL of fluid.  Patient was ultimately discharged on 6/4 with instructions to initiate cefdinir, guaifenesin, prednisone, and spironolactone at 12.5 mg daily.    Of note, previously patient was recently hospitalized at [facility] with acute on chronic HFpEF, bilateral pleural effusions with right greater than left and pulmonary hypertension. Patient did receive IV Lasix while hospitalized. Echo March 2024 showed EF of 69%, mild AS, moderate TR, markedly elevated RVSP greater than 55 mmHg. Patient underwent a right-sided thoracentesis on 5/13/2024 with 900 mL of fluid removed.     Since most recent discharge from our facility, patient has felt well overall.  He denies any significant weight gain, shortness of air, or lower extremity edema.  He also currently denies any exertional chest pain or pressure, palpitations, syncope, or near syncope.  Patient is extremely compliant with daily weights and is continuing to monitor blood pressure and heart rate at home as well.  Pressure readings have been well within acceptable limits.    Echocardiogram 3/8/2024:    Left ventricular systolic function is  "normal. Calculated left ventricular EF = 69.1% Normal left ventricular cavity size noted. Left ventricular wall thickness is consistent with mild concentric hypertrophy. Elevated left atrial pressure.    The aortic valve exhibits sclerosis. There is moderate calcification of the aortic valve. There is thickening of the aortic valve. The aortic valve appears trileaflet. No aortic valve regurgitation is present. Mild aortic valve stenosis is present    The tricuspid valve is normal in structure. Moderate tricuspid valve regurgitation is present. Estimated right ventricular systolic pressure from tricuspid regurgitation is markedly elevated (>55 mmHg).         Objective     Vital Signs:   Vitals:    06/06/24 1502   BP: 146/67   BP Location: Left arm   Patient Position: Sitting   Pulse: 65   SpO2: 93%   Weight: 68 kg (150 lb)   Height: 182.9 cm (72\")     Body mass index is 20.34 kg/m².  Physical Exam  Vitals and nursing note reviewed.   Constitutional:       Appearance: Normal appearance.   HENT:      Head: Normocephalic.   Eyes:      Pupils: Pupils are equal, round, and reactive to light.   Cardiovascular:      Rate and Rhythm: Normal rate and regular rhythm.      Pulses: Normal pulses.      Heart sounds: Normal heart sounds. No murmur heard.  Pulmonary:      Effort: Pulmonary effort is normal.      Breath sounds: Normal breath sounds.   Abdominal:      General: Bowel sounds are normal.      Palpations: Abdomen is soft.   Musculoskeletal:         General: Normal range of motion.      Cervical back: Normal range of motion.      Right lower leg: No edema.      Left lower leg: No edema.   Skin:     General: Skin is warm and dry.      Capillary Refill: Capillary refill takes less than 2 seconds.   Neurological:      Mental Status: He is alert and oriented to person, place, and time.   Psychiatric:         Mood and Affect: Mood normal.         Thought Content: Thought content normal.                Data Reviewed:{ Labs  " Result Review  Imaging  Med Tab  Media :23}   Lab Results   Component Value Date    WBC 11.69 (H) 06/04/2024    HGB 10.3 (L) 06/04/2024    HCT 31.7 (L) 06/04/2024    MCV 88.3 06/04/2024     06/04/2024      Lab Results   Component Value Date    GLUCOSE 112 (H) 06/04/2024    BUN 28 (H) 06/04/2024    CREATININE 1.30 (H) 06/04/2024    EGFRRESULT 50 (L) 03/27/2024    EGFR 52.8 (L) 06/04/2024    BCR 21.5 06/04/2024    K 3.6 06/04/2024    CO2 27.0 06/04/2024    CALCIUM 8.5 (L) 06/04/2024    PROTENTOTREF 5.9 (L) 03/27/2024    ALBUMIN 4.0 06/02/2024    BILITOT 0.7 06/02/2024    AST 15 06/02/2024    ALT 12 06/02/2024      Lab Results   Component Value Date    CHOL 102 05/13/2024    CHLPL 145 11/10/2023    TRIG 64 05/13/2024    HDL 44 05/13/2024    LDL 44 05/13/2024      Lab Results   Component Value Date    TSH 2.560 06/02/2024      Lab Results   Component Value Date    TROPONINT 23 (H) 06/02/2024      CT Angiogram Chest (06/03/2024 00:18)  US Thoracentesis (06/03/2024 10:39)  XR Chest 1 View (06/03/2024 10:47)  Adult Transthoracic Echo Complete W/ Cont if Necessary Per Protocol (03/08/2024 15:23)  ECG 12 Lead Dyspnea (06/02/2024 20:50)  ECG 12 Lead Dyspnea (05/27/2024 17:25)  Adult Transthoracic Echo Complete W/ Cont if Necessary Per Protocol (06/28/2023 11:50)    Assessment & Plan   Assessment and Plan {CC Problem List  Visit Diagnosis  ROS  Review (Popup)  Health Maintenance  Quality  BestPractice  Medications  SmartSets  SnapShot Encounters  Media :23}     1. Chronic heart failure with preserved ejection fraction  -Patient currently compliant with daily weights, and ambulatory blood pressure and heart rate monitoring.  -Current medication regimen of Bystolic 10 mg daily, Lasix 20 mg twice daily, and spironolactone at 12.5 mg daily.  -Previously, patient had been unable to tolerate Jardiance.  He was unsure of what his prior reaction was.  Patient is willing to initiate Farxiga in an attempt to  optimize his GDMT  -Patient with upcoming appointment with primary care on 6/11.  We will obtain updated BMP at this time to assess for renal function as well as electrolytes.  - dapagliflozin Propanediol (Farxiga) 10 MG tablet; Take 10 mg by mouth Daily.  Dispense: 30 tablet; Refill: 3  - Basic Metabolic Panel; Future    2. Primary hypertension  -Blood pressure mildly elevated today while in office, but patient states that his blood pressure at home is more adequately controlled.  Patient is currently on a short course of prednisone and patient I discussed today that this will likely impact his blood pressure readings as well.  -For now, recommend patient to continue his current medication regimen of spironolactone, Bystolic, and Lasix  -Patient will continue ambulatory blood pressure monitoring, and will notify heart and valve if blood pressure remains elevated despite completion of prednisone taper    3. Other hyperlipidemia  -Currently tolerating simvastatin at 40 mg.  Recommend to continue    4. Longstanding persistent atrial fibrillation  -Patient on Bystolic, and Eliquis.  Recommend to continue these medications.  Heart rate currently well-controlled.      Will plan to follow-up with patient in approximately 2 weeks for further adjustments of GDMT if able.  Will also make adjustments on 6/11 if needed based on updated lab results.  Patient was encouraged strongly to reach out to heart and valve prior to that time if symptoms change or worsen.    Follow Up {Instructions Charge Capture  Follow-up Communications :23}     Return in about 2 weeks (around 6/20/2024).      Patient was given instructions and counseling regarding his condition or for health maintenance advice. Please see specific information pulled into the AVS if appropriate.  Patient was instructed to call the [facility] with any questions, concerns, or worsening symptoms.    Dictated Utilizing Dragon Dictation   Please note that  portions of this note were completed with a voice recognition program.   Part of this note may be an electronic transcription/translation of spoken language to printed text using the Dragon Dictation System.

## 2024-06-07 ENCOUNTER — TELEPHONE (OUTPATIENT)
Dept: CARDIOLOGY | Facility: HOSPITAL | Age: 89
End: 2024-06-07

## 2024-06-07 LAB
BACTERIA SPEC AEROBE CULT: NORMAL
BACTERIA SPEC AEROBE CULT: NORMAL

## 2024-06-07 NOTE — TELEPHONE ENCOUNTER
Caller: Paty Tobar    Relationship: Emergency Contact    Best call back number: [phone]    What is the best time to reach you: ANYTIME     Who are you requesting to speak with (clinical staff, provider,  specific staff member): CLINICAL     What was the call regarding: PATIENT WAS IN TO SEE OH PUCKETT YESTERDAY 06/06/24 AND WAS GIVEN FARXIGA 10 MG. PATIENT WAS TOLD BY THE PHARMACY THAT A PRE AUTH IS NEEDED FOR THIS MEDICATION. PATIENT DAUGHTER WOULD LIKE A CALL BACK TO DISCUSS.     Is it okay if the provider responds through MyChart: PREFERS A CALL

## 2024-06-08 LAB — BACTERIA SPEC ANAEROBE CULT: NORMAL

## 2024-06-11 ENCOUNTER — OFFICE VISIT (OUTPATIENT)
Dept: FAMILY MEDICINE CLINIC | Facility: CLINIC | Age: 89
End: 2024-06-11
Payer: MEDICARE

## 2024-06-11 VITALS
HEART RATE: 55 BPM | BODY MASS INDEX: 20.01 KG/M2 | DIASTOLIC BLOOD PRESSURE: 70 MMHG | SYSTOLIC BLOOD PRESSURE: 128 MMHG | HEIGHT: 72 IN | OXYGEN SATURATION: 96 % | WEIGHT: 147.7 LBS

## 2024-06-11 DIAGNOSIS — Z09 HOSPITAL DISCHARGE FOLLOW-UP: Primary | ICD-10-CM

## 2024-06-11 DIAGNOSIS — I50.32 CHRONIC DIASTOLIC CONGESTIVE HEART FAILURE: Chronic | ICD-10-CM

## 2024-06-11 DIAGNOSIS — F41.1 GENERALIZED ANXIETY DISORDER: Chronic | ICD-10-CM

## 2024-06-11 PROCEDURE — 99214 OFFICE O/P EST MOD 30 MIN: CPT | Performed by: NURSE PRACTITIONER

## 2024-06-11 PROCEDURE — 1126F AMNT PAIN NOTED NONE PRSNT: CPT | Performed by: NURSE PRACTITIONER

## 2024-06-11 RX ORDER — FUROSEMIDE 20 MG/1
20 TABLET ORAL
Qty: 60 TABLET | Refills: 2 | Status: SHIPPED | OUTPATIENT
Start: 2024-06-11

## 2024-06-11 RX ORDER — BUSPIRONE HYDROCHLORIDE 5 MG/1
5 TABLET ORAL 3 TIMES DAILY PRN
Qty: 270 TABLET | Refills: 1 | Status: SHIPPED | OUTPATIENT
Start: 2024-06-11

## 2024-06-14 PROBLEM — F41.1 GENERALIZED ANXIETY DISORDER: Chronic | Status: ACTIVE | Noted: 2024-05-23

## 2024-06-14 PROBLEM — I50.9 CHF (CONGESTIVE HEART FAILURE): Chronic | Status: ACTIVE | Noted: 2024-03-08

## 2024-06-14 NOTE — ASSESSMENT & PLAN NOTE
Patient's daughter reports they were told during this hospitalization that patient's congestive heart failure is stable, cardiologist would like for him to continue on Lasix twice daily.  Daughter brought patient's current prescription bottle with him today, he is in need of refills if he is going to continue twice daily dosing.  APRN reviewed cardiology's note, it does confirm 20 mg twice daily dosing.  Refill sent to pharmacy.  Follow-up with cardiology as scheduled, return to the office for any concerns.  We reviewed symptoms associated with CHF exacerbation.

## 2024-06-14 NOTE — ASSESSMENT & PLAN NOTE
Patient was seen at [facility] with diagnosis of pneumonia.  He was admitted 6/2/2024 and discharged 6/4/2024.  He reports he is feeling so much better, almost back to his baseline before his recent illnesses.  He is accompanied today by his daughter who reports he seems to be back to himself.  They have already followed up with cardiology prior to being seen today, note is in the chart and was reviewed by LINDA.

## 2024-06-14 NOTE — ASSESSMENT & PLAN NOTE
Patient's daughter reports the patient seems to have much better control of his anxiety with initiation of BuSpar.  Initially was ordered on an as-needed basis, he has been taking it once or twice daily.  Daughter reports she feels there are times that he might benefit from 10 mg, and then there are times that he seems to not even need the 5 mg.  We discussed patient and daughter's ability to titrate this type of medication on an as-needed basis.  Will adjust prescription to allow for 5 mg or 10 mg administration.  Refill sent to pharmacy.

## 2024-06-14 NOTE — PROGRESS NOTES
"    Office Note     Name: Ricki Trotter    : 1935     MRN: [id number]     Chief Complaint  Pneumonia (Pt states he was in the hospital due to pneumonia. Pt states he is doing better and is getting strength back )    Subjective     History of Present Illness:  Ricki Trotter is a 89 y.o. male who presents today for hospital follow-up.      Objective     Past Medical History:   Diagnosis Date    Allergic rhinitis     Amnesia     Arrhythmia     Atrial fibrillation     Basal cell carcinoma     Benign prostatic hyperplasia     CHF (congestive heart failure) 3-    Chronic kidney disease, stage 3b     COPD (chronic obstructive pulmonary disease)     Diabetes mellitus N/A    Drug dependency     Heart murmur     Hyperlipidemia     Hypertension     Hyperthyroidism     Iron deficiency     Long term (current) use of insulin     Melanoma 2012    Nephrosclerosis      Past Surgical History:   Procedure Laterality Date    CATARACT EXTRACTION Bilateral     COLONOSCOPY      PROSTHODONTIC PROCEDURE       Family History   Problem Relation Age of Onset    Diabetes Mother     Stroke Mother     Hypertension Mother     Breast cancer Sister        Vital Signs  /70 (BP Location: Left arm, Patient Position: Sitting)   Pulse 55   Ht 182.9 cm (72\")   Wt 67 kg (147 lb 11.2 oz)   SpO2 96%   BMI 20.03 kg/m²   Estimated body mass index is 20.03 kg/m² as calculated from the following:    Height as of this encounter: 182.9 cm (72\").    Weight as of this encounter: 67 kg (147 lb 11.2 oz).    Physical Exam  Vitals reviewed.   Constitutional:       Appearance: Normal appearance.   Cardiovascular:      Rate and Rhythm: Normal rate and regular rhythm.      Heart sounds: Normal heart sounds.      Comments: There are no signs of CHF associated edema at this time, lungs are clear bilaterally and he has no lower extremity edema.  Pulmonary:      Effort: Pulmonary effort is normal.      Breath sounds: Normal breath sounds. "   Skin:     General: Skin is warm and dry.      Capillary Refill: Capillary refill takes less than 2 seconds.   Neurological:      General: No focal deficit present.      Mental Status: He is alert and oriented to person, place, and time.   Psychiatric:         Mood and Affect: Mood normal.         Behavior: Behavior normal.          Assessment and Plan     Diagnoses and all orders for this visit:    1. Hospital discharge follow-up (Primary)  Assessment & Plan:  Patient was seen at [facility] with diagnosis of pneumonia.  He was admitted 6/2/2024 and discharged 6/4/2024.  He reports he is feeling so much better, almost back to his baseline before his recent illnesses.  He is accompanied today by his daughter who reports he seems to be back to himself.  They have already followed up with cardiology prior to being seen today, note is in the chart and was reviewed by APRN.      2. Generalized anxiety disorder  Assessment & Plan:  Patient's daughter reports the patient seems to have much better control of his anxiety with initiation of BuSpar.  Initially was ordered on an as-needed basis, he has been taking it once or twice daily.  Daughter reports she feels there are times that he might benefit from 10 mg, and then there are times that he seems to not even need the 5 mg.  We discussed patient and daughter's ability to titrate this type of medication on an as-needed basis.  Will adjust prescription to allow for 5 mg or 10 mg administration.  Refill sent to pharmacy.    Orders:  -     busPIRone (BUSPAR) 5 MG tablet; Take 1 tablet by mouth 3 (Three) Times a Day As Needed (anxiety).  Dispense: 270 tablet; Refill: 1    3. Chronic diastolic congestive heart failure  Assessment & Plan:  Patient's daughter reports they were told during this hospitalization that patient's congestive heart failure is stable, cardiologist would like for him to continue on Lasix twice daily.  Daughter brought patient's  current prescription bottle with him today, he is in need of refills if he is going to continue twice daily dosing.  APRN reviewed cardiology's note, it does confirm 20 mg twice daily dosing.  Refill sent to pharmacy.  Follow-up with cardiology as scheduled, return to the office for any concerns.  We reviewed symptoms associated with CHF exacerbation.        Orders:  -     furosemide (LASIX) 20 MG tablet; Take 1 tablet by mouth 2 (Two) Times a Day Before Meals.  Dispense: 60 tablet; Refill: 2      BMI is within normal parameters. No other follow-up for BMI required.      Follow Up  Return if symptoms worsen or fail to improve.    LINDA Purcell

## 2024-06-17 ENCOUNTER — READMISSION MANAGEMENT (OUTPATIENT)
Dept: CALL CENTER | Facility: HOSPITAL | Age: 89
End: 2024-06-17
Payer: MEDICARE

## 2024-06-17 NOTE — OUTREACH NOTE
COPD/PN Week 2 Survey      Flowsheet Row Responses   Congregational facility patient discharged from? [facility]   Does the patient have one of the following disease processes/diagnoses(primary or secondary)? COPD   Week 2 attempt successful? No   Unsuccessful attempts Attempt 1            Chato LUNA - Registered Nurse

## 2024-06-21 ENCOUNTER — OFFICE VISIT (OUTPATIENT)
Dept: CARDIOLOGY | Facility: HOSPITAL | Age: 89
End: 2024-06-21
Payer: MEDICARE

## 2024-06-21 VITALS
HEIGHT: 72 IN | HEART RATE: 70 BPM | OXYGEN SATURATION: 96 % | DIASTOLIC BLOOD PRESSURE: 60 MMHG | RESPIRATION RATE: 18 BRPM | BODY MASS INDEX: 18.5 KG/M2 | WEIGHT: 136.56 LBS | TEMPERATURE: 98 F | SYSTOLIC BLOOD PRESSURE: 139 MMHG

## 2024-06-21 DIAGNOSIS — I10 PRIMARY HYPERTENSION: ICD-10-CM

## 2024-06-21 DIAGNOSIS — I50.32 CHRONIC HEART FAILURE WITH PRESERVED EJECTION FRACTION: Primary | ICD-10-CM

## 2024-06-21 DIAGNOSIS — I48.11 LONGSTANDING PERSISTENT ATRIAL FIBRILLATION: ICD-10-CM

## 2024-06-21 DIAGNOSIS — E78.49 OTHER HYPERLIPIDEMIA: ICD-10-CM

## 2024-06-21 RX ORDER — SPIRONOLACTONE 25 MG/1
12.5 TABLET ORAL DAILY
Qty: 30 TABLET | Refills: 3 | Status: SHIPPED | OUTPATIENT
Start: 2024-06-21

## 2024-06-21 NOTE — PROGRESS NOTES
Chief Complaint  Follow-up and Congestive Heart Failure    Subjective    History of Present Illness {  Problem List  Visit  Diagnosis   Encounters  Notes  Medications  Labs  Result Review Imaging  Media :23}       History of Present Illness   Ricki Trotter, 88 y.o. male with past medical history significant for COPD, GERD, hypertension, PVCs, acute on chronic diastolic heart failure, hyperlipidemia, CKD, atrial fibrillation, pulmonary hypertension, and type 2 diabetes, who presents to [facility] for ongoing evaluation of his hfpef. Patient was recently hospitalized on 6/2/2024 through 6/4/2024 with chief complaint of COPD exacerbation.  Patient was followed closely by cardiology during recent admission with adjustment in diuretics.  Patient also went ultrasound-guided right-sided thoracentesis on 6/3 with removal of 400 mL of fluid.  Patient was ultimately discharged on 6/4 with instructions to initiate cefdinir, guaifenesin, prednisone, and spironolactone at 12.5 mg daily.     Of note, previously patient was recently hospitalized at [facility] with acute on chronic HFpEF, bilateral pleural effusions with right greater than left and pulmonary hypertension. Patient did receive IV Lasix while hospitalized. Echo March 2024 showed EF of 69%, mild AS, moderate TR, markedly elevated RVSP greater than 55 mmHg. Patient underwent a right-sided thoracentesis on 5/13/2024 with 900 mL of fluid removed.    Previous weight 163 and weight now is 136.9lbs. He notes that his energy level has improved and he notes he is eating better. Currently denies cp, dizziness, presyncope, moran or pedal edema.      Echocardiogram 3/8/2024:    Left ventricular systolic function is normal. Calculated left ventricular EF = 69.1% Normal left ventricular cavity size noted. Left ventricular wall thickness is consistent with mild concentric hypertrophy. Elevated left atrial pressure.    The  "aortic valve exhibits sclerosis. There is moderate calcification of the aortic valve. There is thickening of the aortic valve. The aortic valve appears trileaflet. No aortic valve regurgitation is present. Mild aortic valve stenosis is present    The tricuspid valve is normal in structure. Moderate tricuspid valve regurgitation is present. Estimated right ventricular systolic pressure from tricuspid regurgitation is markedly elevated (>55 mmHg).                 Objective     Vital Signs:   Vitals:    06/21/24 1305   BP: 139/60   BP Location: Left arm   Patient Position: Sitting   Cuff Size: Small Adult   Pulse: 70   Resp: 18   Temp: 98 °F (36.7 °C)   TempSrc: Temporal   SpO2: 96%   Weight: 61.9 kg (136 lb 9 oz)   Height: 182.9 cm (72\")     Body mass index is 18.52 kg/m².  Physical Exam  Vitals and nursing note reviewed.   Constitutional:       Appearance: Normal appearance.   HENT:      Head: Normocephalic.   Eyes:      Pupils: Pupils are equal, round, and reactive to light.   Cardiovascular:      Rate and Rhythm: Normal rate and regular rhythm.      Pulses: Normal pulses.      Heart sounds: Normal heart sounds. No murmur heard.  Pulmonary:      Effort: Pulmonary effort is normal.      Breath sounds: Normal breath sounds.   Abdominal:      General: Bowel sounds are normal.      Palpations: Abdomen is soft.   Musculoskeletal:         General: Normal range of motion.      Cervical back: Normal range of motion.      Right lower leg: No edema.      Left lower leg: No edema.   Skin:     General: Skin is warm and dry.      Capillary Refill: Capillary refill takes less than 2 seconds.   Neurological:      Mental Status: He is alert and oriented to person, place, and time.   Psychiatric:         Mood and Affect: Mood normal.         Thought Content: Thought content normal.              Result Review  Data Reviewed:{ Labs  Result Review  Imaging  Med Tab  Media :23}   CBC (No Diff) (06/04/2024 06:49)  Basic Metabolic " Panel (06/04/2024 06:49)  Adult Transthoracic Echo Complete W/ Cont if Necessary Per Protocol (03/08/2024 15:23)          Assessment and Plan {CC Problem List  Visit Diagnosis  ROS  Review (Popup)  Health Maintenance  Quality  BestPractice  Medications  SmartSets  SnapShot Encounters  Media :23}   1. Chronic heart failure with preserved ejection fraction  Decrease Lasix to 20 mg daily; may take extra 20 mg as needed  Continue Aldactone 12.5 mg daily, Farxiga 10 mg daily  Farxiga 10 mg boxes x 4 given today Lot number [id number] expiration 2/28/2027  Heart failure education today including signs and symptoms, the role of the heart failure center, daily weights, low sodium diet (less than 1500 mg per day), and daily physical activity. Reviewed HF Zones with patient and family.  Patient to continue current medications as previously ordered.   2. Primary hypertension  Stable on  Aldactone, by niya      3. Other hyperlipidemia  Stable on Zocor  4. Longstanding persistent atrial fibrillation  Continue Eliquis, Bystolic  CHADS-VASc Risk Assessment               5 Total Score    1 CHF    1 Hypertension    2 Age >/= 75    1 DM    Criteria that do not apply:    PRIOR STROKE/TIA/THROMBO    Vascular Disease    Age 65-74    Sex: Female              Repeat BMP before office appointment with Dr. Singh  mid July    Follow Up {Instructions Charge Capture  Follow-up Communications :23}   Return in 8 weeks (on 8/16/2024) for Office visit, HF.    Patient was given instructions and counseling regarding his condition or for health maintenance advice. Please see specific information pulled into the AVS if appropriate.  Patient was instructed to call the [facility] with any questions, concerns, or worsening symptoms.

## 2024-07-01 DIAGNOSIS — J30.1 SEASONAL ALLERGIC RHINITIS DUE TO POLLEN: ICD-10-CM

## 2024-07-01 RX ORDER — FLUTICASONE PROPIONATE 50 MCG
2 SPRAY, SUSPENSION (ML) NASAL DAILY
Qty: 16 G | Refills: 11 | Status: SHIPPED | OUTPATIENT
Start: 2024-07-01

## 2024-07-03 ENCOUNTER — TELEPHONE (OUTPATIENT)
Dept: CARDIOLOGY | Facility: HOSPITAL | Age: 89
End: 2024-07-03
Payer: MEDICARE

## 2024-07-03 RX ORDER — SPIRONOLACTONE 25 MG/1
12.5 TABLET ORAL DAILY
Qty: 30 TABLET | Refills: 3 | Status: SHIPPED | OUTPATIENT
Start: 2024-07-03

## 2024-07-03 NOTE — TELEPHONE ENCOUNTER
Caller: AmadouPaty    Relationship: Emergency Contact    Best call back number: [phone]    Requested Prescriptions:   Requested Prescriptions     Pending Prescriptions Disp Refills    spironolactone (ALDACTONE) 25 MG tablet 30 tablet 3     Sig: Take 0.5 tablets by mouth Daily.        Pharmacy where request should be sent:      Last office visit with prescribing clinician: 6/21/2024   Last telemedicine visit with prescribing clinician: Visit date not found   Next office visit with prescribing clinician: 8/16/2024     Additional details provided by patient:     Does the patient have less than a 3 day supply:  [x] Yes  [] No    Would you like a call back once the refill request has been completed: [] Yes [x] No    If the office needs to give you a call back, can they leave a voicemail: [] Yes [x] No    [place] Rep   07/03/24 13:12 EDT

## 2024-07-11 NOTE — PROGRESS NOTES
[facility] Card[facility]  Office Progress Note  Ricki Trotter  1935  [address] [facility] [address] [facility]  [facility] [address]       Visit Date: 07/12/24    PCP: Sofiya Krishnan APRN  [address] [facility] [address]    IDENTIFICATION: A 89 y.o. male  resident of [place], [place]      PROBLEM LIST:   Valvular heart disease/ HFpEF  Echo 7/2020: EF 50-55%, mild concentric LVH, grade 2 diastolic dysfunction, severely increased LA volume, aortic sclerosis without significant stenosis, mild to moderate MR, mild to moderate TR, RVSP 52 mmHg  Echo, 6/28/2023: EF 51-55%, mild AS, max PG 28, mean PG 14, moderate MR, moderate-severe TR, RVSP 60 mmHg  3/24 echo EF>60% mild AV stenosis(peak 22). TR >55  PVC/ AFib  9d Holter 7/2020: avg 66,  bpm, VE 8.3%, SVE 4.6%, 5 runs of NSVT, 20 runs of SVT, no A-fib  Nuclear stress 7/2020: Normal MPS with no evidence of ischemia, frequent PVCs noted at rest actually decreased with increased heart rate, EF 58%, lower study  PVCs adequately controlled following initiation of beta-blocker  3/24 EKG afib  Hypertension  Dyslipidemia  5/24  [id number]  Type 2 diabetes, controlled  11/23 A1c 6.5  5/24 A1c 5.8  COPD with recurrent pneumonia  Former smoker, asbestos exposure and other chemicals at [employer]  Some entrapped lung d/t scarring and bronchiectasis  Normocytic Anemia  BPH  Moderate, pharyngeal dysphagia        CC:   Chief Complaint   Patient presents with    Nonrheumatic aortic valve stenosis       Allergies  Allergies   Allergen Reactions    Penicillins Swelling     Patient has tolerated PO Cefdinir    Sulfa Antibiotics Swelling       Current Medications  Current Outpatient Medications   Medication Instructions    apixaban (ELIQUIS) 5 MG tablet tablet Take 1 tablet by mouth Every 12 (Twelve) Hours as directed for Atrial Fibrillation    Baclofen (LIORESAL) 5 mg, Oral, Every 8 Hours Scheduled    busPIRone (BUSPAR) 5 mg, Oral, 3 Times Daily PRN  "   Bystolic 10 mg, Oral, Daily    dapagliflozin Propanediol (FARXIGA) 10 mg, Oral, Daily    esomeprazole (NEXIUM) 40 mg, Oral, 2 Times Daily Before Meals    fluticasone (FLONASE) 50 MCG/ACT nasal spray 2 sprays, Nasal, Daily    furosemide (LASIX) 20 mg, Oral, 2 Times Daily Before Meals    melatonin 5 mg, Oral, Nightly    montelukast (SINGULAIR) 10 mg, Oral, Every Evening    simvastatin (ZOCOR) 40 mg, Oral, Every Evening    SITagliptin (JANUVIA) 100 mg, Oral, Daily    spironolactone (ALDACTONE) 12.5 mg, Oral, Daily    tamsulosin (FLOMAX) 0.4 mg, Oral, Daily    Toujeo Max SoloStar 16 Units, Subcutaneous, Daily    traZODone (DESYREL) 100 mg, Oral, Nightly        History of Present Illness   Ricki Trotter is a 89 y.o. year old male here for follow up.  Has had some improvement in his weight loss.  Continues with intermittent gravitational lower extremity edema  No new dyspnea or chest symptoms    OBJECTIVE:  Vitals:    07/12/24 1059   BP: 146/62   BP Location: Left arm   Patient Position: Sitting   Cuff Size: Adult   Pulse: 63   SpO2: 96%   Weight: 68.6 kg (151 lb 3.2 oz)   Height: 182.9 cm (72\")     Body mass index is 20.51 kg/m².    Constitutional:       Appearance: Healthy appearance. Not in distress.   Neck:      Vascular: No JVR. JVD normal.   Pulmonary:      Effort: Pulmonary effort is normal.      Breath sounds: Normal breath sounds. No wheezing. No rhonchi. No rales.   Chest:      Chest wall: Not tender to palpatation.   Cardiovascular:      PMI at left midclavicular line. Normal rate. Regular rhythm. Normal S1. Normal S2.       Murmurs: There is a systolic murmur.      No gallop.  No click. No rub.   Pulses:     Intact distal pulses.   Edema:     Peripheral edema present.  Abdominal:      General: Bowel sounds are normal.      Palpations: Abdomen is soft.      Tenderness: There is no abdominal tenderness.   Musculoskeletal: Normal range of motion.         General: No tenderness. Skin:     General: Skin is " warm and dry.   Neurological:      General: No focal deficit present.      Mental Status: Alert and oriented to person, place and time.         Diagnostic Data:  Procedures    Advance Care Planning   ACP discussion was held with the patient during this visit. Patient has an advance directive (not in EMR), copy requested.         ASSESSMENT:   Diagnosis Plan   1. Nonrheumatic aortic valve stenosis        2. Primary hypertension        3. Paroxysmal atrial fibrillation        4. Mixed hyperlipidemia            PLAN:  Aortic stenosis mild asymptomatic continued observation    A-fib persistent rate controlled anticoagulated    Hypertension controlled currently Bystolic Aldactone tamsulosin    Mixed dyslipidemia controlled on statin therapy            Lukasz Singh MD, FACC

## 2024-07-12 ENCOUNTER — OFFICE VISIT (OUTPATIENT)
Dept: CARDIOLOGY | Facility: CLINIC | Age: 89
End: 2024-07-12
Payer: MEDICARE

## 2024-07-12 VITALS
BODY MASS INDEX: 20.48 KG/M2 | WEIGHT: 151.2 LBS | SYSTOLIC BLOOD PRESSURE: 146 MMHG | OXYGEN SATURATION: 96 % | HEART RATE: 63 BPM | HEIGHT: 72 IN | DIASTOLIC BLOOD PRESSURE: 62 MMHG

## 2024-07-12 DIAGNOSIS — I48.0 PAROXYSMAL ATRIAL FIBRILLATION: ICD-10-CM

## 2024-07-12 DIAGNOSIS — I35.0 NONRHEUMATIC AORTIC VALVE STENOSIS: Primary | ICD-10-CM

## 2024-07-12 DIAGNOSIS — E78.2 MIXED HYPERLIPIDEMIA: ICD-10-CM

## 2024-07-12 DIAGNOSIS — I10 PRIMARY HYPERTENSION: ICD-10-CM

## 2024-07-15 LAB
FUNGUS WND CULT: NORMAL
MYCOBACTERIUM SPEC CULT: NORMAL
NIGHT BLUE STAIN TISS: NORMAL

## 2024-07-16 ENCOUNTER — OUTSIDE FACILITY SERVICE (OUTPATIENT)
Dept: FAMILY MEDICINE CLINIC | Facility: CLINIC | Age: 89
End: 2024-07-16
Payer: MEDICARE

## 2024-07-16 RX ORDER — LEVOCETIRIZINE DIHYDROCHLORIDE 5 MG/1
5 TABLET, FILM COATED ORAL EVERY EVENING
Qty: 90 TABLET | Refills: 1 | Status: SHIPPED | OUTPATIENT
Start: 2024-07-16

## 2024-07-16 RX ORDER — TRAZODONE HYDROCHLORIDE 100 MG/1
100 TABLET ORAL NIGHTLY
Qty: 90 TABLET | Refills: 1 | Status: SHIPPED | OUTPATIENT
Start: 2024-07-16

## 2024-07-16 RX ORDER — LEVOCETIRIZINE DIHYDROCHLORIDE 5 MG/1
5 TABLET, FILM COATED ORAL EVERY EVENING
COMMUNITY
End: 2024-07-16 | Stop reason: SDUPTHER

## 2024-07-16 NOTE — TELEPHONE ENCOUNTER
.    Caller: AmadouPaty    Relationship: Emergency Contact    Best call back number: [phone]     Requested Prescriptions:   Requested Prescriptions     Pending Prescriptions Disp Refills    traZODone (DESYREL) 100 MG tablet 30 tablet 0     Sig: Take 1 tablet by mouth Every Night.    levocetirizine (XYZAL) 5 MG tablet       Sig: Take 1 tablet by mouth Every Evening.        Pharmacy where request should be sent: [address] [phone] [facility] [phone]      Last office visit with prescribing clinician: 6/11/2024   Last telemedicine visit with prescribing clinician: Visit date not found   Next office visit with prescribing clinician: 11/25/2024     Additional details provided by patient: OUT OF MEDICATION     Does the patient have less than a 3 day supply:  [x] Yes  [] No    Would you like a call back once the refill request has been completed: [] Yes [x] No    If the office needs to give you a call back, can they leave a voicemail: [] Yes [x] No    Alva Montes De Oca Rep   07/16/24 14:48 EDT

## 2024-07-18 ENCOUNTER — READMISSION MANAGEMENT (OUTPATIENT)
Dept: CALL CENTER | Facility: HOSPITAL | Age: 89
End: 2024-07-18
Payer: MEDICARE

## 2024-07-18 NOTE — OUTREACH NOTE
Prep Survey      Flowsheet Row Responses   [facility] facility patient discharged from? Non-BH   Is LACE score < 7 ? Non-BH Discharge   Eligibility Not Eligible   What are the reasons patient is not eligible? Other  [no recent external discharge noted]   Does the patient have one of the following disease processes/diagnoses(primary or secondary)? Other   Prep survey completed? Yes            Apoorva Larsen Registered Nurse

## 2024-07-25 RX ORDER — LEVOCETIRIZINE DIHYDROCHLORIDE 5 MG/1
5 TABLET, FILM COATED ORAL EVERY EVENING
Qty: 90 TABLET | Refills: 1 | OUTPATIENT
Start: 2024-07-25

## 2024-08-13 RX ORDER — POTASSIUM CHLORIDE 750 MG/1
10 TABLET, EXTENDED RELEASE ORAL DAILY
Qty: 90 TABLET | Refills: 1 | OUTPATIENT
Start: 2024-08-13

## 2024-08-14 ENCOUNTER — LAB (OUTPATIENT)
Dept: FAMILY MEDICINE CLINIC | Facility: CLINIC | Age: 89
End: 2024-08-14
Payer: MEDICARE

## 2024-08-16 ENCOUNTER — OFFICE VISIT (OUTPATIENT)
Dept: CARDIOLOGY | Facility: HOSPITAL | Age: 89
End: 2024-08-16
Payer: MEDICARE

## 2024-08-16 VITALS
HEART RATE: 65 BPM | SYSTOLIC BLOOD PRESSURE: 161 MMHG | BODY MASS INDEX: 19.37 KG/M2 | RESPIRATION RATE: 17 BRPM | OXYGEN SATURATION: 95 % | HEIGHT: 72 IN | WEIGHT: 143 LBS | DIASTOLIC BLOOD PRESSURE: 66 MMHG | TEMPERATURE: 97.3 F

## 2024-08-16 DIAGNOSIS — I50.32 CHRONIC HEART FAILURE WITH PRESERVED EJECTION FRACTION: Primary | ICD-10-CM

## 2024-08-16 DIAGNOSIS — I10 PRIMARY HYPERTENSION: ICD-10-CM

## 2024-08-16 DIAGNOSIS — E78.2 MIXED HYPERLIPIDEMIA: ICD-10-CM

## 2024-08-16 DIAGNOSIS — I48.11 LONGSTANDING PERSISTENT ATRIAL FIBRILLATION: ICD-10-CM

## 2024-08-16 PROCEDURE — 1160F RVW MEDS BY RX/DR IN RCRD: CPT | Performed by: NURSE PRACTITIONER

## 2024-08-16 PROCEDURE — G2211 COMPLEX E/M VISIT ADD ON: HCPCS | Performed by: NURSE PRACTITIONER

## 2024-08-16 PROCEDURE — 99214 OFFICE O/P EST MOD 30 MIN: CPT | Performed by: NURSE PRACTITIONER

## 2024-08-16 PROCEDURE — 1159F MED LIST DOCD IN RCRD: CPT | Performed by: NURSE PRACTITIONER

## 2024-08-19 NOTE — PROGRESS NOTES
Chief Complaint  Follow-up and Congestive Heart Failure    Subjective    History of Present Illness {  Problem List  Visit  Diagnosis   Encounters  Notes  Medications  Labs  Result Review Imaging  Media :23}       History of Present Illness   Ricki Trotter, 88 y.o. male with past medical history significant for COPD, GERD, hypertension, PVCs, acute on chronic diastolic heart failure, hyperlipidemia, CKD, atrial fibrillation, pulmonary hypertension, and type 2 diabetes, who presents to [facility] for ongoing evaluation of his hfpef. Patient was recently hospitalized on 6/2/2024 through 6/4/2024 with chief complaint of COPD exacerbation.  Patient was followed closely by cardiology during recent admission with adjustment in diuretics.  Patient also went ultrasound-guided right-sided thoracentesis on 6/3 with removal of 400 mL of fluid.      Of note, previously patient was recently hospitalized at [facility] with acute on chronic HFpEF, bilateral pleural effusions with right greater than left and pulmonary hypertension. Patient did receive IV Lasix while hospitalized. Echo March 2024 showed EF of 69%, mild AS, moderate TR, markedly elevated RVSP greater than 55 mmHg. Patient underwent a right-sided thoracentesis on 5/13/2024 with 900 mL of fluid removed.    Previous weight 163 and weight now is 136.9lbs. He notes that his energy level has improved and he notes he is eating better. Currently denies cp, dizziness, presyncope, moran or pedal edema.      Echocardiogram 3/8/2024:    Left ventricular systolic function is normal. Calculated left ventricular EF = 69.1% Normal left ventricular cavity size noted. Left ventricular wall thickness is consistent with mild concentric hypertrophy. Elevated left atrial pressure.    The aortic valve exhibits sclerosis. There is moderate calcification of the aortic valve. There is thickening of the aortic valve. The aortic valve  "appears trileaflet. No aortic valve regurgitation is present. Mild aortic valve stenosis is present    The tricuspid valve is normal in structure. Moderate tricuspid valve regurgitation is present. Estimated right ventricular systolic pressure from tricuspid regurgitation is markedly elevated (>55 mmHg).                 Objective     Vital Signs:   Vitals:    08/16/24 1340   BP: 161/66   BP Location: Left arm   Patient Position: Sitting   Cuff Size: Adult   Pulse: 65   Resp: 17   Temp: 97.3 °F (36.3 °C)   TempSrc: Temporal   SpO2: 95%   Weight: 64.9 kg (143 lb)   Height: 182.9 cm (72\")     Body mass index is 19.39 kg/m².  Physical Exam  Vitals and nursing note reviewed.   Constitutional:       Appearance: Normal appearance.   HENT:      Head: Normocephalic.   Eyes:      Pupils: Pupils are equal, round, and reactive to light.   Cardiovascular:      Rate and Rhythm: Normal rate and regular rhythm.      Pulses: Normal pulses.      Heart sounds: Normal heart sounds. No murmur heard.  Pulmonary:      Effort: Pulmonary effort is normal.      Breath sounds: Normal breath sounds.   Abdominal:      General: Bowel sounds are normal.      Palpations: Abdomen is soft.   Musculoskeletal:         General: Normal range of motion.      Cervical back: Normal range of motion.      Right lower leg: No edema.      Left lower leg: No edema.   Skin:     General: Skin is warm and dry.      Capillary Refill: Capillary refill takes less than 2 seconds.   Neurological:      Mental Status: He is alert and oriented to person, place, and time.   Psychiatric:         Mood and Affect: Mood normal.         Thought Content: Thought content normal.              Result Review  Data Reviewed:{ Labs  Result Review  Imaging  Med Tab  Media :23}   CBC (No Diff) (06/04/2024 06:49)  Basic Metabolic Panel (06/04/2024 06:49)  Adult Transthoracic Echo Complete W/ Cont if Necessary Per Protocol (03/08/2024 15:23)          Assessment and Plan {CC Problem " List  Visit Diagnosis  ROS  Review (Popup)  [facility] Maintenance  Quality  BestPractice  Medications  SmartSets  SnapShot Encounters  Media :23}   1. Chronic heart failure with preserved ejection fraction  Decrease Lasix to 20 mg daily; may take extra 20 mg as needed  Continue Aldactone 12.5 mg daily, Farxiga 10 mg daily    Heart failure education today including signs and symptoms, the role of the heart failure center, daily weights, low sodium diet (less than 1500 mg per day), and daily physical activity. Reviewed HF Zones with patient and family.  Patient to continue current medications as previously ordered.   2. Primary hypertension  Stable on  Aldactone, by stolic      3. Other hyperlipidemia  Stable on Zocor  4. Longstanding persistent atrial fibrillation  Continue Eliquis, Bystolic  CHADS-VASc Risk Assessment               5 Total Score    1 CHF    1 Hypertension    2 Age >/= 75    1 DM    Criteria that do not apply:    PRIOR STROKE/TIA/THROMBO    Vascular Disease    Age 65-74    Sex: Female              Repeat BMP before office appointment with Dr. Singh  mid July    Follow Up {Instructions Charge Capture  Follow-up Communications :23}   Return in about 3 months (around 11/16/2024) for Office visit, HF.    Patient was given instructions and counseling regarding his condition or for health maintenance advice. Please see specific information pulled into the AVS if appropriate.  Patient was instructed to call the [facility] Center with any questions, concerns, or worsening symptoms.

## 2024-08-20 ENCOUNTER — TELEPHONE (OUTPATIENT)
Dept: FAMILY MEDICINE CLINIC | Facility: CLINIC | Age: 89
End: 2024-08-20
Payer: MEDICARE

## 2024-08-20 DIAGNOSIS — E11.65 TYPE 2 DIABETES MELLITUS WITH HYPERGLYCEMIA, UNSPECIFIED WHETHER LONG TERM INSULIN USE: Primary | ICD-10-CM

## 2024-08-20 NOTE — TELEPHONE ENCOUNTER
Caller: Paty Tobar    Relationship: Emergency Contact    Best call back number: [phone]     Requested Prescriptions:   Requested Prescriptions      No prescriptions requested or ordered in this encounter   ONE TOUCH TEST STRIPS     Pharmacy where request should be sent: [address] - [phone] [facility] [phone]     Last office visit with prescribing clinician: 6/11/2024   Last telemedicine visit with prescribing clinician: Visit date not found   Next office visit with prescribing clinician: 11/25/2024     Additional details provided by patient: PT IS OUT OF TEST STRIPS.    Does the patient have less than a 3 day supply:  [x] Yes  [] No    Would you like a call back once the refill request has been completed: [] Yes [x] No    If the office needs to give you a call back, can they leave a voicemail: [] Yes [x] No    Alva Aldridge Rep   08/20/24 14:50 EDT

## 2024-09-12 DIAGNOSIS — I50.32 CHRONIC HEART FAILURE WITH PRESERVED EJECTION FRACTION: ICD-10-CM

## 2024-09-12 RX ORDER — DAPAGLIFLOZIN 10 MG/1
10 TABLET, FILM COATED ORAL DAILY
Qty: 30 TABLET | Refills: 3 | Status: SHIPPED | OUTPATIENT
Start: 2024-09-12

## 2024-09-13 DIAGNOSIS — E78.2 MIXED HYPERLIPIDEMIA: ICD-10-CM

## 2024-09-13 RX ORDER — SIMVASTATIN 40 MG
40 TABLET ORAL EVERY EVENING
Qty: 90 TABLET | Refills: 1 | Status: SHIPPED | OUTPATIENT
Start: 2024-09-13

## 2024-09-13 NOTE — TELEPHONE ENCOUNTER
LVM for pt to call us back    Hub to relay:  Refill simvastatin was sent in to mail order pharmacy and a reminder you have an appt 11-25-24 at 3:15

## 2024-10-03 NOTE — ADDENDUM NOTE
Addended by: YONG ANDRADE on: 5/13/2022 03:40 PM     Modules accepted: Orders    
Detail Level: Detailed
Quality 130: Documentation Of Current Medications In The Medical Record: Current Medications Documented
Quality 431: Preventive Care And Screening: Unhealthy Alcohol Use - Screening: Patient not identified as an unhealthy alcohol user when screened for unhealthy alcohol use using a systematic screening method
Quality 226: Preventive Care And Screening: Tobacco Use: Screening And Cessation Intervention: Patient screened for tobacco use and is an ex/non-smoker

## 2024-10-20 DIAGNOSIS — Z79.4 TYPE 2 DIABETES MELLITUS WITHOUT COMPLICATION, WITH LONG-TERM CURRENT USE OF INSULIN: ICD-10-CM

## 2024-10-20 DIAGNOSIS — E11.9 TYPE 2 DIABETES MELLITUS WITHOUT COMPLICATION, WITH LONG-TERM CURRENT USE OF INSULIN: ICD-10-CM

## 2024-10-21 RX ORDER — SITAGLIPTIN 100 MG/1
100 TABLET, FILM COATED ORAL DAILY
Qty: 90 TABLET | Refills: 1 | Status: SHIPPED | OUTPATIENT
Start: 2024-10-21

## 2024-10-23 ENCOUNTER — OFFICE VISIT (OUTPATIENT)
Dept: UROLOGY | Facility: CLINIC | Age: 89
End: 2024-10-23
Payer: MEDICARE

## 2024-10-23 VITALS — HEIGHT: 72 IN | BODY MASS INDEX: 20.13 KG/M2 | WEIGHT: 148.6 LBS

## 2024-10-23 DIAGNOSIS — N40.0 BENIGN PROSTATIC HYPERPLASIA WITHOUT LOWER URINARY TRACT SYMPTOMS: Primary | ICD-10-CM

## 2024-10-23 LAB
BILIRUB BLD-MCNC: NEGATIVE MG/DL
CLARITY, POC: CLEAR
COLOR UR: YELLOW
EXPIRATION DATE: ABNORMAL
GLUCOSE UR STRIP-MCNC: ABNORMAL MG/DL
KETONES UR QL: NEGATIVE
LEUKOCYTE EST, POC: NEGATIVE
Lab: ABNORMAL
NITRITE UR-MCNC: NEGATIVE MG/ML
PH UR: 6 [PH] (ref 5–8)
PROT UR STRIP-MCNC: NEGATIVE MG/DL
RBC # UR STRIP: NEGATIVE /UL
SP GR UR: 1.01 (ref 1–1.03)
UROBILINOGEN UR QL: NORMAL

## 2024-10-23 NOTE — PROGRESS NOTES
[facility] Male Office Visit      Patient Name: Ricki Trotter  : 1935   MRN: [id number]     Chief Complaint:  Lower Urinary Tract Symptoms.   Chief Complaint   Patient presents with    Benign prostatic hyperplasia without lower urinary tract sy        Referring Provider: No ref. provider found    History of Present Illness: Mr. Trotter is a 89 y.o. male with history of lower urinary tract symptoms.  He was seen previously due to urinary retention in the hospital.  He was able to pass his TOV and is here for follow up after 6 months.  He reports he is voiding well.  No dysuria or gross hematuria.  He is on tamsulosin and doing well.        PVR = 0 mL     Subjective      Review of System:   Review of Systems   I have reviewed the ROS documented by my clinical staff, I have updated appropriately and I agree. Dakota Moore MD    Past Medical History:  Past Medical History:   Diagnosis Date    Allergic rhinitis     Amnesia     Arrhythmia     Atrial fibrillation     Basal cell carcinoma     Benign prostatic hyperplasia     CHF (congestive heart failure) 3-    Chronic kidney disease, stage 3b     COPD (chronic obstructive pulmonary disease)     Diabetes mellitus N/A    Drug dependency     Heart murmur     Hyperlipidemia     Hypertension     Hyperthyroidism     Iron deficiency     Long term (current) use of insulin     Melanoma     Nephrosclerosis        Past Surgical History:  Past Surgical History:   Procedure Laterality Date    CATARACT EXTRACTION Bilateral     COLONOSCOPY      PROSTHODONTIC PROCEDURE         Medications:    Current Outpatient Medications:     apixaban (ELIQUIS) 5 MG tablet tablet, Take 1 tablet by mouth Every 12 (Twelve) Hours as directed for Atrial Fibrillation, Disp: 60 tablet, Rfl: 5    Baclofen (LIORESAL) 5 MG tablet, Take 1 tablet by mouth Every 8 (Eight) Hours., Disp: , Rfl:     busPIRone (BUSPAR) 5 MG tablet, Take 1 tablet by mouth 3 (Three) Times a Day As Needed  (anxiety)., Disp: 270 tablet, Rfl: 1    Bystolic 10 MG tablet, Take 1 tablet by mouth Daily., Disp: 90 tablet, Rfl: 1    dapagliflozin Propanediol (Farxiga) 10 MG tablet, Take 10 mg by mouth Daily., Disp: 30 tablet, Rfl: 3    esomeprazole (nexIUM) 40 MG capsule, Take 1 capsule by mouth 2 (Two) Times a Day Before Meals., Disp: 60 capsule, Rfl: 3    fluticasone (FLONASE) 50 MCG/ACT nasal spray, 2 sprays into the nostril(s) as directed by provider Daily., Disp: 16 g, Rfl: 11    furosemide (LASIX) 20 MG tablet, Take 1 tablet by mouth 2 (Two) Times a Day Before Meals., Disp: 60 tablet, Rfl: 2    glucose blood test strip, 1 each by Other route 4 (Four) Times a Day. Use as instructed, Disp: 200 each, Rfl: 12    Insulin Glargine, 2 Unit Dial, (Toujeo Max SoloStar) 300 UNIT/ML solution pen-injector injection, Inject 16 Units under the skin into the appropriate area as directed Daily., Disp: 6 mL, Rfl: 1    Januvia 100 MG tablet, TAKE ONE TABLET BY MOUTH EVERY DAY, Disp: 90 tablet, Rfl: 1    levocetirizine (XYZAL) 5 MG tablet, Take 1 tablet by mouth Every Evening., Disp: 90 tablet, Rfl: 1    melatonin 5 MG tablet tablet, Take 1 tablet by mouth Every Night., Disp: 30 tablet, Rfl: 0    montelukast (SINGULAIR) 10 MG tablet, Take 1 tablet by mouth Every Evening., Disp: 90 tablet, Rfl: 1    simvastatin (ZOCOR) 40 MG tablet, TAKE 1 TABLET BY MOUTH EVERY EVENING, Disp: 90 tablet, Rfl: 1    spironolactone (ALDACTONE) 25 MG tablet, Take 0.5 tablets by mouth Daily., Disp: 30 tablet, Rfl: 3    tamsulosin (FLOMAX) 0.4 MG capsule 24 hr capsule, Take 1 capsule by mouth Daily., Disp: 30 capsule, Rfl: 11    traZODone (DESYREL) 100 MG tablet, Take 1 tablet by mouth Every Night., Disp: 90 tablet, Rfl: 1    Allergies:  Allergies   Allergen Reactions    Penicillins Swelling     Patient has tolerated PO Cefdinir    Sulfa Antibiotics Swelling       Social History:  Social History     Socioeconomic History    Marital status:    Tobacco Use     Smoking status: Former     Current packs/day: 0.00     Average packs/day: 0.5 packs/day for 10.0 years (5.0 ttl pk-yrs)     Types: Cigarettes     Start date: 1953     Quit date: 1962     Years since quittin.8     Passive exposure: Past    Smokeless tobacco: Never   Vaping Use    Vaping status: Never Used   Substance and Sexual Activity    Alcohol use: Yes     Alcohol/week: 2.0 standard drinks of alcohol     Types: 2 Cans of beer per week    Drug use: No    Sexual activity: Not Currently     Partners: Female     Birth control/protection: Hysterectomy       Family History:  Family History   Problem Relation Age of Onset    Diabetes Mother     Stroke Mother     Hypertension Mother     Breast cancer Sister        IPSS Questionnaire (AUA-7):  Over the past month…    1)  Incomplete Emptying  How often have you had a sensation of not emptying your bladder?  0 - Not at all   2)  Frequency  How often have you had to urinate less than every two hours? 3 - About half the time   3)  Intermittency  How often have you found you stopped and started again several times when you urinated?  4 - More than half the time   4) Urgency  How often have you found it difficult to postpone urination?  0 - Not at all   5) Weak Stream  How often have you had a weak urinary stream?  0 - Not at all   6) Straining  How often have you had to push or strain to begin urination?  0 - Not at all   7) Nocturia  How many times did you typically get up at night to urinate?  1 - 1 time   Total Score:  8   The International Prostate Symptom Score (IPSS) is used to screen, diagnose, track symptoms of benign prostatic hyperplasia (BPH).    0-7 pts (Mild Symptoms)  / 8-19 pts (Moderate) / 20-35 (Severe)    Quality of life due to urinary symptoms:  If you were to spend the rest of your life with your urinary condition the way it is now, how would you feel about that? 0-Delighted   Urine Leakage (Incontinence) 0-No Leakage       Post void  "residual bladder scan:   0 mL     Objective     Physical Exam:   Vital Signs:   Vitals:    10/23/24 1553   Weight: 67.4 kg (148 lb 9.6 oz)   Height: 182.9 cm (72\")     Body mass index is 20.15 kg/m².     Physical Exam  Vitals and nursing note reviewed.   Constitutional:       General: He is awake. He is not in acute distress.     Appearance: Normal appearance. He is well-developed.   HENT:      Head: Normocephalic and atraumatic.      Right Ear: External ear normal.      Left Ear: External ear normal.      Nose: Nose normal.   Eyes:      Conjunctiva/sclera: Conjunctivae normal.   Pulmonary:      Effort: Pulmonary effort is normal.   Abdominal:      General: There is no distension.      Palpations: Abdomen is soft. There is no mass.      Tenderness: There is no abdominal tenderness. There is no right CVA tenderness, left CVA tenderness, guarding or rebound.      Hernia: No hernia is present. There is no hernia in the left inguinal area or right inguinal area.   Genitourinary:     Pubic Area: No rash.       Rectum: No mass or tenderness. Normal anal tone.   Musculoskeletal:      Cervical back: Normal range of motion.   Lymphadenopathy:      Lower Body: No right inguinal adenopathy. No left inguinal adenopathy.   Skin:     General: Skin is warm.   Neurological:      General: No focal deficit present.      Mental Status: He is alert and oriented to person, place, and time.   Psychiatric:         Behavior: Behavior normal. Behavior is cooperative.         Labs:   Lab Results   Component Value Date    PSA 2.6 03/27/2024       Brief Urine Lab Results  (Last result in the past 365 days)        Color   Clarity   Blood   Leuk Est   Nitrite   Protein   CREAT   Urine HCG        05/27/24 1647 Yellow   Clear   Negative   Small (1+)   Negative   Negative                        Lab Results   Component Value Date    GLUCOSE 112 (H) 06/04/2024    CALCIUM 8.5 (L) 06/04/2024     06/04/2024    K 3.6 06/04/2024    CO2 27.0 " 06/04/2024    CL 98 06/04/2024    BUN 28 (H) 06/04/2024    CREATININE 1.30 (H) 06/04/2024    BCR 21.5 06/04/2024    ANIONGAP 14.0 06/04/2024       Lab Results   Component Value Date    WBC 11.69 (H) 06/04/2024    HGB 10.3 (L) 06/04/2024    HCT 31.7 (L) 06/04/2024    MCV 88.3 06/04/2024     06/04/2024       Images:   No Images in the past 120 days found..    Measures:   Tobacco:   Ricki Trotter  reports that he quit smoking about 62 years ago. His smoking use included cigarettes. He started smoking about 71 years ago. He has a 5 pack-year smoking history. He has been exposed to tobacco smoke. He has never used smokeless tobacco.       Urine Incontinence: Patient reports that he is not currently experiencing any symptoms of urinary incontinence.         Assessment / Plan      Assessment:  Mr. Trotter is a 89 y.o. male who presented today with lower urinary tract symptoms.  He has done really well since his last visit.  We will continue tamsulosin.  I will see him back in 1 year.       Diagnoses and all orders for this visit:    1. Benign prostatic hyperplasia without lower urinary tract symptoms (Primary)  -     POC Urinalysis Dipstick, Automated        Patient Education:       Follow Up:   Return in about 1 year (around 10/23/2025) for Recheck.    I spent approximately 30 minutes providing clinical care for this patient; including review of patient's chart and provider documentation, face to face time spent with patient in examination room (obtaining history, performing physical exam, discussing diagnosis and management options), placing orders, and completing patient documentation.     Dakota Moore MD  [facility] [place]

## 2024-11-04 ENCOUNTER — OFFICE VISIT (OUTPATIENT)
Dept: FAMILY MEDICINE CLINIC | Facility: CLINIC | Age: 89
End: 2024-11-04
Payer: MEDICARE

## 2024-11-04 VITALS
OXYGEN SATURATION: 97 % | HEIGHT: 72 IN | HEART RATE: 81 BPM | BODY MASS INDEX: 19.64 KG/M2 | WEIGHT: 145 LBS | SYSTOLIC BLOOD PRESSURE: 122 MMHG | DIASTOLIC BLOOD PRESSURE: 68 MMHG

## 2024-11-04 DIAGNOSIS — J01.00 ACUTE NON-RECURRENT MAXILLARY SINUSITIS: Primary | ICD-10-CM

## 2024-11-04 DIAGNOSIS — J30.1 ALLERGIC RHINITIS DUE TO POLLEN, UNSPECIFIED SEASONALITY: ICD-10-CM

## 2024-11-04 DIAGNOSIS — F41.1 GENERALIZED ANXIETY DISORDER: Chronic | ICD-10-CM

## 2024-11-04 PROCEDURE — 90662 IIV NO PRSV INCREASED AG IM: CPT | Performed by: NURSE PRACTITIONER

## 2024-11-04 PROCEDURE — 1160F RVW MEDS BY RX/DR IN RCRD: CPT | Performed by: NURSE PRACTITIONER

## 2024-11-04 PROCEDURE — 99214 OFFICE O/P EST MOD 30 MIN: CPT | Performed by: NURSE PRACTITIONER

## 2024-11-04 PROCEDURE — G0008 ADMIN INFLUENZA VIRUS VAC: HCPCS | Performed by: NURSE PRACTITIONER

## 2024-11-04 PROCEDURE — 1159F MED LIST DOCD IN RCRD: CPT | Performed by: NURSE PRACTITIONER

## 2024-11-04 PROCEDURE — 1126F AMNT PAIN NOTED NONE PRSNT: CPT | Performed by: NURSE PRACTITIONER

## 2024-11-04 RX ORDER — BUSPIRONE HYDROCHLORIDE 7.5 MG/1
7.5 TABLET ORAL 3 TIMES DAILY
Qty: 90 TABLET | Refills: 5 | Status: SHIPPED | OUTPATIENT
Start: 2024-11-04

## 2024-11-04 RX ORDER — GUAIFENESIN 600 MG/1
600 TABLET, EXTENDED RELEASE ORAL 2 TIMES DAILY
Qty: 30 TABLET | Refills: 0 | Status: SHIPPED | OUTPATIENT
Start: 2024-11-04

## 2024-11-04 RX ORDER — LEVOCETIRIZINE DIHYDROCHLORIDE 5 MG/1
5 TABLET, FILM COATED ORAL EVERY EVENING
Qty: 90 TABLET | Refills: 1 | Status: SHIPPED | OUTPATIENT
Start: 2024-11-04

## 2024-11-04 RX ORDER — DOXYCYCLINE 100 MG/1
100 CAPSULE ORAL 2 TIMES DAILY
Qty: 20 CAPSULE | Refills: 0 | Status: SHIPPED | OUTPATIENT
Start: 2024-11-04

## 2024-11-04 NOTE — PROGRESS NOTES
"Chief Complaint  Cough and Nasal Congestion (Been having symptoms for at least 2 weeks. )    Subjective          Ricki Trotter presents to [facility]  History of Present Illness  Pt has had cough and congestion x 2 weeks. He denies fever, chills, or myalgias. He denies shortness of breath. He has had anxiety intermittently and his daughter feels that the Buspar needs to be increased. He needs a refill on his Xyzal for allergies.   Cough  Pertinent negatives include no chest pain, fever or shortness of breath.       History of Present Illness      Objective   Vital Signs:   /68   Pulse 81   Ht 182.9 cm (72\")   Wt 65.8 kg (145 lb)   SpO2 97%   BMI 19.67 kg/m²     Body mass index is 19.67 kg/m².    Review of Systems   Constitutional:  Negative for fatigue and fever.   HENT:  Positive for congestion.    Respiratory:  Positive for cough. Negative for shortness of breath.    Cardiovascular:  Negative for chest pain, palpitations and leg swelling.   Neurological:  Negative for syncope.   Psychiatric/Behavioral:  The patient is nervous/anxious.           Current Outpatient Medications:     apixaban (ELIQUIS) 5 MG tablet tablet, Take 1 tablet by mouth Every 12 (Twelve) Hours as directed for Atrial Fibrillation, Disp: 60 tablet, Rfl: 5    Baclofen (LIORESAL) 5 MG tablet, Take 1 tablet by mouth Every 8 (Eight) Hours., Disp: , Rfl:     busPIRone (BUSPAR) 7.5 MG tablet, Take 1 tablet by mouth 3 (Three) Times a Day., Disp: 90 tablet, Rfl: 5    Bystolic 10 MG tablet, Take 1 tablet by mouth Daily., Disp: 90 tablet, Rfl: 1    dapagliflozin Propanediol (Farxiga) 10 MG tablet, Take 10 mg by mouth Daily., Disp: 30 tablet, Rfl: 3    esomeprazole (nexIUM) 40 MG capsule, Take 1 capsule by mouth 2 (Two) Times a Day Before Meals., Disp: 60 capsule, Rfl: 3    fluticasone (FLONASE) 50 MCG/ACT nasal spray, 2 sprays into the nostril(s) as directed by provider Daily., Disp: 16 g, Rfl: 11    " furosemide (LASIX) 20 MG tablet, Take 1 tablet by mouth 2 (Two) Times a Day Before Meals., Disp: 60 tablet, Rfl: 2    glucose blood test strip, 1 each by Other route 4 (Four) Times a Day. Use as instructed, Disp: 200 each, Rfl: 12    Insulin Glargine, 2 Unit Dial, (Toujeo Max SoloStar) 300 UNIT/ML solution pen-injector injection, Inject 16 Units under the skin into the appropriate area as directed Daily., Disp: 6 mL, Rfl: 1    Januvia 100 MG tablet, TAKE ONE TABLET BY MOUTH EVERY DAY, Disp: 90 tablet, Rfl: 1    levocetirizine (XYZAL) 5 MG tablet, Take 1 tablet by mouth Every Evening., Disp: 90 tablet, Rfl: 1    melatonin 5 MG tablet tablet, Take 1 tablet by mouth Every Night., Disp: 30 tablet, Rfl: 0    montelukast (SINGULAIR) 10 MG tablet, Take 1 tablet by mouth Every Evening., Disp: 90 tablet, Rfl: 1    simvastatin (ZOCOR) 40 MG tablet, TAKE 1 TABLET BY MOUTH EVERY EVENING, Disp: 90 tablet, Rfl: 1    spironolactone (ALDACTONE) 25 MG tablet, Take 0.5 tablets by mouth Daily., Disp: 30 tablet, Rfl: 3    tamsulosin (FLOMAX) 0.4 MG capsule 24 hr capsule, Take 1 capsule by mouth Daily., Disp: 30 capsule, Rfl: 11    traZODone (DESYREL) 100 MG tablet, Take 1 tablet by mouth Every Night., Disp: 90 tablet, Rfl: 1    doxycycline (VIBRAMYCIN) 100 MG capsule, Take 1 capsule by mouth 2 (Two) Times a Day., Disp: 20 capsule, Rfl: 0    guaiFENesin (Mucinex) 600 MG 12 hr tablet, Take 1 tablet by mouth 2 (Two) Times a Day., Disp: 30 tablet, Rfl: 0      Allergies: Penicillins and Sulfa antibiotics    Physical Exam  Constitutional:       Appearance: Normal appearance.   HENT:      Head: Normocephalic.   Eyes:      Conjunctiva/sclera: Conjunctivae normal.      Pupils: Pupils are equal, round, and reactive to light.   Cardiovascular:      Rate and Rhythm: Normal rate and regular rhythm.      Heart sounds: Normal heart sounds.   Pulmonary:      Effort: Pulmonary effort is normal.      Breath sounds: Normal breath sounds.   Abdominal:       Tenderness: There is no abdominal tenderness.   Musculoskeletal:         General: Normal range of motion.   Skin:     General: Skin is warm and dry.      Capillary Refill: Capillary refill takes less than 2 seconds.   Neurological:      General: No focal deficit present.      Mental Status: He is alert and oriented to person, place, and time.   Psychiatric:         Mood and Affect: Mood normal.         Behavior: Behavior normal.         Thought Content: Thought content normal.         Judgment: Judgment normal.          Physical Exam         Result Review :                Results            Assessment and Plan    Diagnoses and all orders for this visit:    1. Acute non-recurrent maxillary sinusitis (Primary)  Comments:  Finish antibiotics. Mucinex and continue allergy meds. RTC for worsened sx and if not improving in 1 week.  Orders:  -     doxycycline (VIBRAMYCIN) 100 MG capsule; Take 1 capsule by mouth 2 (Two) Times a Day.  Dispense: 20 capsule; Refill: 0  -     guaiFENesin (Mucinex) 600 MG 12 hr tablet; Take 1 tablet by mouth 2 (Two) Times a Day.  Dispense: 30 tablet; Refill: 0    2. Generalized anxiety disorder  Comments:  Raise Buspar to 7.5mg. RTC if not improving in 1 month.  Orders:  -     busPIRone (BUSPAR) 7.5 MG tablet; Take 1 tablet by mouth 3 (Three) Times a Day.  Dispense: 90 tablet; Refill: 5    3. Allergic rhinitis due to pollen, unspecified seasonality  Comments:  Continue current meds.  Orders:  -     levocetirizine (XYZAL) 5 MG tablet; Take 1 tablet by mouth Every Evening.  Dispense: 90 tablet; Refill: 1    Other orders  -     Fluzone High-Dose 65+yrs ([id number])                  Follow Up   Return in about 1 week (around 11/11/2024) for if not improving or sooner if symptoms worsen.  Patient was given instructions and counseling regarding his condition or for health maintenance advice. Please see specific information pulled into the AVS if appropriate.     Stacey Hanley, LINDA

## 2024-11-05 DIAGNOSIS — I10 PRIMARY HYPERTENSION: ICD-10-CM

## 2024-11-05 RX ORDER — NEBIVOLOL HYDROCHLORIDE 10 MG/1
10 TABLET ORAL DAILY
Qty: 90 TABLET | Refills: 1 | Status: SHIPPED | OUTPATIENT
Start: 2024-11-05

## 2024-11-19 ENCOUNTER — HOSPITAL ENCOUNTER (OUTPATIENT)
Dept: GENERAL RADIOLOGY | Facility: HOSPITAL | Age: 89
Discharge: HOME | End: 2024-11-19
Admitting: NURSE PRACTITIONER
Payer: MEDICARE

## 2024-11-19 ENCOUNTER — OFFICE VISIT (OUTPATIENT)
Dept: CARDIOLOGY | Facility: HOSPITAL | Age: 89
End: 2024-11-19
Payer: MEDICARE

## 2024-11-19 VITALS
SYSTOLIC BLOOD PRESSURE: 146 MMHG | HEIGHT: 72 IN | HEART RATE: 63 BPM | WEIGHT: 142.56 LBS | DIASTOLIC BLOOD PRESSURE: 67 MMHG | TEMPERATURE: 98.3 F | BODY MASS INDEX: 19.31 KG/M2 | OXYGEN SATURATION: 94 % | RESPIRATION RATE: 17 BRPM

## 2024-11-19 DIAGNOSIS — I10 PRIMARY HYPERTENSION: ICD-10-CM

## 2024-11-19 DIAGNOSIS — I50.33 ACUTE ON CHRONIC HEART FAILURE WITH PRESERVED EJECTION FRACTION (HFPEF): Primary | ICD-10-CM

## 2024-11-19 DIAGNOSIS — R06.09 DOE (DYSPNEA ON EXERTION): ICD-10-CM

## 2024-11-19 DIAGNOSIS — E78.49 OTHER HYPERLIPIDEMIA: ICD-10-CM

## 2024-11-19 DIAGNOSIS — R05.1 ACUTE COUGH: ICD-10-CM

## 2024-11-19 PROCEDURE — 71046 X-RAY EXAM CHEST 2 VIEWS: CPT

## 2024-11-19 NOTE — PROGRESS NOTES
Chief Complaint  Congestive Heart Failure and Follow-up    Subjective    History of Present Illness {  Problem List  Visit  Diagnosis   Encounters  Notes  Medications  Labs  Result Review Imaging  Media :23}       History of Present Illness   Ricki Trotter, 88 y.o. male with past medical history significant for COPD, GERD, hypertension, PVCs, acute on chronic diastolic heart failure, hyperlipidemia, CKD, atrial fibrillation, pulmonary hypertension, and type 2 diabetes, who presents to [facility] for ongoing evaluation of his hfpef. Patient was recently hospitalized on 6/2/2024 through 6/4/2024 with chief complaint of COPD exacerbation.  Patient was followed closely by cardiology during admission with adjustment in diuretics.  Patient also went ultrasound-guided right-sided thoracentesis on 6/3 with removal of 400 mL of fluid.      Of note, previously patient was recently hospitalized at [facility] with acute on chronic HFpEF, bilateral pleural effusions with right greater than left and pulmonary hypertension. Patient did receive IV Lasix while hospitalized. Echo March 2024 showed EF of 69%, mild AS, moderate TR, markedly elevated RVSP greater than 55 mmHg. Patient underwent a right-sided thoracentesis on 5/13/2024 with 900 mL of fluid removed.    Previous weight 163 and weight now is 142.9lbs He notes that his energy level has improved and he notes he is eating better. Currently denies cp, dizziness, presyncope, or pedal edema.  Recently treated for sinus infection and still notes a loose cough with associated PND. Also reports intermittent RAI.     Echocardiogram 3/8/2024:    Left ventricular systolic function is normal. Calculated left ventricular EF = 69.1% Normal left ventricular cavity size noted. Left ventricular wall thickness is consistent with mild concentric hypertrophy. Elevated left atrial pressure.    The aortic valve exhibits sclerosis. There is  "moderate calcification of the aortic valve. There is thickening of the aortic valve. The aortic valve appears trileaflet. No aortic valve regurgitation is present. Mild aortic valve stenosis is present    The tricuspid valve is normal in structure. Moderate tricuspid valve regurgitation is present. Estimated right ventricular systolic pressure from tricuspid regurgitation is markedly elevated (>55 mmHg).                 Objective     Vital Signs:   Vitals:    11/19/24 1418   BP: 146/67   BP Location: Left arm   Patient Position: Sitting   Cuff Size: Adult   Pulse: 63   Resp: 17   Temp: 98.3 °F (36.8 °C)   TempSrc: Temporal   SpO2: 94%   Weight: 64.7 kg (142 lb 9 oz)   Height: 182.9 cm (72\")     Body mass index is 19.33 kg/m².  Physical Exam  Vitals and nursing note reviewed.   Constitutional:       Appearance: Normal appearance.   HENT:      Head: Normocephalic.   Eyes:      Pupils: Pupils are equal, round, and reactive to light.   Cardiovascular:      Rate and Rhythm: Normal rate and regular rhythm.      Pulses: Normal pulses.      Heart sounds: Normal heart sounds. No murmur heard.  Pulmonary:      Effort: Pulmonary effort is normal.      Breath sounds: Normal breath sounds.   Abdominal:      General: Bowel sounds are normal.      Palpations: Abdomen is soft.   Musculoskeletal:         General: Normal range of motion.      Cervical back: Normal range of motion.      Right lower leg: No edema.      Left lower leg: No edema.   Skin:     General: Skin is warm and dry.      Capillary Refill: Capillary refill takes less than 2 seconds.   Neurological:      Mental Status: He is alert and oriented to person, place, and time.   Psychiatric:         Mood and Affect: Mood normal.         Thought Content: Thought content normal.              Result Review  Data Reviewed:{ Labs  Result Review  Imaging  Med Tab  Media :23}   CBC (No Diff) (06/04/2024 06:49)  Basic Metabolic Panel (06/04/2024 06:49)  Adult Transthoracic " Echo Complete W/ Cont if Necessary Per Protocol (03/08/2024 15:23)          Assessment and Plan {CC Problem List  Visit Diagnosis  ROS  Review (Popup)  Health Maintenance  Quality  BestPractice  Medications  SmartSets  SnapShot Encounters  Media :23}   1. Acute on Chronic heart failure with preserved ejection fraction  Lasix 20 mg bid   Continue Aldactone 12.5 mg daily, Farxiga 10 mg daily    Heart failure education today including signs and symptoms, the role of the heart failure center, daily weights, low sodium diet (less than 1500 mg per day), and daily physical activity. Reviewed HF Zones with patient and family.  Patient to continue current medications as previously ordered.   2. Primary hypertension  Stable on  Aldactone, by stolic      3. Other hyperlipidemia  Stable on Zocor  4. RAI  Pa and lateral chest xray today   5. Other cough   Pa and lateral chest xray today   Follow Up {Instructions Charge Capture  Follow-up Communications :23}   Return in about 2 months (around 1/29/2025) for Office visit, HF.    Patient was given instructions and counseling regarding his condition or for health maintenance advice. Please see specific information pulled into the AVS if appropriate.  Patient was instructed to call the [facility] with any questions, concerns, or worsening symptoms.

## 2024-11-20 RX ORDER — FUROSEMIDE 20 MG/1
20 TABLET ORAL
Qty: 180 TABLET | Refills: 3 | Status: SHIPPED | OUTPATIENT
Start: 2024-11-20

## 2024-11-20 RX ORDER — SPIRONOLACTONE 25 MG/1
12.5 TABLET ORAL DAILY
Qty: 90 TABLET | Refills: 3 | Status: SHIPPED | OUTPATIENT
Start: 2024-11-20 | End: 2024-11-25 | Stop reason: SDUPTHER

## 2024-11-22 ENCOUNTER — TELEPHONE (OUTPATIENT)
Dept: FAMILY MEDICINE CLINIC | Facility: CLINIC | Age: 89
End: 2024-11-22
Payer: MEDICARE

## 2024-11-22 NOTE — TELEPHONE ENCOUNTER
Caller: KEYSHAWN WITH [facility]    Relationship: Other    Best call back number: [phone]     What form or medical record are you requesting: MEDICAL RECORDS - LAST OFFICE NOTES AND CURRENT MEDICATION LIST    Who is requesting this form or medical record from you: [facility]    How would you like to receive the form or medical records (pick-up, mail, fax): FAX  If fax, what is the fax number: [phone]    Timeframe paperwork needed: AS SOON AS POSSIBLE    Additional notes: KEYSHAWN STATES THE REQUEST WAS FAXED ON 10/31/24. THEY CONFIRM RECEIPT ON 11/19/24. SHE IS CALLING TO CHECK ON THE STATUS OF THIS REQUEST

## 2024-11-25 ENCOUNTER — OFFICE VISIT (OUTPATIENT)
Dept: FAMILY MEDICINE CLINIC | Facility: CLINIC | Age: 89
End: 2024-11-25
Payer: MEDICARE

## 2024-11-25 ENCOUNTER — TELEPHONE (OUTPATIENT)
Dept: CARDIOLOGY | Facility: HOSPITAL | Age: 89
End: 2024-11-25
Payer: MEDICARE

## 2024-11-25 VITALS
HEART RATE: 72 BPM | SYSTOLIC BLOOD PRESSURE: 126 MMHG | DIASTOLIC BLOOD PRESSURE: 76 MMHG | BODY MASS INDEX: 19.5 KG/M2 | WEIGHT: 144 LBS | OXYGEN SATURATION: 99 % | HEIGHT: 72 IN

## 2024-11-25 DIAGNOSIS — E78.2 MIXED HYPERLIPIDEMIA: ICD-10-CM

## 2024-11-25 DIAGNOSIS — Z79.4 TYPE 2 DIABETES MELLITUS WITHOUT COMPLICATION, WITH LONG-TERM CURRENT USE OF INSULIN: ICD-10-CM

## 2024-11-25 DIAGNOSIS — E11.9 TYPE 2 DIABETES MELLITUS WITHOUT COMPLICATION, WITH LONG-TERM CURRENT USE OF INSULIN: ICD-10-CM

## 2024-11-25 DIAGNOSIS — F41.1 GENERALIZED ANXIETY DISORDER: ICD-10-CM

## 2024-11-25 DIAGNOSIS — I10 PRIMARY HYPERTENSION: ICD-10-CM

## 2024-11-25 DIAGNOSIS — J90 PLEURAL EFFUSION: ICD-10-CM

## 2024-11-25 DIAGNOSIS — R13.10 SWALLOWING DYSFUNCTION: ICD-10-CM

## 2024-11-25 DIAGNOSIS — Z00.00 MEDICARE ANNUAL WELLNESS VISIT, SUBSEQUENT: Primary | ICD-10-CM

## 2024-11-25 PROCEDURE — G0439 PPPS, SUBSEQ VISIT: HCPCS | Performed by: NURSE PRACTITIONER

## 2024-11-25 PROCEDURE — 99213 OFFICE O/P EST LOW 20 MIN: CPT | Performed by: NURSE PRACTITIONER

## 2024-11-25 PROCEDURE — 1126F AMNT PAIN NOTED NONE PRSNT: CPT | Performed by: NURSE PRACTITIONER

## 2024-11-25 PROCEDURE — 1160F RVW MEDS BY RX/DR IN RCRD: CPT | Performed by: NURSE PRACTITIONER

## 2024-11-25 PROCEDURE — 1170F FXNL STATUS ASSESSED: CPT | Performed by: NURSE PRACTITIONER

## 2024-11-25 PROCEDURE — 1159F MED LIST DOCD IN RCRD: CPT | Performed by: NURSE PRACTITIONER

## 2024-11-25 RX ORDER — SPIRONOLACTONE 25 MG/1
25 TABLET ORAL DAILY
Qty: 90 TABLET | Refills: 3 | Status: SHIPPED | OUTPATIENT
Start: 2024-11-25

## 2024-11-25 RX ORDER — FERROUS SULFATE 325(65) MG
1 TABLET ORAL DAILY
COMMUNITY

## 2024-11-25 RX ORDER — PEN NEEDLE, DIABETIC 32GX 5/32"
NEEDLE, DISPOSABLE MISCELLANEOUS
COMMUNITY
Start: 2024-11-22

## 2024-11-25 NOTE — TELEPHONE ENCOUNTER
Caller: Paty Tobar    Relationship: Emergency Contact    Best call back number: [phone]      Do you know the name of the person who called: HARSH TAYLOR    What was the call regarding: PT DAUGHTER WAS RETURNING A CALL ABOUT HER FATHERS X-RAY. HUB WAS UNABLE TO WARM TRANSFER TO OFFICE.

## 2024-11-25 NOTE — PROGRESS NOTES
Subjective   The ABCs of the Annual Wellness Visit  Medicare Wellness Visit      Ricki Trotter is a 89 y.o. patient who presents for a Medicare Wellness Visit.    The following portions of the patient's history were reviewed and   updated as appropriate: allergies, current medications, past family history, past medical history, past social history, past surgical history, and problem list.    Compared to one year ago, the patient's physical   health is better.  Compared to one year ago, the patient's mental   health is the same.    Recent Hospitalizations:  This patient has had a [facility] admission record on file within the last 365 days.    Current Medical Providers:  Patient Care Team:  Sofiya Krishnan APRN as PCP - General (Family Medicine)  Delgado Larios MD as Consulting Physician (Medical Oncology)  Demetria Dasilva APRN as Nurse Practitioner (Pulmonary Disease)  Lukasz Singh MD as Consulting Physician (Cardiology)  Gregorio Herbert MD as Consulting Physician (Pulmonary Disease)    Outpatient Medications Prior to Visit   Medication Sig Dispense Refill    apixaban (ELIQUIS) 5 MG tablet tablet Take 1 tablet by mouth Every 12 (Twelve) Hours as directed for Atrial Fibrillation 60 tablet 5    Baclofen (LIORESAL) 5 MG tablet Take 1 tablet by mouth Every 8 (Eight) Hours.      BD Pen Needle Rasheeda 2nd Gen 32G X 4 MM misc       busPIRone (BUSPAR) 7.5 MG tablet Take 1 tablet by mouth 3 (Three) Times a Day. 90 tablet 5    Bystolic 10 MG tablet Take 1 tablet by mouth Daily. 90 tablet 1    dapagliflozin Propanediol (Farxiga) 10 MG tablet Take 10 mg by mouth Daily. 30 tablet 3    esomeprazole (nexIUM) 40 MG capsule Take 1 capsule by mouth 2 (Two) Times a Day Before Meals. 60 capsule 3    ferrous sulfate 325 (65 FE) MG tablet Take 1 tablet by mouth Daily.      fluticasone (FLONASE) 50 MCG/ACT nasal spray 2 sprays into the nostril(s) as directed by provider Daily. 16 g 11    furosemide  (LASIX) 20 MG tablet Take 1 tablet by mouth 2 (Two) Times a Day Before Meals. 180 tablet 3    glucose blood test strip 1 each by Other route 4 (Four) Times a Day. Use as instructed 200 each 12    Insulin Glargine, 2 Unit Dial, (Toujeo Max SoloStar) 300 UNIT/ML solution pen-injector injection Inject 16 Units under the skin into the appropriate area as directed Daily. 6 mL 1    Januvia 100 MG tablet TAKE ONE TABLET BY MOUTH EVERY DAY 90 tablet 1    levocetirizine (XYZAL) 5 MG tablet Take 1 tablet by mouth Every Evening. 90 tablet 1    melatonin 5 MG tablet tablet Take 1 tablet by mouth Every Night. 30 tablet 0    montelukast (SINGULAIR) 10 MG tablet Take 1 tablet by mouth Every Evening. 90 tablet 1    simvastatin (ZOCOR) 40 MG tablet TAKE 1 TABLET BY MOUTH EVERY EVENING 90 tablet 1    spironolactone (ALDACTONE) 25 MG tablet Take 1 tablet by mouth Daily. 90 tablet 3    tamsulosin (FLOMAX) 0.4 MG capsule 24 hr capsule Take 1 capsule by mouth Daily. 30 capsule 11    traZODone (DESYREL) 100 MG tablet Take 1 tablet by mouth Every Night. 90 tablet 1    doxycycline (VIBRAMYCIN) 100 MG capsule Take 1 capsule by mouth 2 (Two) Times a Day. (Patient not taking: Reported on 11/25/2024) 20 capsule 0    guaiFENesin (Mucinex) 600 MG 12 hr tablet Take 1 tablet by mouth 2 (Two) Times a Day. (Patient not taking: Reported on 11/25/2024) 30 tablet 0     No facility-administered medications prior to visit.     No opioid medication identified on active medication list. I have reviewed chart for other potential  high risk medication/s and harmful drug interactions in the elderly.      Aspirin is not on active medication list.  Aspirin use is contraindicated for this patient due to: current use of Eliquis.  .    Patient Active Problem List   Diagnosis    Mass of lower lobe of left lung    GERD (gastroesophageal reflux disease)    Primary hypertension    Insomnia due to medical condition    PVC's (premature ventricular contractions)    HLD  "(hyperlipidemia)    Leukocytosis    Pleural effusion, bilateral    CKD (chronic kidney disease) stage 3, GFR 30-59 ml/min    Hyponatremia    Lesion of lung    Atrial fibrillation    Pulmonary hypertension    Valvular heart disease    CHF (congestive heart failure)    Type 2 diabetes mellitus without complication, with long-term current use of insulin    Cervical osteophyte    Pharyngeal dysphagia secondary to large cervical osteophyte    Hospital discharge follow-up    Environmental and seasonal allergies    Dizziness    Generalized anxiety disorder    Chronic heart failure with preserved ejection fraction    COPD exacerbation    Chronic anticoagulation     Advance Care Planning Advance Directive is not on file.  ACP discussion was held with the patient during this visit. Patient has an advance directive (not in EMR), copy requested.        Objective   Vitals:    24 1532   BP: 126/76   BP Location: Left arm   Patient Position: Sitting   Cuff Size: Adult   Pulse: 72   SpO2: 99%   Weight: 65.3 kg (144 lb)   Height: 182.9 cm (72\")   PainSc: 0-No pain       Estimated body mass index is 19.53 kg/m² as calculated from the following:    Height as of this encounter: 182.9 cm (72\").    Weight as of this encounter: 65.3 kg (144 lb).    BMI is within normal parameters. No other follow-up for BMI required.       Does the patient have evidence of cognitive impairment?  minimal                                                                                                Health  Risk Assessment    Smoking Status:  Social History     Tobacco Use   Smoking Status Former    Current packs/day: 0.00    Average packs/day: 0.5 packs/day for 10.0 years (5.0 ttl pk-yrs)    Types: Cigarettes    Start date: 1953    Quit date: 1962    Years since quittin.9    Passive exposure: Past   Smokeless Tobacco Never     Alcohol Consumption:  Social History     Substance and Sexual Activity   Alcohol Use Yes    Alcohol/week: 2.0 " standard drinks of alcohol    Types: 2 Cans of beer per week       Fall Risk Screen  STEADI Fall Risk Assessment was completed, and patient is at LOW risk for falls.Assessment completed on:2024    Depression Screening   The PHQ has not been completed during this encounter.     Health Habits and Functional and Cognitive Screenin/25/2024     3:29 PM   Functional & Cognitive Status   Do you have difficulty preparing food and eating? No   Do you have difficulty bathing yourself, getting dressed or grooming yourself? No   Do you have difficulty using the toilet? No   Do you have difficulty moving around from place to place? No   Do you have trouble with steps or getting out of a bed or a chair? No   Current Diet Well Balanced Diet   Dental Exam Up to date   Eye Exam Up to date   Exercise (times per week) Other   Current Exercises Include Walking   Do you need help using the phone?  No   Are you deaf or do you have serious difficulty hearing?  Yes   Do you need help to go to places out of walking distance? Yes   Do you need help shopping? No   Do you need help preparing meals?  No   Do you need help with housework?  No   Do you need help with laundry? No   Do you need help taking your medications? No   Do you need help managing money? No   Do you ever drive or ride in a car without wearing a seat belt? No   Have you felt unusual stress, anger or loneliness in the last month? No   Who do you live with? Alone   If you need help, do you have trouble finding someone available to you? No   Have you been bothered in the last four weeks by sexual problems? No   Do you have difficulty concentrating, remembering or making decisions? No           Age-appropriate Screening Schedule:  Refer to the list below for future screening recommendations based on patient's age, sex and/or medical conditions. Orders for these recommended tests are listed in the plan section. The patient has been provided with a written  plan.    Health Maintenance List  Health Maintenance   Topic Date Due    ZOSTER VACCINE (1 of 2) Never done    RSV Vaccine - Adults (1 - 1-dose 75+ series) Never done    DIABETIC EYE EXAM  07/16/2023    HEMOGLOBIN A1C  11/13/2024    COVID-19 Vaccine (6 - 2024-25 season) 02/14/2025 (Originally 9/1/2024)    URINE MICROALBUMIN  03/27/2025    LIPID PANEL  06/03/2025    ANNUAL WELLNESS VISIT  11/25/2025    TDAP/TD VACCINES (2 - Td or Tdap) 06/13/2027    INFLUENZA VACCINE  Completed    Pneumococcal Vaccine 65+  Completed                                                                                                                                                CMS Preventative Services Quick Reference  Risk Factors Identified During Encounter  Immunizations Discussed/Encouraged: Shingrix, COVID19, and RSV (Respiratory Syncytial Virus)    The above risks/problems have been discussed with the patient.  Pertinent information has been shared with the patient in the After Visit Summary.  An After Visit Summary and PPPS were made available to the patient.    Follow Up:   Next Medicare Wellness visit to be scheduled in 1 year.         Additional E&M Note during same encounter follows:  Patient has additional, significant, and separately identifiable condition(s)/problem(s) that require work above and beyond the Medicare Wellness Visit     Chief Complaint  Medicare Wellness-subsequent (Pt is here for an medicare wellness visit with his daughter )    Subjective    HPI     The patient presents for a Medicare wellness visit. He is accompanied by his daughter.    He reports an improvement in his physical health compared to the previous year, although his mental health remains unchanged. He has been hospitalized within the last 365 days.    His current medications include Eliquis, BuSpar, and Bystolic. He has been experiencing sinus issues, which have been managed with Mucinex. He also reports a slight increase in fluid retention,  "for which he is taking spironolactone.     He has a history of pleural effusion, for which his specialist has recently increased one of his medications. He has a decreased appetite and has difficulty swallowing certain foods, such as chicken and roast.     He has received his influenza vaccine.    SOCIAL HISTORY  He quit smoking 63 years ago.          Objective   Vital Signs:  /76 (BP Location: Left arm, Patient Position: Sitting, Cuff Size: Adult)   Pulse 72   Ht 182.9 cm (72\")   Wt 65.3 kg (144 lb)   SpO2 99%   BMI 19.53 kg/m²     Physical Exam  Vitals reviewed.   Constitutional:       Appearance: Normal appearance.   Cardiovascular:      Rate and Rhythm: Normal rate. Rhythm irregular.      Heart sounds: Murmur heard.   Pulmonary:      Effort: Pulmonary effort is normal.      Breath sounds: Normal breath sounds.   Skin:     General: Skin is warm and dry.      Capillary Refill: Capillary refill takes less than 2 seconds.   Neurological:      General: No focal deficit present.      Mental Status: He is alert and oriented to person, place, and time.   Psychiatric:         Mood and Affect: Mood normal.         Behavior: Behavior normal.               Results  Imaging  Recent X-ray shows small bilateral pleural effusions.            Assessment and Plan      1. Anxiety.  He reports that his anxiety has increased but is understandable. He was seen by Stacey on October 4, 2024, and his BuSpar dosage was increased. He feels that the increased dosage has helped. He has started taking an extra BuSpar in the afternoon to manage his anxiety better.     2. Pleural Effusion.  X-ray results show small bilateral pleural effusions, indicating fluid around the lungs. Dr. Minaya increased his spironolactone dosage to help reduce the fluid. He is advised that he may experience increased urination due to the diuretic effect of spironolactone.    3. Sinus Infection.  He was treated for a sinus infection with antibiotics and " Mucinex. Despite treatment, he still has excess fluid and drainage. Dr. Minaya conducted an x-ray on Friday, which showed improvement in the fluid pockets in his lungs since June.    4. Swallowing Issues.  He has difficulty swallowing certain foods like chicken and roast. It is recommended to consider protein shakes to ensure adequate protein intake. He uses applesauce to take his medications to avoid swallowing difficulties.              Follow Up     No follow-ups on file.    Patient was given instructions and counseling regarding his condition or for health maintenance advice. Please see specific information pulled into the AVS if appropriate.    Patient or patient representative verbalized consent for the use of Ambient Listening during the visit with  LINDA Purcell for chart documentation. 11/26/2024  22:13 EST

## 2024-12-25 NOTE — PLAN OF CARE
Problem: Adult Inpatient Plan of Care  Goal: Readiness for Transition of Care  Intervention: Mutually Develop Transition Plan  Recent Flowsheet Documentation  Taken 5/12/2024 2308 by Paty Fox, RN  Transportation Anticipated: family or friend will provide  Patient/Family Anticipated Services at Transition: none  Patient/Family Anticipates Transition to: home  Taken 5/12/2024 2306 by Paty Fox, RN  Equipment Currently Used at Home:   shower chair   wheelchair   Goal Outcome Evaluation:  Plan of Care Reviewed With: patient        Progress: no change                                  
Goal Outcome Evaluation:                     Anticipated Discharge Disposition (SLP): unknown, home with home health          SLP Swallowing Diagnosis: R/O pharyngeal dysphagia, suspected esophageal dysphagia (05/13/24 4586)             
Goal Outcome Evaluation:         Pt arrived to floor. Frequent PVCs on tele. A&Ox4.                                      
Goal Outcome Evaluation:  Plan of Care Reviewed With: patient              MBS completed. Large cervical osteophyte extending C4-5, with small, less impacting, osteophyte C7. Osteophyte plus mild diffuse pharyngeal weakness leading to, at times, moderate-severe pharyngeal residue in valleculae. No aspiration though does intermittently penetrate liquids deep to the level of the vocal folds. Does not sense though cleared with cues for cough/throat clearing. Some success reducing pharyngeal residue with use of head turn to either shoulder + effortful swallow. Per esophageal scan, all barium media emptied into stomach without significant issue. No obvious stricture or other anatomical/motility concerns per Radiology PA. Scan performed in upright position so not sensitive in capturing ANGIE. Results relayed to GI PA. SLP will f/u tomorrow to work on aspiration precautions/compensations. See full note above for pictures of cervical osteophytes. Given pt's age, likely most appropriate to manage with diet modifications/adjustments and compensatory strategies?          SLP Swallowing Diagnosis: functional oral phase, moderate, pharyngeal dysphagia (05/14/24 4602)             
Goal Outcome Evaluation:  Plan of Care Reviewed With: patient        Progress: no change    Pt resting throughout the night. Adequate UOP. Pt A&O and afib with very frequent PVCs on monitor. Bradycardic.                                   
Goal Outcome Evaluation:  Plan of Care Reviewed With: patient        Progress: no change  Outcome Evaluation: OT evaluation completed. The pt presents below his functional baseline with generalized weakness, decreased activity tolerance, and minor balance deficits. The pt performed bed mobility with SBA and ambulated <HH distance using a RWx with CGA for safety. The pt had no LOB, though admitted to feeling weaker compared to his baseline. The pt will benefit from continued IP OT services to increase the pt's safety and independence during ADLs and functional mobility. Recommend a d/c home with assist and HHOT when medically ready.      Anticipated Discharge Disposition (OT): home with assist, home with home health                        
Goal Outcome Evaluation:  Plan of Care Reviewed With: patient        Progress: no change  Outcome Evaluation: PT eval performed: Pt presenting with decline in functional mobility.  CGA for transfers.  Pt able to ambulate 100', FWW, CGA with slow cautious gait, limited by stamina.  Recommend home with assist and HHPT at d/c (pt's daughter planning on staying with him initially when first d/c'd.      Anticipated Discharge Disposition (PT): home with home health, home with assist                        
Goal Outcome Evaluation:  Plan of Care Reviewed With: patient        SLP re-evaluation completed. Will continue to address dysphagia strategies, exercises, and management of diet. Patient's completed PO trials and pharyngeal exercises. Patient prefers soft diet and did not want to upgrade to regular solids. 1x cough with mixed consistencies so recommend holding on mixed consistencies for further trials.  Please see note for further details and recommendations.        Anticipated Discharge Disposition (SLP): home with home health          SLP Swallowing Diagnosis: functional oral phase, moderate, pharyngeal dysphagia, other (see comments) (per MBS completed 5/15) (05/15/24 8546)             
Goal Outcome Evaluation:  Plan of Care Reviewed With: patient, daughter        Progress: improving  Outcome Evaluation: Patient demonstrates improving safety and independence w/ mobility, but continues to be limited by generalized weakness, decreased activity tolerance, mildly unsteady gait, and remains below his baseline. He ambulated 250ft w/ RW, SBA, and 2 brief standing rests. HEP completed and pt. encouraged to continue on his own. PT recommends home w/ assist and HHPT at D/C.      Anticipated Discharge Disposition (PT): home with assist, home with home health                        
Goal Outcome Evaluation:  Plan of Care Reviewed With: patient, daughter        Progress: improving  Outcome Evaluation: Patient with discharge orders, pts daughter will drive the patient home today.                               
Patient underwent MBS with upper GI series today that revealed large cervical osteophyte extending C4-5 causing moderate pharyngeal dysphagia.   No esophageal stricture.   Will cancel EGD tomorrow.    Appreciate speech therapy following regarding compensatory strategies/dietary modifications.     
No

## 2024-12-26 DIAGNOSIS — J30.1 SEASONAL ALLERGIC RHINITIS DUE TO POLLEN: ICD-10-CM

## 2024-12-26 RX ORDER — ESOMEPRAZOLE MAGNESIUM 40 MG/1
40 CAPSULE, DELAYED RELEASE ORAL
Qty: 60 CAPSULE | Refills: 3 | OUTPATIENT
Start: 2024-12-26

## 2024-12-26 NOTE — TELEPHONE ENCOUNTER
Caller: Paty Tobar    Relationship: Emergency Contact    Best call back number: [phone]     Requested Prescriptions:   Requested Prescriptions     Pending Prescriptions Disp Refills    esomeprazole (nexIUM) 40 MG capsule 60 capsule 3     Sig: Take 1 capsule by mouth 2 (Two) Times a Day Before Meals.    fluticasone (FLONASE) 50 MCG/ACT nasal spray 16 g 11     Sig: Administer 2 sprays into the nostril(s) as directed by provider Daily.        Pharmacy where request should be sent:  [address] [phone] [facility] [phone]  [phone]     Last office visit with prescribing clinician: 11/25/2024   Last telemedicine visit with prescribing clinician: Visit date not found   Next office visit with prescribing clinician: Visit date not found     Additional details provided by patient: PATIENT WILL BE RUNNING OUT OF THIS MEDICATION SOON    Does the patient have less than a 3 day supply:  [] Yes  [x] No    Would you like a call back once the refill request has been completed: [] Yes [x] No    If the office needs to give you a call back, can they leave a voicemail: [] Yes [x] No    Alva Toro Rep   12/26/24 15:11 EST

## 2024-12-27 ENCOUNTER — APPOINTMENT (OUTPATIENT)
Dept: GENERAL RADIOLOGY | Facility: HOSPITAL | Age: 89
End: 2024-12-27
Payer: MEDICARE

## 2024-12-27 ENCOUNTER — HOSPITAL ENCOUNTER (EMERGENCY)
Facility: HOSPITAL | Age: 89
Discharge: HOME | End: 2024-12-28
Attending: EMERGENCY MEDICINE
Payer: MEDICARE

## 2024-12-27 DIAGNOSIS — R06.02 SHORTNESS OF BREATH: ICD-10-CM

## 2024-12-27 DIAGNOSIS — R05.9 COUGH, UNSPECIFIED TYPE: Primary | ICD-10-CM

## 2024-12-27 DIAGNOSIS — Z87.09 HISTORY OF PULMONARY ASPIRATION: ICD-10-CM

## 2024-12-27 LAB
ALBUMIN SERPL-MCNC: 4 G/DL (ref 3.5–5.2)
ALBUMIN/GLOB SERPL: 1.5 G/DL
ALP SERPL-CCNC: 95 U/L (ref 39–117)
ALT SERPL W P-5'-P-CCNC: 15 U/L (ref 1–41)
ANION GAP SERPL CALCULATED.3IONS-SCNC: 13 MMOL/L (ref 5–15)
AST SERPL-CCNC: 19 U/L (ref 1–40)
BASOPHILS # BLD AUTO: 0.03 10*3/MM3 (ref 0–0.2)
BASOPHILS NFR BLD AUTO: 0.3 % (ref 0–1.5)
BILIRUB SERPL-MCNC: 0.5 MG/DL (ref 0–1.2)
BUN SERPL-MCNC: 26 MG/DL (ref 8–23)
BUN/CREAT SERPL: 18.7 (ref 7–25)
CALCIUM SPEC-SCNC: 9.3 MG/DL (ref 8.6–10.5)
CHLORIDE SERPL-SCNC: 101 MMOL/L (ref 98–107)
CO2 SERPL-SCNC: 26 MMOL/L (ref 22–29)
CREAT SERPL-MCNC: 1.39 MG/DL (ref 0.76–1.27)
DEPRECATED RDW RBC AUTO: 48.5 FL (ref 37–54)
EGFRCR SERPLBLD CKD-EPI 2021: 48.5 ML/MIN/1.73
EOSINOPHIL # BLD AUTO: 0.12 10*3/MM3 (ref 0–0.4)
EOSINOPHIL NFR BLD AUTO: 1.2 % (ref 0.3–6.2)
ERYTHROCYTE [DISTWIDTH] IN BLOOD BY AUTOMATED COUNT: 14.3 % (ref 12.3–15.4)
GLOBULIN UR ELPH-MCNC: 2.6 GM/DL
GLUCOSE SERPL-MCNC: 184 MG/DL (ref 65–99)
HCT VFR BLD AUTO: 37 % (ref 37.5–51)
HGB BLD-MCNC: 12 G/DL (ref 13–17.7)
HOLD SPECIMEN: NORMAL
IMM GRANULOCYTES # BLD AUTO: 0.05 10*3/MM3 (ref 0–0.05)
IMM GRANULOCYTES NFR BLD AUTO: 0.5 % (ref 0–0.5)
LYMPHOCYTES # BLD AUTO: 2.15 10*3/MM3 (ref 0.7–3.1)
LYMPHOCYTES NFR BLD AUTO: 21.3 % (ref 19.6–45.3)
MCH RBC QN AUTO: 29.9 PG (ref 26.6–33)
MCHC RBC AUTO-ENTMCNC: 32.4 G/DL (ref 31.5–35.7)
MCV RBC AUTO: 92.3 FL (ref 79–97)
MONOCYTES # BLD AUTO: 0.56 10*3/MM3 (ref 0.1–0.9)
MONOCYTES NFR BLD AUTO: 5.6 % (ref 5–12)
NEUTROPHILS NFR BLD AUTO: 7.18 10*3/MM3 (ref 1.7–7)
NEUTROPHILS NFR BLD AUTO: 71.1 % (ref 42.7–76)
NRBC BLD AUTO-RTO: 0 /100 WBC (ref 0–0.2)
NT-PROBNP SERPL-MCNC: 5878 PG/ML (ref 0–1800)
PLATELET # BLD AUTO: 163 10*3/MM3 (ref 140–450)
PMV BLD AUTO: 10.5 FL (ref 6–12)
POTASSIUM SERPL-SCNC: 4.7 MMOL/L (ref 3.5–5.2)
PROT SERPL-MCNC: 6.6 G/DL (ref 6–8.5)
QT INTERVAL: 366 MS
QTC INTERVAL: 432 MS
RBC # BLD AUTO: 4.01 10*6/MM3 (ref 4.14–5.8)
SODIUM SERPL-SCNC: 140 MMOL/L (ref 136–145)
TROPONIN T SERPL HS-MCNC: 30 NG/L
WBC NRBC COR # BLD AUTO: 10.09 10*3/MM3 (ref 3.4–10.8)
WHOLE BLOOD HOLD COAG: NORMAL
WHOLE BLOOD HOLD SPECIMEN: NORMAL

## 2024-12-27 PROCEDURE — 99284 EMERGENCY DEPT VISIT MOD MDM: CPT

## 2024-12-27 PROCEDURE — 85025 COMPLETE CBC W/AUTO DIFF WBC: CPT | Performed by: EMERGENCY MEDICINE

## 2024-12-27 PROCEDURE — 93005 ELECTROCARDIOGRAM TRACING: CPT | Performed by: EMERGENCY MEDICINE

## 2024-12-27 PROCEDURE — 0202U NFCT DS 22 TRGT SARS-COV-2: CPT | Performed by: EMERGENCY MEDICINE

## 2024-12-27 PROCEDURE — 36415 COLL VENOUS BLD VENIPUNCTURE: CPT

## 2024-12-27 PROCEDURE — 80053 COMPREHEN METABOLIC PANEL: CPT | Performed by: EMERGENCY MEDICINE

## 2024-12-27 PROCEDURE — 71045 X-RAY EXAM CHEST 1 VIEW: CPT

## 2024-12-27 PROCEDURE — 83880 ASSAY OF NATRIURETIC PEPTIDE: CPT | Performed by: EMERGENCY MEDICINE

## 2024-12-27 PROCEDURE — 84484 ASSAY OF TROPONIN QUANT: CPT | Performed by: EMERGENCY MEDICINE

## 2024-12-27 RX ORDER — SODIUM CHLORIDE 0.9 % (FLUSH) 0.9 %
10 SYRINGE (ML) INJECTION AS NEEDED
Status: DISCONTINUED | OUTPATIENT
Start: 2024-12-27 | End: 2024-12-28 | Stop reason: HOSPADM

## 2024-12-28 VITALS
RESPIRATION RATE: 18 BRPM | WEIGHT: 143 LBS | BODY MASS INDEX: 19.37 KG/M2 | DIASTOLIC BLOOD PRESSURE: 51 MMHG | TEMPERATURE: 98.3 F | HEIGHT: 72 IN | HEART RATE: 57 BPM | SYSTOLIC BLOOD PRESSURE: 134 MMHG | OXYGEN SATURATION: 94 %

## 2024-12-28 LAB
B PARAPERT DNA SPEC QL NAA+PROBE: NOT DETECTED
B PERT DNA SPEC QL NAA+PROBE: NOT DETECTED
C PNEUM DNA NPH QL NAA+NON-PROBE: NOT DETECTED
FLUAV SUBTYP SPEC NAA+PROBE: NOT DETECTED
FLUBV RNA ISLT QL NAA+PROBE: NOT DETECTED
GEN 5 1HR TROPONIN T REFLEX: 27 NG/L
HADV DNA SPEC NAA+PROBE: NOT DETECTED
HCOV 229E RNA SPEC QL NAA+PROBE: NOT DETECTED
HCOV HKU1 RNA SPEC QL NAA+PROBE: NOT DETECTED
HCOV NL63 RNA SPEC QL NAA+PROBE: NOT DETECTED
HCOV OC43 RNA SPEC QL NAA+PROBE: NOT DETECTED
HMPV RNA NPH QL NAA+NON-PROBE: NOT DETECTED
HPIV1 RNA ISLT QL NAA+PROBE: NOT DETECTED
HPIV2 RNA SPEC QL NAA+PROBE: NOT DETECTED
HPIV3 RNA NPH QL NAA+PROBE: NOT DETECTED
HPIV4 P GENE NPH QL NAA+PROBE: NOT DETECTED
M PNEUMO IGG SER IA-ACNC: NOT DETECTED
RHINOVIRUS RNA SPEC NAA+PROBE: NOT DETECTED
RSV RNA NPH QL NAA+NON-PROBE: NOT DETECTED
SARS-COV-2 RNA NPH QL NAA+NON-PROBE: NOT DETECTED
TROPONIN T % DELTA: -10 %
TROPONIN T NUMERIC DELTA: -3 NG/L

## 2024-12-28 RX ORDER — FAMOTIDINE 20 MG/1
20 TABLET, FILM COATED ORAL ONCE
Status: COMPLETED | OUTPATIENT
Start: 2024-12-28 | End: 2024-12-28

## 2024-12-28 RX ORDER — DOXYCYCLINE 100 MG/1
100 CAPSULE ORAL ONCE
Status: COMPLETED | OUTPATIENT
Start: 2024-12-28 | End: 2024-12-28

## 2024-12-28 RX ORDER — DOXYCYCLINE 100 MG/1
100 CAPSULE ORAL 2 TIMES DAILY
Qty: 13 CAPSULE | Refills: 0 | Status: SHIPPED | OUTPATIENT
Start: 2024-12-28

## 2024-12-28 RX ADMIN — FAMOTIDINE 20 MG: 20 TABLET, FILM COATED ORAL at 03:16

## 2024-12-28 RX ADMIN — DOXYCYCLINE 100 MG: 100 CAPSULE ORAL at 03:16

## 2024-12-28 NOTE — DISCHARGE INSTRUCTIONS
You can take pepcid over the counter 20 mg up to twice per day as needed. Continue your usual medications as prescribed. Return to the ER if you develop oxygen saturation 88% or lower.

## 2024-12-28 NOTE — ED PROVIDER NOTES
Subjective   History of Present Illness    Review of Systems    Past Medical History:   Diagnosis Date    Allergic rhinitis     Amnesia     Arrhythmia     Atrial fibrillation     Basal cell carcinoma 2015    Benign prostatic hyperplasia     CHF (congestive heart failure) 3-    Chronic kidney disease, stage 3b     COPD (chronic obstructive pulmonary disease)     Diabetes mellitus N/A    Drug dependency     Heart murmur     Hyperlipidemia     Hypertension     Hyperthyroidism     Iron deficiency     Long term (current) use of insulin     Melanoma     Nephrosclerosis        Allergies   Allergen Reactions    Penicillins Swelling     Patient has tolerated PO Cefdinir    Sulfa Antibiotics Swelling       Past Surgical History:   Procedure Laterality Date    CATARACT EXTRACTION Bilateral     COLONOSCOPY      PROSTHODONTIC PROCEDURE      SKIN CANCER EXCISION      nose       Family History   Problem Relation Age of Onset    Diabetes Mother     Stroke Mother     Hypertension Mother     Breast cancer Sister        Social History     Socioeconomic History    Marital status:    Tobacco Use    Smoking status: Former     Current packs/day: 0.00     Average packs/day: 0.5 packs/day for 10.0 years (5.0 ttl pk-yrs)     Types: Cigarettes     Start date: 1953     Quit date: 1962     Years since quittin.0     Passive exposure: Past    Smokeless tobacco: Never   Vaping Use    Vaping status: Never Used   Substance and Sexual Activity    Alcohol use: Yes     Alcohol/week: 2.0 standard drinks of alcohol     Types: 2 Cans of beer per week    Drug use: No    Sexual activity: Not Currently     Partners: Female     Birth control/protection: Hysterectomy           Objective   Physical Exam    Procedures           ED Course  ED Course as of 12/28/24 0314   Fri Dec 27, 2024   2132 Tues dry heaves. Hx of GERD severe. CC SOA. Hx of aspiration. Today evening worsening SOA. Decreased appetite. Not felt well since . No  antipyretic recently. [facility] cardiology. Denies recent fever. Postive cough recently. Hx of CHF. Increased spironolactone in Nov.  [LD]   2134 Rhinorhea, hx of allergies. Nonproductive cough.  [LD]   2138 eGFR(!): 48.5 [LD]   2139 Murmur systolic, chronic afib. Acid reflux.  [LD]   2140 Nexium PPI chronically. Eliquis. Doesn't tolerate liquid thickener, but drinks buttermilk.  [LD]   2140 WBC: 10.09 [LD]   2140 Creatinine(!): 1.39  At baseline compared to prior values. No CP [LD]   2140 HS Troponin T(!): 30 [LD]   2140 proBNP(!): 5,878.0  Better than prior values. [LD]   2308 Chest x-ray result reviewed. Awaiting 2nd troponin collection and respiratory panel collection. Patient recently brought back to ER room from waiting room. [LD]   Sat Dec 28, 2024   0314 Unremarkable ED course. Results and plan d;'/w ptt and family at [facility]. All questions addressed. [LD]      ED Course User Index  [LD] Hayley Sánchez MD                                                       Medical Decision Making  Problems Addressed:  Cough, unspecified type: complicated acute illness or injury  History of pulmonary aspiration: complicated acute illness or injury  Shortness of breath: complicated acute illness or injury    Amount and/or Complexity of Data Reviewed  Labs: ordered. Decision-making details documented in ED Course.  Radiology: ordered. Decision-making details documented in ED Course.  ECG/medicine tests: ordered and independent interpretation performed. Decision-making details documented in ED Course.    Risk  Prescription drug management.      Recent Results (from the past 24 hours)   ECG 12 Lead ED Triage Standing Order; SOA    Collection Time: 12/27/24  8:34 PM   Result Value Ref Range    QT Interval 366 ms    QTC Interval 432 ms   Comprehensive Metabolic Panel    Collection Time: 12/27/24  8:43 PM    Specimen: Blood   Result Value Ref Range    Glucose 184 (H) 65 - 99 mg/dL    BUN 26 (H) 8 - 23 mg/dL    Creatinine  1.39 (H) 0.76 - 1.27 mg/dL    Sodium 140 136 - 145 mmol/L    Potassium 4.7 3.5 - 5.2 mmol/L    Chloride 101 98 - 107 mmol/L    CO2 26.0 22.0 - 29.0 mmol/L    Calcium 9.3 8.6 - 10.5 mg/dL    Total Protein 6.6 6.0 - 8.5 g/dL    Albumin 4.0 3.5 - 5.2 g/dL    ALT (SGPT) 15 1 - 41 U/L    AST (SGOT) 19 1 - 40 U/L    Alkaline Phosphatase 95 39 - 117 U/L    Total Bilirubin 0.5 0.0 - 1.2 mg/dL    Globulin 2.6 gm/dL    A/G Ratio 1.5 g/dL    BUN/Creatinine Ratio 18.7 7.0 - 25.0    Anion Gap 13.0 5.0 - 15.0 mmol/L    eGFR 48.5 (L) >60.0 mL/min/1.73   BNP    Collection Time: 12/27/24  8:43 PM    Specimen: Blood   Result Value Ref Range    proBNP 5,878.0 (H) 0.0 - 1,800.0 pg/mL   High Sensitivity Troponin T    Collection Time: 12/27/24  8:43 PM    Specimen: Blood   Result Value Ref Range    HS Troponin T 30 (H) <22 ng/L   Green Top (Gel)    Collection Time: 12/27/24  8:43 PM   Result Value Ref Range    Extra Tube Hold for add-ons.    Lavender Top    Collection Time: 12/27/24  8:43 PM   Result Value Ref Range    Extra Tube hold for add-on    Gold Top - SST    Collection Time: 12/27/24  8:43 PM   Result Value Ref Range    Extra Tube Hold for add-ons.    Gray Top    Collection Time: 12/27/24  8:43 PM   Result Value Ref Range    Extra Tube Hold for add-ons.    Light Blue Top    Collection Time: 12/27/24  8:43 PM   Result Value Ref Range    Extra Tube Hold for add-ons.    CBC Auto Differential    Collection Time: 12/27/24  8:43 PM    Specimen: Blood   Result Value Ref Range    WBC 10.09 3.40 - 10.80 10*3/mm3    RBC 4.01 (L) 4.14 - 5.80 10*6/mm3    Hemoglobin 12.0 (L) 13.0 - 17.7 g/dL    Hematocrit 37.0 (L) 37.5 - 51.0 %    MCV 92.3 79.0 - 97.0 fL    MCH 29.9 26.6 - 33.0 pg    MCHC 32.4 31.5 - 35.7 g/dL    RDW 14.3 12.3 - 15.4 %    RDW-SD 48.5 37.0 - 54.0 fl    MPV 10.5 6.0 - 12.0 fL    Platelets 163 140 - 450 10*3/mm3    Neutrophil % 71.1 42.7 - 76.0 %    Lymphocyte % 21.3 19.6 - 45.3 %    Monocyte % 5.6 5.0 - 12.0 %    Eosinophil %  1.2 0.3 - 6.2 %    Basophil % 0.3 0.0 - 1.5 %    Immature Grans % 0.5 0.0 - 0.5 %    Neutrophils, Absolute 7.18 (H) 1.70 - 7.00 10*3/mm3    Lymphocytes, Absolute 2.15 0.70 - 3.10 10*3/mm3    Monocytes, Absolute 0.56 0.10 - 0.90 10*3/mm3    Eosinophils, Absolute 0.12 0.00 - 0.40 10*3/mm3    Basophils, Absolute 0.03 0.00 - 0.20 10*3/mm3    Immature Grans, Absolute 0.05 0.00 - 0.05 10*3/mm3    nRBC 0.0 0.0 - 0.2 /100 WBC   High Sensitivity Troponin T 1Hr    Collection Time: 12/27/24 11:36 PM    Specimen: Blood   Result Value Ref Range    HS Troponin T 27 (H) <22 ng/L    Troponin T Numeric Delta -3 ng/L    Troponin T % Delta -10 Abnormal if >/= 20% %   Respiratory Panel PCR w/COVID-19(SARS-CoV-2) [facility]A/PAD/COR/AMY In-House, NP Swab in UTM/VTM, 2 HR TAT - Swab, Nasopharynx    Collection Time: 12/27/24 11:36 PM    Specimen: Nasopharynx; Swab   Result Value Ref Range    ADENOVIRUS, PCR Not Detected Not Detected    Coronavirus 229E Not Detected Not Detected    Coronavirus HKU1 Not Detected Not Detected    Coronavirus NL63 Not Detected Not Detected    Coronavirus OC43 Not Detected Not Detected    COVID19 Not Detected Not Detected - Ref. Range    Human Metapneumovirus Not Detected Not Detected    Human Rhinovirus/Enterovirus Not Detected Not Detected    Influenza A PCR Not Detected Not Detected    Influenza B PCR Not Detected Not Detected    Parainfluenza Virus 1 Not Detected Not Detected    Parainfluenza Virus 2 Not Detected Not Detected    Parainfluenza Virus 3 Not Detected Not Detected    Parainfluenza Virus 4 Not Detected Not Detected    RSV, PCR Not Detected Not Detected    Bordetella pertussis pcr Not Detected Not Detected    Bordetella parapertussis PCR Not Detected Not Detected    Chlamydophila pneumoniae PCR Not Detected Not Detected    Mycoplasma pneumo by PCR Not Detected Not Detected     Note: In addition to lab results from this visit, the labs listed above may include labs taken at another facility or  during a different encounter within the last 24 hours. Please correlate lab times with ED admission and discharge times for further clarification of the services performed during this visit.    XR Chest 1 View   Final Result   Impression:   Stable chest. There is a stable small right pleural effusion and chronic left pleural thickening, with chronic atelectasis in the left lower lobe and right lung base         Electronically Signed: Lenin Quinteros     12/27/2024 10:07 PM EST     Workstation ID: [id number]        Vitals:    12/28/24 0100 12/28/24 0130 12/28/24 0200 12/28/24 0231   BP: 130/65 132/54 136/55 140/53   Pulse: 73 72 76 75   Resp:   18 18   Temp:       TempSrc:       SpO2: 94% 94% 95% 94%   Weight:       Height:         Medications   sodium chloride 0.9 % flush 10 mL (has no administration in time range)   doxycycline (MONODOX) capsule 100 mg (has no administration in time range)   famotidine (PEPCID) tablet 20 mg (has no administration in time range)     ECG/EMG Results (last 24 hours)       Procedure Component Value Units Date/Time    ECG 12 Lead ED Triage Standing Order; SOA [[id number]] Collected: 12/27/24 2034     Updated: 12/27/24 2034     QT Interval 366 ms      QTC Interval 432 ms     Narrative:      Test Reason : ED Triage Standing Order~  Blood Pressure :   */*   mmHG  Vent. Rate :  84 BPM     Atrial Rate : 326 BPM     P-R Int :   * ms          QRS Dur :  84 ms      QT Int : 366 ms       P-R-T Axes :   *  -3  33 degrees    QTcB Int : 432 ms    Atrial fibrillation with premature ventricular or aberrantly conducted  complexes  Anteroseptal infarct (cited on or before 02-Jun-2024)  Abnormal ECG  When compared with ECG of 02-Jun-2024 20:50,  No significant change was found    Referred By: EDMD           Confirmed By:           ECG 12 Lead ED Triage Standing Order; SOA   Preliminary Result   Test Reason : ED Triage Standing Order~   Blood Pressure :   */*   mmHG   Vent. Rate :  84 BPM     Atrial Rate :  326 BPM      P-R Int :   * ms          QRS Dur :  84 ms       QT Int : 366 ms       P-R-T Axes :   *  -3  33 degrees     QTcB Int : 432 ms      Atrial fibrillation with premature ventricular or aberrantly conducted   complexes   Anteroseptal infarct (cited on or before 02-Jun-2024)   Abnormal ECG   When compared with ECG of 02-Jun-2024 20:50,   No significant change was found      Referred By: EDMD           Confirmed By:               Final diagnoses:   Cough, unspecified type   History of pulmonary aspiration   Shortness of breath       ED Disposition  ED Disposition       ED Disposition   Discharge    Condition   Stable    Comment   --               Sofiya Krishnan, APRN  [address] [facility] [address] [phone]    Schedule an appointment as soon as possible for a visit in 3 days  primary care provider         Medication List      No changes were made to your prescriptions during this visit.          84 ms       QT Int : 366 ms       P-R-T Axes :   *  -3  33 degrees     QTcB Int : 432 ms      Atrial fibrillation with premature ventricular or aberrantly conducted   complexes   Anteroseptal infarct (cited on or before 02-Jun-2024)   Abnormal ECG   When compared with ECG of 02-Jun-2024 20:50,   No significant change was found      Referred By: EDMD           Confirmed By:         Discharge instructions include:  You can take pepcid over the counter 20 mg up to twice per day as needed. Continue your usual medications as prescribed. Return to the ER if you develop oxygen saturation 88% or lower.       Final diagnoses:   Cough, unspecified type   History of pulmonary aspiration   Shortness of breath       ED Disposition  ED Disposition       ED Disposition   Discharge    Condition   Stable    Comment   --               Sofiya Krishnan, APRN  [address] [place] [address]  [phone]    Schedule an appointment as soon as possible for a visit in 3 days  primary care provider         Medication List      No changes were made to your prescriptions during this visit.            Hayley Sánchez MD  01/05/25 [id number]

## 2024-12-30 RX ORDER — FLUTICASONE PROPIONATE 50 MCG
2 SPRAY, SUSPENSION (ML) NASAL DAILY
Qty: 16 G | Refills: 11 | Status: SHIPPED | OUTPATIENT
Start: 2024-12-30

## 2024-12-30 RX ORDER — ESOMEPRAZOLE MAGNESIUM 40 MG/1
40 CAPSULE, DELAYED RELEASE ORAL
Qty: 60 CAPSULE | Refills: 3 | Status: SHIPPED | OUTPATIENT
Start: 2024-12-30 | End: 2025-01-03 | Stop reason: SDUPTHER

## 2025-01-03 ENCOUNTER — TELEPHONE (OUTPATIENT)
Dept: FAMILY MEDICINE CLINIC | Facility: CLINIC | Age: OVER 89
End: 2025-01-03

## 2025-01-03 DIAGNOSIS — K21.9 GASTROESOPHAGEAL REFLUX DISEASE WITHOUT ESOPHAGITIS: Primary | ICD-10-CM

## 2025-01-03 RX ORDER — ESOMEPRAZOLE MAGNESIUM 40 MG/1
40 CAPSULE, DELAYED RELEASE ORAL
Qty: 60 CAPSULE | Refills: 3 | Status: SHIPPED | OUTPATIENT
Start: 2025-01-03

## 2025-01-03 NOTE — TELEPHONE ENCOUNTER
Called pts daughter back and she states that pts insurance requires name brand Nexium. Can we get that sent in? Thanks!

## 2025-01-03 NOTE — TELEPHONE ENCOUNTER
Caller: Paty Tobar    Relationship: Emergency Contact    Best call back number:     [phone] (Mobile)     Which medication are you concerned about:     esomeprazole (nexIUM) 40 MG capsule     Who prescribed you this medication:     MARLENI PRICE    What are your concerns:     CALLER STATED MEDICATION WAS REFILLED WITH THE GENERIC FORM INSTEAD OF THE BRAND NAME NEXIUM    CALLER ALSO STATED PATIENT IS OUT OF THE BRAND NAME NEXIUM CURRENTLY    PREFERRED PHARMACY:    [facility] - [place], [place]    TELEPHONE CONTACT:    [phone]

## 2025-01-05 LAB
QT INTERVAL: 366 MS
QTC INTERVAL: 432 MS

## 2025-01-20 RX ORDER — TRAZODONE HYDROCHLORIDE 100 MG/1
100 TABLET ORAL NIGHTLY
Qty: 90 TABLET | Refills: 0 | Status: SHIPPED | OUTPATIENT
Start: 2025-01-20

## 2025-02-04 ENCOUNTER — OFFICE VISIT (OUTPATIENT)
Dept: CARDIOLOGY | Facility: HOSPITAL | Age: OVER 89
End: 2025-02-04
Payer: MEDICARE

## 2025-02-04 VITALS
DIASTOLIC BLOOD PRESSURE: 60 MMHG | OXYGEN SATURATION: 97 % | BODY MASS INDEX: 19.57 KG/M2 | WEIGHT: 144.5 LBS | RESPIRATION RATE: 16 BRPM | SYSTOLIC BLOOD PRESSURE: 134 MMHG | HEIGHT: 72 IN | HEART RATE: 63 BPM

## 2025-02-04 DIAGNOSIS — E78.49 OTHER HYPERLIPIDEMIA: ICD-10-CM

## 2025-02-04 DIAGNOSIS — I50.32 CHRONIC HEART FAILURE WITH PRESERVED EJECTION FRACTION: Primary | ICD-10-CM

## 2025-02-04 DIAGNOSIS — I10 PRIMARY HYPERTENSION: ICD-10-CM

## 2025-02-07 NOTE — PROGRESS NOTES
Chief Complaint  Follow-up and Congestive Heart Failure    Subjective    History of Present Illness {  Problem List  Visit  Diagnosis   Encounters  Notes  Medications  Labs  Result Review Imaging  Media :23}       History of Present Illness   Ricki Trotter, 88 y.o. male with past medical history significant for COPD, GERD, hypertension, PVCs, acute on chronic diastolic heart failure, hyperlipidemia, CKD, atrial fibrillation, pulmonary hypertension, and type 2 diabetes, who presents to [facility] for ongoing evaluation of his hfpef. Patient was  hospitalized on 6/2/2024 through 6/4/2024 with chief complaint of COPD exacerbation.  Patient was followed closely by cardiology during admission with adjustment in diuretics.  Patient also went ultrasound-guided right-sided thoracentesis on 6/3 with removal of 400 mL of fluid.       Echo March 2024 showed EF of 69%, mild AS, moderate TR, markedly elevated RVSP greater than 55 mmHg. Patient underwent a right-sided thoracentesis on 5/13/2024 with 900 mL of fluid removed.    Previous weight 163 and weight now is 142.9lbs He notes that his energy level has improved and he notes he is eating better. Currently denies cp, dizziness, presyncope, or pedal edema.  Recently treated for sinus infection and still notes a loose cough with associated PND. Also reports intermittent RAI.     Echocardiogram 3/8/2024:    Left ventricular systolic function is normal. Calculated left ventricular EF = 69.1% Normal left ventricular cavity size noted. Left ventricular wall thickness is consistent with mild concentric hypertrophy. Elevated left atrial pressure.    The aortic valve exhibits sclerosis. There is moderate calcification of the aortic valve. There is thickening of the aortic valve. The aortic valve appears trileaflet. No aortic valve regurgitation is present. Mild aortic valve stenosis is present    The tricuspid valve is normal in structure.  "Moderate tricuspid valve regurgitation is present. Estimated right ventricular systolic pressure from tricuspid regurgitation is markedly elevated (>55 mmHg).                 Objective     Vital Signs:   Vitals:    02/04/25 1459   BP: 134/60   BP Location: Left arm   Patient Position: Sitting   Cuff Size: Adult   Pulse: 63   Resp: 16   SpO2: 97%   Weight: 65.5 kg (144 lb 8 oz)   Height: 182.9 cm (72\")     Body mass index is 19.6 kg/m².  Physical Exam  Vitals and nursing note reviewed.   Constitutional:       Appearance: Normal appearance.   HENT:      Head: Normocephalic.   Eyes:      Pupils: Pupils are equal, round, and reactive to light.   Cardiovascular:      Rate and Rhythm: Normal rate and regular rhythm.      Pulses: Normal pulses.      Heart sounds: Normal heart sounds. No murmur heard.  Pulmonary:      Effort: Pulmonary effort is normal.      Breath sounds: Normal breath sounds.   Abdominal:      General: Bowel sounds are normal.      Palpations: Abdomen is soft.   Musculoskeletal:         General: Normal range of motion.      Cervical back: Normal range of motion.      Right lower leg: No edema.      Left lower leg: No edema.   Skin:     General: Skin is warm and dry.      Capillary Refill: Capillary refill takes less than 2 seconds.   Neurological:      Mental Status: He is alert and oriented to person, place, and time.   Psychiatric:         Mood and Affect: Mood normal.         Thought Content: Thought content normal.              Result Review  Data Reviewed:{ Labs  Result Review  Imaging  Med Tab  Media :23}   CBC (No Diff) (06/04/2024 06:49)  Basic Metabolic Panel (06/04/2024 06:49)  Adult Transthoracic Echo Complete W/ Cont if Necessary Per Protocol (03/08/2024 15:23)          Assessment and Plan {CC Problem List  Visit Diagnosis  ROS  Review (Popup)  Health Maintenance  Quality  BestPractice  Medications  SmartSets  SnapShot Encounters  Media :23}   1.  Chronic heart failure with " preserved ejection fraction  Lasix 20 mg bid   Continue Aldactone 12.5 mg daily, Farxiga 10 mg daily    Heart failure education today including signs and symptoms, the role of the heart failure center, daily weights, low sodium diet (less than 1500 mg per day), and daily physical activity. Reviewed HF Zones with patient and family.  Patient to continue current medications as previously ordered.   2. Primary hypertension  Stable on  Aldactone, bystolic      3. Other hyperlipidemia  Stable on Zocor    Follow Up {Instructions Charge Capture  Follow-up Communications :23}   Return in about 6 months (around 8/4/2025) for Office visit, HF.    Patient was given instructions and counseling regarding his condition or for health maintenance advice. Please see specific information pulled into the AVS if appropriate.  Patient was instructed to call the [facility] with any questions, concerns, or worsening symptoms.

## 2025-02-23 DIAGNOSIS — N40.0 BENIGN PROSTATIC HYPERPLASIA WITHOUT LOWER URINARY TRACT SYMPTOMS: ICD-10-CM

## 2025-02-24 RX ORDER — TAMSULOSIN HYDROCHLORIDE 0.4 MG/1
1 CAPSULE ORAL DAILY
Qty: 90 CAPSULE | Refills: 1 | Status: SHIPPED | OUTPATIENT
Start: 2025-02-24

## 2025-03-02 DIAGNOSIS — E78.2 MIXED HYPERLIPIDEMIA: ICD-10-CM

## 2025-03-03 RX ORDER — SIMVASTATIN 40 MG
40 TABLET ORAL EVERY EVENING
Qty: 90 TABLET | Refills: 0 | Status: SHIPPED | OUTPATIENT
Start: 2025-03-03

## 2025-03-14 ENCOUNTER — OFFICE VISIT (OUTPATIENT)
Dept: CARDIOLOGY | Facility: CLINIC | Age: OVER 89
End: 2025-03-14
Payer: MEDICARE

## 2025-03-14 VITALS
HEIGHT: 72 IN | DIASTOLIC BLOOD PRESSURE: 60 MMHG | OXYGEN SATURATION: 100 % | HEART RATE: 56 BPM | BODY MASS INDEX: 18.88 KG/M2 | SYSTOLIC BLOOD PRESSURE: 138 MMHG | WEIGHT: 139.4 LBS

## 2025-03-14 DIAGNOSIS — I50.32 CHRONIC HEART FAILURE WITH PRESERVED EJECTION FRACTION: ICD-10-CM

## 2025-03-14 PROCEDURE — 99214 OFFICE O/P EST MOD 30 MIN: CPT | Performed by: INTERNAL MEDICINE

## 2025-03-14 RX ORDER — DAPAGLIFLOZIN 10 MG/1
10 TABLET, FILM COATED ORAL DAILY
Qty: 30 TABLET | Refills: 11 | Status: SHIPPED | OUTPATIENT
Start: 2025-03-14

## 2025-03-14 RX ORDER — FUROSEMIDE 40 MG/1
40 TABLET ORAL
COMMUNITY
Start: 2025-02-28

## 2025-03-14 RX ORDER — MUPIROCIN 20 MG/G
OINTMENT TOPICAL
COMMUNITY
Start: 2025-03-12

## 2025-03-14 NOTE — PROGRESS NOTES
[facility] Cardiology  Office Progress Note  Ricki Trotter  1935  [address] [facility] [address]       Visit Date: 03/14/25    PCP: Sofiya Krishnan APRN  [address] [facility] [address]    IDENTIFICATION: A 89 y.o. male  resident of [place], [place]      PROBLEM LIST:   Valvular heart disease/ HFpEF  Echo 7/2020: EF 50-55%, mild concentric LVH, grade 2 diastolic dysfunction, severely increased LA volume, aortic sclerosis without significant stenosis, mild to moderate MR, mild to moderate TR, RVSP 52 mmHg  Echo, 6/28/2023: EF 51-55%, mild AS, max PG 28, mean PG 14, moderate MR, moderate-severe TR, RVSP 60 mmHg  3/24 echo EF>60% mild AV stenosis(peak 22). TR >55  PVC/ AFib  9d Holter 7/2020: avg 66,  bpm, VE 8.3%, SVE 4.6%, 5 runs of NSVT, 20 runs of SVT, no A-fib  Nuclear stress 7/2020: Normal MPS with no evidence of ischemia, frequent PVCs noted at rest actually decreased with increased heart rate, EF 58%, lower study  PVCs adequately controlled following initiation of beta-blocker  3/24 EKG afib  Hypertension  Dyslipidemia  5/24  [id number]  Type 2 diabetes, controlled  11/23 A1c 6.5  5/24 A1c 5.8  COPD with recurrent pneumonia  Former smoker, asbestos exposure and other chemicals at [employer]  Some entrapped lung d/t scarring and bronchiectasis  Normocytic Anemia  BPH  Moderate, pharyngeal dysphagia        CC:   Chief Complaint   Patient presents with    Nonrheumatic aortic valve stenosis     1-Yr F/U       Allergies  Allergies   Allergen Reactions    Penicillins Swelling     Patient has tolerated PO Cefdinir    Sulfa Antibiotics Swelling       Current Medications  Current Outpatient Medications   Medication Instructions    apixaban (ELIQUIS) 5 MG tablet tablet Take 1 tablet by mouth Every 12 (Twelve) Hours as directed for Atrial Fibrillation    Baclofen (LIORESAL) 5 mg, Every 8 Hours Scheduled    BD Pen Needle Rasheeda 2nd Gen 32G X 4 MM  "misc     busPIRone (BUSPAR) 7.5 mg, Oral, 3 Times Daily    Bystolic 10 mg, Oral, Daily    dapagliflozin Propanediol (FARXIGA) 10 mg, Oral, Daily    esomeprazole (NEXIUM) 40 mg, Oral, 2 Times Daily Before Meals    ferrous sulfate 325 (65 FE) MG tablet 1 tablet, Daily    fluticasone (FLONASE) 50 MCG/ACT nasal spray 2 sprays, Nasal, Daily    furosemide (LASIX) 40 mg, 2 Times Daily Before Meals    glucose blood test strip 1 each, Other, 4 Times Daily, Use as instructed    Januvia 100 mg, Oral, Daily    levocetirizine (XYZAL) 5 mg, Oral, Every Evening    melatonin 5 mg, Oral, Nightly    montelukast (SINGULAIR) 10 mg, Oral, Every Evening    mupirocin (BACTROBAN) 2 % ointment APPLY OINTMENT TOPICALLY 1-2 TIMES DAILY TO BIOPSY SITE    simvastatin (ZOCOR) 40 mg, Oral, Every Evening    spironolactone (ALDACTONE) 25 mg, Oral, Daily    tamsulosin (FLOMAX) 0.4 mg, Oral, Daily    Toujeo Max SoloStar 16 Units, Subcutaneous, Daily    traZODone (DESYREL) 100 mg, Oral, Nightly        History of Present Illness   Ricki Trotter is a 89 y.o. year old male here for follow up.  He had an ER visit end of December for cough, fluid overload.  Lasix increased at that time and he followed up with heart and valve.  Since that time he reports no new dyspnea or chest symptoms.  Now takes furosemide daily and extra dose as needed for lower extremity edema.  Compliant with medical regimen listed above.  He has continued to lose weight related to decreased caloric intake.    OBJECTIVE:  Vitals:    03/14/25 1446   BP: 138/60   BP Location: Left arm   Patient Position: Sitting   Cuff Size: Adult   Pulse: 56   SpO2: 100%   Weight: 63.2 kg (139 lb 6.4 oz)   Height: 182.9 cm (72\")     Wt Readings from Last 3 Encounters:   03/14/25 63.2 kg (139 lb 6.4 oz)   02/04/25 65.5 kg (144 lb 8 oz)   12/27/24 64.9 kg (143 lb)      Body mass index is 18.91 kg/m².    Vitals reviewed.   Constitutional:       Appearance: Healthy appearance.      Comments: Walking " with a cane   Neck:      Vascular: No JVR. JVD normal.   Pulmonary:      Effort: Pulmonary effort is normal.      Breath sounds: Normal breath sounds. No wheezing. No rhonchi. No rales.   Chest:      Chest wall: Not tender to palpatation.   Cardiovascular:      PMI at left midclavicular line. Normal rate. Regular rhythm. Normal S1. Normal S2.       Murmurs: There is a systolic murmur.      No gallop.  No click. No rub.   Pulses:     Intact distal pulses.   Edema:     Ankle: bilateral trace edema of the ankle.  Abdominal:      General: Bowel sounds are normal.      Palpations: Abdomen is soft.      Tenderness: There is no abdominal tenderness.   Skin:     General: Skin is warm and dry.   Neurological:      General: No focal deficit present.      Mental Status: Alert and oriented to person, place and time.         Diagnostic Data:  Procedures    Advance Care Planning   ACP discussion was held with the patient during this visit. Patient has an advance directive (not in EMR), copy requested.         ASSESSMENT:   Diagnosis Plan   1. Chronic heart failure with preserved ejection fraction  dapagliflozin Propanediol (Farxiga) 10 MG tablet            PLAN:  Chronic HFpEF, aortic stenosis mild asymptomatic continue GDMT.  Utilize furosemide daily with extra dose as needed for weight gain/edema.  Tolerating Farxiga.  ACE/ARB not prescribed due to low normal BP in the past. Ongoing weight loss and risk of orthostatic symptoms. Tolerating current regimen    A-fib persistent rate controlled anticoagulated -with continued weight loss would consider decreasing Eliquis to 2.5 mg twice daily.    Hypertension controlled currently Bystolic Aldactone tamsulosin    Mixed dyslipidemia controlled on statin therapy    Scribed for Lukasz Singh MD by Emelia Perez, APRN. 3/14/2025  15:55 EDT   Lukasz Singh MD, [facility]

## 2025-04-16 ENCOUNTER — TELEPHONE (OUTPATIENT)
Dept: CARDIOLOGY | Facility: HOSPITAL | Age: OVER 89
End: 2025-04-16

## 2025-04-16 DIAGNOSIS — I50.32 CHRONIC HEART FAILURE WITH PRESERVED EJECTION FRACTION: ICD-10-CM

## 2025-04-16 RX ORDER — DAPAGLIFLOZIN 10 MG/1
10 TABLET, FILM COATED ORAL DAILY
Qty: 30 TABLET | Refills: 11 | Status: CANCELLED | OUTPATIENT
Start: 2025-04-16

## 2025-04-16 RX ORDER — FUROSEMIDE 40 MG/1
40 TABLET ORAL
Status: CANCELLED | OUTPATIENT
Start: 2025-04-16

## 2025-04-16 NOTE — TELEPHONE ENCOUNTER
Caller: Paty Tobar    Relationship: Emergency Contact    Best call back number: [phone]    Requested Prescriptions:   Requested Prescriptions     Pending Prescriptions Disp Refills    apixaban (ELIQUIS) 5 MG tablet tablet 60 tablet 11     Sig: Take 1 tablet by mouth Every 12 (Twelve) Hours as directed for Atrial Fibrillation    furosemide (LASIX) 40 MG tablet       Sig: Take 1 tablet by mouth 2 (Two) Times a Day Before Meals.    dapagliflozin Propanediol (Farxiga) 10 MG tablet 30 tablet 11     Sig: Take 10 mg by mouth Daily.        Pharmacy where request should be sent:      Last office visit with prescribing clinician: 2/4/2025   Last telemedicine visit with prescribing clinician: Visit date not found   Next office visit with prescribing clinician: 8/5/2025     Additional details provided by patient:     Does the patient have less than a 3 day supply:  [x] Yes  [] No    Would you like a call back once the refill request has been completed: [] Yes [x] No    If the office needs to give you a call back, can they leave a voicemail: [] Yes [x] No    Alva Mills Rep   04/16/25 14:16 EDT

## 2025-04-21 RX ORDER — TRAZODONE HYDROCHLORIDE 100 MG/1
100 TABLET ORAL NIGHTLY
Qty: 90 TABLET | Refills: 0 | Status: SHIPPED | OUTPATIENT
Start: 2025-04-21

## 2025-04-25 ENCOUNTER — EXTERNAL PBMM DATA (OUTPATIENT)
Dept: PHARMACY | Facility: OTHER | Age: OVER 89
End: 2025-04-25
Payer: MEDICARE

## 2025-05-18 DIAGNOSIS — I10 PRIMARY HYPERTENSION: ICD-10-CM

## 2025-05-19 ENCOUNTER — OFFICE VISIT (OUTPATIENT)
Dept: CARDIOLOGY | Facility: HOSPITAL | Age: OVER 89
End: 2025-05-19
Payer: MEDICARE

## 2025-05-19 ENCOUNTER — HOSPITAL ENCOUNTER (OUTPATIENT)
Dept: GENERAL RADIOLOGY | Facility: HOSPITAL | Age: OVER 89
Discharge: HOME | End: 2025-05-19
Admitting: NURSE PRACTITIONER
Payer: MEDICARE

## 2025-05-19 VITALS
HEART RATE: 49 BPM | DIASTOLIC BLOOD PRESSURE: 59 MMHG | SYSTOLIC BLOOD PRESSURE: 134 MMHG | HEIGHT: 72 IN | WEIGHT: 143.13 LBS | BODY MASS INDEX: 19.39 KG/M2 | OXYGEN SATURATION: 95 %

## 2025-05-19 DIAGNOSIS — E78.49 OTHER HYPERLIPIDEMIA: ICD-10-CM

## 2025-05-19 DIAGNOSIS — I10 PRIMARY HYPERTENSION: ICD-10-CM

## 2025-05-19 DIAGNOSIS — I50.33 ACUTE ON CHRONIC HEART FAILURE WITH PRESERVED EJECTION FRACTION: Primary | ICD-10-CM

## 2025-05-19 LAB
ABSOLUTE LUNG FLUID CONTENT: 38 % (ref 20–35)
ANION GAP SERPL CALCULATED.3IONS-SCNC: 13 MMOL/L (ref 5–15)
BUN SERPL-MCNC: 30 MG/DL (ref 8–23)
BUN/CREAT SERPL: 21.7 (ref 7–25)
CALCIUM SPEC-SCNC: 8.8 MG/DL (ref 8.6–10.5)
CHLORIDE SERPL-SCNC: 102 MMOL/L (ref 98–107)
CO2 SERPL-SCNC: 23 MMOL/L (ref 22–29)
CREAT SERPL-MCNC: 1.38 MG/DL (ref 0.76–1.27)
EGFRCR SERPLBLD CKD-EPI 2021: 48.9 ML/MIN/1.73
GLUCOSE SERPL-MCNC: 159 MG/DL (ref 65–99)
NT-PROBNP SERPL-MCNC: ABNORMAL PG/ML (ref 0–1800)
POTASSIUM SERPL-SCNC: 4.9 MMOL/L (ref 3.5–5.2)
SODIUM SERPL-SCNC: 138 MMOL/L (ref 136–145)

## 2025-05-19 PROCEDURE — 83880 ASSAY OF NATRIURETIC PEPTIDE: CPT | Performed by: NURSE PRACTITIONER

## 2025-05-19 PROCEDURE — 71046 X-RAY EXAM CHEST 2 VIEWS: CPT

## 2025-05-19 PROCEDURE — 80048 BASIC METABOLIC PNL TOTAL CA: CPT | Performed by: NURSE PRACTITIONER

## 2025-05-19 RX ORDER — NEBIVOLOL HYDROCHLORIDE 10 MG/1
10 TABLET ORAL DAILY
Qty: 30 TABLET | Refills: 0 | Status: SHIPPED | OUTPATIENT
Start: 2025-05-19

## 2025-05-19 NOTE — PROGRESS NOTES
Heart Failure Clinic    Date:  05/19/25     Vitals:    05/19/25 1414   BP: 134/59   Pulse: (!) 49   SpO2: 95%        Indication:  Heart Failure     Procedure:  ReDS device sensor unit applied to right side of chest and right side of back.  Appropriate positioning confirmed based off of the unit's calculation.  Chest measurement obtained with the chest size ruler.  Measurement session performed over 45 seconds.      Method of arrival:  Other wheelchair     Weighing self daily:  Yes    Taking medications as prescribed:  Yes    Edema:  1+    Shortness of Air:  Yes    Number of pillows used at night:  2-3 pillows     Results:  ReDS Value=        38                  Interpretation:  36-38% - mildly elevated lung fluid content    Tanja Felipe, APRN 05/19/25 16:19 EDT

## 2025-05-19 NOTE — PROGRESS NOTES
Chief Complaint  Follow-up and Edema    Subjective    History of Present Illness {  Problem List  Visit  Diagnosis   Encounters  Notes  Medications  Labs  Result Review Imaging  Media :23}       History of Present Illness   Ricki Trotter, 88 y.o. male with past medical history significant for COPD, GERD, hypertension, PVCs, acute on chronic diastolic heart failure, hyperlipidemia, CKD, atrial fibrillation, pulmonary hypertension, and type 2 diabetes, who presents to [facility] for ongoing evaluation of his hfpef.  Patient also went ultrasound-guided right-sided thoracentesis on 6/3 with removal of 400 mL of fluid.       Echo March 2024 showed EF of 69%, mild AS, moderate TR, markedly elevated RVSP greater than 55 mmHg. Patient underwent a right-sided thoracentesis on 5/13/2024 with 900 mL of fluid removed.    Previous weight 163 and weight now is 143 .2 lbs does report that he has been experiencing worsening dyspnea on exertion, intermittent orthopnea, pedal edema and dry hacking cough over the last week.  Lasix had been decreased recently from 40 twice daily down to 20 mg twice daily.     Echocardiogram 3/8/2024:    Left ventricular systolic function is normal. Calculated left ventricular EF = 69.1% Normal left ventricular cavity size noted. Left ventricular wall thickness is consistent with mild concentric hypertrophy. Elevated left atrial pressure.    The aortic valve exhibits sclerosis. There is moderate calcification of the aortic valve. There is thickening of the aortic valve. The aortic valve appears trileaflet. No aortic valve regurgitation is present. Mild aortic valve stenosis is present    The tricuspid valve is normal in structure. Moderate tricuspid valve regurgitation is present. Estimated right ventricular systolic pressure from tricuspid regurgitation is markedly elevated (>55 mmHg).                 Objective     Vital Signs:   Vitals:    05/19/25 1414  "  BP: 134/59   BP Location: Left arm   Patient Position: Sitting   Cuff Size: Adult   Pulse: (!) 49   SpO2: 95%   Weight: 64.9 kg (143 lb 2 oz)   Height: 182.9 cm (72\")     Body mass index is 19.41 kg/m².  Physical Exam  Vitals and nursing note reviewed.   Constitutional:       Appearance: Normal appearance.   HENT:      Head: Normocephalic.   Eyes:      Pupils: Pupils are equal, round, and reactive to light.   Cardiovascular:      Rate and Rhythm: Normal rate and regular rhythm.      Pulses: Normal pulses.      Heart sounds: Normal heart sounds. No murmur heard.  Pulmonary:      Effort: Pulmonary effort is normal.      Breath sounds: Rales present.   Abdominal:      General: Bowel sounds are normal.      Palpations: Abdomen is soft.   Musculoskeletal:         General: Normal range of motion.      Cervical back: Normal range of motion.      Right lower leg: Edema (1+) present.      Left lower leg: Edema (1+) present.   Skin:     General: Skin is warm and dry.      Capillary Refill: Capillary refill takes less than 2 seconds.   Neurological:      Mental Status: He is alert and oriented to person, place, and time.   Psychiatric:         Mood and Affect: Mood normal.         Thought Content: Thought content normal.              Result Review  Data Reviewed:{ Labs  Result Review  Imaging  Med Tab  Media :23}   CBC (No Diff) (06/04/2024 06:49)  Basic Metabolic Panel (06/04/2024 06:49)  Adult Transthoracic Echo Complete W/ Cont if Necessary Per Protocol (03/08/2024 15:23)        I have reviewed this documentation, made edits where appropriate, and agree with the final report of ReDS data/interpretation. In addition, I have the following to add:    Lung fluid content by ReDS assessment correlates to pulmonary capillary wedge pressure (Uri et al. TRINITY 2018). In patients with LEFT-sided heart failure, elevated readings suggest that the patient MAY benefit from additional diuresis while low readings suggest that the " patient MAY benefit from a reduction in diuretics; clinical correlation is recommended. Low normal and mildly elevated readings may be appropriate for some patients. In patients with RIGHT-sided heart failure, lung fluid content may be a less reliable marker for guiding diuresis.        Assessment and Plan {CC Problem List  Visit Diagnosis  ROS  Review (Popup)  [facility] Maintenance  Quality  BestPractice  Medications  SmartSets  SnapShot Encounters  Media :23}   1. Acute on Chronic heart failure with preserved ejection fraction  Increase lasix back to 40 mg bid  Reds 38 today   Patient received 60 mg of iv lasix in office and voided one time prior to discharge from office. Vitals were monitored and stable   Continue Aldactone 12.5 mg daily, Farxiga 10 mg daily  Bmp and probnp  Heart failure education today including signs and symptoms, the role of the heart failure center, daily weights, low sodium diet (less than 1500 mg per day), and daily physical activity. Reviewed HF Zones with patient and family.  Patient to continue current medications as previously ordered.   2. Primary hypertension  Stable on  Aldactone, bystolic      3. Other hyperlipidemia  Stable on Zocor    Follow Up {Instructions Charge Capture  Follow-up Communications :23}   No follow-ups on file.    Patient was given instructions and counseling regarding his condition or for health maintenance advice. Please see specific information pulled into the AVS if appropriate.  Patient was instructed to call the [facility] with any questions, concerns, or worsening symptoms.

## 2025-05-20 ENCOUNTER — TELEPHONE (OUTPATIENT)
Dept: CARDIOLOGY | Facility: HOSPITAL | Age: OVER 89
End: 2025-05-20
Payer: MEDICARE

## 2025-05-20 NOTE — TELEPHONE ENCOUNTER
Spoke with pt's daughter regarding follow up from IV diuretics given in office yesterday    Per daughter pt was feeling much better yesterday evening and will check with him this morning regarding how he is feeling and call us back if he is feeling bad today.    Daughter asked about chest xray and lab results. RN reviewed lab results compared to last draw - will have provider review xray results and call back if further recs are needed      Please advise

## 2025-05-20 NOTE — PROGRESS NOTES
Heart Failure Clinic    Date:  05/16/2025    Vitals:    05/19/25 1414   BP: 134/59   Pulse: (!) 49   SpO2: 95%        Indication:  Heart Failure     Procedure:  ReDS device sensor unit applied to right side of chest and right side of back.  Appropriate positioning confirmed based off of the unit's calculation.  Chest measurement obtained with the chest size ruler.  Measurement session performed over 45 seconds.      Method of arrival:  Other wheelchair     Weighing self daily:  Yes    Taking medications as prescribed:  Yes    Edema:  1+    Shortness of Air:  Yes    Number of pillows used at night:  >3    Results:  ReDS Value=   38                       Interpretation:  36-38% - mildly elevated lung fluid content    Tanja Felipe, APRN 05/20/25 14:25 EDT

## 2025-05-21 DIAGNOSIS — E78.2 MIXED HYPERLIPIDEMIA: ICD-10-CM

## 2025-05-21 DIAGNOSIS — J30.1 SEASONAL ALLERGIC RHINITIS DUE TO POLLEN: ICD-10-CM

## 2025-05-21 RX ORDER — SIMVASTATIN 40 MG
40 TABLET ORAL EVERY EVENING
Qty: 30 TABLET | Refills: 0 | Status: SHIPPED | OUTPATIENT
Start: 2025-05-21

## 2025-05-21 RX ORDER — FLUTICASONE PROPIONATE 50 MCG
2 SPRAY, SUSPENSION (ML) NASAL DAILY
Qty: 16 G | Refills: 11 | Status: SHIPPED | OUTPATIENT
Start: 2025-05-21

## 2025-05-22 ENCOUNTER — TELEPHONE (OUTPATIENT)
Dept: CARDIOLOGY | Facility: HOSPITAL | Age: OVER 89
End: 2025-05-22
Payer: MEDICARE

## 2025-05-22 RX ORDER — DOXYCYCLINE HYCLATE 100 MG
100 TABLET ORAL 2 TIMES DAILY
Qty: 20 TABLET | Refills: 0 | Status: SHIPPED | OUTPATIENT
Start: 2025-05-22

## 2025-05-22 NOTE — TELEPHONE ENCOUNTER
Pt daughter called and stated that he has not improved. Still short of breath and has not slept in two nights. Inquiry about his chest xray also. Pt taking lasix 40 mg bid.

## 2025-05-26 DIAGNOSIS — E11.9 TYPE 2 DIABETES MELLITUS WITHOUT COMPLICATION, WITH LONG-TERM CURRENT USE OF INSULIN: ICD-10-CM

## 2025-05-26 DIAGNOSIS — Z79.4 TYPE 2 DIABETES MELLITUS WITHOUT COMPLICATION, WITH LONG-TERM CURRENT USE OF INSULIN: ICD-10-CM

## 2025-05-27 ENCOUNTER — OFFICE VISIT (OUTPATIENT)
Dept: FAMILY MEDICINE CLINIC | Facility: CLINIC | Age: OVER 89
End: 2025-05-27
Payer: MEDICARE

## 2025-05-27 VITALS
HEART RATE: 61 BPM | DIASTOLIC BLOOD PRESSURE: 68 MMHG | SYSTOLIC BLOOD PRESSURE: 110 MMHG | WEIGHT: 140 LBS | BODY MASS INDEX: 18.96 KG/M2 | OXYGEN SATURATION: 96 % | HEIGHT: 72 IN

## 2025-05-27 DIAGNOSIS — J30.1 SEASONAL ALLERGIC RHINITIS DUE TO POLLEN: ICD-10-CM

## 2025-05-27 DIAGNOSIS — E56.9 VITAMIN DEFICIENCY: ICD-10-CM

## 2025-05-27 DIAGNOSIS — E55.9 VITAMIN D DEFICIENCY: ICD-10-CM

## 2025-05-27 DIAGNOSIS — Z79.4 TYPE 2 DIABETES MELLITUS WITHOUT COMPLICATION, WITH LONG-TERM CURRENT USE OF INSULIN: Primary | ICD-10-CM

## 2025-05-27 DIAGNOSIS — E78.2 MIXED HYPERLIPIDEMIA: ICD-10-CM

## 2025-05-27 DIAGNOSIS — E11.9 TYPE 2 DIABETES MELLITUS WITHOUT COMPLICATION, WITH LONG-TERM CURRENT USE OF INSULIN: Primary | ICD-10-CM

## 2025-05-27 DIAGNOSIS — I10 PRIMARY HYPERTENSION: ICD-10-CM

## 2025-05-27 DIAGNOSIS — F41.1 GENERALIZED ANXIETY DISORDER: Chronic | ICD-10-CM

## 2025-05-27 DIAGNOSIS — K21.9 GASTROESOPHAGEAL REFLUX DISEASE WITHOUT ESOPHAGITIS: ICD-10-CM

## 2025-05-27 LAB
EXPIRATION DATE: NORMAL
Lab: NORMAL
POC ALBUMIN, URINE: 80 MG/L
POC CREATININE, URINE: 100 MG/DL
POC URINE ALB/CREA RATIO: NORMAL

## 2025-05-27 PROCEDURE — 82044 UR ALBUMIN SEMIQUANTITATIVE: CPT | Performed by: NURSE PRACTITIONER

## 2025-05-27 PROCEDURE — 1159F MED LIST DOCD IN RCRD: CPT | Performed by: NURSE PRACTITIONER

## 2025-05-27 PROCEDURE — 82570 ASSAY OF URINE CREATININE: CPT | Performed by: NURSE PRACTITIONER

## 2025-05-27 PROCEDURE — 1160F RVW MEDS BY RX/DR IN RCRD: CPT | Performed by: NURSE PRACTITIONER

## 2025-05-27 PROCEDURE — 1126F AMNT PAIN NOTED NONE PRSNT: CPT | Performed by: NURSE PRACTITIONER

## 2025-05-27 PROCEDURE — 99214 OFFICE O/P EST MOD 30 MIN: CPT | Performed by: NURSE PRACTITIONER

## 2025-05-27 RX ORDER — LEVOCETIRIZINE DIHYDROCHLORIDE 5 MG/1
5 TABLET, FILM COATED ORAL EVERY EVENING
Qty: 90 TABLET | Refills: 1 | Status: SHIPPED | OUTPATIENT
Start: 2025-05-27

## 2025-05-27 RX ORDER — NEBIVOLOL HYDROCHLORIDE 10 MG/1
10 TABLET ORAL DAILY
Qty: 90 TABLET | Refills: 1 | Status: SHIPPED | OUTPATIENT
Start: 2025-05-27

## 2025-05-27 RX ORDER — ESOMEPRAZOLE MAGNESIUM 40 MG/1
40 CAPSULE, DELAYED RELEASE ORAL
Qty: 180 CAPSULE | Refills: 1 | Status: SHIPPED | OUTPATIENT
Start: 2025-05-27

## 2025-05-27 RX ORDER — INSULIN GLARGINE 300 U/ML
16 INJECTION, SOLUTION SUBCUTANEOUS DAILY
Qty: 6 ML | Refills: 1 | Status: SHIPPED | OUTPATIENT
Start: 2025-05-27

## 2025-05-27 RX ORDER — SIMVASTATIN 40 MG
40 TABLET ORAL EVERY EVENING
Qty: 90 TABLET | Refills: 1 | Status: SHIPPED | OUTPATIENT
Start: 2025-05-27

## 2025-05-27 RX ORDER — BUSPIRONE HYDROCHLORIDE 7.5 MG/1
7.5 TABLET ORAL 3 TIMES DAILY
Qty: 270 TABLET | Refills: 1 | Status: SHIPPED | OUTPATIENT
Start: 2025-05-27

## 2025-05-27 RX ORDER — SITAGLIPTIN 100 MG/1
100 TABLET, FILM COATED ORAL DAILY
Qty: 90 TABLET | Refills: 0 | OUTPATIENT
Start: 2025-05-27

## 2025-05-27 RX ORDER — MONTELUKAST SODIUM 10 MG/1
10 TABLET ORAL EVERY EVENING
Qty: 90 TABLET | Refills: 1 | Status: SHIPPED | OUTPATIENT
Start: 2025-05-27

## 2025-05-27 NOTE — PROGRESS NOTES
Office Note     Name: Ricki Trotter    : 1935     MRN: [id number]     Chief Complaint  Med Refill    Subjective     History of Present Illness:  Ricki Trotter is a 89 y.o. male who presents today for 6-month follow-up.     History of Present Illness  The patient presents for medication refills and lab work.    He has been on antibiotic therapy since 2025 due to aspiration. He is currently on Lasix for CHF as well.    He is seeking refills for his medications, including Toujeo insulin, simvastatin, and montelukast to be sent to [facility] pharmacy. He also requests a 90-day supply of Flonase and Nexium, which he takes twice daily. Additionally, he requires refills for Bystolic, Xyzal and BuSpar.     He has a history of congestive heart failure, which led to a hospitalization in 2024.    SOCIAL HISTORY  He quit smoking 60 years ago. He has never been a heavy drinker but has a beer once in a while.      Objective     Past Medical History:   Diagnosis Date    Allergic rhinitis     Amnesia     Arrhythmia     Atrial fibrillation     Basal cell carcinoma     Benign prostatic hyperplasia     CHF (congestive heart failure) 3-    Chronic kidney disease, stage 3b     COPD (chronic obstructive pulmonary disease)     Diabetes mellitus N/A    Drug dependency     GERD (gastroesophageal reflux disease)     Heart murmur     Hyperlipidemia     Hypertension     Hyperthyroidism     Iron deficiency     Long term (current) use of insulin     Lung nodule     Melanoma     Nephrosclerosis     Pneumonia     Renal insufficiency      Past Surgical History:   Procedure Laterality Date    BRONCHOSCOPY      CATARACT EXTRACTION Bilateral     COLONOSCOPY      PROSTHODONTIC PROCEDURE      SKIN CANCER EXCISION      nose     Family History   Problem Relation Age of Onset    Diabetes Mother     Stroke Mother     Hypertension Mother     COPD Mother     Kidney disease Father     Breast cancer Sister     ALS Sister   "   No Known Problems Sister     No Known Problems Sister     No Known Problems Sister     Diabetes Sister     No Known Problems Brother     Anxiety disorder Daughter     Hyperlipidemia Daughter     Cancer Son        Vital Signs  /68 (BP Location: Left arm)   Pulse 61   Ht 182.9 cm (72\")   Wt 63.5 kg (140 lb)   SpO2 96%   BMI 18.99 kg/m²   Estimated body mass index is 18.99 kg/m² as calculated from the following:    Height as of this encounter: 182.9 cm (72\").    Weight as of this encounter: 63.5 kg (140 lb).    Physical Exam  Vitals reviewed.   Constitutional:       Appearance: Normal appearance.   Cardiovascular:      Rate and Rhythm: Normal rate. Rhythm irregular.      Heart sounds: Murmur heard.   Pulmonary:      Effort: Pulmonary effort is normal.      Breath sounds: Normal breath sounds.   Skin:     General: Skin is warm and dry.   Neurological:      General: No focal deficit present.      Mental Status: He is alert and oriented to person, place, and time.   Psychiatric:         Mood and Affect: Mood normal.         Behavior: Behavior normal.       Physical Exam  Respiratory: Clear to auscultation, no wheezing, rales or rhonchi      Results      POCT Results (if applicable):  Results for orders placed or performed in visit on 05/27/25   POC Albumin/Creatinine Ratio Urine    Collection Time: 05/27/25  4:17 PM    Specimen: Urine   Result Value Ref Range    POC ALBUMIN, URINE 80 mg/L    POC CREATININE, URINE 100 mg/dL    POC Urine Albumin Creatinine Ratio  <30    Lot Number [id number]     Expiration Date 12/31/2025             Assessment and Plan     Diagnoses and all orders for this visit:    1. Type 2 diabetes mellitus without complication, with long-term current use of insulin (Primary)  -     SITagliptin (Januvia) 100 MG tablet; Take 1 tablet by mouth Daily.  Dispense: 90 tablet; Refill: 1  -     Insulin Glargine, 2 Unit Dial, (Toujeo Max SoloStar) 300 UNIT/ML solution pen-injector injection; " Inject 16 Units under the skin into the appropriate area as directed Daily.  Dispense: 6 mL; Refill: 1  -     CBC & Differential  -     Comprehensive Metabolic Panel  -     Hemoglobin A1c  -     POC Albumin/Creatinine Ratio Urine    2. Mixed hyperlipidemia  -     simvastatin (ZOCOR) 40 MG tablet; Take 1 tablet by mouth Every Evening.  Dispense: 90 tablet; Refill: 1    3. Seasonal allergic rhinitis due to pollen  -     montelukast (SINGULAIR) 10 MG tablet; Take 1 tablet by mouth Every Evening.  Dispense: 90 tablet; Refill: 1  -     levocetirizine (XYZAL) 5 MG tablet; Take 1 tablet by mouth Every Evening.  Dispense: 90 tablet; Refill: 1    4. Primary hypertension  -     CBC & Differential  -     Comprehensive Metabolic Panel  -     TSH  -     T4, Free  -     Bystolic 10 MG tablet; Take 1 tablet by mouth Daily.  Dispense: 90 tablet; Refill: 1    5. Vitamin D deficiency  -     Vitamin D,25-Hydroxy    6. Vitamin deficiency  -     Vitamin B12  -     Folate  -     Magnesium    7. Gastroesophageal reflux disease without esophagitis  -     esomeprazole (nexIUM) 40 MG capsule; Take 1 capsule by mouth 2 (Two) Times a Day Before Meals.  Dispense: 180 capsule; Refill: 1    8. Generalized anxiety disorder  -     busPIRone (BUSPAR) 7.5 MG tablet; Take 1 tablet by mouth 3 (Three) Times a Day.  Dispense: 270 tablet; Refill: 1      Assessment & Plan  1. Medication Management.  - Prescriptions for Januvia, Toujeo insulin, simvastatin, montelukast, Bystolic, Nexium, Xyzal, and BuSpar have been renewed and sent to the [facility] pharmacy.  - The Nexium prescription has been adjusted to a 90-day supply.  - The patient inquired about the possibility of 90-day supplies for all medications, which was confirmed as the standard practice unless an upcoming appointment necessitates a shorter supply.    2. Laboratory Workup.  - A comprehensive blood panel will be conducted today to assess A1c levels, complete blood count, and kidney function.  - A  urine sample will also be collected today to evaluate renal function.  - If the patient is unable to provide a urine sample today, it will be deferred until the next visit in November 2025.    3. Congestive Heart Failure.  - The patient is currently on an antibiotic due to a recent aspiration event.  - He reports that he had to get fluid off his lungs again.  - No changes to the current medication regimen were discussed.    4. Follow-up.  - The patient will follow up in November 2025 for his Medicare wellness visit.  - Any necessary adjustments or additional concerns will be addressed at that time.      Follow Up  Return in about 6 months (around 11/26/2025) for Medicare Wellness with fasting labs.      Patient or patient representative verbalized consent for the use of Ambient Listening during the visit with  LINDA Purcell for chart documentation. 5/27/2025  15:58 EDT    LINDA Purcell

## 2025-05-28 LAB
25(OH)D3+25(OH)D2 SERPL-MCNC: 52.2 NG/ML (ref 30–100)
ALBUMIN SERPL-MCNC: 4.1 G/DL (ref 3.7–4.7)
ALP SERPL-CCNC: 125 IU/L (ref 44–121)
ALT SERPL-CCNC: 19 IU/L (ref 0–44)
AST SERPL-CCNC: 21 IU/L (ref 0–40)
BASOPHILS # BLD AUTO: 0 X10E3/UL (ref 0–0.2)
BASOPHILS NFR BLD AUTO: 0 %
BILIRUB SERPL-MCNC: 0.6 MG/DL (ref 0–1.2)
BUN SERPL-MCNC: 28 MG/DL (ref 8–27)
BUN/CREAT SERPL: 18 (ref 10–24)
CALCIUM SERPL-MCNC: 9 MG/DL (ref 8.6–10.2)
CHLORIDE SERPL-SCNC: 98 MMOL/L (ref 96–106)
CO2 SERPL-SCNC: 22 MMOL/L (ref 20–29)
CREAT SERPL-MCNC: 1.58 MG/DL (ref 0.76–1.27)
EGFRCR SERPLBLD CKD-EPI 2021: 42 ML/MIN/1.73
EOSINOPHIL # BLD AUTO: 0.1 X10E3/UL (ref 0–0.4)
EOSINOPHIL NFR BLD AUTO: 1 %
ERYTHROCYTE [DISTWIDTH] IN BLOOD BY AUTOMATED COUNT: 13.2 % (ref 11.6–15.4)
FOLATE SERPL-MCNC: 17.7 NG/ML
GLOBULIN SER CALC-MCNC: 2.1 G/DL (ref 1.5–4.5)
GLUCOSE SERPL-MCNC: 183 MG/DL (ref 70–99)
HBA1C MFR BLD: 6.2 % (ref 4.8–5.6)
HCT VFR BLD AUTO: 37.5 % (ref 37.5–51)
HGB BLD-MCNC: 11.8 G/DL (ref 13–17.7)
IMM GRANULOCYTES # BLD AUTO: 0 X10E3/UL (ref 0–0.1)
IMM GRANULOCYTES NFR BLD AUTO: 0 %
LYMPHOCYTES # BLD AUTO: 2 X10E3/UL (ref 0.7–3.1)
LYMPHOCYTES NFR BLD AUTO: 19 %
MAGNESIUM SERPL-MCNC: 2.2 MG/DL (ref 1.6–2.3)
MCH RBC QN AUTO: 29.6 PG (ref 26.6–33)
MCHC RBC AUTO-ENTMCNC: 31.5 G/DL (ref 31.5–35.7)
MCV RBC AUTO: 94 FL (ref 79–97)
MONOCYTES # BLD AUTO: 0.6 X10E3/UL (ref 0.1–0.9)
MONOCYTES NFR BLD AUTO: 6 %
NEUTROPHILS # BLD AUTO: 7.5 X10E3/UL (ref 1.4–7)
NEUTROPHILS NFR BLD AUTO: 74 %
PLATELET # BLD AUTO: 212 X10E3/UL (ref 150–450)
POTASSIUM SERPL-SCNC: 4.3 MMOL/L (ref 3.5–5.2)
PROT SERPL-MCNC: 6.2 G/DL (ref 6–8.5)
RBC # BLD AUTO: 3.98 X10E6/UL (ref 4.14–5.8)
SODIUM SERPL-SCNC: 137 MMOL/L (ref 134–144)
T4 FREE SERPL-MCNC: 1.38 NG/DL (ref 0.82–1.77)
TSH SERPL DL<=0.005 MIU/L-ACNC: 3.7 UIU/ML (ref 0.45–4.5)
VIT B12 SERPL-MCNC: 941 PG/ML (ref 232–1245)
WBC # BLD AUTO: 10.3 X10E3/UL (ref 3.4–10.8)

## 2025-06-05 ENCOUNTER — OFFICE VISIT (OUTPATIENT)
Dept: FAMILY MEDICINE CLINIC | Facility: CLINIC | Age: OVER 89
End: 2025-06-05
Payer: MEDICARE

## 2025-06-05 VITALS
WEIGHT: 138 LBS | SYSTOLIC BLOOD PRESSURE: 112 MMHG | HEIGHT: 72 IN | BODY MASS INDEX: 18.69 KG/M2 | DIASTOLIC BLOOD PRESSURE: 68 MMHG | HEART RATE: 53 BPM | OXYGEN SATURATION: 98 %

## 2025-06-05 DIAGNOSIS — I50.31 ACUTE DIASTOLIC CHF (CONGESTIVE HEART FAILURE): ICD-10-CM

## 2025-06-05 DIAGNOSIS — E11.9 TYPE 2 DIABETES MELLITUS WITHOUT COMPLICATION, WITH LONG-TERM CURRENT USE OF INSULIN: ICD-10-CM

## 2025-06-05 DIAGNOSIS — Z79.4 TYPE 2 DIABETES MELLITUS WITHOUT COMPLICATION, WITH LONG-TERM CURRENT USE OF INSULIN: ICD-10-CM

## 2025-06-05 DIAGNOSIS — S90.512A ABRASION OF LEFT ANKLE, INITIAL ENCOUNTER: Primary | ICD-10-CM

## 2025-06-05 NOTE — PROGRESS NOTES
".Chief Complaint  Skin Lesion (Lesion on outer Lt ankle )    Subjective          History of Present Illness  Ricki Trotter is here today with his  History of Present Illness  The patient presents for evaluation of an ankle issue. Patients daughter accompanies and states that last night saw Patient after coming out of the shower and noticed a red spot on the outside of his left ankle.     He noticed an issue with his ankle last night. He typically wears socks and shoes all the time. He was unaware of the condition until it was brought to his attention. The accompanying adult female applied peroxide to the area and exerted pressure, which did not elicit any pain response from him.    He has a history of diabetes and congestive heart failure. He recently completed a course of antibiotics.    Objective   Vital Signs:   /68   Pulse 53   Ht 182.9 cm (72\")   Wt 62.6 kg (138 lb)   SpO2 98%   BMI 18.72 kg/m²     Body mass index is 18.72 kg/m².  BMI is within normal parameters. No other follow-up for BMI required.      Review of Systems      Current Outpatient Medications:   •  apixaban (ELIQUIS) 2.5 MG tablet tablet, Take 1 tablet by mouth 2 (Two) Times a Day., Disp: 180 tablet, Rfl: 3  •  Baclofen (LIORESAL) 5 MG tablet, Take 1 tablet by mouth Every 8 (Eight) Hours., Disp: , Rfl:   •  BD Pen Needle Rasheeda 2nd Gen 32G X 4 MM misc, , Disp: , Rfl:   •  busPIRone (BUSPAR) 7.5 MG tablet, Take 1 tablet by mouth 3 (Three) Times a Day., Disp: 270 tablet, Rfl: 1  •  Bystolic 10 MG tablet, Take 1 tablet by mouth Daily., Disp: 90 tablet, Rfl: 1  •  dapagliflozin Propanediol (Farxiga) 10 MG tablet, Take 10 mg by mouth Daily., Disp: 30 tablet, Rfl: 11  •  doxycycline (VIBRAMYICN) 100 MG tablet, Take 1 tablet by mouth 2 (Two) Times a Day., Disp: 20 tablet, Rfl: 0  •  esomeprazole (nexIUM) 40 MG capsule, Take 1 capsule by mouth 2 (Two) Times a Day Before Meals., Disp: 180 capsule, Rfl: 1  •  ferrous sulfate 325 (65 FE) MG " tablet, Take 1 tablet by mouth Daily., Disp: , Rfl:   •  fluticasone (FLONASE) 50 MCG/ACT nasal spray, Administer 2 sprays into the nostril(s) as directed by provider Daily., Disp: 16 g, Rfl: 11  •  furosemide (LASIX) 40 MG tablet, Take 1 tablet by mouth 2 (Two) Times a Day Before Meals., Disp: , Rfl:   •  glucose blood test strip, 1 each by Other route 4 (Four) Times a Day. Use as instructed, Disp: 200 each, Rfl: 12  •  Insulin Glargine, 2 Unit Dial, (Toujeo Max SoloStar) 300 UNIT/ML solution pen-injector injection, Inject 16 Units under the skin into the appropriate area as directed Daily., Disp: 6 mL, Rfl: 1  •  levocetirizine (XYZAL) 5 MG tablet, Take 1 tablet by mouth Every Evening., Disp: 90 tablet, Rfl: 1  •  melatonin 5 MG tablet tablet, Take 1 tablet by mouth Every Night., Disp: 30 tablet, Rfl: 0  •  montelukast (SINGULAIR) 10 MG tablet, Take 1 tablet by mouth Every Evening., Disp: 90 tablet, Rfl: 1  •  mupirocin (BACTROBAN) 2 % ointment, , Disp: , Rfl:   •  simvastatin (ZOCOR) 40 MG tablet, Take 1 tablet by mouth Every Evening., Disp: 90 tablet, Rfl: 1  •  SITagliptin (Januvia) 100 MG tablet, Take 1 tablet by mouth Daily., Disp: 90 tablet, Rfl: 1  •  spironolactone (ALDACTONE) 25 MG tablet, Take 1 tablet by mouth Daily., Disp: 90 tablet, Rfl: 3  •  tamsulosin (FLOMAX) 0.4 MG capsule 24 hr capsule, TAKE ONE CAPSULE BY MOUTH EVERY DAY, Disp: 90 capsule, Rfl: 1  •  traZODone (DESYREL) 100 MG tablet, TAKE 1 TABLET BY MOUTH ONCE DAILY AT NIGHT, Disp: 90 tablet, Rfl: 0    Allergies: Penicillins and Sulfa antibiotics    Physical Exam  Vitals and nursing note reviewed.   Constitutional:       General: He is not in acute distress.     Appearance: Normal appearance. He is normal weight. He is not ill-appearing, toxic-appearing or diaphoretic.   HENT:      Head: Normocephalic and atraumatic.   Cardiovascular:      Rate and Rhythm: Normal rate and regular rhythm.      Heart sounds: Murmur heard.      No friction rub.  No gallop.   Pulmonary:      Effort: Pulmonary effort is normal.      Breath sounds: Normal breath sounds.   Musculoskeletal:        Feet:    Neurological:      General: No focal deficit present.      Mental Status: He is alert.   Psychiatric:         Mood and Affect: Mood normal.         Behavior: Behavior normal.      Physical Exam      Result Review :          Results               Assessment and Plan    Diagnoses and all orders for this visit:    1. Abrasion of left ankle, initial encounter (Primary)  - The ankle abrasion is likely due to friction from his footwear, specifically his [employer] shoes.  - There is no evidence of infection at this time.  - Advised to apply Vaseline to the affected area to promote skin moisture and healing.  - Instructed to cover the abrasion with a Band-Aid to prevent further irritation from his shoes.  2. Acute diastolic CHF (congestive heart failure)  - The patient has a history of congestive heart failure.  - Recent completion of a course of antibiotics was noted.  - Lung auscultation revealed no new concerns; heart murmur was acknowledged.  - No changes to current heart failure management were made at this time.  3. Type 2 diabetes mellitus without complication, with long-term current use of insulin    - Advised to continue monitoring blood glucose levels regularly.  - No changes to diabetes management were made at this time.  Assessment & Plan        Follow Up   No follow-ups on file.  Patient was given instructions and counseling regarding his condition or for health maintenance advice. Please see specific information pulled into the AVS if appropriate.     Patient or patient representative verbalized consent for the use of Ambient Listening during the visit with  MAURICIO Thompson for chart documentation. 6/5/2025  16:08 EDT    MAURICIO Thompson  06/05/2025

## 2025-06-06 ENCOUNTER — OFFICE VISIT (OUTPATIENT)
Dept: PULMONOLOGY | Facility: CLINIC | Age: OVER 89
End: 2025-06-06
Payer: MEDICARE

## 2025-06-06 VITALS
DIASTOLIC BLOOD PRESSURE: 58 MMHG | HEIGHT: 72 IN | HEART RATE: 80 BPM | OXYGEN SATURATION: 96 % | WEIGHT: 138 LBS | SYSTOLIC BLOOD PRESSURE: 112 MMHG | BODY MASS INDEX: 18.69 KG/M2 | TEMPERATURE: 97.7 F

## 2025-06-06 DIAGNOSIS — R91.8 MASS OF LOWER LOBE OF LEFT LUNG: ICD-10-CM

## 2025-06-06 DIAGNOSIS — J44.9 CHRONIC OBSTRUCTIVE PULMONARY DISEASE, UNSPECIFIED COPD TYPE: Primary | ICD-10-CM

## 2025-06-06 PROCEDURE — 1159F MED LIST DOCD IN RCRD: CPT | Performed by: INTERNAL MEDICINE

## 2025-06-06 PROCEDURE — 1160F RVW MEDS BY RX/DR IN RCRD: CPT | Performed by: INTERNAL MEDICINE

## 2025-06-06 PROCEDURE — 99214 OFFICE O/P EST MOD 30 MIN: CPT | Performed by: INTERNAL MEDICINE

## 2025-06-06 NOTE — PROGRESS NOTES
Subjective:     Chief Complaint:   Chief Complaint   Patient presents with    Chronic obstructive pulmonary disease, unspecified COPD type     Follow Up       HPI:    Ricki Trotter is a 89 y.o. male here for follow-up of COPD    I have seen him intermittently since 2018.  He has also been seen by Demetria MCKEON.  My last visit with him was in March 2023.  He has a history of recurrent pneumonia.  He has a chronic masslike density in the left lower lobe associate with bronchiectasis and pleural thickening.  He has history of COPD.  He quit smoking 1962 but was exposed to asbestos and other nonspecific chemicals per his report when he worked at [employer].  The mass in question has been felt to be related to rounded atelectasis likely due to some postinflammatory scarring.  Due to its stability scheduled CT imaging was discontinued.    He is here for follow-up.  He remains on as needed albuterol only.  His main issues have been difficult to control diastolic heart failure which he has been on recent variable doses of diuretics ordered by cardiology.  He is seen in the heart/valve center as well as by Dr. Singh.    Current medications are:   Current Outpatient Medications:     apixaban (ELIQUIS) 2.5 MG tablet tablet, Take 1 tablet by mouth 2 (Two) Times a Day., Disp: 180 tablet, Rfl: 3    Baclofen (LIORESAL) 5 MG tablet, Take 1 tablet by mouth Every 8 (Eight) Hours., Disp: , Rfl:     BD Pen Needle Rasheeda 2nd Gen 32G X 4 MM misc, , Disp: , Rfl:     busPIRone (BUSPAR) 7.5 MG tablet, Take 1 tablet by mouth 3 (Three) Times a Day., Disp: 270 tablet, Rfl: 1    Bystolic 10 MG tablet, Take 1 tablet by mouth Daily., Disp: 90 tablet, Rfl: 1    dapagliflozin Propanediol (Farxiga) 10 MG tablet, Take 10 mg by mouth Daily., Disp: 30 tablet, Rfl: 11    doxycycline (VIBRAMYICN) 100 MG tablet, Take 1 tablet by mouth 2 (Two) Times a Day., Disp: 20 tablet, Rfl: 0    esomeprazole (nexIUM) 40 MG capsule, Take 1 capsule by  mouth 2 (Two) Times a Day Before Meals., Disp: 180 capsule, Rfl: 1    ferrous sulfate 325 (65 FE) MG tablet, Take 1 tablet by mouth Daily., Disp: , Rfl:     fluticasone (FLONASE) 50 MCG/ACT nasal spray, Administer 2 sprays into the nostril(s) as directed by provider Daily., Disp: 16 g, Rfl: 11    furosemide (LASIX) 40 MG tablet, Take 1 tablet by mouth 2 (Two) Times a Day Before Meals., Disp: , Rfl:     glucose blood test strip, 1 each by Other route 4 (Four) Times a Day. Use as instructed, Disp: 200 each, Rfl: 12    Insulin Glargine, 2 Unit Dial, (Toujeo Max SoloStar) 300 UNIT/ML solution pen-injector injection, Inject 16 Units under the skin into the appropriate area as directed Daily., Disp: 6 mL, Rfl: 1    levocetirizine (XYZAL) 5 MG tablet, Take 1 tablet by mouth Every Evening., Disp: 90 tablet, Rfl: 1    melatonin 5 MG tablet tablet, Take 1 tablet by mouth Every Night., Disp: 30 tablet, Rfl: 0    montelukast (SINGULAIR) 10 MG tablet, Take 1 tablet by mouth Every Evening., Disp: 90 tablet, Rfl: 1    mupirocin (BACTROBAN) 2 % ointment, , Disp: , Rfl:     simvastatin (ZOCOR) 40 MG tablet, Take 1 tablet by mouth Every Evening., Disp: 90 tablet, Rfl: 1    SITagliptin (Januvia) 100 MG tablet, Take 1 tablet by mouth Daily., Disp: 90 tablet, Rfl: 1    spironolactone (ALDACTONE) 25 MG tablet, Take 1 tablet by mouth Daily., Disp: 90 tablet, Rfl: 3    tamsulosin (FLOMAX) 0.4 MG capsule 24 hr capsule, TAKE ONE CAPSULE BY MOUTH EVERY DAY, Disp: 90 capsule, Rfl: 1    traZODone (DESYREL) 100 MG tablet, TAKE 1 TABLET BY MOUTH ONCE DAILY AT NIGHT, Disp: 90 tablet, Rfl: 0.      The patient's relevant past medical, surgical, family and social history were reviewed and updated in Epic as appropriate.     ROS:    Review of Systems      Objective:    Physical Exam  Vitals reviewed.   Constitutional:       Appearance: He is well-developed.   HENT:      Head: Normocephalic.      Mouth/Throat:      Mouth: Mucous membranes are moist.       Pharynx: Oropharynx is clear.   Cardiovascular:      Rate and Rhythm: Normal rate and regular rhythm.      Heart sounds: No murmur heard.     No friction rub. No gallop.   Pulmonary:      Effort: Pulmonary effort is normal. No respiratory distress.      Breath sounds: Rales present. No wheezing.      Comments: Decreased breath sounds and bibasilar crackles  Skin:     General: Skin is warm and dry.   Neurological:      Mental Status: He is alert and oriented to person, place, and time.   Psychiatric:         Behavior: Behavior normal.         Thought Content: Thought content normal.         Data:    CXR: 5/19/2025: Changes of COPD with trace pleural effusions    PFT: No additional    Assessment:    Problem List Items Addressed This Visit          Pulmonary Problems    Mass of lower lobe of left lung    Overview   Probable inflammation associated with focal bronchiectasis  Stable CT scan         COPD (chronic obstructive pulmonary disease) - Primary         COPD: Mild to moderate mixed defects by PFTs.  Albuterol as needed only  Chronic rounded density left lower lobe: Associated with pleural thickening and likely represents an trapped lung with chronic rounded atelectasis.  This has been present since 2015.  No longitudinal follow-up needed.  History of recurrent pneumonia: Likely related to the chronically damaged area in his left lung with decreased secretion clearance    Plan:    As needed albuterol 2 puffs 4 times daily as needed  Primary issues with his respiratory status currently is his difficult to control heart failure and this has been managed by cardiology.  His Lasix dose was recently decreased with fluid retention and it has been increased again.  No need for longitudinal imaging.  Any imaging can be symptom based  Continued follow-up in the pulmonary clinic at least yearly    Level of Risk Moderate due to: prescription drug management    Discussed in detail with the patient.  He will call prior to his  follow up visit for any new problems.    Signed by  Gregorio Herbert MD

## 2025-06-09 ENCOUNTER — TELEPHONE (OUTPATIENT)
Dept: CARDIOLOGY | Facility: HOSPITAL | Age: OVER 89
End: 2025-06-09
Payer: MEDICARE

## 2025-06-09 RX ORDER — FUROSEMIDE 40 MG/1
40 TABLET ORAL
Qty: 180 TABLET | Refills: 0 | Status: SHIPPED | OUTPATIENT
Start: 2025-06-09

## 2025-06-23 RX ORDER — PEN NEEDLE, DIABETIC 32GX 5/32"
NEEDLE, DISPOSABLE MISCELLANEOUS
Qty: 100 EACH | Refills: 3 | Status: SHIPPED | OUTPATIENT
Start: 2025-06-23

## 2025-06-23 NOTE — TELEPHONE ENCOUNTER
Caller: Paty Tobar    Relationship: Emergency Contact    Best call back number: [phone]     Requested Prescriptions:   Requested Prescriptions     Pending Prescriptions Disp Refills    BD Pen Needle Rasheeda 2nd Gen 32G X 4 MM [facility]          Pharmacy where request should be sent: [facility] [address] [place] [address] [phone] [facility] [phone] FX     Last office visit with prescribing clinician: 5/27/2025   Last telemedicine visit with prescribing clinician: Visit date not found   Next office visit with prescribing clinician: Visit date not found     Does the patient have less than a 3 day supply:  [x] Yes  [] No    Would you like a call back once the refill request has been completed: [] Yes [x] No    If the office needs to give you a call back, can they leave a voicemail: [] Yes [x] No    Alva Armstrong Rep   06/23/25 11:04 EDT

## 2025-07-08 ENCOUNTER — TELEPHONE (OUTPATIENT)
Dept: CARDIOLOGY | Facility: HOSPITAL | Age: OVER 89
End: 2025-07-08
Payer: MEDICARE

## 2025-07-08 NOTE — TELEPHONE ENCOUNTER
His daughter called and stated that he has had a cough for the past two weeks and not getting any better. No noticeable weight gain or edema but she would like to get him checked. I have him on schedule for next week. Would you like sooner? Pt taking all meds as directed.

## 2025-07-11 ENCOUNTER — OFFICE VISIT (OUTPATIENT)
Dept: CARDIOLOGY | Facility: HOSPITAL | Age: OVER 89
End: 2025-07-11
Payer: MEDICARE

## 2025-07-11 VITALS
OXYGEN SATURATION: 98 % | SYSTOLIC BLOOD PRESSURE: 128 MMHG | HEART RATE: 53 BPM | BODY MASS INDEX: 18.28 KG/M2 | DIASTOLIC BLOOD PRESSURE: 60 MMHG | HEIGHT: 72 IN | WEIGHT: 135 LBS

## 2025-07-11 DIAGNOSIS — I50.33 ACUTE ON CHRONIC HEART FAILURE WITH PRESERVED EJECTION FRACTION: Primary | ICD-10-CM

## 2025-07-11 LAB — ABSOLUTE LUNG FLUID CONTENT: 33 % (ref 20–35)

## 2025-07-11 RX ORDER — CEFDINIR 300 MG/1
300 CAPSULE ORAL 2 TIMES DAILY
Qty: 20 CAPSULE | Refills: 0 | Status: SHIPPED | OUTPATIENT
Start: 2025-07-11 | End: 2025-07-21

## 2025-07-11 NOTE — PROGRESS NOTES
"Chief Complaint  Congestive Heart Failure    Subjective    History of Present Illness {  Problem List  Visit  Diagnosis   Encounters  Notes  Medications  Labs  Result Review Imaging  Media :23}       History of Present Illness   Ricki Trotter, 88 y.o. male with past medical history significant for COPD, GERD, hypertension, PVCs, acute on chronic diastolic heart failure, hyperlipidemia, CKD, atrial fibrillation, pulmonary hypertension, and type 2 diabetes, who presents to [facility] and Valve clinic for ongoing evaluation of his hfpef.  Patient also went ultrasound-guided right-sided thoracentesis on 6/3 with removal of 400 mL of fluid.       Echo March 2024 showed EF of 69%, mild AS, moderate TR, markedly elevated RVSP greater than 55 mmHg. Patient underwent a right-sided thoracentesis on 5/13/2024 with 900 mL of fluid removed.    Previous weight 163 and weight now is 135 lbs does report that he has been experiencing hacking cough over the last week with associated fatigue.  Echocardiogram 3/8/2024:    Left ventricular systolic function is normal. Calculated left ventricular EF = 69.1% Normal left ventricular cavity size noted. Left ventricular wall thickness is consistent with mild concentric hypertrophy. Elevated left atrial pressure.    The aortic valve exhibits sclerosis. There is moderate calcification of the aortic valve. There is thickening of the aortic valve. The aortic valve appears trileaflet. No aortic valve regurgitation is present. Mild aortic valve stenosis is present    The tricuspid valve is normal in structure. Moderate tricuspid valve regurgitation is present. Estimated right ventricular systolic pressure from tricuspid regurgitation is markedly elevated (>55      Objective     Vital Signs:   Vitals:    07/11/25 0945   BP: 128/60   BP Location: Left arm   Patient Position: Sitting   Pulse: 53   SpO2: 98%   Weight: 61.2 kg (135 lb)   Height: 182.9 cm (72\")     Body mass " index is 18.31 kg/m².  Physical Exam  Vitals and nursing note reviewed.   Constitutional:       Appearance: Normal appearance.   HENT:      Head: Normocephalic.   Eyes:      Pupils: Pupils are equal, round, and reactive to light.   Cardiovascular:      Rate and Rhythm: Normal rate. Rhythm irregular.      Pulses: Normal pulses.      Heart sounds: Normal heart sounds. No murmur heard.  Pulmonary:      Effort: Pulmonary effort is normal.      Breath sounds: No rales.   Abdominal:      General: Bowel sounds are normal.      Palpations: Abdomen is soft.   Musculoskeletal:         General: Normal range of motion.      Cervical back: Normal range of motion.      Right lower leg: No edema.      Left lower leg: No edema.   Skin:     General: Skin is warm and dry.      Capillary Refill: Capillary refill takes less than 2 seconds.   Neurological:      Mental Status: He is alert and oriented to person, place, and time.   Psychiatric:         Mood and Affect: Mood normal.         Thought Content: Thought content normal.              Result Review  Data Reviewed:{ Labs  Result Review  Imaging  Med Tab  Media :23}   CBC (No Diff) (06/04/2024 06:49)  Basic Metabolic Panel (06/04/2024 06:49)  Adult Transthoracic Echo Complete W/ Cont if Necessary Per Protocol (03/08/2024 15:23)        I have reviewed this documentation, made edits where appropriate, and agree with the final report of ReDS data/interpretation. In addition, I have the following to add:    Lung fluid content by ReDS assessment correlates to pulmonary capillary wedge pressure (Uri et al. TRINITY 2018). In patients with LEFT-sided heart failure, elevated readings suggest that the patient MAY benefit from additional diuresis while low readings suggest that the patient MAY benefit from a reduction in diuretics; clinical correlation is recommended. Low normal and mildly elevated readings may be appropriate for some patients. In patients with RIGHT-sided heart failure,  lung fluid content may be a less reliable marker for guiding diuresis.        Assessment and Plan {CC Problem List  Visit Diagnosis  ROS  Review (Popup)  Health Maintenance  Quality  BestPractice  Medications  SmartSets  SnapShot Encounters  Media :23}   1. Acute on Chronic heart failure with preserved ejection fraction  lasix back to 40 mg bid  Reds 33 today   Continue Aldactone 12.5 mg daily, Farxiga 10 mg daily    Heart failure education today including signs and symptoms, the role of the heart failure center, daily weights, low sodium diet (less than 1500 mg per day), and daily physical activity. Reviewed HF Zones with patient and family.  Patient to continue current medications as previously ordered.   2. Primary hypertension  Stable on  Aldactone, bystolic      3. Other hyperlipidemia  Stable on Zocor      Begin omnicef 300 mg bid x 10 days  Follow Up {Instructions Charge Capture  Follow-up Communications :23}   August 2025    Patient was given instructions and counseling regarding his condition or for health maintenance advice. Please see specific information pulled into the AVS if appropriate.  Patient was instructed to call the Heart and Valve Center with any questions, concerns, or worsening symptoms.

## 2025-07-11 NOTE — PROGRESS NOTES
Heart Failure Clinic    Date:  07/11/25     Vitals:    07/11/25 0945   BP: 128/60   Pulse: 53   SpO2: 98%        Indication:  Heart Failure     Procedure:  ReDS device sensor unit applied to right side of chest and right side of back.  Appropriate positioning confirmed based off of the unit's calculation.  Chest measurement obtained with the chest size ruler.  Measurement session performed over 45 seconds.      Method of arrival:  Ambulatory with cane    Weighing self daily:  Yes    Taking medications as prescribed:  Yes    Edema:  No    Shortness of Air:  No    Number of pillows used at night:  <2    Results:  ReDS Value=33                          Interpretation:  25-35% - normal/ideal lung fluid content    Tanja Felipe, LINDA 07/11/25 11:48 EDT

## 2025-07-15 RX ORDER — TRAZODONE HYDROCHLORIDE 100 MG/1
100 TABLET ORAL NIGHTLY
Qty: 90 TABLET | Refills: 0 | Status: SHIPPED | OUTPATIENT
Start: 2025-07-15

## 2025-07-16 ENCOUNTER — APPOINTMENT (OUTPATIENT)
Dept: CT IMAGING | Facility: HOSPITAL | Age: OVER 89
End: 2025-07-16
Payer: MEDICARE

## 2025-07-16 ENCOUNTER — APPOINTMENT (OUTPATIENT)
Dept: GENERAL RADIOLOGY | Facility: HOSPITAL | Age: OVER 89
End: 2025-07-16
Payer: MEDICARE

## 2025-07-16 ENCOUNTER — TELEPHONE (OUTPATIENT)
Dept: UROLOGY | Facility: CLINIC | Age: OVER 89
End: 2025-07-16
Payer: MEDICARE

## 2025-07-16 ENCOUNTER — HOSPITAL ENCOUNTER (EMERGENCY)
Facility: HOSPITAL | Age: OVER 89
Discharge: HOME | End: 2025-07-16
Attending: EMERGENCY MEDICINE | Admitting: EMERGENCY MEDICINE
Payer: MEDICARE

## 2025-07-16 VITALS
DIASTOLIC BLOOD PRESSURE: 55 MMHG | RESPIRATION RATE: 16 BRPM | WEIGHT: 140 LBS | HEIGHT: 72 IN | SYSTOLIC BLOOD PRESSURE: 136 MMHG | HEART RATE: 50 BPM | TEMPERATURE: 97.5 F | OXYGEN SATURATION: 91 % | BODY MASS INDEX: 18.96 KG/M2

## 2025-07-16 DIAGNOSIS — K59.00 ACUTE CONSTIPATION: ICD-10-CM

## 2025-07-16 DIAGNOSIS — R33.8 ACUTE URINARY RETENTION: Primary | ICD-10-CM

## 2025-07-16 DIAGNOSIS — R10.9 ACUTE ABDOMINAL PAIN: ICD-10-CM

## 2025-07-16 LAB
ALBUMIN SERPL-MCNC: 4.2 G/DL (ref 3.5–5.2)
ALBUMIN/GLOB SERPL: 1.6 G/DL
ALP SERPL-CCNC: 140 U/L (ref 39–117)
ALT SERPL W P-5'-P-CCNC: 24 U/L (ref 1–41)
ANION GAP SERPL CALCULATED.3IONS-SCNC: 11 MMOL/L (ref 5–15)
AST SERPL-CCNC: 24 U/L (ref 1–40)
BASOPHILS # BLD AUTO: 0.04 10*3/MM3 (ref 0–0.2)
BASOPHILS NFR BLD AUTO: 0.4 % (ref 0–1.5)
BILIRUB SERPL-MCNC: 0.6 MG/DL (ref 0–1.2)
BILIRUB UR QL STRIP: NEGATIVE
BUN SERPL-MCNC: 32.9 MG/DL (ref 8–23)
BUN/CREAT SERPL: 24.9 (ref 7–25)
CALCIUM SPEC-SCNC: 9 MG/DL (ref 8.2–9.6)
CHLORIDE SERPL-SCNC: 97 MMOL/L (ref 98–107)
CLARITY UR: CLEAR
CO2 SERPL-SCNC: 28 MMOL/L (ref 22–29)
COLOR UR: YELLOW
CREAT SERPL-MCNC: 1.32 MG/DL (ref 0.76–1.27)
D-LACTATE SERPL-SCNC: 1.1 MMOL/L (ref 0.5–2)
DEPRECATED RDW RBC AUTO: 44.5 FL (ref 37–54)
EGFRCR SERPLBLD CKD-EPI 2021: 51.2 ML/MIN/1.73
EOSINOPHIL # BLD AUTO: 0.35 10*3/MM3 (ref 0–0.4)
EOSINOPHIL NFR BLD AUTO: 3.8 % (ref 0.3–6.2)
ERYTHROCYTE [DISTWIDTH] IN BLOOD BY AUTOMATED COUNT: 13.4 % (ref 12.3–15.4)
FLUAV RNA RESP QL NAA+PROBE: NOT DETECTED
FLUBV RNA NPH QL NAA+NON-PROBE: NOT DETECTED
GEN 5 1HR TROPONIN T REFLEX: 29 NG/L
GLOBULIN UR ELPH-MCNC: 2.6 GM/DL
GLUCOSE SERPL-MCNC: 130 MG/DL (ref 65–99)
GLUCOSE UR STRIP-MCNC: ABNORMAL MG/DL
HCT VFR BLD AUTO: 35.8 % (ref 37.5–51)
HGB BLD-MCNC: 11.7 G/DL (ref 13–17.7)
HGB UR QL STRIP.AUTO: NEGATIVE
IMM GRANULOCYTES # BLD AUTO: 0.06 10*3/MM3 (ref 0–0.05)
IMM GRANULOCYTES NFR BLD AUTO: 0.6 % (ref 0–0.5)
KETONES UR QL STRIP: NEGATIVE
LEUKOCYTE ESTERASE UR QL STRIP.AUTO: NEGATIVE
LYMPHOCYTES # BLD AUTO: 1.92 10*3/MM3 (ref 0.7–3.1)
LYMPHOCYTES NFR BLD AUTO: 20.8 % (ref 19.6–45.3)
MCH RBC QN AUTO: 29.3 PG (ref 26.6–33)
MCHC RBC AUTO-ENTMCNC: 32.7 G/DL (ref 31.5–35.7)
MCV RBC AUTO: 89.7 FL (ref 79–97)
MONOCYTES # BLD AUTO: 0.64 10*3/MM3 (ref 0.1–0.9)
MONOCYTES NFR BLD AUTO: 6.9 % (ref 5–12)
NEUTROPHILS NFR BLD AUTO: 6.24 10*3/MM3 (ref 1.7–7)
NEUTROPHILS NFR BLD AUTO: 67.5 % (ref 42.7–76)
NITRITE UR QL STRIP: NEGATIVE
NRBC BLD AUTO-RTO: 0 /100 WBC (ref 0–0.2)
NT-PROBNP SERPL-MCNC: 5265 PG/ML (ref 0–1800)
PH UR STRIP.AUTO: 5.5 [PH] (ref 5–8)
PLATELET # BLD AUTO: 169 10*3/MM3 (ref 140–450)
PMV BLD AUTO: 9.5 FL (ref 6–12)
POTASSIUM SERPL-SCNC: 4.3 MMOL/L (ref 3.5–5.2)
PROT SERPL-MCNC: 6.8 G/DL (ref 6–8.5)
PROT UR QL STRIP: NEGATIVE
RBC # BLD AUTO: 3.99 10*6/MM3 (ref 4.14–5.8)
RSV RNA RESP QL NAA+PROBE: NOT DETECTED
SARS-COV-2 RNA RESP QL NAA+PROBE: NOT DETECTED
SODIUM SERPL-SCNC: 136 MMOL/L (ref 136–145)
SP GR UR STRIP: 1.01 (ref 1–1.03)
TROPONIN T % DELTA: 0
TROPONIN T NUMERIC DELTA: 0 NG/L
TROPONIN T SERPL HS-MCNC: 29 NG/L
UROBILINOGEN UR QL STRIP: ABNORMAL
WBC NRBC COR # BLD AUTO: 9.25 10*3/MM3 (ref 3.4–10.8)

## 2025-07-16 PROCEDURE — 87637 SARSCOV2&INF A&B&RSV AMP PRB: CPT | Performed by: NURSE PRACTITIONER

## 2025-07-16 PROCEDURE — 51798 US URINE CAPACITY MEASURE: CPT

## 2025-07-16 PROCEDURE — 36415 COLL VENOUS BLD VENIPUNCTURE: CPT

## 2025-07-16 PROCEDURE — 71045 X-RAY EXAM CHEST 1 VIEW: CPT

## 2025-07-16 PROCEDURE — 80053 COMPREHEN METABOLIC PANEL: CPT | Performed by: NURSE PRACTITIONER

## 2025-07-16 PROCEDURE — 99284 EMERGENCY DEPT VISIT MOD MDM: CPT

## 2025-07-16 PROCEDURE — 81003 URINALYSIS AUTO W/O SCOPE: CPT | Performed by: NURSE PRACTITIONER

## 2025-07-16 PROCEDURE — 85025 COMPLETE CBC W/AUTO DIFF WBC: CPT | Performed by: NURSE PRACTITIONER

## 2025-07-16 PROCEDURE — 83605 ASSAY OF LACTIC ACID: CPT | Performed by: NURSE PRACTITIONER

## 2025-07-16 PROCEDURE — 84484 ASSAY OF TROPONIN QUANT: CPT | Performed by: NURSE PRACTITIONER

## 2025-07-16 PROCEDURE — 83880 ASSAY OF NATRIURETIC PEPTIDE: CPT | Performed by: NURSE PRACTITIONER

## 2025-07-16 PROCEDURE — 93005 ELECTROCARDIOGRAM TRACING: CPT | Performed by: NURSE PRACTITIONER

## 2025-07-16 PROCEDURE — 74176 CT ABD & PELVIS W/O CONTRAST: CPT

## 2025-07-16 PROCEDURE — P9612 CATHETERIZE FOR URINE SPEC: HCPCS

## 2025-07-16 RX ORDER — SODIUM CHLORIDE 0.9 % (FLUSH) 0.9 %
10 SYRINGE (ML) INJECTION AS NEEDED
Status: DISCONTINUED | OUTPATIENT
Start: 2025-07-16 | End: 2025-07-16 | Stop reason: HOSPADM

## 2025-07-16 NOTE — TELEPHONE ENCOUNTER
PT IS CURRENTLY IN ER, HAS BEEN CATHETERIZED.  ER PHYSICIAN ADVISES CATHETER COME OUT IN A FEW DAYS. OFFICE ADVISED A TE FOR SCHEDULING.    PLEASE ADVISE DAUGHTER ON APPT TIME.    CALL BACK NUMBER: [phone]

## 2025-07-16 NOTE — TELEPHONE ENCOUNTER
Caller: Paty Tobar     Relationship: DAUGHTER    Best call back number: [phone]    What is your medical concern? PT DAUGHTER REQUESTING PT TO BE SEEN ON A FRIDAY    How long has this issue been going on? TODAY    Is your provider already aware of this issue? NO    Have you been treated for this issue? NO

## 2025-07-16 NOTE — ED PROVIDER NOTES
Subjective   History of Present Illness  Pleasant patient who presents to the ER with difficulty urinating and lower abdominal pain.  He is seeing  in the past.  He reports urgency and dribbling.  Tells me his last full urination was maybe yesterday.  He is currently on Omnicef for respiratory infection.  History of CHF.  He denies any chest pain difficulty breathing.        Review of Systems   Constitutional:  Negative for chills, diaphoresis and fever.   HENT:  Negative for congestion and sore throat.    Respiratory:  Negative for cough, choking, chest tightness, shortness of breath and wheezing.    Cardiovascular:  Negative for chest pain and leg swelling.   Gastrointestinal:  Positive for abdominal pain. Negative for abdominal distention, anal bleeding, blood in stool, constipation, diarrhea, nausea and vomiting.   Genitourinary:  Negative for difficulty urinating, dysuria, flank pain, frequency, hematuria and urgency.   All other systems reviewed and are negative.      Past Medical History:   Diagnosis Date    Allergic rhinitis     Amnesia     Arrhythmia     Atrial fibrillation     Basal cell carcinoma 2015    Benign prostatic hyperplasia     CHF (congestive heart failure) 3-2024    Chronic kidney disease, stage 3b     COPD (chronic obstructive pulmonary disease)     Diabetes mellitus N/A    Drug dependency     GERD (gastroesophageal reflux disease)     Heart murmur     Hyperlipidemia     Hypertension     Hyperthyroidism     Iron deficiency     Long term (current) use of insulin     Lung nodule     Melanoma 2012    Nephrosclerosis     Pneumonia     Renal insufficiency        Allergies   Allergen Reactions    Penicillins Swelling     Patient has tolerated PO Cefdinir    Sulfa Antibiotics Swelling       Past Surgical History:   Procedure Laterality Date    BRONCHOSCOPY      CATARACT EXTRACTION Bilateral     COLONOSCOPY      PROSTHODONTIC PROCEDURE      SKIN CANCER EXCISION      nose       Family History    Problem Relation Age of Onset    Diabetes Mother     Stroke Mother     Hypertension Mother     COPD Mother     Kidney disease Father     Breast cancer Sister     ALS Sister     No Known Problems Sister     No Known Problems Sister     No Known Problems Sister     Diabetes Sister     No Known Problems Brother     Anxiety disorder Daughter     Hyperlipidemia Daughter     Cancer Son        Social History     Socioeconomic History    Marital status:    Tobacco Use    Smoking status: Former     Current packs/day: 0.00     Average packs/day: 0.5 packs/day for 10.0 years (5.0 ttl pk-yrs)     Types: Cigarettes     Start date: 1953     Quit date: 1962     Years since quittin.5     Passive exposure: Past    Smokeless tobacco: Never   Vaping Use    Vaping status: Never Used   Substance and Sexual Activity    Alcohol use: Not Currently     Comment: Rarely    Drug use: No    Sexual activity: Not Currently     Partners: Female     Birth control/protection: Hysterectomy           Objective   Physical Exam  Constitutional:       General: He is not in acute distress.     Appearance: He is well-developed. He is not ill-appearing or toxic-appearing.   HENT:      Head: Normocephalic and atraumatic.      Right Ear: External ear normal.      Left Ear: External ear normal.      Nose: Nose normal.   Eyes:      Conjunctiva/sclera: Conjunctivae normal.      Pupils: Pupils are equal, round, and reactive to light.   Cardiovascular:      Rate and Rhythm: Normal rate and regular rhythm.      Heart sounds: Normal heart sounds.   Pulmonary:      Effort: Pulmonary effort is normal. No respiratory distress.      Breath sounds: Normal breath sounds.   Abdominal:      General: Bowel sounds are normal.      Palpations: Abdomen is soft.      Tenderness: There is abdominal tenderness.   Musculoskeletal:         General: Normal range of motion.      Cervical back: Normal range of motion and neck supple.   Skin:     General: Skin is  warm and dry.   Neurological:      Mental Status: He is alert and oriented to person, place, and time.   Psychiatric:         Mood and Affect: Mood normal.         Behavior: Behavior normal.         Thought Content: Thought content normal.         Judgment: Judgment normal.         Procedures           ED Course  ED Course as of 07/16/25 1317   Wed Jul 16, 2025   0858 Post void 919. Will place a fc. [JM]   0905 EKG performed 7/16/2025 at 7:49 AM a-fib, PACs, normal QTc, normal QRS, no acute ischemic changes. [NS]   1141 Patient has urinary retention.  Holland catheter placed.  He also has a large amount of stool in his rectum.  I ordered an enema to help. [JM]   1313 Large amount of stool after the enema. Pt resulting comfortably. Secure chat sent to Dr. Moore. Pt will go home with a leg bag. [JM]      ED Course User Index  [JM] Paresh Soto APRN  [NS] Ashly Kennedy MD                                             CT Abdomen Pelvis Without Contrast   Final Result   Impression:   1.No acute abnormality identified in the abdomen or pelvis.   2.Moderate to large fecal load most pronounced in the rectum.   3.Bilateral pleural effusions, right larger than left with similar lower lung atelectasis including round atelectasis in the left lower lobe.            Electronically Signed: Nir Ibarra MD     7/16/2025 10:52 AM EDT     Workstation ID: [id number]      XR Chest 1 View   Final Result   Impression:   1.Chronic interstitial disease.   2.Small bilateral pleural effusions.   3.Airspace disease in the left base which could reflect atelectasis or pneumonia.            Electronically Signed: Paresh Suarez MD     7/16/2025 7:49 AM EDT     Workstation ID: [id number]                  Medical Decision Making  Problems Addressed:  Acute abdominal pain: complicated acute illness or injury  Acute constipation: complicated acute illness or injury  Acute urinary retention: complicated acute illness or injury    Amount and/or  Complexity of Data Reviewed  Labs: ordered.  Radiology: ordered.  ECG/medicine tests: ordered.    Risk  Prescription drug management.        Final diagnoses:   Acute urinary retention   Acute abdominal pain   Acute constipation       ED Disposition  ED Disposition       ED Disposition   Discharge    Condition   Stable    Comment   --               Sofiya Krishnan, APRN  [address] [facility] [address]  [phone]    Schedule an appointment as soon as possible for a visit       [facility] EMERGENCY DEPARTMENT  [address] [facility] [address] [phone]    If symptoms worsen    Dakota Moore MD  [address] [address]  [phone]    Schedule an appointment as soon as possible for a visit            Medication List      No changes were made to your prescriptions during this visit.            Paresh Soto, LINDA  07/16/25 [id number]

## 2025-07-18 DIAGNOSIS — K21.9 GASTROESOPHAGEAL REFLUX DISEASE WITHOUT ESOPHAGITIS: ICD-10-CM

## 2025-07-18 RX ORDER — ESOMEPRAZOLE MAGNESIUM 40 MG/1
40 CAPSULE, DELAYED RELEASE ORAL
Qty: 180 CAPSULE | Refills: 1 | OUTPATIENT
Start: 2025-07-18

## 2025-07-18 NOTE — TELEPHONE ENCOUNTER
Caller: Paty Tobar    Relationship: Emergency Contact    Best call back number:[phone]     Requested Prescriptions:   Requested Prescriptions     Pending Prescriptions Disp Refills    esomeprazole (nexIUM) 40 MG capsule 180 capsule 1     Sig: Take 1 capsule by mouth 2 (Two) Times a Day Before Meals.        Pharmacy where request should be sent: [facility] [address] [place] [address] [phone] [facility] [phone]      Last office visit with prescribing clinician: 5/27/2025   Last telemedicine visit with prescribing clinician: Visit date not found   Next office visit with prescribing clinician: Visit date not found     Additional details provided by patient:     Does the patient have less than a 3 day supply:  [x] Yes  [] No    Would you like a call back once the refill request has been completed: [] Yes [x] No    If the office needs to give you a call back, can they leave a voicemail: [] Yes [x] No    Alva Benavidez   07/18/25 14:15 EDT

## 2025-07-21 ENCOUNTER — CLINICAL SUPPORT (OUTPATIENT)
Dept: UROLOGY | Facility: CLINIC | Age: OVER 89
End: 2025-07-21
Payer: MEDICARE

## 2025-07-21 LAB
QT INTERVAL: 420 MS
QTC INTERVAL: 472 MS

## 2025-07-22 ENCOUNTER — TELEPHONE (OUTPATIENT)
Dept: CASE MANAGEMENT | Facility: OTHER | Age: OVER 89
End: 2025-07-22
Payer: MEDICARE

## 2025-07-23 ENCOUNTER — PATIENT OUTREACH (OUTPATIENT)
Dept: CASE MANAGEMENT | Facility: OTHER | Age: OVER 89
End: 2025-07-23
Payer: MEDICARE

## 2025-07-23 DIAGNOSIS — J44.9 CHRONIC OBSTRUCTIVE PULMONARY DISEASE, UNSPECIFIED COPD TYPE: Primary | ICD-10-CM

## 2025-07-23 DIAGNOSIS — K21.9 GASTROESOPHAGEAL REFLUX DISEASE WITHOUT ESOPHAGITIS: ICD-10-CM

## 2025-07-23 NOTE — OUTREACH NOTE
AMBULATORY CASE MANAGEMENT NOTE    Names and Relationships of Patient/Support Persons: Contact: Paty Tobar; Relationship: Emergency Contact -     CCM Interim Update    Spoke to patient's daughter regarding CCM services. She states patient is doing well at this time. She will contact office if she needs support in the future. No additional needs at this time.         Education Documentation  No documentation found.        Nasra LUNA  Ambulatory Case Management    7/23/2025, 15:08 EDT

## 2025-07-24 NOTE — PROGRESS NOTES
PT presented to our office for batres catheter removal and voiding trial. PT is accompanied by his family. PT's batres catheter was draining yellow urine without Clots. I first explained to the PT what I will be doing throughout the voiding trial and answered any questions PT and family may have. Then I removed the bag from the catheter and using a piston syringe, inserted 200 mL of sterile water into the PT's bladder. When PT notified me they began to feel a strong urge to urinate, I deflated the catheter balloon holding 10 mL mL of water. Once the balloon was empty, I removed the catheter in a smooth and gentle movement. I instructed the PT to try and urinate into the hand-held urinal . PT was able to void 100 mL of yellow urine. PT tolerated well. I advised PT and family to drink lots of water, rest, call us if they are suddenly unable to urinate. PT verbalized understanding.

## 2025-07-30 ENCOUNTER — OFFICE VISIT (OUTPATIENT)
Dept: UROLOGY | Facility: CLINIC | Age: OVER 89
End: 2025-07-30
Payer: MEDICARE

## 2025-07-30 VITALS — BODY MASS INDEX: 18.96 KG/M2 | HEIGHT: 72 IN | WEIGHT: 140 LBS

## 2025-07-30 DIAGNOSIS — N40.0 BENIGN PROSTATIC HYPERPLASIA WITHOUT LOWER URINARY TRACT SYMPTOMS: Primary | ICD-10-CM

## 2025-07-30 PROCEDURE — 1159F MED LIST DOCD IN RCRD: CPT | Performed by: UROLOGY

## 2025-07-30 PROCEDURE — 99214 OFFICE O/P EST MOD 30 MIN: CPT | Performed by: UROLOGY

## 2025-07-30 PROCEDURE — 1160F RVW MEDS BY RX/DR IN RCRD: CPT | Performed by: UROLOGY

## 2025-07-30 NOTE — PROGRESS NOTES
Follow Up Office Visit      Patient Name: Ricki Trotter  : 1935   MRN: [id number]     Chief Complaint:    Chief Complaint   Patient presents with    Benign Prostatic Hypertrophy       Referring Provider: No ref. provider found    History of Present Illness: Ricki Trotter is a 89 y.o. male who presents today for follow up of urinary retention.  He reports he has done well.   He went to the ER about 2 weeks ago and was in urinary retention due to severe bowel impaction and constipation.  He reports once he was able to move his bowel he did much better.  No dysuria or gross hematuria.    He reports he is voiding without issue at this point.     PVR: 150 cc (voided about 30 min ago)    Subjective      Review of System:   Review of Systems   I have reviewed the ROS documented by my clinical staff, I have updated appropriately and I agree. Dakota Moore MD    I have reviewed and the following portions of the patient's history were updated as appropriate: past family history, past medical history, past social history, past surgical history and problem list.    Past Medical History:   Past Medical History:   Diagnosis Date    Allergic rhinitis     Amnesia     Arrhythmia     Atrial fibrillation     Basal cell carcinoma     Benign prostatic hyperplasia     CHF (congestive heart failure) 3-    Chronic kidney disease, stage 3b     COPD (chronic obstructive pulmonary disease)     Diabetes mellitus N/A    Drug dependency     GERD (gastroesophageal reflux disease)     Heart murmur     Hyperlipidemia     Hypertension     Hyperthyroidism     Iron deficiency     Long term (current) use of insulin     Lung nodule     Melanoma 2012    Nephrosclerosis     Pneumonia     Renal insufficiency        Past Surgical History:  Past Surgical History:   Procedure Laterality Date    BRONCHOSCOPY      CATARACT EXTRACTION Bilateral     COLONOSCOPY      PROSTHODONTIC PROCEDURE      SKIN CANCER EXCISION      nose        Family History:   family history includes ALS in his sister; Anxiety disorder in his daughter; Breast cancer in his sister; COPD in his mother; Cancer in his son; Diabetes in his mother and sister; Hyperlipidemia in his daughter; Hypertension in his mother; Kidney disease in his father; No Known Problems in his brother, sister, sister, and sister; Stroke in his mother.   Otherwise pertinent FHx was reviewed and not pertinent to current issue.    Social History:    reports that he quit smoking about 63 years ago. His smoking use included cigarettes. He started smoking about 72 years ago. He has a 5 pack-year smoking history. He has been exposed to tobacco smoke. He has never used smokeless tobacco. He reports that he does not currently use alcohol. He reports that he does not use drugs.    Medications:     Current Outpatient Medications:     apixaban (ELIQUIS) 2.5 MG tablet tablet, Take 1 tablet by mouth 2 (Two) Times a Day., Disp: 180 tablet, Rfl: 3    Baclofen (LIORESAL) 5 MG tablet, Take 1 tablet by mouth Every 8 (Eight) Hours., Disp: , Rfl:     BD Pen Needle Rasheeda 2nd Gen 32G X 4 MM misc, Use as directed, Disp: 100 each, Rfl: 3    busPIRone (BUSPAR) 7.5 MG tablet, Take 1 tablet by mouth 3 (Three) Times a Day., Disp: 270 tablet, Rfl: 1    Bystolic 10 MG tablet, Take 1 tablet by mouth Daily., Disp: 90 tablet, Rfl: 1    dapagliflozin Propanediol (Farxiga) 10 MG tablet, Take 10 mg by mouth Daily., Disp: 30 tablet, Rfl: 11    esomeprazole (nexIUM) 40 MG capsule, Take 1 capsule by mouth 2 (Two) Times a Day Before Meals., Disp: 180 capsule, Rfl: 1    ferrous sulfate 325 (65 FE) MG tablet, Take 1 tablet by mouth Daily., Disp: , Rfl:     fluticasone (FLONASE) 50 MCG/ACT nasal spray, Administer 2 sprays into the nostril(s) as directed by provider Daily., Disp: 16 g, Rfl: 11    furosemide (LASIX) 40 MG tablet, Take 1 tablet by mouth 2 (Two) Times a Day Before Meals., Disp: 180 tablet, Rfl: 0    glucose blood test  strip, 1 each by Other route 4 (Four) Times a Day. Use as instructed, Disp: 200 each, Rfl: 12    Insulin Glargine, 2 Unit Dial, (Toujeo Max SoloStar) 300 UNIT/ML solution pen-injector injection, Inject 16 Units under the skin into the appropriate area as directed Daily., Disp: 6 mL, Rfl: 1    levocetirizine (XYZAL) 5 MG tablet, Take 1 tablet by mouth Every Evening., Disp: 90 tablet, Rfl: 1    melatonin 5 MG tablet tablet, Take 1 tablet by mouth Every Night., Disp: 30 tablet, Rfl: 0    montelukast (SINGULAIR) 10 MG tablet, Take 1 tablet by mouth Every Evening., Disp: 90 tablet, Rfl: 1    mupirocin (BACTROBAN) 2 % ointment, , Disp: , Rfl:     simvastatin (ZOCOR) 40 MG tablet, Take 1 tablet by mouth Every Evening., Disp: 90 tablet, Rfl: 1    SITagliptin (Januvia) 100 MG tablet, Take 1 tablet by mouth Daily., Disp: 90 tablet, Rfl: 1    spironolactone (ALDACTONE) 25 MG tablet, Take 1 tablet by mouth Daily., Disp: 90 tablet, Rfl: 3    tamsulosin (FLOMAX) 0.4 MG capsule 24 hr capsule, TAKE ONE CAPSULE BY MOUTH EVERY DAY, Disp: 90 capsule, Rfl: 1    traZODone (DESYREL) 100 MG tablet, TAKE 1 TABLET BY MOUTH ONCE DAILY AT NIGHT, Disp: 90 tablet, Rfl: 0    Allergies:   Allergies   Allergen Reactions    Penicillins Swelling     Patient has tolerated PO Cefdinir    Sulfa Antibiotics Swelling       IPSS Questionnaire (AUA-7):  Over the past month…    1)  Incomplete Emptying  How often have you had a sensation of not emptying your bladder?  0 - Not at all   2)  Frequency  How often have you had to urinate less than every two hours? 3 - About half the time   3)  Intermittency  How often have you found you stopped and started again several times when you urinated?  0 - Not at all   4) Urgency  How often have you found it difficult to postpone urination?  3 - About half the time   5) Weak Stream  How often have you had a weak urinary stream?  0 - Not at all   6) Straining  How often have you had to push or strain to begin urination?   "1 - Less than 1 time in 5   7) Nocturia  How many times did you typically get up at night to urinate?  1 - 1 time   Total Score:  8   The International Prostate Symptom Score (IPSS) is used to screen, diagnose, track symptoms of benign prostatic hyperplasia (BPH).    0-7 pts (Mild Symptoms)  / 8-19 pts (Moderate) / 20-35 (Severe)    Quality of life due to urinary symptoms:  If you were to spend the rest of your life with your urinary condition the way it is now, how would you feel about that? 1-Pleased   Urine Leakage (Incontinence) 0-No Leakage       Objective     Physical Exam:   Vital Signs:   Vitals:    07/30/25 1507   Weight: 63.5 kg (140 lb)   Height: 182.9 cm (72.01\")   PainSc: 0-No pain     Body mass index is 18.98 kg/m².     Physical Exam    Labs:   Brief Urine Lab Results  (Last result in the past 365 days)        Color   Clarity   Blood   Leuk Est   Nitrite   Protein   CREAT   Urine HCG        07/16/25 0914 Yellow   Clear   Negative   Negative   Negative   Negative                        Lab Results   Component Value Date    GLUCOSE 130 (H) 07/16/2025    CALCIUM 9.0 07/16/2025     07/16/2025    K 4.3 07/16/2025    CO2 28.0 07/16/2025    CL 97 (L) 07/16/2025    BUN 32.9 (H) 07/16/2025    CREATININE 1.32 (H) 07/16/2025    BCR 24.9 07/16/2025    ANIONGAP 11.0 07/16/2025       Lab Results   Component Value Date    WBC 9.25 07/16/2025    HGB 11.7 (L) 07/16/2025    HCT 35.8 (L) 07/16/2025    MCV 89.7 07/16/2025     07/16/2025       Lab Results   Component Value Date    PSA 2.6 03/27/2024       Images:   CT Abdomen Pelvis Without Contrast  Result Date: 7/16/2025  Impression: 1.No acute abnormality identified in the abdomen or pelvis. 2.Moderate to large fecal load most pronounced in the rectum. 3.Bilateral pleural effusions, right larger than left with similar lower lung atelectasis including round atelectasis in the left lower lobe. Electronically Signed: Nir Ibarra MD  7/16/2025 10:52 AM EDT  " Workstation ID: [id number]    XR Chest 1 View  Result Date: 7/16/2025  Impression: 1.Chronic interstitial disease. 2.Small bilateral pleural effusions. 3.Airspace disease in the left base which could reflect atelectasis or pneumonia. Electronically Signed: Paresh Suarez MD  7/16/2025 7:49 AM EDT  Workstation ID: [id number]    XR Chest PA & Lateral  Result Date: 5/19/2025  Impression: COPD with interstitial thickening which may indicate superimposed viral infectious or inflammatory process. Electronically Signed: Eve Rosales MD  5/19/2025 3:48 PM EDT  Workstation ID: [id number]      Measures:   Tobacco:   Ricki Trotter  reports that he quit smoking about 63 years ago. His smoking use included cigarettes. He started smoking about 72 years ago. He has a 5 pack-year smoking history. He has been exposed to tobacco smoke. He has never used smokeless tobacco.     Urine Incontinence: Patient reports that he is not currently experiencing any symptoms of urinary incontinence.         Assessment / Plan      Assessment/Plan:   89 y.o. male who presented today for follow up of urinary retention and constipation.  He is doing much better now and is voiding well.  He is going to discuss starting Linzess with his PCP, I believe this would be helpful to prevent future episodes of retention/constipation.      Diagnoses and all orders for this visit:    1. Benign prostatic hyperplasia without lower urinary tract symptoms (Primary)         Follow Up:   Return in about 6 months (around 1/30/2026) for Recheck.    I spent approximately 30 minutes providing clinical care for this patient; including review of patient's chart and provider documentation, face to face time spent with patient in examination room (obtaining history, performing physical exam, discussing diagnosis and management options), placing orders, and completing patient documentation.     Dakota Moore MD  [facility] – [facility]

## 2025-08-02 DIAGNOSIS — N40.0 BENIGN PROSTATIC HYPERPLASIA WITHOUT LOWER URINARY TRACT SYMPTOMS: ICD-10-CM

## 2025-08-04 RX ORDER — TAMSULOSIN HYDROCHLORIDE 0.4 MG/1
1 CAPSULE ORAL DAILY
Qty: 90 CAPSULE | Refills: 0 | Status: SHIPPED | OUTPATIENT
Start: 2025-08-04

## 2025-08-05 ENCOUNTER — OFFICE VISIT (OUTPATIENT)
Dept: CARDIOLOGY | Facility: HOSPITAL | Age: OVER 89
End: 2025-08-05
Payer: MEDICARE

## 2025-08-05 VITALS
RESPIRATION RATE: 16 BRPM | WEIGHT: 133.38 LBS | HEIGHT: 72 IN | OXYGEN SATURATION: 97 % | BODY MASS INDEX: 18.07 KG/M2 | SYSTOLIC BLOOD PRESSURE: 147 MMHG | HEART RATE: 52 BPM | DIASTOLIC BLOOD PRESSURE: 67 MMHG

## 2025-08-05 DIAGNOSIS — I50.32 CHRONIC HEART FAILURE WITH PRESERVED EJECTION FRACTION: Primary | ICD-10-CM

## 2025-08-05 DIAGNOSIS — E78.49 OTHER HYPERLIPIDEMIA: ICD-10-CM

## 2025-08-05 DIAGNOSIS — I10 PRIMARY HYPERTENSION: ICD-10-CM

## 2025-08-05 LAB — ABSOLUTE LUNG FLUID CONTENT: 32 % (ref 20–35)

## 2025-08-05 RX ORDER — FUROSEMIDE 40 MG/1
40 TABLET ORAL DAILY
Qty: 90 TABLET | Refills: 3 | Status: SHIPPED | OUTPATIENT
Start: 2025-08-05

## 2025-08-15 RX ORDER — CEFDINIR 300 MG/1
300 CAPSULE ORAL 2 TIMES DAILY
Qty: 20 CAPSULE | Refills: 0 | Status: SHIPPED | OUTPATIENT
Start: 2025-08-15 | End: 2025-08-25

## 2025-08-22 DIAGNOSIS — K21.9 GASTROESOPHAGEAL REFLUX DISEASE WITHOUT ESOPHAGITIS: ICD-10-CM

## 2025-08-23 RX ORDER — ESOMEPRAZOLE MAGNESIUM 40 MG/1
40 CAPSULE, DELAYED RELEASE ORAL
Qty: 180 CAPSULE | Refills: 1 | Status: SHIPPED | OUTPATIENT
Start: 2025-08-23